# Patient Record
Sex: FEMALE | Race: OTHER | Employment: UNEMPLOYED | URBAN - METROPOLITAN AREA
[De-identification: names, ages, dates, MRNs, and addresses within clinical notes are randomized per-mention and may not be internally consistent; named-entity substitution may affect disease eponyms.]

---

## 2017-01-06 ENCOUNTER — TELEPHONE (OUTPATIENT)
Dept: NEUROLOGY | Facility: CLINIC | Age: 57
End: 2017-01-06

## 2017-01-06 NOTE — TELEPHONE ENCOUNTER
Patient stated that for the past week she has been having a migraine on/off, varying in severity. States she took Sumatriptan 100 mg, 1 tablet yesterday which helped a little bit.   She is also taking Nortriptyline 25 mg 1 capsule nightly and Topamax 50 mg

## 2017-01-08 ENCOUNTER — HOSPITAL ENCOUNTER (OUTPATIENT)
Age: 57
Discharge: HOME | End: 2017-01-08
Attending: EMERGENCY MEDICINE
Payer: MEDICAID

## 2017-01-08 VITALS
TEMPERATURE: 98 F | HEART RATE: 92 BPM | BODY MASS INDEX: 31.01 KG/M2 | WEIGHT: 175 LBS | OXYGEN SATURATION: 100 % | SYSTOLIC BLOOD PRESSURE: 146 MMHG | HEIGHT: 63 IN | DIASTOLIC BLOOD PRESSURE: 40 MMHG | RESPIRATION RATE: 16 BRPM

## 2017-01-08 DIAGNOSIS — J06.9 VIRAL UPPER RESPIRATORY TRACT INFECTION: Primary | ICD-10-CM

## 2017-01-08 PROCEDURE — 99212 OFFICE O/P EST SF 10 MIN: CPT

## 2017-01-08 PROCEDURE — 99213 OFFICE O/P EST LOW 20 MIN: CPT

## 2017-01-08 RX ORDER — FLUTICASONE PROPIONATE 50 MCG
1-2 SPRAY, SUSPENSION (ML) NASAL DAILY
Qty: 16 G | Refills: 0 | Status: SHIPPED | OUTPATIENT
Start: 2017-01-08 | End: 2017-01-11

## 2017-01-08 NOTE — ED PROVIDER NOTES
[facility]    History   Patient presents with:  Cough/URI    Stated Complaint: body aches, headache    HPI    Patient complains of feeling like she has a virus for the last for 5 days.   There is been a mi MG Oral Cap,  Take 1 capsule (25 mg total) by mouth nightly.    fenofibrate micronized 134 MG Oral Cap,  Take 1 capsule (134 mg total) by mouth daily with breakfast.   SUMAtriptan Succinate 100 MG Oral Tab,  TAKE 1 TABLET BY MOUTH PER  DAY   AS NEEDED  FOR Oral   SpO2 01/08/17 1519 100 %   O2 Device --        Current:/40 mmHg  Pulse 92  Temp(Src) 98 °F (36.7 °C) (Oral)  Resp 16  Ht 160 cm (5' 3\")  Wt 79.379 kg  BMI 31.01 kg/m2  SpO2 100%        Physical Exam   Constitutional: She is oriented to person [id number]  [phone]    In 5 days  Follow up with your doctor is important, For follow-up      Medications Prescribed:  Current Discharge Medication List    START taking these medications    Fluticasone Propionate 50 MCG/ACT Nasal Suspension  1-2 sprays by

## 2017-01-11 ENCOUNTER — OFFICE VISIT (OUTPATIENT)
Dept: NEUROLOGY | Facility: CLINIC | Age: 57
End: 2017-01-11

## 2017-01-11 ENCOUNTER — APPOINTMENT (OUTPATIENT)
Dept: LAB | Facility: HOSPITAL | Age: 57
End: 2017-01-11
Attending: Other
Payer: MEDICAID

## 2017-01-11 ENCOUNTER — OFFICE VISIT (OUTPATIENT)
Dept: INTERNAL MEDICINE CLINIC | Facility: CLINIC | Age: 57
End: 2017-01-11

## 2017-01-11 VITALS
RESPIRATION RATE: 20 BRPM | HEART RATE: 85 BPM | DIASTOLIC BLOOD PRESSURE: 76 MMHG | SYSTOLIC BLOOD PRESSURE: 113 MMHG | HEIGHT: 64 IN | BODY MASS INDEX: 31.58 KG/M2 | TEMPERATURE: 98 F | WEIGHT: 185 LBS

## 2017-01-11 VITALS
RESPIRATION RATE: 16 BRPM | HEART RATE: 72 BPM | SYSTOLIC BLOOD PRESSURE: 130 MMHG | DIASTOLIC BLOOD PRESSURE: 76 MMHG | WEIGHT: 175 LBS | BODY MASS INDEX: 31 KG/M2

## 2017-01-11 DIAGNOSIS — G43.709 CHRONIC MIGRAINE WITHOUT AURA WITHOUT STATUS MIGRAINOSUS, NOT INTRACTABLE: ICD-10-CM

## 2017-01-11 DIAGNOSIS — E11.8 TYPE 2 DIABETES MELLITUS WITH COMPLICATION, WITHOUT LONG-TERM CURRENT USE OF INSULIN (HCC): Primary | ICD-10-CM

## 2017-01-11 DIAGNOSIS — G43.709 CHRONIC MIGRAINE WITHOUT AURA WITHOUT STATUS MIGRAINOSUS, NOT INTRACTABLE: Primary | ICD-10-CM

## 2017-01-11 LAB — ERYTHROCYTE [SEDIMENTATION RATE] IN BLOOD: 41 MM/HR (ref 0–30)

## 2017-01-11 PROCEDURE — 99213 OFFICE O/P EST LOW 20 MIN: CPT | Performed by: OTHER

## 2017-01-11 PROCEDURE — 36415 COLL VENOUS BLD VENIPUNCTURE: CPT

## 2017-01-11 PROCEDURE — 85652 RBC SED RATE AUTOMATED: CPT

## 2017-01-11 PROCEDURE — 99214 OFFICE O/P EST MOD 30 MIN: CPT | Performed by: INTERNAL MEDICINE

## 2017-01-11 PROCEDURE — 99212 OFFICE O/P EST SF 10 MIN: CPT | Performed by: INTERNAL MEDICINE

## 2017-01-11 RX ORDER — OMEPRAZOLE 40 MG/1
CAPSULE, DELAYED RELEASE ORAL
Qty: 90 CAPSULE | Refills: 0 | Status: SHIPPED | OUTPATIENT
Start: 2017-01-11 | End: 2017-03-15

## 2017-01-11 RX ORDER — SUMATRIPTAN 100 MG/1
TABLET, FILM COATED ORAL
Qty: 9 TABLET | Refills: 12 | Status: SHIPPED | OUTPATIENT
Start: 2017-01-11 | End: 2017-04-04

## 2017-01-11 RX ORDER — TOPIRAMATE 50 MG/1
50 TABLET, FILM COATED ORAL 2 TIMES DAILY
Qty: 180 TABLET | Refills: 3 | Status: SHIPPED | OUTPATIENT
Start: 2017-01-11 | End: 2017-02-12

## 2017-01-11 RX ORDER — FLUTICASONE PROPIONATE 50 MCG
1-2 SPRAY, SUSPENSION (ML) NASAL DAILY
Qty: 16 G | Refills: 0 | Status: SHIPPED | OUTPATIENT
Start: 2017-01-11 | End: 2017-02-10

## 2017-01-11 RX ORDER — NORTRIPTYLINE HYDROCHLORIDE 25 MG/1
25 CAPSULE ORAL NIGHTLY
Qty: 90 CAPSULE | Refills: 3 | Status: SHIPPED | OUTPATIENT
Start: 2017-01-11 | End: 2017-02-21

## 2017-01-12 NOTE — PROGRESS NOTES
HPI:    Patient ID: Neri Atkins is a 64year old female. Medication Request  Associated symptoms include arthralgias (knees and  occasionaly  hands and    occasionaly  body ache ), congestion and headaches (seeing  neurologist ).  Pertinent negatives Succinate 100 MG Oral Tab TAKE 1 TABLET BY MOUTH PER  DAY   AS NEEDED  FOR  HEADACHE  MAXIMUM   2 PER  24 hours Disp: 9 tablet Rfl: 12   Omeprazole 40 MG Oral Capsule Delayed Release TAKE 1 CAPSULE BY MOUTH 30-60 MIN BEFORE BREAKFAST Disp: 90 capsule Rfl: membrane, external ear and ear canal normal.   Nose: Nose normal. No mucosal edema, rhinorrhea or nose lacerations. Right sinus exhibits no maxillary sinus tenderness and no frontal sinus tenderness.  Left sinus exhibits no maxillary sinus tenderness and no importance of low CHO diet,-1800 keegan ADA- Diabetic diet  Education   Encouraged diet/exercise   Encouraged SBGM   Eye exam and foot exam yearly  Take medications as perscribed  Directions and side effects of medications discussed w/pt  Pt verbalized unders

## 2017-01-27 ENCOUNTER — HOSPITAL ENCOUNTER (OUTPATIENT)
Age: 57
Discharge: HOME | End: 2017-01-27
Attending: EMERGENCY MEDICINE
Payer: MEDICAID

## 2017-01-27 VITALS
WEIGHT: 175 LBS | HEIGHT: 64 IN | TEMPERATURE: 98 F | HEART RATE: 84 BPM | SYSTOLIC BLOOD PRESSURE: 144 MMHG | OXYGEN SATURATION: 100 % | RESPIRATION RATE: 18 BRPM | DIASTOLIC BLOOD PRESSURE: 67 MMHG | BODY MASS INDEX: 29.88 KG/M2

## 2017-01-27 DIAGNOSIS — J11.1 INFLUENZA-LIKE ILLNESS: Primary | ICD-10-CM

## 2017-01-27 PROCEDURE — 99213 OFFICE O/P EST LOW 20 MIN: CPT

## 2017-01-27 PROCEDURE — 99214 OFFICE O/P EST MOD 30 MIN: CPT

## 2017-01-27 RX ORDER — FLUTICASONE PROPIONATE 50 MCG
2 SPRAY, SUSPENSION (ML) NASAL DAILY
Qty: 16 G | Refills: 0 | Status: SHIPPED | OUTPATIENT
Start: 2017-01-27 | End: 2017-01-31

## 2017-01-27 RX ORDER — OSELTAMIVIR PHOSPHATE 75 MG/1
75 CAPSULE ORAL 2 TIMES DAILY
Qty: 10 CAPSULE | Refills: 0 | Status: SHIPPED | OUTPATIENT
Start: 2017-01-27 | End: 2017-02-10 | Stop reason: ALTCHOICE

## 2017-01-27 RX ORDER — BENZONATATE 100 MG/1
100 CAPSULE ORAL 3 TIMES DAILY PRN
Qty: 20 CAPSULE | Refills: 0 | Status: SHIPPED | OUTPATIENT
Start: 2017-01-27 | End: 2017-02-16

## 2017-01-28 NOTE — ED PROVIDER NOTES
Patient Seen in: [facility]    History   Patient presents with:  Cough/URI    Stated Complaint: cough    HPI    60-year-old female patient presents complaining of 2 days of cough congestion associated fever chills body ac Oral Tab,  Take 1 tablet (10 mg total) by mouth nightly.    fenofibrate micronized 134 MG Oral Cap,  Take 1 capsule (134 mg total) by mouth daily with breakfast.   Blood Glucose Monitoring Suppl (TRUETRACK BLOOD GLUCOSE) W/DEVICE Does not apply Kit,  To jaren PERRL, clear oropharynx  Heart: Regular rate and rhythm, no murmur  Lungs: Normal respiratory effort, clear lungs  Abdomen: Soft,  nondistended, non tender  : No CVA tenderness  Skin: No rash, no lesions  Musculoskeletal: Symmetric, no deformity, no inju

## 2017-01-28 NOTE — ED INITIAL ASSESSMENT (HPI)
REPORTS COUGH, BODY ACHES AND VOICE HOARSENESS X 3 DAYS. REPORTS COUGH IS PRODUCTIVE WITH YELLOW SPUTUM. DENIES FEVERS AT HOME. REPORTS RECEIVING FLU IMMUNIZATION THIS YEAR.

## 2017-01-30 ENCOUNTER — TELEPHONE (OUTPATIENT)
Dept: INTERNAL MEDICINE CLINIC | Facility: CLINIC | Age: 57
End: 2017-01-30

## 2017-01-30 NOTE — TELEPHONE ENCOUNTER
Patient requesting an appt. To see Dr. Jung Gains this will be a follow up from    Patient states she can come to see her tomorrow any time tomorrow as long as she is seen tomorrow   Or it can be the last appt.  Of her day tomorrow 1/31/2017  Please call

## 2017-01-30 NOTE — TELEPHONE ENCOUNTER
Pt states seen in [facility] yesterday and taking Hailey-flu and benzonatate.   Pt states she is feeling a little better, she never had a temp and still does not have one, body aches persist, she has clear rhinorrhea and watery eyes, mild headache, productive cough w

## 2017-01-31 ENCOUNTER — OFFICE VISIT (OUTPATIENT)
Dept: INTERNAL MEDICINE CLINIC | Facility: CLINIC | Age: 57
End: 2017-01-31

## 2017-01-31 VITALS
HEART RATE: 82 BPM | OXYGEN SATURATION: 99 % | DIASTOLIC BLOOD PRESSURE: 77 MMHG | BODY MASS INDEX: 31.24 KG/M2 | TEMPERATURE: 98 F | HEIGHT: 64 IN | SYSTOLIC BLOOD PRESSURE: 111 MMHG | WEIGHT: 183 LBS | RESPIRATION RATE: 20 BRPM

## 2017-01-31 DIAGNOSIS — R05.9 COUGH: Primary | ICD-10-CM

## 2017-01-31 PROCEDURE — 99214 OFFICE O/P EST MOD 30 MIN: CPT | Performed by: INTERNAL MEDICINE

## 2017-01-31 PROCEDURE — 99212 OFFICE O/P EST SF 10 MIN: CPT | Performed by: INTERNAL MEDICINE

## 2017-01-31 RX ORDER — AZITHROMYCIN 250 MG/1
TABLET, FILM COATED ORAL
Qty: 6 TABLET | Refills: 0 | Status: SHIPPED | OUTPATIENT
Start: 2017-01-31 | End: 2017-02-10 | Stop reason: ALTCHOICE

## 2017-01-31 NOTE — PROGRESS NOTES
HPI:    Patient ID: Fabian Brooks is a 64year old female. Cough  The current episode started in the past 7 days. The problem has been gradually improving. The cough is non-productive.  Associated symptoms include chills, nasal congestion, postnasal dr times daily. Disp: 10 capsule Rfl: 0   benzonatate 100 MG Oral Cap Take 1 capsule (100 mg total) by mouth 3 (three) times daily as needed for cough.  Disp: 20 capsule Rfl: 0   topiramate 50 MG Oral Tab Take 1 tablet (50 mg total) by mouth 2 (two) times hayder appears well-developed and well-nourished. No distress. HENT:   Head: Normocephalic and atraumatic.    Right Ear: Tympanic membrane, external ear and ear canal normal.   Left Ear: Tympanic membrane, external ear and ear canal normal.   Nose: Nose normal. Oral Tab 6 tablet 0      Sig: Take two tablets by mouth today, then one daily until completed for 5 days. DM-Guaifenesin ER (MUCUS-DM)  MG Oral Tablet 12 Hr 14 tablet 0      Sig: Take 1 tablet by mouth every 12 (twelve) hours.            Imaging

## 2017-01-31 NOTE — TELEPHONE ENCOUNTER
Advised patient of Dr. Phillips More note. Patient verbalized understanding. Appointment made for tomorrow 1/31/17 at 4pm with Dr Gabriella Jiménez in [address] [place] [address]. Phone room will add patient for tomorrow.

## 2017-02-07 ENCOUNTER — TELEPHONE (OUTPATIENT)
Dept: NEUROLOGY | Facility: CLINIC | Age: 57
End: 2017-02-07

## 2017-02-07 NOTE — TELEPHONE ENCOUNTER
Patient stated that she has had a migraine for the past three days. States the pain is on the left side of her head and her left eye feels a bit heavy. States the frequency of her migraines and headaches seem to have increased since last visit.   She is cu

## 2017-02-07 NOTE — TELEPHONE ENCOUNTER
Patient was informed of Dr. August Clinton comment that ESR is still elevated, but improved from the last time, and that she can have office visit tomorrow or Friday. Patient would like to schedule the visit, and her call was transferred to our .

## 2017-02-07 NOTE — TELEPHONE ENCOUNTER
LMTCB. (See Dr. Kunal Asher comment regarding blood work, and offering an office visit, in the [facility])

## 2017-02-10 ENCOUNTER — TELEPHONE (OUTPATIENT)
Dept: OPHTHALMOLOGY | Facility: CLINIC | Age: 57
End: 2017-02-10

## 2017-02-10 ENCOUNTER — OFFICE VISIT (OUTPATIENT)
Dept: NEUROLOGY | Facility: CLINIC | Age: 57
End: 2017-02-10

## 2017-02-10 VITALS
RESPIRATION RATE: 16 BRPM | SYSTOLIC BLOOD PRESSURE: 108 MMHG | HEART RATE: 76 BPM | WEIGHT: 180 LBS | DIASTOLIC BLOOD PRESSURE: 70 MMHG | HEIGHT: 64 IN | BODY MASS INDEX: 30.73 KG/M2

## 2017-02-10 DIAGNOSIS — G43.709 CHRONIC MIGRAINE WITHOUT AURA WITHOUT STATUS MIGRAINOSUS, NOT INTRACTABLE: Primary | ICD-10-CM

## 2017-02-10 PROCEDURE — 99213 OFFICE O/P EST LOW 20 MIN: CPT | Performed by: OTHER

## 2017-02-10 NOTE — TELEPHONE ENCOUNTER
Pt called. She saw her neurologist this morning for migraines and her DrDamián recommended she f/up with RJKATEY for left eye blurry vision sooner then May, your next available. Please advise.

## 2017-02-10 NOTE — TELEPHONE ENCOUNTER
Spoke with patient. She states that she saw her neurologist today and he told her to follow up with Dr. Ramos Dean sooner than May.  Scheduled an EE on 3/7/17 at 2;45pm with Dr. Ramos Dean, Also scheduled patient's  Shanika Jerry for his diabetic EE on 5/17/17

## 2017-02-12 RX ORDER — TOPIRAMATE 50 MG/1
75 TABLET, FILM COATED ORAL 2 TIMES DAILY
Qty: 90 TABLET | Refills: 3 | Status: SHIPPED | OUTPATIENT
Start: 2017-02-12 | End: 2017-05-02

## 2017-02-16 ENCOUNTER — HOSPITAL ENCOUNTER (OUTPATIENT)
Age: 57
Discharge: HOME | End: 2017-02-16
Attending: EMERGENCY MEDICINE
Payer: MEDICAID

## 2017-02-16 VITALS
DIASTOLIC BLOOD PRESSURE: 52 MMHG | BODY MASS INDEX: 30.73 KG/M2 | OXYGEN SATURATION: 100 % | HEIGHT: 64 IN | WEIGHT: 180 LBS | HEART RATE: 81 BPM | SYSTOLIC BLOOD PRESSURE: 114 MMHG | RESPIRATION RATE: 18 BRPM | TEMPERATURE: 97 F

## 2017-02-16 DIAGNOSIS — J02.9 ACUTE VIRAL PHARYNGITIS: Primary | ICD-10-CM

## 2017-02-16 LAB — S PYO AG THROAT QL: NEGATIVE

## 2017-02-16 PROCEDURE — 99213 OFFICE O/P EST LOW 20 MIN: CPT

## 2017-02-16 PROCEDURE — 87430 STREP A AG IA: CPT

## 2017-02-21 ENCOUNTER — OFFICE VISIT (OUTPATIENT)
Dept: INTERNAL MEDICINE CLINIC | Facility: CLINIC | Age: 57
End: 2017-02-21

## 2017-02-21 VITALS
DIASTOLIC BLOOD PRESSURE: 76 MMHG | SYSTOLIC BLOOD PRESSURE: 118 MMHG | RESPIRATION RATE: 20 BRPM | HEIGHT: 64 IN | TEMPERATURE: 99 F | WEIGHT: 182 LBS | BODY MASS INDEX: 31.07 KG/M2 | HEART RATE: 73 BPM

## 2017-02-21 DIAGNOSIS — J30.1 NON-SEASONAL ALLERGIC RHINITIS DUE TO POLLEN: ICD-10-CM

## 2017-02-21 DIAGNOSIS — M25.569 KNEE PAIN, UNSPECIFIED CHRONICITY, UNSPECIFIED LATERALITY: ICD-10-CM

## 2017-02-21 DIAGNOSIS — R05.9 COUGH: ICD-10-CM

## 2017-02-21 DIAGNOSIS — I47.1 SVT (SUPRAVENTRICULAR TACHYCARDIA) (HCC): Primary | ICD-10-CM

## 2017-02-21 DIAGNOSIS — L98.9 SKIN DISORDER: ICD-10-CM

## 2017-02-21 PROCEDURE — 99214 OFFICE O/P EST MOD 30 MIN: CPT | Performed by: INTERNAL MEDICINE

## 2017-02-21 PROCEDURE — 99212 OFFICE O/P EST SF 10 MIN: CPT | Performed by: INTERNAL MEDICINE

## 2017-02-21 RX ORDER — MELOXICAM 15 MG/1
15 TABLET ORAL DAILY
Qty: 90 TABLET | Refills: 0 | Status: SHIPPED | OUTPATIENT
Start: 2017-02-21 | End: 2017-04-24

## 2017-02-21 RX ORDER — NORTRIPTYLINE HYDROCHLORIDE 25 MG/1
25 CAPSULE ORAL NIGHTLY
Qty: 90 CAPSULE | Refills: 3 | Status: SHIPPED | OUTPATIENT
Start: 2017-02-21 | End: 2017-05-22

## 2017-02-21 RX ORDER — ACYCLOVIR 50 MG/G
CREAM TOPICAL
Qty: 1 TUBE | Refills: 0 | Status: SHIPPED | OUTPATIENT
Start: 2017-02-21 | End: 2017-08-31

## 2017-02-21 NOTE — ED PROVIDER NOTES
Patient Seen in: [facility]    History   Patient presents with:  Sore Throat    Stated Complaint: SORE THROAT    HPI    65 yo female with three days of sore throat. No fever. No URI symptoms.      Past Medical History   Lisa Jasmine twice a day. CALCIUM CARBONATE-VITAMIN D 600-400 MG-UNIT Oral Tab,  TAKE ONE TABLET BY MOUTH EVERY DAY   Meloxicam 15 MG Oral Tab,  Take 1 tablet (15 mg total) by mouth daily. ibuprofen (MOTRIN) 600 MG Oral Tab,  as needed.        Family History   Probl Lymphadenopathy:     She has no cervical adenopathy. Neurological: She is alert and oriented to person, place, and time. Skin: Skin is warm and dry. Psychiatric: She has a normal mood and affect.  Her behavior is normal.   Nursing note and vitals re

## 2017-02-22 ENCOUNTER — OFFICE VISIT (OUTPATIENT)
Dept: RHEUMATOLOGY | Facility: CLINIC | Age: 57
End: 2017-02-22

## 2017-02-22 VITALS
HEART RATE: 85 BPM | SYSTOLIC BLOOD PRESSURE: 90 MMHG | WEIGHT: 182 LBS | DIASTOLIC BLOOD PRESSURE: 66 MMHG | HEIGHT: 64 IN | BODY MASS INDEX: 31.07 KG/M2

## 2017-02-22 DIAGNOSIS — Z51.81 THERAPEUTIC DRUG MONITORING: ICD-10-CM

## 2017-02-22 DIAGNOSIS — M13.0 POLYARTHRITIS: Primary | ICD-10-CM

## 2017-02-22 DIAGNOSIS — R70.0 ELEVATED SED RATE: ICD-10-CM

## 2017-02-22 PROCEDURE — 99212 OFFICE O/P EST SF 10 MIN: CPT | Performed by: INTERNAL MEDICINE

## 2017-02-22 PROCEDURE — 99214 OFFICE O/P EST MOD 30 MIN: CPT | Performed by: INTERNAL MEDICINE

## 2017-02-22 RX ORDER — HYDROXYCHLOROQUINE SULFATE 200 MG/1
200 TABLET, FILM COATED ORAL 2 TIMES DAILY
Qty: 60 TABLET | Refills: 1 | Status: SHIPPED | OUTPATIENT
Start: 2017-02-22 | End: 2017-04-24

## 2017-02-22 NOTE — PATIENT INSTRUCTIONS
1. Try tylenol arthritis 650mg every 8 hours -   2. Cont. meloxicam 15mg a dya   3. Trial  hydroxychlroquine 200mg twice a day  - x 2months   4. Return to clinic in 2months  5. Glucosamine chondroitin - for righ tknee   6.  Knee brace for right knee

## 2017-02-22 NOTE — PROGRESS NOTES
Gio Encinas is a 64year old female who presents for Patient presents with:  Joint Pain  Knee Pain: right knee  . HPI:     She's a pleasasnt 54year old who has joint pian in her hands for 1 year. It's worse in the last 5-6 motnhs.    She is noticiing aches.  She got a migraine after it as well. She is on melxoicam 15mg a day. She received synovis injection in her left knee 2 weeks before. With Michele vera. She gets a headaches with the shot.    Her joint pains nweren't too bad on meloxicam 15mg a da Omeprazole 40 MG Oral Capsule Delayed Release TAKE 1 CAPSULE BY MOUTH 30-60 MIN BEFORE BREAKFAST Disp: 90 capsule Rfl: 0   Levothyroxine Sodium 50 MCG Oral Tab TAKE 1 TABLET BY MOUTH BEFORE BREAKFAST.  Disp: 90 tablet Rfl: 1   loratadine 10 MG Oral Tab 1 housewife, 3 daughters,        REVIEW OF SYSTEMS:   Review Of Systems:  Fatigue  Constitutional:No fever, no change in weight or appetitie  Derm: No rashes, no oral ulcers, no alopecia, no photosensitivity, no psoriasis, gets red on her face - not sure if 10/8/2015 4/29/2015 2/9/2015   Glucose      65 - 99 mg/dL 123 (H)  99   Sodium      136 - 144 mmol/L 139  137   Potassium      3.3 - 5.1 mmol/L 4.3  4.3   Chloride      95 - 110 mmol/L 106  101   CARBON DIOXIDE (P)      22 - 32 mmol/L 25  29   BLOOD UREA N URINE      1.002 - 1.035 1.016  1.016   URINE PH      5.0 - 8.0 6.0  6.0   PROTEIN, URINE, QUAL. Negative mg/dL Negative  Negative   GLUCOSE, URINE, QUAL.       Negative mg/dL Negative  Negative   KETONES, URINE      Negative mg/dL Negative  Negative Chronic periosteal thickening mid shafts of the second  through fifth metatarsals may be chronic sequela from previous stress  Injury/fracture.     9/10/2014 - mri brain -   Frontotemporal atrophy greater than expected for age , no white matter signal hugo 37.0 G/DL 31.4 (L)   RED CELL DISTRIBUTION WIDTH      11.0 - 15.0 % 14.8   Platelet Count      794 - 400 K/   MEAN PLATELET VOLUME      7.4 - 10.3 FL 10.6 (H)   Neutrophils %       54   Lymphocytes %       36   Monocytes %       7   Eosinophils % subtle patchy edema involving the lateral     margin of the distal ulna. 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  The ulna positioning is neutral. No obvious TFC     tear on a non-dedicated study.        2.  Mild degenerative subc 650mg every 8 hours -   2. Cont. meloxicam 15mg a dya   3. Trial  hydroxychlroquine 200mg twice a day  - x 2months   4. Return to clinic in 2months  5. Glucosamine chondroitin - for righ tknee   6.  Knee brace for right knee     Adin Rich MD  2/22

## 2017-02-22 NOTE — PROGRESS NOTES
HPI:    Patient ID: Josette Lizarraga is a 64year old female. Skin  This is a new (left  forearm -itchy ) problem. The current episode started in the past 7 days. The problem has been unchanged. Associated symptoms include a rash.  Pertinent negatives inc acetonide 0.1 % External Cream Apply   Thin layer    Twice  Daily  For 1-2 weeks Disp: 60 g Rfl: 0   Acyclovir 5 % External Cream apply cream  On affected  Area  every  3  Hr  For 5-  Days Disp: 1 Tube Rfl: 0   topiramate 50 MG Oral Tab Take 1.5 tablets (7 external ear and ear canal normal.   Left Ear: Tympanic membrane, external ear and ear canal normal.   Nose: Nose normal. Right sinus exhibits no maxillary sinus tenderness and no frontal sinus tenderness.  Left sinus exhibits no maxillary sinus tenderness have postnasal drainage       No orders of the defined types were placed in this encounter. Meds This Visit:  Signed Prescriptions Disp Refills    Meloxicam 15 MG Oral Tab 90 tablet 0      Sig: Take 1 tablet (15 mg total) by mouth daily.  With  Food

## 2017-03-02 ENCOUNTER — TELEPHONE (OUTPATIENT)
Dept: OPHTHALMOLOGY | Facility: CLINIC | Age: 57
End: 2017-03-02

## 2017-03-02 DIAGNOSIS — H53.8 BLURRED VISION, LEFT EYE: Primary | ICD-10-CM

## 2017-03-02 DIAGNOSIS — E11.8 TYPE 2 DIABETES MELLITUS WITH COMPLICATION, WITHOUT LONG-TERM CURRENT USE OF INSULIN (HCC): ICD-10-CM

## 2017-03-02 NOTE — TELEPHONE ENCOUNTER
Patient has an appointment scheduled with Dr. Jolly Salazar on 3/7/17 and they need a referral for the appointment. Please do not hesitate to call me if you have any questions about this. Thank you, Alberto Cabrera at Dr. Duane Cabrera office [phone].    She is being seen fo

## 2017-03-07 ENCOUNTER — OFFICE VISIT (OUTPATIENT)
Dept: OPHTHALMOLOGY | Facility: CLINIC | Age: 57
End: 2017-03-07

## 2017-03-07 DIAGNOSIS — H25.13 AGE-RELATED NUCLEAR CATARACT OF BOTH EYES: ICD-10-CM

## 2017-03-07 DIAGNOSIS — E11.9 DIABETES MELLITUS TYPE 2 WITHOUT RETINOPATHY (HCC): Primary | ICD-10-CM

## 2017-03-07 DIAGNOSIS — H43.391 FLOATERS, RIGHT: ICD-10-CM

## 2017-03-07 PROCEDURE — 99212 OFFICE O/P EST SF 10 MIN: CPT | Performed by: OPHTHALMOLOGY

## 2017-03-07 PROCEDURE — 99243 OFF/OP CNSLTJ NEW/EST LOW 30: CPT | Performed by: OPHTHALMOLOGY

## 2017-03-07 PROCEDURE — 92015 DETERMINE REFRACTIVE STATE: CPT | Performed by: OPHTHALMOLOGY

## 2017-03-07 NOTE — PATIENT INSTRUCTIONS
Diabetes mellitus type 2 without retinopathy ([facility] [address]  Diabetes type II: no background of retinopathy, no signs of neovascularization noted. Discussed ocular and systemic benefits of blood sugar control.   Diagnosis and treatment discussed in detail with driss

## 2017-03-07 NOTE — ASSESSMENT & PLAN NOTE
Discussed with patient that cataract in the right eye is advanced enough at this time to consider surgery; it would be patient's choice. Discussed options such as surgery or change of glasses RX.   Discussed surgical risks, benefits, alternatives and recov

## 2017-03-07 NOTE — PROGRESS NOTES
Amanda Cartwright is a 64year old female.     HPI:     HPI     Diabetic Eye Exam    Additional comments: Pt has been a diabetic for 5 years  5 years on pills/ 0 years on Insulin   Pt checks her BS 1x a day  Pt's last blood sugar was  110  Last HA1C was 6.9 o Medications:    Current Outpatient Prescriptions:  Hydroxychloroquine Sulfate 200 MG Oral Tab Take 1 tablet (200 mg total) by mouth 2 (two) times daily.  Disp: 60 tablet Rfl: 1   Elastic Bandages & Supports (KNEE BRACE) Does not apply Misc Right knee ONE TABLET BY MOUTH EVERY DAY Disp: 30 tablet Rfl: 2   ibuprofen (MOTRIN) 600 MG Oral Tab as needed.  Disp:  Rfl: 0       Allergies:    Codeine                 Nausea only, Dizziness  Morphine                Nausea only, Dizziness  Penicillins             H 020 +1.75       Type:  Flat top bifocal      Manifest Refraction      Sphere Cylinder Axis Dist Add Near   Right [place] +1.00 160 20/40- +2.25 20/30   Left +0.50 +1.00 020 20/20 +2.25 20/20         Final Rx      Sphere Cylinder Axis Add   Right [place] +1.00

## 2017-03-20 NOTE — TELEPHONE ENCOUNTER
Please advise on refill request    Refill Protocol Appointment Criteria  · Appointment scheduled in the past 6 months or in the next 3 months  Recent Visits       Provider Department Primary Dx    3 weeks ago Liat Gill MD [facility], [facility], [address]

## 2017-03-24 RX ORDER — OMEPRAZOLE 40 MG/1
CAPSULE, DELAYED RELEASE ORAL
Qty: 90 CAPSULE | Refills: 0 | Status: SHIPPED | OUTPATIENT
Start: 2017-03-24 | End: 2017-04-04

## 2017-03-27 ENCOUNTER — TELEPHONE (OUTPATIENT)
Dept: INTERNAL MEDICINE CLINIC | Facility: CLINIC | Age: 57
End: 2017-03-27

## 2017-03-27 DIAGNOSIS — G43.009 MIGRAINE WITHOUT AURA AND WITHOUT STATUS MIGRAINOSUS, NOT INTRACTABLE: Primary | ICD-10-CM

## 2017-04-04 ENCOUNTER — OFFICE VISIT (OUTPATIENT)
Dept: INTERNAL MEDICINE CLINIC | Facility: CLINIC | Age: 57
End: 2017-04-04

## 2017-04-04 VITALS
SYSTOLIC BLOOD PRESSURE: 100 MMHG | TEMPERATURE: 98 F | DIASTOLIC BLOOD PRESSURE: 67 MMHG | HEIGHT: 64 IN | BODY MASS INDEX: 31.07 KG/M2 | RESPIRATION RATE: 20 BRPM | WEIGHT: 182 LBS | HEART RATE: 76 BPM

## 2017-04-04 DIAGNOSIS — E11.9 ENCOUNTER FOR DIABETIC FOOT EXAM (HCC): Primary | ICD-10-CM

## 2017-04-04 DIAGNOSIS — M79.671 RIGHT FOOT PAIN: ICD-10-CM

## 2017-04-04 DIAGNOSIS — E78.2 MIXED HYPERLIPIDEMIA: ICD-10-CM

## 2017-04-04 DIAGNOSIS — M54.30 SCIATIC LEG PAIN: ICD-10-CM

## 2017-04-04 PROCEDURE — 99212 OFFICE O/P EST SF 10 MIN: CPT | Performed by: INTERNAL MEDICINE

## 2017-04-04 PROCEDURE — 99214 OFFICE O/P EST MOD 30 MIN: CPT | Performed by: INTERNAL MEDICINE

## 2017-04-04 RX ORDER — SUMATRIPTAN 100 MG/1
TABLET, FILM COATED ORAL
Qty: 9 TABLET | Refills: 3 | Status: SHIPPED | OUTPATIENT
Start: 2017-04-04 | End: 2017-05-02

## 2017-04-04 RX ORDER — SIMVASTATIN 10 MG
10 TABLET ORAL NIGHTLY
Qty: 90 TABLET | Refills: 3 | Status: SHIPPED | OUTPATIENT
Start: 2017-04-04 | End: 2017-08-21 | Stop reason: SINTOL

## 2017-04-04 RX ORDER — OMEPRAZOLE 40 MG/1
CAPSULE, DELAYED RELEASE ORAL
Qty: 90 CAPSULE | Refills: 3 | Status: SHIPPED | OUTPATIENT
Start: 2017-04-04 | End: 2017-06-07

## 2017-04-04 RX ORDER — CYCLOBENZAPRINE HCL 5 MG
5 TABLET ORAL NIGHTLY PRN
Qty: 30 TABLET | Refills: 0 | Status: SHIPPED | OUTPATIENT
Start: 2017-04-04 | End: 2017-05-02

## 2017-04-04 NOTE — PROGRESS NOTES
HPI:    Patient ID: Symone Prince is a 64year old female. Hyperlipidemia  This is a chronic problem. The current episode started more than 1 year ago. The problem is controlled. Factors aggravating her hyperlipidemia include fatty foods.  Pertinent ne Cyclobenzaprine HCl (FLEXERIL) 5 MG Oral Tab Take 1 tablet (5 mg total) by mouth nightly as needed for Muscle spasms. Disp: 30 tablet Rfl: 0   Hydroxychloroquine Sulfate 200 MG Oral Tab Take 1 tablet (200 mg total) by mouth 2 (two) times daily.  Disp: 60 Physical Exam   Constitutional: She is oriented to person, place, and time. She appears well-developed and well-nourished. No distress. HENT:   Head: Normocephalic and atraumatic.    Right Ear: Tympanic membrane, external ear and ear canal normal.   Lef and vegetables   · be active advised  walking /exercise as  tolerated  · Counseling on ideal weight  · continue present management   Labs   cpm        Lower back pain    Weight reduction advised pt  Education   Flexeril  1  Tab po qhs prn     Refer to Pt

## 2017-04-05 PROBLEM — M54.30 SCIATIC LEG PAIN: Status: ACTIVE | Noted: 2017-04-05

## 2017-04-18 ENCOUNTER — OFFICE VISIT (OUTPATIENT)
Dept: ORTHOPEDICS CLINIC | Facility: CLINIC | Age: 57
End: 2017-04-18

## 2017-04-18 ENCOUNTER — HOSPITAL ENCOUNTER (OUTPATIENT)
Dept: GENERAL RADIOLOGY | Facility: HOSPITAL | Age: 57
Discharge: HOME | End: 2017-04-18
Attending: ORTHOPAEDIC SURGERY
Payer: MEDICAID

## 2017-04-18 DIAGNOSIS — M25.561 RIGHT KNEE PAIN, UNSPECIFIED CHRONICITY: ICD-10-CM

## 2017-04-18 DIAGNOSIS — M51.36 DDD (DEGENERATIVE DISC DISEASE), LUMBAR: ICD-10-CM

## 2017-04-18 DIAGNOSIS — M17.11 PRIMARY OSTEOARTHRITIS OF RIGHT KNEE: Primary | ICD-10-CM

## 2017-04-18 PROCEDURE — 73564 X-RAY EXAM KNEE 4 OR MORE: CPT

## 2017-04-18 PROCEDURE — 72100 X-RAY EXAM L-S SPINE 2/3 VWS: CPT

## 2017-04-18 PROCEDURE — 99213 OFFICE O/P EST LOW 20 MIN: CPT | Performed by: ORTHOPAEDIC SURGERY

## 2017-04-18 PROCEDURE — 99212 OFFICE O/P EST SF 10 MIN: CPT | Performed by: ORTHOPAEDIC SURGERY

## 2017-04-18 NOTE — PROGRESS NOTES
4/18/2017  Alicia Landaverdeeed  68/1960  64year old   female  Tyree Whitlock MD    HPI:   Patient presents with:  Knee Pain: Right- pt states synvisc injection LOV helped for 3 months.      This is a pleasant 59-year-old female with a chief complaint of right knee  (primary encounter diagnosis)  Ddd (degenerative disc disease), lumbar  Right knee pain, unspecified chronicity    This is a pleasant 77-year-old female with right knee osteoarthritis and degenerative disc disease of the lumbar spine.   Patient

## 2017-04-24 ENCOUNTER — OFFICE VISIT (OUTPATIENT)
Dept: RHEUMATOLOGY | Facility: CLINIC | Age: 57
End: 2017-04-24

## 2017-04-24 VITALS
TEMPERATURE: 98 F | HEART RATE: 80 BPM | HEIGHT: 64 IN | WEIGHT: 182 LBS | SYSTOLIC BLOOD PRESSURE: 101 MMHG | DIASTOLIC BLOOD PRESSURE: 69 MMHG | BODY MASS INDEX: 31.07 KG/M2

## 2017-04-24 DIAGNOSIS — M06.4 UNDIFFERENTIATED INFLAMMATORY POLYARTHRITIS (HCC): ICD-10-CM

## 2017-04-24 DIAGNOSIS — Z51.81 THERAPEUTIC DRUG MONITORING: ICD-10-CM

## 2017-04-24 DIAGNOSIS — R70.0 ELEVATED SED RATE: Primary | ICD-10-CM

## 2017-04-24 PROCEDURE — 99214 OFFICE O/P EST MOD 30 MIN: CPT | Performed by: INTERNAL MEDICINE

## 2017-04-24 PROCEDURE — 99212 OFFICE O/P EST SF 10 MIN: CPT | Performed by: INTERNAL MEDICINE

## 2017-04-24 RX ORDER — HYDROXYCHLOROQUINE SULFATE 200 MG/1
200 TABLET, FILM COATED ORAL 2 TIMES DAILY
Qty: 60 TABLET | Refills: 6 | Status: SHIPPED | OUTPATIENT
Start: 2017-04-24 | End: 2017-09-22

## 2017-04-24 NOTE — PATIENT INSTRUCTIONS
1. tylenol arthritis 650mg every 8 hours -   2. Cont.   hydroxychlroquine 200mg twice a day  - x 2months   4. Return to clinic in 4-6 months. 5. Glucosamine chondroitin - for righ tknee   6. Knee brace for right knee   7. Physical therapy for knee.    8.

## 2017-04-24 NOTE — PROGRESS NOTES
Roma Garcia is a 64year old female who presents for Patient presents with:  Osteoarthritis: hand pain  . HPI:     She's a pleasasnt 54year old who has joint pian in her hands for 1 year. It's worse in the last 5-6 motnhs.    She is noticiing that he She got a migraine after it as well. She is on melxoicam 15mg a day. She received synovis injection in her left knee 2 weeks before. With Reina vera. She gets a headaches with the shot. Her joint pains nweren't too bad on meloxicam 15mg a day.    She 9 tablet Rfl: 3   Cyclobenzaprine HCl (FLEXERIL) 5 MG Oral Tab Take 1 tablet (5 mg total) by mouth nightly as needed for Muscle spasms.  Disp: 30 tablet Rfl: 0   Hydroxychloroquine Sulfate 200 MG Oral Tab Take 1 tablet (200 mg total) by mouth 2 (two) times Type II or unspecified type diabetes mellitus without mention of complication, not stated as uncontrolled    • Acid reflux    • Esophagitis 09-   • Arrhythmia           Past Surgical History    CHOLECYSTECTOMY      HYSTERECTOMY      APPENDECTOMY (36.6 °C)  Ht 5' 4\" (1.626 m)  Wt 182 lb (82.555 kg)  BMI 31.22 kg/m2  HEENT: Clear oropharynx, no oral ulcers, EOM intact, clear sclear, PERRLA, pleasant, no acute distress, no CAD, no neck tendnerness, good ROM,   No rashes  CVS: RRR, no murmurs  RS: CT MEAN CORPUSCULAR HEMOGLOBIN      27.0 - 32.0 PG 25.6 (L)  27.4   MEAN CORPUSCULAR HEMOGLOBIN CO      32.0 - 37.0 G/DL 31.3 (L)  32.9   RED CELL DISTRIBUTION WIDTH      11.0 - 15.0 % 14.4  14.7   Platelet Count      743 - 400 K/  244   MEAN PLATELET 3. 28   GLYCOHEMOGLOBIN (HgA1c) (L)      4.0 - 6.0 % 7.3 (H)  6.4 (H)   SED RATE (ESR) (L)      0 - 30 MM/HR  33 (H)    RYAN SCREEN WITH REFLEX (S)      Negative  Negative    THYROID FUNCTION PROF (TSH)(S)      0.34 - 5.60 uIU/mL 2.61         11/6/2015 - b/l 9. 1   CALCULATED OSMOLALITY      275 - 295 mOsm/kg 291   AST      15 - 41 U/L 30   ALT (SGPT)      14 - 54 U/L 29   ALK PHOSPHATASE (P)      32 - 100 U/L 64   TOTAL BILIRUBIN      0.3 - 1.2 mg/dL 0.6   TOTAL PROTEIN      5.9 - 8.4 g/dL 7.3   Albumin      3 MICROSCOPIC URINALYSIS COMMENT       Completed   HDL Cholesterol       51   CHOLESTEROL (P)      110 - 200 mg/dL 164   TRIGLYCERIDE (P)      1 - 149 mg/dL 188 (H)   CALCULATED LDL      0 - 99 mg/dL 75   CALCULATED NON HDL      <130 mg/dL 113   GLYCOHEMOG titre <40 so negative -   Had elevated sed rate in the past , corey neg, in the past.   Repeat sed rate -  - suspcious for inflammatory arthirits - negative  mri right hand -   -  labs show inflammation - negative RF and CCP  - stay on hcq 200mg bid    - diane

## 2017-05-02 ENCOUNTER — OFFICE VISIT (OUTPATIENT)
Dept: NEUROLOGY | Facility: CLINIC | Age: 57
End: 2017-05-02

## 2017-05-02 VITALS
BODY MASS INDEX: 30.73 KG/M2 | RESPIRATION RATE: 16 BRPM | HEART RATE: 84 BPM | HEIGHT: 64 IN | WEIGHT: 180 LBS | SYSTOLIC BLOOD PRESSURE: 128 MMHG | DIASTOLIC BLOOD PRESSURE: 80 MMHG

## 2017-05-02 DIAGNOSIS — G43.709 CHRONIC MIGRAINE WITHOUT AURA WITHOUT STATUS MIGRAINOSUS, NOT INTRACTABLE: Primary | ICD-10-CM

## 2017-05-02 PROCEDURE — 99213 OFFICE O/P EST LOW 20 MIN: CPT | Performed by: OTHER

## 2017-05-02 RX ORDER — TOPIRAMATE 50 MG/1
75 TABLET, FILM COATED ORAL 2 TIMES DAILY
Qty: 90 TABLET | Refills: 5 | Status: SHIPPED | OUTPATIENT
Start: 2017-05-02 | End: 2017-07-11

## 2017-05-02 RX ORDER — SUMATRIPTAN 100 MG/1
TABLET, FILM COATED ORAL
Qty: 9 TABLET | Refills: 5 | Status: SHIPPED | OUTPATIENT
Start: 2017-05-02 | End: 2017-09-14

## 2017-05-02 NOTE — PROGRESS NOTES
Lori Conner : 1960     HPI:   Patient presents with:  Headache: LOV: 2/10/17 with Dr. Markus Louis. F/U on headaches. Patient states that her headaches are doing much better since increasing topamax to 75 mg BID.  She states that they are down to abou Thin layer    Twice  Daily  For 1-2 weeks Disp: 60 g Rfl: 0   Acyclovir 5 % External Cream apply cream  On affected  Area  every  3  Hr  For 5-  Days Disp: 1 Tube Rfl: 0   MetFORMIN HCl 500 MG Oral Tab Take 1 tablet (500 mg total) by mouth 2 (two) times da Macular degeneration Neg       Social History:  Social History    Marital Status:              Spouse Name:                       Years of Education:                 Number of children:               Social History Main Topics    Smoking Status: Nev extremities,  No Babinski, no hoffmans, no clonus  Coordination: Finger to nose, heel to shin intact bilaterally. Gait: Narrow based, negative Romberg’s sign. Can stand on heels and toes.     ASSESSMENT AND PLAN:     Horacio Benitez 64year old female p

## 2017-05-03 ENCOUNTER — OFFICE VISIT (OUTPATIENT)
Dept: CARDIOLOGY CLINIC | Facility: CLINIC | Age: 57
End: 2017-05-03

## 2017-05-03 VITALS
WEIGHT: 182 LBS | SYSTOLIC BLOOD PRESSURE: 102 MMHG | DIASTOLIC BLOOD PRESSURE: 68 MMHG | HEART RATE: 82 BPM | BODY MASS INDEX: 31.07 KG/M2 | HEIGHT: 64 IN | RESPIRATION RATE: 20 BRPM

## 2017-05-03 DIAGNOSIS — E11.10 TYPE 2 DIABETES MELLITUS WITH KETOACIDOSIS WITHOUT COMA, WITHOUT LONG-TERM CURRENT USE OF INSULIN (HCC): ICD-10-CM

## 2017-05-03 DIAGNOSIS — I10 ESSENTIAL HYPERTENSION: Primary | ICD-10-CM

## 2017-05-03 DIAGNOSIS — Z13.6 SCREENING FOR CARDIOVASCULAR CONDITION: ICD-10-CM

## 2017-05-03 PROCEDURE — 93005 ELECTROCARDIOGRAM TRACING: CPT | Performed by: INTERNAL MEDICINE

## 2017-05-03 PROCEDURE — 99212 OFFICE O/P EST SF 10 MIN: CPT | Performed by: INTERNAL MEDICINE

## 2017-05-03 PROCEDURE — 93000 ELECTROCARDIOGRAM COMPLETE: CPT | Performed by: INTERNAL MEDICINE

## 2017-05-03 PROCEDURE — 99244 OFF/OP CNSLTJ NEW/EST MOD 40: CPT | Performed by: INTERNAL MEDICINE

## 2017-05-03 NOTE — PROGRESS NOTES
Cardiology Consult Note    5/3/2017    London Stephens is a 64year old female. HPI:   This is a 51-year-old female who presents for initial evaluation.   He is to be followed with Dr. Roslyn Lennox last seen in 2013 point history of SVT ablation which was success BEFORE BREAKFAST.  Disp: 90 tablet Rfl: 1   loratadine 10 MG Oral Tab 1  Tab    oncle  Daily for 1-2  Weeks than prn Disp: 30 tablet Rfl: 1   fenofibrate micronized 134 MG Oral Cap Take 1 capsule (134 mg total) by mouth daily with breakfast. Disp: 90 capsul pulse  GI: good BS's,no masses, HSM or tenderness  EXTREMITIES: no cyanosis, clubbing or edema  NEURO:no focal deficits      Assessment  ASSESSMENT AND PLAN:     (I10) Essential hypertension  (primary encounter diagnosis)  Plan: Not on blood pressure lower

## 2017-05-15 ENCOUNTER — LAB ENCOUNTER (OUTPATIENT)
Dept: LAB | Age: 57
End: 2017-05-15
Attending: INTERNAL MEDICINE
Payer: MEDICAID

## 2017-05-15 DIAGNOSIS — R70.0 ELEVATED SED RATE: ICD-10-CM

## 2017-05-15 DIAGNOSIS — M06.4 UNDIFFERENTIATED INFLAMMATORY POLYARTHRITIS (HCC): ICD-10-CM

## 2017-05-15 DIAGNOSIS — E11.8 TYPE 2 DIABETES MELLITUS WITH COMPLICATION, WITHOUT LONG-TERM CURRENT USE OF INSULIN (HCC): ICD-10-CM

## 2017-05-15 PROCEDURE — 82043 UR ALBUMIN QUANTITATIVE: CPT

## 2017-05-15 PROCEDURE — 85652 RBC SED RATE AUTOMATED: CPT

## 2017-05-15 PROCEDURE — 84443 ASSAY THYROID STIM HORMONE: CPT

## 2017-05-15 PROCEDURE — 81001 URINALYSIS AUTO W/SCOPE: CPT

## 2017-05-15 PROCEDURE — 85025 COMPLETE CBC W/AUTO DIFF WBC: CPT

## 2017-05-15 PROCEDURE — 86140 C-REACTIVE PROTEIN: CPT

## 2017-05-15 PROCEDURE — 83036 HEMOGLOBIN GLYCOSYLATED A1C: CPT

## 2017-05-15 PROCEDURE — 80053 COMPREHEN METABOLIC PANEL: CPT

## 2017-05-15 PROCEDURE — 80061 LIPID PANEL: CPT

## 2017-05-15 PROCEDURE — 36415 COLL VENOUS BLD VENIPUNCTURE: CPT

## 2017-05-15 PROCEDURE — 82570 ASSAY OF URINE CREATININE: CPT

## 2017-05-16 ENCOUNTER — TELEPHONE (OUTPATIENT)
Dept: ORTHOPEDICS CLINIC | Facility: CLINIC | Age: 57
End: 2017-05-16

## 2017-06-05 ENCOUNTER — HOSPITAL ENCOUNTER (OUTPATIENT)
Age: 57
Discharge: HOME | End: 2017-06-05
Attending: FAMILY MEDICINE
Payer: MEDICAID

## 2017-06-05 VITALS
DIASTOLIC BLOOD PRESSURE: 57 MMHG | BODY MASS INDEX: 30.73 KG/M2 | HEIGHT: 64 IN | TEMPERATURE: 98 F | RESPIRATION RATE: 12 BRPM | HEART RATE: 79 BPM | WEIGHT: 180 LBS | SYSTOLIC BLOOD PRESSURE: 111 MMHG | OXYGEN SATURATION: 100 %

## 2017-06-05 DIAGNOSIS — S06.0X0A MILD CONCUSSION, WITHOUT LOC, INITIAL ENCOUNTER: Primary | ICD-10-CM

## 2017-06-05 DIAGNOSIS — M54.9 MILD BACK PAIN: ICD-10-CM

## 2017-06-05 PROCEDURE — 99213 OFFICE O/P EST LOW 20 MIN: CPT

## 2017-06-05 NOTE — ED PROVIDER NOTES
Patient Seen in: 5 [facility]    History   Patient presents with:  Fall (musculoskeletal, neurologic)    Stated Complaint: Fall; HA; Back Pain    HPI    Pt is a 63 yo who presents after a fall 2 days ago.  She was watering h 3  Hr  For 5-  Days   MetFORMIN HCl 500 MG Oral Tab,  Take 1 tablet (500 mg total) by mouth 2 (two) times daily with meals.    Levothyroxine Sodium 50 MCG Oral Tab,  TAKE 1 TABLET BY MOUTH BEFORE BREAKFAST.   loratadine 10 MG Oral Tab,  1  Tab    dileep Callejas well-nourished. HENT:   Head: Normocephalic and atraumatic.    Right Ear: External ear normal.   Left Ear: External ear normal.   Nose: Nose normal.   Mouth/Throat: Oropharynx is clear and moist.   Eyes: Conjunctivae and EOM are normal. Pupils are equal,

## 2017-06-05 NOTE — ED INITIAL ASSESSMENT (HPI)
Patient reports falling onto her right side while working in her garden on Saturday. States she struck her head on the ground and has had a headache since. Patient also c/o right shoulder and lower back pain since the fall.    Patient denies loss of consc

## 2017-06-07 ENCOUNTER — OFFICE VISIT (OUTPATIENT)
Dept: INTERNAL MEDICINE CLINIC | Facility: CLINIC | Age: 57
End: 2017-06-07

## 2017-06-07 VITALS
DIASTOLIC BLOOD PRESSURE: 63 MMHG | WEIGHT: 182 LBS | SYSTOLIC BLOOD PRESSURE: 104 MMHG | TEMPERATURE: 99 F | BODY MASS INDEX: 31.07 KG/M2 | HEART RATE: 75 BPM | HEIGHT: 64 IN | RESPIRATION RATE: 20 BRPM

## 2017-06-07 DIAGNOSIS — L98.9 SKIN DISORDER: ICD-10-CM

## 2017-06-07 DIAGNOSIS — E11.9 TYPE 2 DIABETES MELLITUS WITHOUT COMPLICATION, WITHOUT LONG-TERM CURRENT USE OF INSULIN (HCC): Primary | ICD-10-CM

## 2017-06-07 DIAGNOSIS — E78.2 MIXED HYPERLIPIDEMIA: ICD-10-CM

## 2017-06-07 PROCEDURE — 99214 OFFICE O/P EST MOD 30 MIN: CPT | Performed by: INTERNAL MEDICINE

## 2017-06-07 PROCEDURE — 99212 OFFICE O/P EST SF 10 MIN: CPT | Performed by: INTERNAL MEDICINE

## 2017-06-07 RX ORDER — FENOFIBRATE 134 MG/1
134 CAPSULE ORAL
Qty: 90 CAPSULE | Refills: 1 | Status: SHIPPED | OUTPATIENT
Start: 2017-06-07 | End: 2017-08-03

## 2017-06-07 RX ORDER — OMEPRAZOLE 40 MG/1
CAPSULE, DELAYED RELEASE ORAL
Qty: 90 CAPSULE | Refills: 3 | Status: SHIPPED | OUTPATIENT
Start: 2017-06-07 | End: 2017-09-26

## 2017-06-07 RX ORDER — NORTRIPTYLINE HYDROCHLORIDE 25 MG/1
25 CAPSULE ORAL DAILY
Refills: 3 | COMMUNITY
Start: 2017-06-01 | End: 2017-07-11

## 2017-06-07 RX ORDER — NYSTATIN 100000 U/G
CREAM TOPICAL
Qty: 1 TUBE | Refills: 1 | Status: SHIPPED | OUTPATIENT
Start: 2017-06-07 | End: 2017-08-31

## 2017-06-07 RX ORDER — LORATADINE 10 MG/1
TABLET ORAL
Qty: 30 TABLET | Refills: 1 | Status: SHIPPED | OUTPATIENT
Start: 2017-06-07 | End: 2017-09-26

## 2017-06-07 NOTE — PROGRESS NOTES
HPI:    Patient ID: Argelia Martinez is a 64year old female. Medication Request  Pertinent negatives include no chest pain, rash, visual change or weakness. Diabetes  She presents for her follow-up diabetic visit. She has type 2 diabetes mellitus.  Per prn Disp: 30 tablet Rfl: 1   nystatin 401385 UNIT/GM External Cream Apply   topicaly  On  affecred area  Twice  Daily  . Disp: 1 Tube Rfl: 1   topiramate 50 MG Oral Tab Take 1.5 tablets (75 mg total) by mouth 2 (two) times daily.  Disp: 90 tablet Rfl: 5   S and ear canal normal.   Nose: Nose normal. Right sinus exhibits no maxillary sinus tenderness and no frontal sinus tenderness. Left sinus exhibits no maxillary sinus tenderness and no frontal sinus tenderness.    Mouth/Throat: Uvula is midline and oropharyn diet     Mixed hyperlipidemia  · Advice low fat  diet , lean meat turkey and chicken breast , fish in diet , avoid red meat   · Eat more  fruits and vegetables   · be active advised  walking /exercise as  tolerated  · Counseling on ideal weight  · continue

## 2017-06-09 ENCOUNTER — TELEPHONE (OUTPATIENT)
Dept: PODIATRY CLINIC | Facility: CLINIC | Age: 57
End: 2017-06-09

## 2017-06-09 NOTE — TELEPHONE ENCOUNTER
Pt requesting a f/u appointment sooner than scheduled : 6/26 please call, thank you. (pt aware office is closed until Monday)

## 2017-06-13 NOTE — TELEPHONE ENCOUNTER
Pt called back and instructed on signs of infection to call back.    Reminded to call to see if any cancellations  Tawny verbalizes understanding

## 2017-06-13 NOTE — TELEPHONE ENCOUNTER
New pain last week. Right foot. Pretty intense pain last week. Taking Tylenol. Pain is better now this week . Nail of the left  to touch    Pt saw Dr. Chuck Slade. 4th toe nail  tender.  Thought it was a fungal infection Denies any drainage or re

## 2017-06-21 ENCOUNTER — TELEPHONE (OUTPATIENT)
Dept: NEUROLOGY | Facility: CLINIC | Age: 57
End: 2017-06-21

## 2017-06-21 NOTE — TELEPHONE ENCOUNTER
C/o headache for last 4-5 days. Has taken immitrex once 2 days ago with some relief offered. States headaches are \"slowing her down\". States she was having fewer headaches and decreased topamax to 50 mg BID herself 2 weeks ago.  Advised patient to take

## 2017-06-22 RX ORDER — METHYLPREDNISOLONE 4 MG/1
TABLET ORAL
Qty: 1 PACKAGE | Refills: 0 | Status: SHIPPED | OUTPATIENT
Start: 2017-06-22 | End: 2017-07-11 | Stop reason: ALTCHOICE

## 2017-06-26 ENCOUNTER — OFFICE VISIT (OUTPATIENT)
Dept: PODIATRY CLINIC | Facility: CLINIC | Age: 57
End: 2017-06-26

## 2017-06-26 DIAGNOSIS — E11.9 DIABETES MELLITUS TYPE 2 WITHOUT RETINOPATHY (HCC): Primary | ICD-10-CM

## 2017-06-26 PROCEDURE — 99212 OFFICE O/P EST SF 10 MIN: CPT | Performed by: PODIATRIST

## 2017-06-26 PROCEDURE — 99213 OFFICE O/P EST LOW 20 MIN: CPT | Performed by: PODIATRIST

## 2017-06-26 NOTE — PROGRESS NOTES
HPI:    Patient ID: Sunny Francois is a 64year old female. HPI  This 72-year-old diabetic presents to the office today having not been seen by Dr. Isamar Corbin in approximately 1 year. Patient states that she is here for a diabetic foot evaluation.   Lauren Disp: 90 tablet Rfl: 3   Elastic Bandages & Supports (KNEE BRACE) Does not apply Misc Right knee Disp: 1 each Rfl: 0   triamcinolone acetonide 0.1 % External Cream Apply   Thin layer    Twice  Daily  For 1-2 weeks Disp: 60 g Rfl: 0   Acyclovir 5 % External

## 2017-07-11 ENCOUNTER — APPOINTMENT (OUTPATIENT)
Dept: LAB | Facility: HOSPITAL | Age: 57
End: 2017-07-11
Attending: Other
Payer: COMMERCIAL

## 2017-07-11 ENCOUNTER — OFFICE VISIT (OUTPATIENT)
Dept: NEUROLOGY | Facility: CLINIC | Age: 57
End: 2017-07-11

## 2017-07-11 VITALS
RESPIRATION RATE: 16 BRPM | HEART RATE: 72 BPM | BODY MASS INDEX: 29.88 KG/M2 | DIASTOLIC BLOOD PRESSURE: 60 MMHG | SYSTOLIC BLOOD PRESSURE: 92 MMHG | WEIGHT: 175 LBS | HEIGHT: 64 IN

## 2017-07-11 DIAGNOSIS — R70.0 ELEVATED SED RATE: ICD-10-CM

## 2017-07-11 DIAGNOSIS — G43.709 CHRONIC MIGRAINE WITHOUT AURA WITHOUT STATUS MIGRAINOSUS, NOT INTRACTABLE: Primary | ICD-10-CM

## 2017-07-11 DIAGNOSIS — G43.709 CHRONIC MIGRAINE WITHOUT AURA WITHOUT STATUS MIGRAINOSUS, NOT INTRACTABLE: ICD-10-CM

## 2017-07-11 LAB — ERYTHROCYTE [SEDIMENTATION RATE] IN BLOOD: 94 MM/HR (ref 0–30)

## 2017-07-11 PROCEDURE — 99213 OFFICE O/P EST LOW 20 MIN: CPT | Performed by: OTHER

## 2017-07-11 PROCEDURE — 36415 COLL VENOUS BLD VENIPUNCTURE: CPT

## 2017-07-11 PROCEDURE — 85652 RBC SED RATE AUTOMATED: CPT

## 2017-07-11 RX ORDER — TOPIRAMATE 50 MG/1
75 TABLET, FILM COATED ORAL 2 TIMES DAILY
Qty: 90 TABLET | Refills: 5 | Status: SHIPPED | OUTPATIENT
Start: 2017-07-11 | End: 2017-09-14

## 2017-07-11 RX ORDER — NORTRIPTYLINE HYDROCHLORIDE 25 MG/1
25 CAPSULE ORAL DAILY
Qty: 30 CAPSULE | Refills: 12 | Status: SHIPPED | OUTPATIENT
Start: 2017-07-11 | End: 2017-09-14

## 2017-07-11 NOTE — PROGRESS NOTES
Fredy Arias : 1960     HPI:   Patient presents with:  Headache: LOV: 17. F/U on migraines. Patient states that she had a flare of migraines a couple weeks ago that lasted a week.  She was prescribed medrol dosepak which she feels helped with 10 MG Oral Tab 1  Tab    oncle  Daily for 1-2  Weeks than prn Disp: 30 tablet Rfl: 1   nystatin 294757 UNIT/GM External Cream Apply   topicaly  On  affecred area  Twice  Daily  .  Disp: 1 Tube Rfl: 1   SUMAtriptan Succinate 100 MG Oral Tab TAKE 1 TABLET BY APPENDECTOMY  No date: CHOLECYSTECTOMY  No date: HYSTERECTOMY  09-: UPPER GI ENDOSCOPY PERFORMED   Family History   Problem Relation Age of Onset   • Diabetes Father    • Diabetes Mother    • Heart Disorder Mother    • arthritis Sage Kita Mother    • well.  Neuro:  Higher Integrative Functions:  Alert and cooperative, with normal attention span and concentration. Speech and language normal.  Oriented times three. Cranial Nerves: II-Visual acuity grossly normal, with full visual fields.  Pupil react

## 2017-07-13 ENCOUNTER — TELEPHONE (OUTPATIENT)
Dept: NEUROLOGY | Facility: CLINIC | Age: 57
End: 2017-07-13

## 2017-07-13 ENCOUNTER — TELEPHONE (OUTPATIENT)
Dept: INTERNAL MEDICINE CLINIC | Facility: CLINIC | Age: 57
End: 2017-07-13

## 2017-07-13 DIAGNOSIS — M31.6 TEMPORAL ARTERITIS (HCC): Primary | ICD-10-CM

## 2017-07-13 NOTE — TELEPHONE ENCOUNTER
Pt is aware that Dr Letty Waite is out of the office for 2 weeks and that she will not returning until 7/31.

## 2017-07-13 NOTE — TELEPHONE ENCOUNTER
Pt. requesting to get a sooner appt. as she needs to get Clearance to get a Biopsy done due to having continued headaches. Pt. States that she is seeing Dr Galo Rosenthal - Neurology. No Biopsy date is scheduled as of yet.  Pt does not want to see any othe

## 2017-07-19 NOTE — TELEPHONE ENCOUNTER
Spoke to patient and gave name and number for Dr. Beverley Joy. She will call and schedule after discussing with her brother.

## 2017-07-26 ENCOUNTER — TELEPHONE (OUTPATIENT)
Dept: ORTHOPEDICS CLINIC | Facility: CLINIC | Age: 57
End: 2017-07-26

## 2017-07-26 NOTE — TELEPHONE ENCOUNTER
Patient is complaining of knee pain. Rates pain 8/9 out of 10. Set up appt with JB for 08/04 but would like to know what he would advise until appt. Please call.  Thank you

## 2017-07-26 NOTE — TELEPHONE ENCOUNTER
Dr. Chin Rodriguez please read below. Please advise what pt should do in the mean time while waiting for her appt with you on 08/04/17.

## 2017-07-26 NOTE — TELEPHONE ENCOUNTER
Spoke to pt and she states her right knee is painful. LOV was 04/18/17 for the right knee. Pt did not go to PT. Rates pain 8-9/10 and describes pain as sharp pain. States pain does radiate down leg. Denies any swelling or injuries.  Taking 2 Extra Strength

## 2017-07-31 ENCOUNTER — TELEPHONE (OUTPATIENT)
Dept: NEUROLOGY | Facility: CLINIC | Age: 57
End: 2017-07-31

## 2017-08-02 NOTE — TELEPHONE ENCOUNTER
Spoke with Brother, Dr. Yunior Daly. . Reviewed rational for biopsy with him. He will talk to his Sister.

## 2017-08-03 ENCOUNTER — OFFICE VISIT (OUTPATIENT)
Dept: INTERNAL MEDICINE CLINIC | Facility: CLINIC | Age: 57
End: 2017-08-03

## 2017-08-03 VITALS
RESPIRATION RATE: 20 BRPM | BODY MASS INDEX: 31.58 KG/M2 | HEART RATE: 84 BPM | WEIGHT: 185 LBS | HEIGHT: 64 IN | TEMPERATURE: 98 F | SYSTOLIC BLOOD PRESSURE: 98 MMHG | DIASTOLIC BLOOD PRESSURE: 63 MMHG

## 2017-08-03 DIAGNOSIS — R51.9 HEADACHE, UNSPECIFIED HEADACHE TYPE: ICD-10-CM

## 2017-08-03 DIAGNOSIS — E11.9 TYPE 2 DIABETES MELLITUS WITHOUT COMPLICATION, WITHOUT LONG-TERM CURRENT USE OF INSULIN (HCC): ICD-10-CM

## 2017-08-03 DIAGNOSIS — Z01.810 PREOP CARDIOVASCULAR EXAM: Primary | ICD-10-CM

## 2017-08-03 PROCEDURE — 99212 OFFICE O/P EST SF 10 MIN: CPT | Performed by: INTERNAL MEDICINE

## 2017-08-03 PROCEDURE — 99214 OFFICE O/P EST MOD 30 MIN: CPT | Performed by: INTERNAL MEDICINE

## 2017-08-03 RX ORDER — FENOFIBRATE 134 MG/1
134 CAPSULE ORAL
Qty: 90 CAPSULE | Refills: 1 | Status: SHIPPED | OUTPATIENT
Start: 2017-08-03 | End: 2019-04-17

## 2017-08-03 RX ORDER — OMEPRAZOLE 40 MG/1
40 CAPSULE, DELAYED RELEASE ORAL 2 TIMES DAILY
Qty: 60 CAPSULE | Refills: 3 | Status: SHIPPED | OUTPATIENT
Start: 2017-08-03 | End: 2017-08-31

## 2017-08-03 NOTE — PROGRESS NOTES
HPI:    Patient ID: Franky Hobbs is a 64year old female.   Patient patient presents today for pre op  clearance exam for  R  Temporal  ARTERY BIOPSY -  Due to persistent  Headache and  Elevated  Esr - WITH  DR LAURENT ESTES [facility]   Patient states doing well  othe topiramate 50 MG Oral Tab Take 1.5 tablets (75 mg total) by mouth 2 (two) times daily. Disp: 90 tablet Rfl: 5   Nortriptyline HCl 25 MG Oral Cap Take 1 capsule (25 mg total) by mouth daily.  Disp: 30 capsule Rfl: 12   Omeprazole 40 MG Oral Capsule Delayed Dizziness  Penicillins             Hives, Rash   PHYSICAL EXAM:   Physical Exam   Constitutional: She is oriented to person, place, and time. She appears well-developed and well-nourished. No distress. HENT:   Head: Normocephalic and atraumatic.    Right test   Pending the test results and approval from her  Cardiologist if  normal patient is clear for the surgery.   Headache, unspecified headache type    Type 2 diabetes mellitus without complication, without long-term current use of insulin (hcc)  Discusse

## 2017-08-04 ENCOUNTER — OFFICE VISIT (OUTPATIENT)
Dept: ORTHOPEDICS CLINIC | Facility: CLINIC | Age: 57
End: 2017-08-04

## 2017-08-04 DIAGNOSIS — M51.36 DDD (DEGENERATIVE DISC DISEASE), LUMBAR: Primary | ICD-10-CM

## 2017-08-04 DIAGNOSIS — M17.11 PRIMARY OSTEOARTHRITIS OF RIGHT KNEE: ICD-10-CM

## 2017-08-04 PROCEDURE — 99213 OFFICE O/P EST LOW 20 MIN: CPT | Performed by: ORTHOPAEDIC SURGERY

## 2017-08-04 PROCEDURE — 99212 OFFICE O/P EST SF 10 MIN: CPT | Performed by: ORTHOPAEDIC SURGERY

## 2017-08-04 NOTE — PROGRESS NOTES
8/4/2017  Tomy Johnson Eli  68/1960  64year old   female  Jak Yen MD    HPI:   Patient presents with:  Knee Pain: Right f/u - states she did not went for PT yet, she still has pain in the knee , after the cold packs on the knee pain is less - (primary encounter diagnosis)  Primary osteoarthritis of right knee    This is a pleasant 27-year-old female with degenerative disc disease lumbar spine and right knee osteoarthritis.   The patient will begin a course of physical therapy for the lumbar spin

## 2017-08-07 ENCOUNTER — LAB ENCOUNTER (OUTPATIENT)
Dept: LAB | Age: 57
End: 2017-08-07
Attending: INTERNAL MEDICINE
Payer: COMMERCIAL

## 2017-08-07 DIAGNOSIS — R51.9 HEADACHE, UNSPECIFIED HEADACHE TYPE: ICD-10-CM

## 2017-08-07 DIAGNOSIS — Z01.810 PREOP CARDIOVASCULAR EXAM: ICD-10-CM

## 2017-08-07 DIAGNOSIS — E11.9 TYPE 2 DIABETES MELLITUS WITHOUT COMPLICATION, WITHOUT LONG-TERM CURRENT USE OF INSULIN (HCC): ICD-10-CM

## 2017-08-07 LAB
ALBUMIN SERPL BCP-MCNC: 4.1 G/DL (ref 3.5–4.8)
ALBUMIN/GLOB SERPL: 1.6 {RATIO} (ref 1–2)
ALP SERPL-CCNC: 54 U/L (ref 32–100)
ALT SERPL-CCNC: 24 U/L (ref 14–54)
ANION GAP SERPL CALC-SCNC: 4 MMOL/L (ref 0–18)
AST SERPL-CCNC: 27 U/L (ref 15–41)
BACTERIA UR QL AUTO: NEGATIVE /HPF
BASOPHILS # BLD: 0 K/UL (ref 0–0.2)
BASOPHILS NFR BLD: 1 %
BILIRUB SERPL-MCNC: 0.4 MG/DL (ref 0.3–1.2)
BILIRUB UR QL: NEGATIVE
BUN SERPL-MCNC: 13 MG/DL (ref 8–20)
BUN/CREAT SERPL: 12.7 (ref 10–20)
CALCIUM SERPL-MCNC: 9.2 MG/DL (ref 8.5–10.5)
CHLORIDE SERPL-SCNC: 112 MMOL/L (ref 95–110)
CHOLEST SERPL-MCNC: 141 MG/DL (ref 110–200)
CLARITY UR: CLEAR
CO2 SERPL-SCNC: 23 MMOL/L (ref 22–32)
COLOR UR: YELLOW
CREAT SERPL-MCNC: 1.02 MG/DL (ref 0.5–1.5)
CREAT UR-MCNC: 58.4 MG/DL
EOSINOPHIL # BLD: 0.1 K/UL (ref 0–0.7)
EOSINOPHIL NFR BLD: 2 %
ERYTHROCYTE [DISTWIDTH] IN BLOOD BY AUTOMATED COUNT: 14.5 % (ref 11–15)
GLOBULIN PLAS-MCNC: 2.5 G/DL (ref 2.5–3.7)
GLUCOSE SERPL-MCNC: 119 MG/DL (ref 70–99)
GLUCOSE UR-MCNC: NEGATIVE MG/DL
HCT VFR BLD AUTO: 38.3 % (ref 35–48)
HDLC SERPL-MCNC: 49 MG/DL
HGB BLD-MCNC: 12.2 G/DL (ref 12–16)
HGB UR QL STRIP.AUTO: NEGATIVE
KETONES UR-MCNC: NEGATIVE MG/DL
LDLC SERPL CALC-MCNC: 65 MG/DL (ref 0–99)
LYMPHOCYTES # BLD: 2 K/UL (ref 1–4)
LYMPHOCYTES NFR BLD: 44 %
MCH RBC QN AUTO: 25.9 PG (ref 27–32)
MCHC RBC AUTO-ENTMCNC: 31.9 G/DL (ref 32–37)
MCV RBC AUTO: 81 FL (ref 80–100)
MICROALBUMIN UR-MCNC: 0.2 MG/DL (ref 0–1.8)
MICROALBUMIN/CREAT UR: 3.4 MG/G{CREAT} (ref 0–20)
MONOCYTES # BLD: 0.4 K/UL (ref 0–1)
MONOCYTES NFR BLD: 9 %
NEUTROPHILS # BLD AUTO: 2.1 K/UL (ref 1.8–7.7)
NEUTROPHILS NFR BLD: 45 %
NITRITE UR QL STRIP.AUTO: NEGATIVE
NONHDLC SERPL-MCNC: 92 MG/DL
OSMOLALITY UR CALC.SUM OF ELEC: 289 MOSM/KG (ref 275–295)
PH UR: 6 [PH] (ref 5–8)
PLATELET # BLD AUTO: 293 K/UL (ref 140–400)
PMV BLD AUTO: 10.5 FL (ref 7.4–10.3)
POTASSIUM SERPL-SCNC: 4.3 MMOL/L (ref 3.3–5.1)
PROT SERPL-MCNC: 6.6 G/DL (ref 5.9–8.4)
PROT UR-MCNC: NEGATIVE MG/DL
RBC # BLD AUTO: 4.72 M/UL (ref 3.7–5.4)
RBC #/AREA URNS AUTO: 0 /HPF
SODIUM SERPL-SCNC: 139 MMOL/L (ref 136–144)
SP GR UR STRIP: 1.01 (ref 1–1.03)
TRIGL SERPL-MCNC: 137 MG/DL (ref 1–149)
TSH SERPL-ACNC: 2.56 UIU/ML (ref 0.45–5.33)
UROBILINOGEN UR STRIP-ACNC: <2
VIT C UR-MCNC: NEGATIVE MG/DL
WBC # BLD AUTO: 4.7 K/UL (ref 4–11)
WBC #/AREA URNS AUTO: 1 /HPF

## 2017-08-07 PROCEDURE — 82043 UR ALBUMIN QUANTITATIVE: CPT

## 2017-08-07 PROCEDURE — 82570 ASSAY OF URINE CREATININE: CPT

## 2017-08-07 PROCEDURE — 36415 COLL VENOUS BLD VENIPUNCTURE: CPT

## 2017-08-07 PROCEDURE — 81001 URINALYSIS AUTO W/SCOPE: CPT

## 2017-08-07 PROCEDURE — 84443 ASSAY THYROID STIM HORMONE: CPT

## 2017-08-07 PROCEDURE — 85025 COMPLETE CBC W/AUTO DIFF WBC: CPT

## 2017-08-07 PROCEDURE — 80061 LIPID PANEL: CPT

## 2017-08-07 PROCEDURE — 83036 HEMOGLOBIN GLYCOSYLATED A1C: CPT

## 2017-08-07 PROCEDURE — 80053 COMPREHEN METABOLIC PANEL: CPT

## 2017-08-08 LAB — HBA1C MFR BLD: 6.7 % (ref 4–6)

## 2017-08-11 ENCOUNTER — OFFICE VISIT (OUTPATIENT)
Dept: SURGERY | Facility: CLINIC | Age: 57
End: 2017-08-11

## 2017-08-11 VITALS — HEIGHT: 64 IN | BODY MASS INDEX: 29.88 KG/M2 | WEIGHT: 175 LBS

## 2017-08-11 DIAGNOSIS — R51.9 HEADACHE DISORDER: Primary | ICD-10-CM

## 2017-08-11 PROCEDURE — 99204 OFFICE O/P NEW MOD 45 MIN: CPT | Performed by: SURGERY

## 2017-08-11 PROCEDURE — 99212 OFFICE O/P EST SF 10 MIN: CPT | Performed by: SURGERY

## 2017-08-11 NOTE — PROGRESS NOTES
Visit Note    Active Problems   Headache disorder, r/o temporal arteritis  (primary encounter diagnosis)  Chief Complaint: Patient presents with:  Temporal Arteritis/pmr: Patient referred by Dr. Jabari Swann for temporal arteritis and discuss biopsies.  Patient evaluation May 11, 2017. She is a 49-year-old right-handed female under my care for the treatment of chronic headaches. She has intermittent migraine headaches, and daily dull generalized headaches.   She had been doing well on Topamax, but in May had an Omeprazole 40 MG Oral Capsule Delayed Release Take 1 capsule (40 mg total) by mouth 2 (two) times daily.  Take 30-60 minutes before breakfast and at bedtime on empty stomach Disp: 60 capsule Rfl: 3   topiramate 50 MG Oral Tab Take 1.5 tablets (75 mg total 600-400 MG-UNIT Oral Tab TAKE ONE TABLET BY MOUTH EVERY DAY Disp: 30 tablet Rfl: 2      Allergies: Tawny is allergic to codeine; morphine; and penicillins.     Past Medical History  Past Medical History:   Diagnosis Date   • Acid reflux    • Age-related nu and vomiting. Genitourinary: Negative. Negative for difficulty urinating. Musculoskeletal: Negative. Skin: Negative. Neurological: Positive for headaches. Psychiatric/Behavioral: Negative.         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 Patient needs a temporal artery Biopsy and should be able to proceed as soon as cleared by IM and cardiology.   Note by Dr Paul Moyer .: \"  ASSESSMENT/PLAN:   Preop cardiovascular exam  (primary encounter diagnosis)  Alodnra Alston

## 2017-08-14 NOTE — PATIENT INSTRUCTIONS
Assessment   Headache disorder, R/O Temporal arteritis  (primary encounter diagnosis)    Plan                     All the pertinent records were reviewed. Patient has fairly chronic problem of intermittent headaches and was seen by her PCP and Dr Juve Leavitt.  Ul

## 2017-08-15 ENCOUNTER — HOSPITAL ENCOUNTER (OUTPATIENT)
Age: 57
Discharge: HOME | End: 2017-08-15
Attending: EMERGENCY MEDICINE
Payer: COMMERCIAL

## 2017-08-15 VITALS
OXYGEN SATURATION: 100 % | RESPIRATION RATE: 18 BRPM | HEART RATE: 78 BPM | DIASTOLIC BLOOD PRESSURE: 63 MMHG | BODY MASS INDEX: 29.88 KG/M2 | WEIGHT: 175 LBS | HEIGHT: 64 IN | SYSTOLIC BLOOD PRESSURE: 108 MMHG | TEMPERATURE: 98 F

## 2017-08-15 DIAGNOSIS — M79.10 MYALGIA: Primary | ICD-10-CM

## 2017-08-15 LAB
URINE BILIRUBIN: NEGATIVE
URINE BLOOD: NEGATIVE
URINE CLARITY: CLEAR
URINE COLOR: YELLOW
URINE GLUCOSE: NEGATIVE MG/DL
URINE KETONES: NEGATIVE MG/DL
URINE NITRITE: NEGATIVE
URINE PH: 7
URINE PROTEIN: NEGATIVE MG /DL
URINE SPECIFIC GRAVITY: 1.01
URINE UROBILINOGEN: 0.2 MG/DL

## 2017-08-15 PROCEDURE — 99212 OFFICE O/P EST SF 10 MIN: CPT

## 2017-08-15 PROCEDURE — 82962 GLUCOSE BLOOD TEST: CPT

## 2017-08-15 PROCEDURE — 81002 URINALYSIS NONAUTO W/O SCOPE: CPT

## 2017-08-16 ENCOUNTER — NURSE TRIAGE (OUTPATIENT)
Dept: OTHER | Age: 57
End: 2017-08-16

## 2017-08-16 LAB — GLUCOSE BLDC GLUCOMTR-MCNC: 131 MG/DL (ref 70–99)

## 2017-08-16 NOTE — ED PROVIDER NOTES
Patient Seen in: [facility]    History   Patient presents with:   Body ache and/or chills    Stated Complaint: BODY ACHES    HPI    The patient is a 27-year-old female with a history of type 2 diabetes, migraines, arthriti Nortriptyline HCl 25 MG Oral Cap,  Take 1 capsule (25 mg total) by mouth daily.    Omeprazole 40 MG Oral Capsule Delayed Release,  TAKE 1 CAPSULE BY MOUTH 30-60 MIN BEFORE BREAKFAST   MetFORMIN HCl 500 MG Oral Tab,  Take 1 tablet (500 mg total) by mouth 2 ( HPI.  Constitutional and vital signs reviewed. All other systems reviewed and negative except as noted above. PSFH elements reviewed from today and agreed except as otherwise stated in HPI.     Physical Exam   ED Triage Vitals [08/15/17 1937]  BP: 1

## 2017-08-16 NOTE — TELEPHONE ENCOUNTER
Per pt Body aches x 5 days. Associated with HA. Pain 5/10. Not relieved by tylenol. Went to  yesterday and they advised her to stop taking Simvastatin. Pt states she stopped and feel much better. Denies fever. Pt requesting f/u appt.  Scheduled this week

## 2017-08-16 NOTE — ED INITIAL ASSESSMENT (HPI)
Body aches and migraine headaches since saturday, denies being lightheadedness, denies fever,chills or sweats

## 2017-08-18 ENCOUNTER — OFFICE VISIT (OUTPATIENT)
Dept: INTERNAL MEDICINE CLINIC | Facility: CLINIC | Age: 57
End: 2017-08-18

## 2017-08-18 ENCOUNTER — LAB ENCOUNTER (OUTPATIENT)
Dept: LAB | Age: 57
End: 2017-08-18
Attending: INTERNAL MEDICINE
Payer: COMMERCIAL

## 2017-08-18 VITALS
DIASTOLIC BLOOD PRESSURE: 76 MMHG | WEIGHT: 185 LBS | HEART RATE: 73 BPM | HEIGHT: 64 IN | TEMPERATURE: 98 F | BODY MASS INDEX: 31.58 KG/M2 | SYSTOLIC BLOOD PRESSURE: 114 MMHG

## 2017-08-18 DIAGNOSIS — M79.10 MYALGIA: ICD-10-CM

## 2017-08-18 DIAGNOSIS — M79.10 MYALGIA: Primary | ICD-10-CM

## 2017-08-18 DIAGNOSIS — R53.83 LETHARGY: ICD-10-CM

## 2017-08-18 LAB
ALBUMIN SERPL BCP-MCNC: 4.3 G/DL (ref 3.5–4.8)
ALBUMIN/GLOB SERPL: 1.7 {RATIO} (ref 1–2)
ALP SERPL-CCNC: 61 U/L (ref 32–100)
ALT SERPL-CCNC: 28 U/L (ref 14–54)
ANION GAP SERPL CALC-SCNC: 6 MMOL/L (ref 0–18)
AST SERPL-CCNC: 28 U/L (ref 15–41)
BASOPHILS # BLD: 0 K/UL (ref 0–0.2)
BASOPHILS NFR BLD: 1 %
BILIRUB SERPL-MCNC: 0.4 MG/DL (ref 0.3–1.2)
BUN SERPL-MCNC: 10 MG/DL (ref 8–20)
BUN/CREAT SERPL: 11.6 (ref 10–20)
CALCIUM SERPL-MCNC: 9.4 MG/DL (ref 8.5–10.5)
CHLORIDE SERPL-SCNC: 109 MMOL/L (ref 95–110)
CK MB SERPL-MCNC: 3.3 NG/ML (ref 0–6.2)
CK SERPL-CCNC: 173 U/L (ref 38–234)
CO2 SERPL-SCNC: 24 MMOL/L (ref 22–32)
CREAT SERPL-MCNC: 0.86 MG/DL (ref 0.5–1.5)
CRP SERPL HS-MCNC: 1.1 MG/L (ref 0–7.5)
EOSINOPHIL # BLD: 0.1 K/UL (ref 0–0.7)
EOSINOPHIL NFR BLD: 2 %
ERYTHROCYTE [DISTWIDTH] IN BLOOD BY AUTOMATED COUNT: 14.6 % (ref 11–15)
ERYTHROCYTE [SEDIMENTATION RATE] IN BLOOD: 89 MM/HR (ref 0–30)
GLOBULIN PLAS-MCNC: 2.6 G/DL (ref 2.5–3.7)
GLUCOSE SERPL-MCNC: 116 MG/DL (ref 70–99)
HCT VFR BLD AUTO: 38.6 % (ref 35–48)
HGB BLD-MCNC: 12.3 G/DL (ref 12–16)
LYMPHOCYTES # BLD: 2.1 K/UL (ref 1–4)
LYMPHOCYTES NFR BLD: 38 %
MCH RBC QN AUTO: 25.6 PG (ref 27–32)
MCHC RBC AUTO-ENTMCNC: 31.8 G/DL (ref 32–37)
MCV RBC AUTO: 80.7 FL (ref 80–100)
MONOCYTES # BLD: 0.5 K/UL (ref 0–1)
MONOCYTES NFR BLD: 10 %
NEUTROPHILS # BLD AUTO: 2.7 K/UL (ref 1.8–7.7)
NEUTROPHILS NFR BLD: 49 %
OSMOLALITY UR CALC.SUM OF ELEC: 288 MOSM/KG (ref 275–295)
PLATELET # BLD AUTO: 283 K/UL (ref 140–400)
PMV BLD AUTO: 9.9 FL (ref 7.4–10.3)
POTASSIUM SERPL-SCNC: 4.3 MMOL/L (ref 3.3–5.1)
PROT SERPL-MCNC: 6.9 G/DL (ref 5.9–8.4)
RBC # BLD AUTO: 4.79 M/UL (ref 3.7–5.4)
SODIUM SERPL-SCNC: 139 MMOL/L (ref 136–144)
WBC # BLD AUTO: 5.4 K/UL (ref 4–11)

## 2017-08-18 PROCEDURE — 82553 CREATINE MB FRACTION: CPT

## 2017-08-18 PROCEDURE — 85025 COMPLETE CBC W/AUTO DIFF WBC: CPT

## 2017-08-18 PROCEDURE — 85652 RBC SED RATE AUTOMATED: CPT

## 2017-08-18 PROCEDURE — 82550 ASSAY OF CK (CPK): CPT

## 2017-08-18 PROCEDURE — 99212 OFFICE O/P EST SF 10 MIN: CPT | Performed by: INTERNAL MEDICINE

## 2017-08-18 PROCEDURE — 99214 OFFICE O/P EST MOD 30 MIN: CPT | Performed by: INTERNAL MEDICINE

## 2017-08-18 PROCEDURE — 86141 C-REACTIVE PROTEIN HS: CPT

## 2017-08-18 PROCEDURE — 36415 COLL VENOUS BLD VENIPUNCTURE: CPT

## 2017-08-18 PROCEDURE — 80053 COMPREHEN METABOLIC PANEL: CPT

## 2017-08-18 RX ORDER — PREDNISONE 10 MG/1
10 TABLET ORAL DAILY
Qty: 30 TABLET | Refills: 0 | Status: SHIPPED | OUTPATIENT
Start: 2017-08-18 | End: 2017-08-31 | Stop reason: ALTCHOICE

## 2017-08-18 NOTE — PATIENT INSTRUCTIONS
Myalgias  Myalgias are another word for muscle aches and soreness. This is a symptom, not a disease. Myalgias can have many causes. A cold, the flu, or an acute infection can cause them. So can any illness with a high fever. They may happen after exertio © 3919-2004 [address], [address]. All rights reserved. This information is not intended as a substitute for professional medical care. Always follow your healthcare professional's instructions.

## 2017-08-18 NOTE — TELEPHONE ENCOUNTER
August 17, 2017   Darryle Bowers, MD   to Sung Costa, PINED[facility] • [facility] Clinical Staff           6:45 PM    Patient Reji Gustafson see me  [id number]   [id number]   For  F/u visit -in mean time can see any available doctor in clinic  Or call her  Neurologist due to [facility]

## 2017-08-18 NOTE — PROGRESS NOTES
Patient ID: Cecilia Mena is a 62year old female. Patient presents with: Body ache and/or chills: x1 week       HISTORY OF PRESENT ILLNESS:   HPI  Patient presents for above.   Here with myalgias, mainly in the shoulder region, for the past several we uncontrolled        Past Surgical History:  2005: APPENDECTOMY  2007: CHOLECYSTECTOMY  2012: HYSTERECTOMY  09-: UPPER GI ENDOSCOPY PERFORMED      Current Outpatient Prescriptions:   •  predniSONE 10 MG Oral Tab, Take 1 tablet (10 mg total) by mouth Disp: 60 g, Rfl: 0  •  Acyclovir 5 % External Cream, apply cream  On affected  Area  every  3  Hr  For 5-  Days, Disp: 1 Tube, Rfl: 0  •  Levothyroxine Sodium 50 MCG Oral Tab, TAKE 1 TABLET BY MOUTH BEFORE BREAKFAST., Disp: 90 tablet, Rfl: 1  •  Blood Gluc to light. Neck: Normal range of motion. Neck supple. No thyromegaly present. Cardiovascular: Normal rate, regular rhythm and normal heart sounds. Pulmonary/Chest: Effort normal and breath sounds normal.   Abdominal: Soft.  Bowel sounds are normal.

## 2017-08-21 ENCOUNTER — OFFICE VISIT (OUTPATIENT)
Dept: INTERNAL MEDICINE CLINIC | Facility: CLINIC | Age: 57
End: 2017-08-21

## 2017-08-21 VITALS
BODY MASS INDEX: 31.41 KG/M2 | HEART RATE: 80 BPM | WEIGHT: 184 LBS | SYSTOLIC BLOOD PRESSURE: 118 MMHG | TEMPERATURE: 98 F | DIASTOLIC BLOOD PRESSURE: 79 MMHG | HEIGHT: 64 IN | RESPIRATION RATE: 20 BRPM

## 2017-08-21 DIAGNOSIS — E78.2 MIXED HYPERLIPIDEMIA: Primary | ICD-10-CM

## 2017-08-21 DIAGNOSIS — R51.9 HEADACHE, UNSPECIFIED HEADACHE TYPE: ICD-10-CM

## 2017-08-21 PROCEDURE — 99212 OFFICE O/P EST SF 10 MIN: CPT | Performed by: INTERNAL MEDICINE

## 2017-08-21 PROCEDURE — 99214 OFFICE O/P EST MOD 30 MIN: CPT | Performed by: INTERNAL MEDICINE

## 2017-08-21 RX ORDER — ROSUVASTATIN CALCIUM 5 MG/1
5 TABLET, COATED ORAL NIGHTLY
Qty: 30 TABLET | Refills: 2 | Status: SHIPPED | OUTPATIENT
Start: 2017-08-21 | End: 2017-08-31

## 2017-08-21 RX ORDER — CALCIUM CARBONATE/VITAMIN D3 600 MG-10
1 TABLET ORAL
Qty: 30 TABLET | Refills: 2 | Status: SHIPPED | OUTPATIENT
Start: 2017-08-21 | End: 2017-08-31

## 2017-08-21 NOTE — PROGRESS NOTES
HPI:    Patient ID: Luis F Vigil is a 62year old female. Medication Request   This is a new problem. The current episode started in the past 7 days. The problem has been gradually improving.  Associated symptoms include arthralgias (knees bill ), fat Skin: Negative for pallor. Neurological: Positive for headaches. Negative for dizziness and vertigo. Psychiatric/Behavioral: Negative for confusion. The patient is not nervous/anxious.                Current Outpatient Prescriptions:  Calcium Carbonate- Acyclovir 5 % External Cream apply cream  On affected  Area  every  3  Hr  For 5-  Days Disp: 1 Tube Rfl: 0   Levothyroxine Sodium 50 MCG Oral Tab TAKE 1 TABLET BY MOUTH BEFORE BREAKFAST.  Disp: 90 tablet Rfl: 1   Blood Glucose Monitoring Suppl Kevin Youssef [address] Abdominal: Soft. She exhibits no mass. There is no hepatosplenomegaly. There is no tenderness. There is no CVA tenderness. Musculoskeletal: She exhibits no edema. Lymphadenopathy:     She has no cervical adenopathy.    Neurological: She is alert and hung Sig: Take 1 tablet (5 mg total) by mouth nightly.            Imaging & Referrals:  None       OR#[id number]

## 2017-08-23 ENCOUNTER — OFFICE VISIT (OUTPATIENT)
Dept: CARDIOLOGY CLINIC | Facility: CLINIC | Age: 57
End: 2017-08-23

## 2017-08-23 VITALS
WEIGHT: 185 LBS | RESPIRATION RATE: 16 BRPM | DIASTOLIC BLOOD PRESSURE: 70 MMHG | BODY MASS INDEX: 32 KG/M2 | HEART RATE: 60 BPM | SYSTOLIC BLOOD PRESSURE: 116 MMHG

## 2017-08-23 DIAGNOSIS — Z13.6 SCREENING FOR CARDIOVASCULAR CONDITION: ICD-10-CM

## 2017-08-23 DIAGNOSIS — Z01.810 PREOP CARDIOVASCULAR EXAM: ICD-10-CM

## 2017-08-23 DIAGNOSIS — I10 ESSENTIAL HYPERTENSION: Primary | ICD-10-CM

## 2017-08-23 PROCEDURE — 93000 ELECTROCARDIOGRAM COMPLETE: CPT | Performed by: INTERNAL MEDICINE

## 2017-08-23 PROCEDURE — 99212 OFFICE O/P EST SF 10 MIN: CPT | Performed by: INTERNAL MEDICINE

## 2017-08-23 PROCEDURE — 99214 OFFICE O/P EST MOD 30 MIN: CPT | Performed by: INTERNAL MEDICINE

## 2017-08-23 PROCEDURE — 93005 ELECTROCARDIOGRAM TRACING: CPT | Performed by: INTERNAL MEDICINE

## 2017-08-23 NOTE — PROGRESS NOTES
Cardiology Follow Up    Lillie Freeman is a 62year old female. Patient presents with:  Surgical/procedure Clearance    HPI:   80-year-old female presents for follow-up visit last seen in May 2017 prior history of SVT ablation asymptomatic since 2004.   [facility] nystatin [id number] UNIT/GM External Cream Apply   topicaly  On  affecred area  Twice  Daily  .  Disp: 1 Tube Rfl: 1   SUMAtriptan Succinate 100 MG Oral Tab TAKE 1 TABLET BY MOUTH PER  DAY   AS NEEDED  FOR  HEADACHE  MAXIMUM   2 PER  24 hours Disp: 9 tablet R review of systems is completed and negative    EXAM:   /70   Pulse 60   Resp 16   Wt 185 lb (83.9 kg)   BMI 31.76 kg/m²   GENERAL: well developed, well nourished,in no apparent distress  SKIN: no rashes,no suspicious lesions  HEENT: atraumatic, normo

## 2017-08-24 ENCOUNTER — TELEPHONE (OUTPATIENT)
Dept: SURGERY | Facility: CLINIC | Age: 57
End: 2017-08-24

## 2017-08-24 NOTE — TELEPHONE ENCOUNTER
Pt. With questions regarding anesthesia, healing time and hair removal.  All questions answered, pt. Verbalized understanding. Call prn.

## 2017-08-24 NOTE — TELEPHONE ENCOUNTER
Pt states she has questions re: 9/7 sx, only wants to discuss questions with RN, pls call thank you.

## 2017-08-25 ENCOUNTER — HOSPITAL ENCOUNTER (EMERGENCY)
Facility: HOSPITAL | Age: 57
Discharge: HOME | End: 2017-08-26
Attending: EMERGENCY MEDICINE
Payer: COMMERCIAL

## 2017-08-25 DIAGNOSIS — G43.809 OTHER MIGRAINE WITHOUT STATUS MIGRAINOSUS, NOT INTRACTABLE: Primary | ICD-10-CM

## 2017-08-25 PROCEDURE — 96374 THER/PROPH/DIAG INJ IV PUSH: CPT

## 2017-08-25 PROCEDURE — 99284 EMERGENCY DEPT VISIT MOD MDM: CPT

## 2017-08-25 PROCEDURE — 96375 TX/PRO/DX INJ NEW DRUG ADDON: CPT

## 2017-08-25 PROCEDURE — 96361 HYDRATE IV INFUSION ADD-ON: CPT

## 2017-08-25 RX ORDER — KETOROLAC TROMETHAMINE 30 MG/ML
30 INJECTION, SOLUTION INTRAMUSCULAR; INTRAVENOUS ONCE
Status: COMPLETED | OUTPATIENT
Start: 2017-08-25 | End: 2017-08-25

## 2017-08-25 RX ORDER — METOCLOPRAMIDE HYDROCHLORIDE 5 MG/ML
10 INJECTION INTRAMUSCULAR; INTRAVENOUS ONCE
Status: COMPLETED | OUTPATIENT
Start: 2017-08-25 | End: 2017-08-26

## 2017-08-25 RX ORDER — DIPHENHYDRAMINE HYDROCHLORIDE 50 MG/ML
50 INJECTION INTRAMUSCULAR; INTRAVENOUS ONCE
Status: COMPLETED | OUTPATIENT
Start: 2017-08-25 | End: 2017-08-26

## 2017-08-26 VITALS
WEIGHT: 180 LBS | OXYGEN SATURATION: 99 % | HEART RATE: 74 BPM | SYSTOLIC BLOOD PRESSURE: 121 MMHG | TEMPERATURE: 98 F | DIASTOLIC BLOOD PRESSURE: 73 MMHG | HEIGHT: 64 IN | BODY MASS INDEX: 30.73 KG/M2 | RESPIRATION RATE: 20 BRPM

## 2017-08-26 NOTE — ED INITIAL ASSESSMENT (HPI)
C/o persistent headache which started yesterday, associated with tingling sensation to head and face but no numbness. Hx of migraines. Speech is clear. Moving all extremities with equal strength, ambulatory with steady gait.

## 2017-08-26 NOTE — ED PROVIDER NOTES
Patient Seen in: [facility] AND [facility] Emergency Department    History   Patient presents with:  Headache (neurologic)    Stated Complaint:     HPI    62year old Female complains of headache   Location:  throbbing pain, dull pain.   To the top of her head TABLET BY MOUTH PER  DAY   AS NEEDED  FOR  HEADACHE  MAXIMUM   2 PER  24 hours   Calcium Carbonate-Vitamin D 600-400 MG-UNIT Oral Tab,  Take 1 tablet by mouth once daily. Rosuvastatin Calcium 5 MG Oral Tab,  Take 1 tablet (5 mg total) by mouth nightly. Alcohol use: No                Review of Systems    Positive for stated complaint:   Other systems are as noted in HPI. Constitutional and vital signs reviewed. All other systems reviewed and negative except as noted above.     [facility] normal.   Nursing note and vitals reviewed.               ED Course   Nursing notes and Triage vitals reviewed  Labs Reviewed - No data to display    Imaging Results Available and Reviewed while in ED: No orders to display    ED Medications Administered: Vulvar boil; Podiatry visit, routine; Diabetes mellitus type 2 without retinopathy ([facility] [address] Essential hypertension with goal blood pressure less than 140/90; Adverse reaction to vaccine; Cough;  Non-seasonal allergic rhinitis due to pollen; Viral upper respir

## 2017-08-28 ENCOUNTER — TELEPHONE (OUTPATIENT)
Dept: NEUROLOGY | Facility: CLINIC | Age: 57
End: 2017-08-28

## 2017-08-28 DIAGNOSIS — G44.221 CHRONIC TENSION-TYPE HEADACHE, INTRACTABLE: Primary | ICD-10-CM

## 2017-08-28 NOTE — TELEPHONE ENCOUNTER
LOV was 7/11/17. ER visit (at [facility]) on 8/25/17, due to having a bad migraine on the weekend; headace is \"not as bad today. \" Patient says she is scheduled for bilateral temporal artery biopsy on Sept. 7th, per Dr. Car Mercado.  Patient wants to know if a C

## 2017-08-28 NOTE — TELEPHONE ENCOUNTER
Spoke to patient and notified her that Dr. Alex Bal did order CT of head. Transferred patient to scheduling department to get it scheduled.

## 2017-08-29 ENCOUNTER — TELEPHONE (OUTPATIENT)
Dept: NEUROLOGY | Facility: CLINIC | Age: 57
End: 2017-08-29

## 2017-08-29 DIAGNOSIS — R51.9 ACUTE INTRACTABLE HEADACHE, UNSPECIFIED HEADACHE TYPE: Primary | ICD-10-CM

## 2017-08-29 NOTE — TELEPHONE ENCOUNTER
[employer] for authorization of approval for CT brain. head wo. Tracking # [id number], will fax clinical notes.  Faxed notes pending approval.

## 2017-08-31 ENCOUNTER — OFFICE VISIT (OUTPATIENT)
Dept: RHEUMATOLOGY | Facility: CLINIC | Age: 57
End: 2017-08-31

## 2017-08-31 VITALS
BODY MASS INDEX: 31.41 KG/M2 | WEIGHT: 184 LBS | SYSTOLIC BLOOD PRESSURE: 122 MMHG | HEART RATE: 84 BPM | HEIGHT: 64 IN | DIASTOLIC BLOOD PRESSURE: 79 MMHG

## 2017-08-31 DIAGNOSIS — M06.4 UNDIFFERENTIATED INFLAMMATORY POLYARTHRITIS (HCC): Primary | ICD-10-CM

## 2017-08-31 DIAGNOSIS — G89.29 CHRONIC NONINTRACTABLE HEADACHE, UNSPECIFIED HEADACHE TYPE: ICD-10-CM

## 2017-08-31 DIAGNOSIS — R51.9 CHRONIC NONINTRACTABLE HEADACHE, UNSPECIFIED HEADACHE TYPE: ICD-10-CM

## 2017-08-31 DIAGNOSIS — R70.0 ELEVATED SED RATE: ICD-10-CM

## 2017-08-31 PROCEDURE — 99213 OFFICE O/P EST LOW 20 MIN: CPT | Performed by: INTERNAL MEDICINE

## 2017-08-31 PROCEDURE — 99212 OFFICE O/P EST SF 10 MIN: CPT | Performed by: INTERNAL MEDICINE

## 2017-08-31 NOTE — PATIENT INSTRUCTIONS
1. tylenol arthritis 650mg every 8 hours -   2. Cont.   hydroxychlroquine 200mg twice a day    3. Will wait for biopsy results on 9/7 -   4 -return to clinic in 2 weeks.

## 2017-08-31 NOTE — TELEPHONE ENCOUNTER
Qian [email or web address] called stating a peer to peer is required. Dr. Annabelle Jewell informed.

## 2017-08-31 NOTE — TELEPHONE ENCOUNTER
[employer] for authorization of approval for Nola torres. New tracking #[id number], will fax clinical notes. Faxed notes pending approval.

## 2017-08-31 NOTE — PROGRESS NOTES
Krissy Bateman is a 62year old female who presents for Patient presents with:  Knee Pain: right knee  . HPI:     She's a pleasasnt 54year old who has joint pian in her hands for 1 year. It's worse in the last 5-6 motnhs.    She is noticiing that her ac got a migraine after it as well. She is on melxoicam 15mg a day. She received synovis injection in her left knee 2 weeks before. With Thanh vera. She gets a headaches with the shot. Her joint pains nweren't too bad on meloxicam 15mg a day.    She was plaqueil . Her hands pain is not her pain issues anymore. Her knee bothers her but that is not new. Wt Readings from Last 2 Encounters:  08/31/17 : 184 lb (83.5 kg)  08/25/17 : 180 lb (81.6 kg)    Body mass index is 31.58 kg/m².         Current Outp For 1-2 weeks Disp: 60 g Rfl: 0   Acyclovir 5 % External Cream apply cream  On affected  Area  every  3  Hr  For 5-  Days Disp: 1 Tube Rfl: 0   Levothyroxine Sodium 50 MCG Oral Tab TAKE 1 TABLET BY MOUTH BEFORE BREAKFAST.  Disp: 90 tablet Rfl: 1   Blood Glu pain, no heart disease, nuc stress test - normal 3/14/2009  RS: No SOB, no Cough, No Pleurtic pain,   GI: No nausea, no vomiiting, no abominal pain, no hx of ulcer, hx of  heartburn, no dyshpagia, no BRBPR or melena,   EGD 1/28/2016 - mild chronic gastirti RATIO      10.0 - 20.0 18.4  16.7   ANION GAP      0 - 18 8  7   CALCIUM      8.5 - 10.5 mg/dL 9.4  9.2   CALCULATED OSMOLALITY      275 - 295 mOsm/kg 291  284   AST      15 - 41 U/L 35  20   ALT (SGPT)      14 - 54 U/L 37  22   ALK PHOSPHATASE (P)      32 Negative   URINE NITRITE      Negative Negative  Negative   UROBILINOGEN, URINE      <2.0 mg/dL <2.0  <2.0   URINE WBC ESTERASE      Negative Large (A)  Negative   ASCORBIC ACID, URINE      Negative mg/dL Negative  Negative   URINE SQUAMOUS EPITHELIAL ZULEYMA Negative Negative   ANTI-SJOGREN'S B (S)      Negative Negative   C-REACTIVE PROTEIN (CRP)(S)      0.0 - 0.9 mg/dL 1.2 (H)   SED RATE (ESR) (L)      0 - 30 MM/HR 79 (H)   HEPATITIS C VIRUS ANTIBODY      NonReactive Non-Reactive   RYAN SCREEN      Negative M MONOCYTES #      0.0 - 1.0 K/UL 0.5   EOSINOPHILS #      0.0 - 0.7 K/UL 0.1   BASOPHILS #      0.0 - 0.2 K/UL 0.1   URINE-COLOR      Yellow Yellow   URINE CLARITY      Clear Hazy (A)   SPECIFIC GRAVITY, URINE      1.002 - 1.035 1.012   URINE PH      5.0 arthropathy. 7/22/2106 - lumbar xray   1. Normal alignment. Mild multilevel degenerative spondylosis. Vertebral     bodies are intact. No fracture or destructive process. 2. Disc spaces are intact. 6/29/2016 - si joint xray   1. Normal SI joints.   2 % 10  9   Eosinophils %      % 2  2   Basophils %      % 1  1   Neutrophils Absolute      1.8 - 7.7 K/UL 2.7  2.1   Lymphocytes Absolute      1.0 - 4.0 K/UL 2.1  2.0   Monocytes Absolute      0.0 - 1.0 K/UL 0.5  0.4   Eosinophils Absolute      0.0 - 0.7 POC GLUCOSE      70 - 99  131 (H)    SED RATE      0 - 30 mm/Hr 89 (H)     HSCRP      0.0 - 7.5 mg/L 1.1     CK      38 - 234 U/L 173     CKMB      0.0 - 6.2 ng/mL 3.3       Component      Latest Ref Rng 1/11/2017 7/23/2016 3/31/2016 4/29/2015   SED RATE

## 2017-09-01 ENCOUNTER — NURSE TRIAGE (OUTPATIENT)
Dept: OTHER | Age: 57
End: 2017-09-01

## 2017-09-01 NOTE — TELEPHONE ENCOUNTER
Action Requested: Summary for Provider     []  Critical Lab, Recommendations Needed  [] Need Additional Advice  []   FYI    []   Need Orders  [] Need Medications Sent to Pharmacy  []  Other     SUMMARY: pt states has ongoing \"body aches\" and was seen by

## 2017-09-01 NOTE — TELEPHONE ENCOUNTER
Reason for Disposition  • Nursing judgment    Protocols used: NO PROTOCOL AVAILABLE - SICK ADULT-A-[place]

## 2017-09-02 ENCOUNTER — OFFICE VISIT (OUTPATIENT)
Dept: INTERNAL MEDICINE CLINIC | Facility: CLINIC | Age: 57
End: 2017-09-02

## 2017-09-02 VITALS
HEIGHT: 64 IN | SYSTOLIC BLOOD PRESSURE: 100 MMHG | BODY MASS INDEX: 31.58 KG/M2 | DIASTOLIC BLOOD PRESSURE: 68 MMHG | TEMPERATURE: 98 F | HEART RATE: 76 BPM | WEIGHT: 185 LBS

## 2017-09-02 DIAGNOSIS — M79.10 MYALGIA: Primary | ICD-10-CM

## 2017-09-02 PROCEDURE — 99212 OFFICE O/P EST SF 10 MIN: CPT | Performed by: INTERNAL MEDICINE

## 2017-09-02 PROCEDURE — 99214 OFFICE O/P EST MOD 30 MIN: CPT | Performed by: INTERNAL MEDICINE

## 2017-09-02 NOTE — PROGRESS NOTES
Patient ID: Jonel Mary is a 62year old female. Patient presents with:  Pain: body ache since 8/12       HISTORY OF PRESENT ILLNESS:   HPI  Patient presents for above. Here for myalgias, diffuse.   Saw patient approximately 2 weeks ago for similar a tablets (75 mg total) by mouth 2 (two) times daily. , Disp: 90 tablet, Rfl: 5  •  Nortriptyline HCl 25 MG Oral Cap, Take 1 capsule (25 mg total) by mouth daily. , Disp: 30 capsule, Rfl: 12  •  Omeprazole 40 MG Oral Capsule Delayed Release, TAKE 1 CAPSULE BY 1 cup daily and rare coffee    Exercise No     Social History Narrative    The patient does not use an assistive device. .      The patient does live in a home with stairs.                PHYSICAL EXAM:      09/02/17  0849   BP: 100/68   Pulse: 76   Temp: Flaco Rutledge

## 2017-09-02 NOTE — PATIENT INSTRUCTIONS
Myalgias  Myalgias are another word for muscle aches and soreness. This is a symptom, not a disease. Myalgias can have many causes. A cold, the flu, or an acute infection can cause them. So can any illness with a high fever. They may happen after exertio © 1939-6446 [address], [address] [place]. All rights reserved. This information is not intended as a substitute for professional medical care. Always follow your healthcare professional's instructions.

## 2017-09-05 ENCOUNTER — TELEPHONE (OUTPATIENT)
Dept: SURGERY | Facility: CLINIC | Age: 57
End: 2017-09-05

## 2017-09-05 NOTE — TELEPHONE ENCOUNTER
Received fax from [facility] [address] advising of approval for MRI brain wo. Authorization # [id number] effective 08/31/17 to 09/30/17. Will call Pt. to inform. L/m advising Pt. of MRI approval. Can proceed with scheduling appt.

## 2017-09-05 NOTE — TELEPHONE ENCOUNTER
Patient had questions regarding upcoming surgery on 09/07/2017. All questions answered, patient was reassured.

## 2017-09-07 ENCOUNTER — HOSPITAL ENCOUNTER (OUTPATIENT)
Facility: HOSPITAL | Age: 57
Setting detail: HOSPITAL OUTPATIENT SURGERY
Discharge: HOME | End: 2017-09-07
Attending: SURGERY | Admitting: SURGERY
Payer: COMMERCIAL

## 2017-09-07 ENCOUNTER — SURGERY (OUTPATIENT)
Age: 57
End: 2017-09-07

## 2017-09-07 ENCOUNTER — ANESTHESIA (OUTPATIENT)
Dept: SURGERY | Facility: HOSPITAL | Age: 57
End: 2017-09-07
Payer: COMMERCIAL

## 2017-09-07 ENCOUNTER — ANESTHESIA EVENT (OUTPATIENT)
Dept: SURGERY | Facility: HOSPITAL | Age: 57
End: 2017-09-07
Payer: COMMERCIAL

## 2017-09-07 VITALS
SYSTOLIC BLOOD PRESSURE: 113 MMHG | DIASTOLIC BLOOD PRESSURE: 72 MMHG | HEIGHT: 64 IN | TEMPERATURE: 98 F | HEART RATE: 71 BPM | BODY MASS INDEX: 31.24 KG/M2 | RESPIRATION RATE: 14 BRPM | OXYGEN SATURATION: 100 % | WEIGHT: 183 LBS

## 2017-09-07 DIAGNOSIS — M31.6 TEMPORAL ARTERITIS (HCC): Primary | ICD-10-CM

## 2017-09-07 LAB — GLUCOSE BLDC GLUCOMTR-MCNC: 93 MG/DL (ref 70–99)

## 2017-09-07 PROCEDURE — 03BS0ZX EXCISION OF RIGHT TEMPORAL ARTERY, OPEN APPROACH, DIAGNOSTIC: ICD-10-PCS | Performed by: SURGERY

## 2017-09-07 PROCEDURE — 82962 GLUCOSE BLOOD TEST: CPT

## 2017-09-07 PROCEDURE — 03BT0ZX EXCISION OF LEFT TEMPORAL ARTERY, OPEN APPROACH, DIAGNOSTIC: ICD-10-PCS | Performed by: SURGERY

## 2017-09-07 PROCEDURE — 88305 TISSUE EXAM BY PATHOLOGIST: CPT | Performed by: SURGERY

## 2017-09-07 RX ORDER — METOCLOPRAMIDE 10 MG/1
10 TABLET ORAL ONCE
Status: COMPLETED | OUTPATIENT
Start: 2017-09-07 | End: 2017-09-07

## 2017-09-07 RX ORDER — LIDOCAINE HYDROCHLORIDE 10 MG/ML
INJECTION, SOLUTION EPIDURAL; INFILTRATION; INTRACAUDAL; PERINEURAL AS NEEDED
Status: DISCONTINUED | OUTPATIENT
Start: 2017-09-07 | End: 2017-09-07 | Stop reason: HOSPADM

## 2017-09-07 RX ORDER — NALOXONE HYDROCHLORIDE 0.4 MG/ML
80 INJECTION, SOLUTION INTRAMUSCULAR; INTRAVENOUS; SUBCUTANEOUS AS NEEDED
Status: DISCONTINUED | OUTPATIENT
Start: 2017-09-07 | End: 2017-09-07

## 2017-09-07 RX ORDER — CLINDAMYCIN PHOSPHATE 900 MG/50ML
900 INJECTION INTRAVENOUS ONCE
Status: DISCONTINUED | OUTPATIENT
Start: 2017-09-07 | End: 2017-09-07 | Stop reason: HOSPADM

## 2017-09-07 RX ORDER — MORPHINE SULFATE 4 MG/ML
4 INJECTION, SOLUTION INTRAMUSCULAR; INTRAVENOUS EVERY 10 MIN PRN
Status: DISCONTINUED | OUTPATIENT
Start: 2017-09-07 | End: 2017-09-07

## 2017-09-07 RX ORDER — MORPHINE SULFATE 10 MG/ML
6 INJECTION, SOLUTION INTRAMUSCULAR; INTRAVENOUS EVERY 10 MIN PRN
Status: DISCONTINUED | OUTPATIENT
Start: 2017-09-07 | End: 2017-09-07

## 2017-09-07 RX ORDER — HYDROMORPHONE HYDROCHLORIDE 1 MG/ML
0.2 INJECTION, SOLUTION INTRAMUSCULAR; INTRAVENOUS; SUBCUTANEOUS EVERY 5 MIN PRN
Status: DISCONTINUED | OUTPATIENT
Start: 2017-09-07 | End: 2017-09-07

## 2017-09-07 RX ORDER — HYDROMORPHONE HYDROCHLORIDE 1 MG/ML
0.4 INJECTION, SOLUTION INTRAMUSCULAR; INTRAVENOUS; SUBCUTANEOUS EVERY 5 MIN PRN
Status: DISCONTINUED | OUTPATIENT
Start: 2017-09-07 | End: 2017-09-07

## 2017-09-07 RX ORDER — ONDANSETRON 2 MG/ML
4 INJECTION INTRAMUSCULAR; INTRAVENOUS ONCE AS NEEDED
Status: DISCONTINUED | OUTPATIENT
Start: 2017-09-07 | End: 2017-09-07

## 2017-09-07 RX ORDER — HYDROCODONE BITARTRATE AND ACETAMINOPHEN 5; 325 MG/1; MG/1
2 TABLET ORAL AS NEEDED
Status: DISCONTINUED | OUTPATIENT
Start: 2017-09-07 | End: 2017-09-07

## 2017-09-07 RX ORDER — HALOPERIDOL 5 MG/ML
0.25 INJECTION INTRAMUSCULAR ONCE AS NEEDED
Status: DISCONTINUED | OUTPATIENT
Start: 2017-09-07 | End: 2017-09-07

## 2017-09-07 RX ORDER — HYDROCODONE BITARTRATE AND ACETAMINOPHEN 5; 325 MG/1; MG/1
1 TABLET ORAL EVERY 4 HOURS PRN
Status: DISCONTINUED | OUTPATIENT
Start: 2017-09-07 | End: 2017-09-07

## 2017-09-07 RX ORDER — HYDROCODONE BITARTRATE AND ACETAMINOPHEN 5; 325 MG/1; MG/1
2 TABLET ORAL EVERY 4 HOURS PRN
Status: DISCONTINUED | OUTPATIENT
Start: 2017-09-07 | End: 2017-09-07

## 2017-09-07 RX ORDER — FAMOTIDINE 20 MG/1
20 TABLET ORAL ONCE
Status: COMPLETED | OUTPATIENT
Start: 2017-09-07 | End: 2017-09-07

## 2017-09-07 RX ORDER — ACETAMINOPHEN 325 MG/1
650 TABLET ORAL ONCE
Status: COMPLETED | OUTPATIENT
Start: 2017-09-07 | End: 2017-09-07

## 2017-09-07 RX ORDER — HYDROCODONE BITARTRATE AND ACETAMINOPHEN 5; 325 MG/1; MG/1
1 TABLET ORAL AS NEEDED
Status: DISCONTINUED | OUTPATIENT
Start: 2017-09-07 | End: 2017-09-07

## 2017-09-07 RX ORDER — SODIUM CHLORIDE, SODIUM LACTATE, POTASSIUM CHLORIDE, CALCIUM CHLORIDE 600; 310; 30; 20 MG/100ML; MG/100ML; MG/100ML; MG/100ML
INJECTION, SOLUTION INTRAVENOUS CONTINUOUS
Status: DISCONTINUED | OUTPATIENT
Start: 2017-09-07 | End: 2017-09-07

## 2017-09-07 RX ORDER — MORPHINE SULFATE 2 MG/ML
2 INJECTION, SOLUTION INTRAMUSCULAR; INTRAVENOUS EVERY 10 MIN PRN
Status: DISCONTINUED | OUTPATIENT
Start: 2017-09-07 | End: 2017-09-07

## 2017-09-07 RX ORDER — MIDAZOLAM HYDROCHLORIDE 1 MG/ML
INJECTION INTRAMUSCULAR; INTRAVENOUS AS NEEDED
Status: DISCONTINUED | OUTPATIENT
Start: 2017-09-07 | End: 2017-09-07 | Stop reason: SURG

## 2017-09-07 RX ORDER — ACETAMINOPHEN 325 MG/1
650 TABLET ORAL EVERY 4 HOURS PRN
Status: DISCONTINUED | OUTPATIENT
Start: 2017-09-07 | End: 2017-09-07

## 2017-09-07 RX ORDER — HYDROMORPHONE HYDROCHLORIDE 1 MG/ML
0.6 INJECTION, SOLUTION INTRAMUSCULAR; INTRAVENOUS; SUBCUTANEOUS EVERY 5 MIN PRN
Status: DISCONTINUED | OUTPATIENT
Start: 2017-09-07 | End: 2017-09-07

## 2017-09-07 RX ORDER — LIDOCAINE HYDROCHLORIDE 10 MG/ML
INJECTION, SOLUTION EPIDURAL; INFILTRATION; INTRACAUDAL; PERINEURAL AS NEEDED
Status: DISCONTINUED | OUTPATIENT
Start: 2017-09-07 | End: 2017-09-07 | Stop reason: SURG

## 2017-09-07 RX ADMIN — MIDAZOLAM HYDROCHLORIDE 1 MG: 1 INJECTION INTRAMUSCULAR; INTRAVENOUS at 11:10:00

## 2017-09-07 RX ADMIN — MIDAZOLAM HYDROCHLORIDE 2 MG: 1 INJECTION INTRAMUSCULAR; INTRAVENOUS at 11:03:00

## 2017-09-07 RX ADMIN — LIDOCAINE HYDROCHLORIDE 50 MG: 10 INJECTION, SOLUTION EPIDURAL; INFILTRATION; INTRACAUDAL; PERINEURAL at 11:04:00

## 2017-09-07 RX ADMIN — MIDAZOLAM HYDROCHLORIDE 1 MG: 1 INJECTION INTRAMUSCULAR; INTRAVENOUS at 12:15:00

## 2017-09-07 RX ADMIN — SODIUM CHLORIDE, SODIUM LACTATE, POTASSIUM CHLORIDE, CALCIUM CHLORIDE: 600; 310; 30; 20 INJECTION, SOLUTION INTRAVENOUS at 11:03:00

## 2017-09-07 NOTE — INTERVAL H&P NOTE
Pre-op Diagnosis: temporal arteritis    The above referenced H&P was reviewed by Yris Bowen MD on 9/7/2017, the patient was examined and no significant changes have occurred in the patient's condition since the H&P was performed.   I discussed with ameya

## 2017-09-07 NOTE — H&P
· Ml Jon MD   SURGERY, GENERAL  · Headache disorder, R/O Temporal arteritis   Dx  · Temporal Arteritis/pmr   Reason for Visit    Reason for Visit   Temporal Arteritis/pmr Patient referred by Dr. Cory Molina for temporal arteritis and discuss biop about 6 months (around 11/2/2017). Claudine Nuñez. Perez Tatum MD\"   And on 07/11/17 .:   HPI:   Patient presents with:  Headache: LOV: 5/2/17. F/U on migraines. Patient states that she had a flare of migraines a couple weeks ago that lasted a week.  She was p arteritis in view of the elevated sed rate. Although clinically she is doing well today, we decided to repeat the sed rate. Decision on temporal artery biopsy will be made once the results are available.   She will call me later this week to review the bl weeks Disp: 60 g Rfl: 0   Acyclovir 5 % External Cream apply cream  On affected  Area  every  3  Hr  For 5-  Days Disp: 1 Tube Rfl: 0   Levothyroxine Sodium 50 MCG Oral Tab TAKE 1 TABLET BY MOUTH BEFORE BREAKFAST.  Disp: 90 tablet Rfl: 1   Blood Glucose Mon on file                 Other Topics Concern                Caffeine Concern No              Comment: tea 1 cup daily and rare coffee     Exercise No          Social History Narrative     The patient does not use an assistive device. .       The patient medley reflexes. She displays normal reflexes. No cranial nerve deficit. Coordination normal.   Skin: Skin is warm and dry. No rash noted. She is not diaphoretic. No erythema. Psychiatric: She has a normal mood and affect.  Her behavior is normal. Judgment and t of the defined types were placed in this encounter.         Imaging & Referrals  None     Lorelei Marcelo MD

## 2017-09-07 NOTE — OPERATIVE REPORT
[facility]    PATIENT'S NAME: Miguel Owens   ATTENDING PHYSICIAN: Breanna Queen MD   OPERATING PHYSICIAN: Breanna Queen MD   PATIENT ACCOUNT#:   [id number]    LOCATION:  [facility] [address] PACU [address] [place] [address]  MEDICAL RECORD #:   [id number] preauricular area and deepened through the skin and the subcutaneous tissues. Divulsion was utilized to find the artery. The artery was ligated proximally and distally with 4-0 chromic catgut and 2 cm of it excised.   The specimens were sent to Pathology

## 2017-09-07 NOTE — ANESTHESIA POSTPROCEDURE EVALUATION
Patient: Cecilia Mena    Procedure Summary     Date:  09/07/17 Room / Location:  [facility] MAIN OR 05 / [address] [place] [address] MAIN OR    Anesthesia Start:  3038 Anesthesia Stop:  2072    Procedure:  TEMPORAL ARTERY BIOPSY (Bilateral ) Diagnosis:  (temporal arteritis)    Linda Pilar

## 2017-09-07 NOTE — BRIEF OP NOTE
Pre-Operative Diagnosis: temporal arteritis     Post-Operative Diagnosis: temporal arteritis     Procedure Performed:   Procedure(s):  bilateral temporal artery biopsy    Surgeon(s) and Role:     * Kathy Milton MD - Primary    Assistant(s):   Non

## 2017-09-07 NOTE — ANESTHESIA PREPROCEDURE EVALUATION
Anesthesia PreOp Note    HPI:     Ingrid Keith is a 62year old female who presents for preoperative consultation requested by: Jensen Varela MD    Date of Surgery: 9/7/2017    Procedure(s):  TEMPORAL ARTERY BIOPSY  Indication: temporal arteritis depression         Date Noted: 02/11/2015      Lateral epicondylitis of left elbow         Date Noted: 02/11/2015      Migraine         Date Noted: 12/31/2014      HTN (hypertension)         Date Noted: 09/30/2014      DM2 (diabetes mellitus, type 2) (RUSTca 75.) mouth 2 (two) times daily with meals.  Disp: 180 tablet Rfl: 3 9/6/2017 at Unknown time   loratadine 10 MG Oral Tab 1  Tab    oncle  Daily for 1-2  Weeks than prn Disp: 30 tablet Rfl: 1 9/6/2017 at Unknown time   SUMAtriptan Succinate 100 MG Oral Tab TAKE 1 09/07/17 1117      No current [facility]-ordered outpatient prescriptions on file.       Codeine                 Nausea only, Dizziness  Morphine                Nausea only, Dizziness  Penicillins             Hives, Rash    Family History   Problem Relation Age o Resp:  16   Temp:  (!) 97.4 °F (36.3 °C)   TempSrc:  Oral   SpO2:  100%   Weight: 83.9 kg (185 lb) 83 kg (183 lb)   Height: 1.626 m (5' 4\")         Anesthesia ROS/Med Hx and Physical Exam     Patient summary reviewed and Nursing notes reviewed    Airway

## 2017-09-08 ENCOUNTER — TELEPHONE (OUTPATIENT)
Dept: SURGERY | Facility: CLINIC | Age: 57
End: 2017-09-08

## 2017-09-08 NOTE — TELEPHONE ENCOUNTER
pt called. Asking for a follow up after biopsy appt with ANABEL. Next opening 10/2/17. pt has [employer]. She asked if she can be seen before 9/30/17.

## 2017-09-08 NOTE — TELEPHONE ENCOUNTER
Contacted patient. Appt scheduled 09/29/2017 at 1100. Patient verbalized understanding and all questions answered.

## 2017-09-11 ENCOUNTER — TELEPHONE (OUTPATIENT)
Dept: RHEUMATOLOGY | Facility: CLINIC | Age: 57
End: 2017-09-11

## 2017-09-13 ENCOUNTER — OFFICE VISIT (OUTPATIENT)
Dept: SURGERY | Facility: CLINIC | Age: 57
End: 2017-09-13

## 2017-09-13 DIAGNOSIS — Z09 POSTOPERATIVE FOLLOW-UP: Primary | ICD-10-CM

## 2017-09-13 PROCEDURE — 99024 POSTOP FOLLOW-UP VISIT: CPT | Performed by: SURGERY

## 2017-09-13 PROCEDURE — 99212 OFFICE O/P EST SF 10 MIN: CPT | Performed by: SURGERY

## 2017-09-14 ENCOUNTER — OFFICE VISIT (OUTPATIENT)
Dept: NEUROLOGY | Facility: CLINIC | Age: 57
End: 2017-09-14

## 2017-09-14 ENCOUNTER — TELEPHONE (OUTPATIENT)
Dept: RHEUMATOLOGY | Facility: CLINIC | Age: 57
End: 2017-09-14

## 2017-09-14 VITALS
HEART RATE: 72 BPM | HEIGHT: 64 IN | WEIGHT: 180 LBS | SYSTOLIC BLOOD PRESSURE: 110 MMHG | RESPIRATION RATE: 16 BRPM | DIASTOLIC BLOOD PRESSURE: 70 MMHG | BODY MASS INDEX: 30.73 KG/M2

## 2017-09-14 DIAGNOSIS — M19.90 ARTHRITIS: ICD-10-CM

## 2017-09-14 DIAGNOSIS — G43.709 CHRONIC MIGRAINE WITHOUT AURA WITHOUT STATUS MIGRAINOSUS, NOT INTRACTABLE: Primary | ICD-10-CM

## 2017-09-14 PROCEDURE — 99213 OFFICE O/P EST LOW 20 MIN: CPT | Performed by: OTHER

## 2017-09-14 RX ORDER — SUMATRIPTAN 100 MG/1
TABLET, FILM COATED ORAL
Qty: 9 TABLET | Refills: 5 | Status: SHIPPED | OUTPATIENT
Start: 2017-09-14 | End: 2018-09-05

## 2017-09-14 RX ORDER — TOPIRAMATE 50 MG/1
75 TABLET, FILM COATED ORAL 2 TIMES DAILY
Qty: 90 TABLET | Refills: 5 | Status: SHIPPED | OUTPATIENT
Start: 2017-09-14 | End: 2017-12-13

## 2017-09-14 RX ORDER — NORTRIPTYLINE HYDROCHLORIDE 25 MG/1
25 CAPSULE ORAL DAILY
Qty: 30 CAPSULE | Refills: 12 | Status: SHIPPED | OUTPATIENT
Start: 2017-09-14 | End: 2017-09-26

## 2017-09-14 NOTE — PROGRESS NOTES
Thuy Davey Eli : 1960     HPI:   Patient presents with: Follow - Up: Patient presents today for a follow up on biopsy. Pt had temporal biopsy done on 17. Sade Pittman was seen in follow-up in my office 2017.   She is a 58-year mg total) by mouth 2 (two) times daily. Disp: 60 tablet Rfl: 6   Elastic Bandages & Supports (KNEE BRACE) Does not apply Misc Right knee Disp: 1 each Rfl: 0   Levothyroxine Sodium 50 MCG Oral Tab TAKE 1 TABLET BY MOUTH BEFORE BREAKFAST.  Disp: 90 tablet Rfl Smokeless tobacco: Never Used                        Alcohol use:  No              Drug use: No            Other Topics            Concern  Caffeine Concern        No    Comment:tea 1 cup daily and rare coffee  Exercise bilaterally. Gait: Narrow based, negative Romberg’s sign. Can stand on heels and toes. ASSESSMENT AND PLAN:     Horacio Benitez 62year old female presents to clinic with chronic vascular headaches.   Because of the elevated sedimentation rate, tempo

## 2017-09-14 NOTE — TELEPHONE ENCOUNTER
Pt calling regarding having appt today with Dr. Salena Parker.  Pt state that she was advise to follow up with Dr. Salena Parker in two weeks from her last appt. There is  no  appt scheduled for today. Next available appt is oct 9.   Pt is requesting to see Dr. Salena Parker asap because he

## 2017-09-15 NOTE — PROGRESS NOTES
Follow Up Visit Note       Active Problems   Postoperative follow-up, s/p bilateral temporal artery biopsy  (primary encounter diagnosis)  Chief Complaint: Patient presents with:  Post-Op: First post op.  S/P Bilateral temporal artery biopsies on 09/07/2017 (TRUETEST TEST) In Vitro Strip Check blood sugars twice a day. Disp: 100 each Rfl: 0   Nortriptyline HCl 25 MG Oral Cap Take 1 capsule (25 mg total) by mouth daily.  Disp: 30 capsule Rfl: 12   topiramate 50 MG Oral Tab Take 1.5 tablets (75 mg total) by mout Referrals  None    Follow Up No Follow-up on file.     Lorelei Marcelo MD

## 2017-09-20 ENCOUNTER — TELEPHONE (OUTPATIENT)
Dept: ORTHOPEDICS CLINIC | Facility: CLINIC | Age: 57
End: 2017-09-20

## 2017-09-20 RX ORDER — LEVOTHYROXINE SODIUM 0.05 MG/1
TABLET ORAL
Qty: 90 TABLET | Refills: 0 | Status: SHIPPED | OUTPATIENT
Start: 2017-09-20 | End: 2017-09-26

## 2017-09-20 NOTE — TELEPHONE ENCOUNTER
Called pt and informed them we are no longer accepting [employer] insurance starting 10/1/2017. They can contact [facility] member services at [phone] to help assist them in finding an in network doctor. They verbalized understanding.

## 2017-09-20 NOTE — TELEPHONE ENCOUNTER
Hypothyroid Medications  Protocol Criteria:  Appointment scheduled in the past 12 months or the next 3 months  TSH resulted in the past 12 months that is normal  Recent Outpatient Visits            6 days ago Chronic migraine without aura without status mi

## 2017-09-21 ENCOUNTER — OFFICE VISIT (OUTPATIENT)
Dept: RHEUMATOLOGY | Facility: CLINIC | Age: 57
End: 2017-09-21

## 2017-09-21 ENCOUNTER — APPOINTMENT (OUTPATIENT)
Dept: LAB | Age: 57
End: 2017-09-21
Attending: INTERNAL MEDICINE
Payer: COMMERCIAL

## 2017-09-21 VITALS
DIASTOLIC BLOOD PRESSURE: 68 MMHG | TEMPERATURE: 98 F | WEIGHT: 185 LBS | BODY MASS INDEX: 31.58 KG/M2 | HEIGHT: 64 IN | SYSTOLIC BLOOD PRESSURE: 104 MMHG | HEART RATE: 76 BPM

## 2017-09-21 DIAGNOSIS — R51.9 NONINTRACTABLE HEADACHE, UNSPECIFIED CHRONICITY PATTERN, UNSPECIFIED HEADACHE TYPE: ICD-10-CM

## 2017-09-21 DIAGNOSIS — R76.8 POSITIVE ANA (ANTINUCLEAR ANTIBODY): ICD-10-CM

## 2017-09-21 DIAGNOSIS — R70.0 ELEVATED SED RATE: ICD-10-CM

## 2017-09-21 DIAGNOSIS — R70.0 ELEVATED SED RATE: Primary | ICD-10-CM

## 2017-09-21 LAB
CRP SERPL-MCNC: 0.7 MG/DL (ref 0–0.9)
ERYTHROCYTE [SEDIMENTATION RATE] IN BLOOD: 81 MM/HR (ref 0–30)

## 2017-09-21 PROCEDURE — 86235 NUCLEAR ANTIGEN ANTIBODY: CPT

## 2017-09-21 PROCEDURE — 86140 C-REACTIVE PROTEIN: CPT

## 2017-09-21 PROCEDURE — 86803 HEPATITIS C AB TEST: CPT

## 2017-09-21 PROCEDURE — 83516 IMMUNOASSAY NONANTIBODY: CPT

## 2017-09-21 PROCEDURE — 99212 OFFICE O/P EST SF 10 MIN: CPT | Performed by: INTERNAL MEDICINE

## 2017-09-21 PROCEDURE — 83876 ASSAY MYELOPEROXIDASE: CPT

## 2017-09-21 PROCEDURE — 85652 RBC SED RATE AUTOMATED: CPT

## 2017-09-21 PROCEDURE — 86038 ANTINUCLEAR ANTIBODIES: CPT

## 2017-09-21 PROCEDURE — 36415 COLL VENOUS BLD VENIPUNCTURE: CPT

## 2017-09-21 PROCEDURE — 86225 DNA ANTIBODY NATIVE: CPT

## 2017-09-21 PROCEDURE — 86255 FLUORESCENT ANTIBODY SCREEN: CPT

## 2017-09-21 PROCEDURE — 99214 OFFICE O/P EST MOD 30 MIN: CPT | Performed by: INTERNAL MEDICINE

## 2017-09-21 NOTE — TELEPHONE ENCOUNTER
Roseanne pacheco [place]     Her insurance will change asking for medication to be sent  90 day supply     Current Outpatient Prescriptions:  Hydroxychloroquine Sulfate 200 MG Oral Tab Take 1 tablet (200 mg total) by mouth 2 (two) times daily.  Disp: 60 tablet R

## 2017-09-21 NOTE — PROGRESS NOTES
Fabian Brooks is a 62year old female who presents for Patient presents with:  Joint Pain  . HPI:     She's a pleasasnt 54year old who has joint pian in her hands for 1 year. It's worse in the last 5-6 motnhs.    She is noticiing that her activities wi migraine after it as well. She is on melxoicam 15mg a day. She received synovis injection in her left knee 2 weeks before. With Reina vera. She gets a headaches with the shot. Her joint pains nweren't too bad on meloxicam 15mg a day.    She was [place] Her hands pain is not her pain issues anymore. Her knee bothers her but that is not new.     9/21/2017  She had temporal artery bx on 9/7/2017 it was negative for arteritis. She feels her headaches were better.    She took the prednisoe burst over 8/26 x mg total) by mouth 2 (two) times daily with meals.  Disp: 180 tablet Rfl: 3   loratadine 10 MG Oral Tab 1  Tab    oncle  Daily for 1-2  Weeks than prn Disp: 30 tablet Rfl: 1   Hydroxychloroquine Sulfate 200 MG Oral Tab Take 1 tablet (200 mg total) by mouth housewife, 3 daughters,        REVIEW OF SYSTEMS:   Review Of Systems:  Fatigue  Constitutional:No fever, no change in weight or appetitie  Derm: No rashes, no oral ulcers, no alopecia, no photosensitivity, no psoriasis, gets red on her face - no tendenress  Right anklenot tender         Component      Latest Ref Rng 10/8/2015 4/29/2015 2/9/2015   Glucose      65 - 99 mg/dL 123 (H)  99   Sodium      136 - 144 mmol/L 139  137   Potassium      3.3 - 5.1 mmol/L 4.3  4.3   Chloride      95 - 110 mmol/L Yellow   URINE CLARITY      Clear Clear  Clear   SPECIFIC GRAVITY, URINE      1.002 - 1.035 1.016  1.016   URINE PH      5.0 - 8.0 6.0  6.0   PROTEIN, URINE, QUAL. Negative mg/dL Negative  Negative   GLUCOSE, URINE, QUAL.       Negative mg/dL Negative dislocation. Pes planus configuration. Plantar  calcaneal spurs. Chronic periosteal thickening mid shafts of the second  through fifth metatarsals may be chronic sequela from previous stress  Injury/fracture.     9/10/2014 - mri brain -   Frontotemporal atr 32.0 PG 25.5 (L)   MEAN CORPUSCULAR HEMOGLOBIN CO      32.0 - 37.0 G/DL 31.4 (L)   RED CELL DISTRIBUTION WIDTH      11.0 - 15.0 % 14.8   Platelet Count      979 - 400 K/   MEAN PLATELET VOLUME      7.4 - 10.3 FL 10.6 (H)   Neutrophils %       54   Ly involving the ulnar aspect of the     lunate, with corresponding subtle patchy edema involving the lateral     margin of the distal ulna.  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  The ulna positioning is neutral. No obvious TFC    16.0 g/dL 12.3  12.2   Hematocrit      35.0 - 48.0 % 38.6  38.3   MCV      80.0 - 100.0 fL 80.7  81.0   MCH      27.0 - 32.0 pg 25.6 (L)  25.9 (L)   MCHC      32.0 - 37.0 g/dl 31.8 (L)  31.9 (L)   RDW      11.0 - 15.0 % 14.6  14.5   Platelet Count      914 HDL Cholesterol      mg/dL   49   CHOLESTEROL, TOTAL      110 - 200 mg/dL   141   Triglycerides      1 - 149 mg/dL   137   NON HDL CHOL      <130 mg/dL   92   LDL Cholesterol Calc      0 - 99 mg/dL   65   CREATININE UR RANDOM      mg/dL   58.4   MALB URI ortho and getting steroid injecitons. No benefit from synvisc in righ tknee, give knee brace script -   3. Hx of gastris -   4. Hx of mammogram on 4/2016   Colonoscopy -   5.  Hx of diabetes - gets yearly eye exam - 3/7/2017 -     Summary:  1. tylenol arthr

## 2017-09-22 LAB
DSDNA AB TITR SER: <10 {TITER}
HCV AB SERPL QL IA: NONREACTIVE

## 2017-09-22 RX ORDER — HYDROXYCHLOROQUINE SULFATE 200 MG/1
200 TABLET, FILM COATED ORAL 2 TIMES DAILY
Qty: 180 TABLET | Refills: 1 | Status: SHIPPED | OUTPATIENT
Start: 2017-09-22 | End: 2018-01-04

## 2017-09-22 NOTE — TELEPHONE ENCOUNTER
LOV: 9/21/17  Future Appointments  Date Time Provider Laurie Mcgrawi   9/26/2017 1:00 PM Shanta Drummond MD [facility] [place]   9/26/2017 3:40 PM Donis Hammans., MD [facility]     Summary:  1. tylenol arthritis 650mg every 8 hours -   2

## 2017-09-25 LAB — NUCLEAR IGG TITR SER IF: NEGATIVE {TITER}

## 2017-09-26 ENCOUNTER — OFFICE VISIT (OUTPATIENT)
Dept: INTERNAL MEDICINE CLINIC | Facility: CLINIC | Age: 57
End: 2017-09-26

## 2017-09-26 ENCOUNTER — OFFICE VISIT (OUTPATIENT)
Dept: ORTHOPEDICS CLINIC | Facility: CLINIC | Age: 57
End: 2017-09-26

## 2017-09-26 VITALS — HEART RATE: 84 BPM | SYSTOLIC BLOOD PRESSURE: 100 MMHG | DIASTOLIC BLOOD PRESSURE: 70 MMHG | RESPIRATION RATE: 14 BRPM

## 2017-09-26 VITALS
HEART RATE: 80 BPM | BODY MASS INDEX: 31.76 KG/M2 | DIASTOLIC BLOOD PRESSURE: 75 MMHG | TEMPERATURE: 98 F | SYSTOLIC BLOOD PRESSURE: 124 MMHG | HEIGHT: 64 IN | RESPIRATION RATE: 20 BRPM | WEIGHT: 186 LBS

## 2017-09-26 DIAGNOSIS — E11.9 TYPE 2 DIABETES MELLITUS WITHOUT COMPLICATION, WITHOUT LONG-TERM CURRENT USE OF INSULIN (HCC): Primary | ICD-10-CM

## 2017-09-26 DIAGNOSIS — M17.11 PRIMARY OSTEOARTHRITIS OF RIGHT KNEE: Primary | ICD-10-CM

## 2017-09-26 DIAGNOSIS — R70.0 ESR RAISED: ICD-10-CM

## 2017-09-26 LAB
ENA SM IGG SER QL: NEGATIVE
ENA SM+RNP AB SER QL: NEGATIVE
ENA SS-A AB SER QL IA: NEGATIVE
ENA SS-B AB SER QL IA: NEGATIVE

## 2017-09-26 PROCEDURE — 99212 OFFICE O/P EST SF 10 MIN: CPT | Performed by: INTERNAL MEDICINE

## 2017-09-26 PROCEDURE — 99214 OFFICE O/P EST MOD 30 MIN: CPT | Performed by: INTERNAL MEDICINE

## 2017-09-26 PROCEDURE — 99212 OFFICE O/P EST SF 10 MIN: CPT | Performed by: ORTHOPAEDIC SURGERY

## 2017-09-26 PROCEDURE — 20610 DRAIN/INJ JOINT/BURSA W/O US: CPT | Performed by: ORTHOPAEDIC SURGERY

## 2017-09-26 RX ORDER — OMEPRAZOLE 40 MG/1
CAPSULE, DELAYED RELEASE ORAL
Qty: 90 CAPSULE | Refills: 0 | Status: SHIPPED | OUTPATIENT
Start: 2017-09-26 | End: 2017-12-06

## 2017-09-26 RX ORDER — LEVOTHYROXINE SODIUM 0.05 MG/1
TABLET ORAL
Qty: 90 TABLET | Refills: 0 | Status: SHIPPED | OUTPATIENT
Start: 2017-09-26 | End: 2018-01-30

## 2017-09-26 RX ORDER — NORTRIPTYLINE HYDROCHLORIDE 25 MG/1
25 CAPSULE ORAL DAILY
Qty: 90 CAPSULE | Refills: 0 | Status: SHIPPED | OUTPATIENT
Start: 2017-09-26 | End: 2017-12-19

## 2017-09-26 RX ORDER — LORATADINE 10 MG/1
TABLET ORAL
Qty: 30 TABLET | Refills: 1 | Status: SHIPPED | OUTPATIENT
Start: 2017-09-26 | End: 2018-02-21

## 2017-09-26 NOTE — PROGRESS NOTES
This is a pleasant 24-year-old female with right knee osteoarthritis. Patient comes in today for Synvisc injection into the right knee. On exam, the patient has no swelling of the right knee.   Patient is an intact cruciate and collateral ligament exam.

## 2017-09-26 NOTE — PROCEDURES
The patient was given an informational brochure about Synvisc. After sterile prep, 5 cc of 1% lidocaine was injected into the right knee. This was followed by 6 cc of Synvisc 1. The patient tolerated the procedure well.

## 2017-09-26 NOTE — PROGRESS NOTES
Per verbal order from Dr. Zia Khalil, draw up 5ml of 1% llidocaine for injection to right knee. Wong Ladd, RN    Pt left without getting post injection vitals.  MT, RN

## 2017-09-26 NOTE — PROGRESS NOTES
HPI:    Patient ID: Naun Leija is a 62year old female. Diabetes   She presents for her follow-up (pt  state  doing well  state -  bx of  temporal art   went  well   and woud healed well  ) diabetic visit. Her disease course has been improving.  Hyp Current Outpatient Prescriptions:  Levothyroxine Sodium 50 MCG Oral Tab TAKE 1 TABLET BY MOUTH BEFORE BREAKFAST Disp: 90 tablet Rfl: 0   loratadine 10 MG Oral Tab 1  Tab    oncle  Daily for 1-2  Weeks than prn Disp: 30 tablet Rfl: 1   MetFORMIN Nose: Nose normal. Right sinus exhibits no maxillary sinus tenderness and no frontal sinus tenderness. Left sinus exhibits no maxillary sinus tenderness and no frontal sinus tenderness.    Mouth/Throat: Uvula is midline and oropharynx is clear and moist. No No orders of the defined types were placed in this encounter.       Meds This Visit:  Signed Prescriptions Disp Refills    Levothyroxine Sodium 50 MCG Oral Tab 90 tablet 0      Sig: TAKE 1 TABLET BY MOUTH BEFORE BREAKFAST      loratadine 10 MG Oral Tab 30 t

## 2017-09-27 LAB
MYELOPEROX ANTIBODIES, IGG: 2 AU/ML
RIBOSOMAL P ANTIBODY: 3 AU/ML
SCLERODERMA (SCL-70) (ENA) AB, IGG: 1 AU/ML
SERINE PROTEASE3, IGG: 0 AU/ML

## 2017-09-29 ENCOUNTER — HOSPITAL ENCOUNTER (OUTPATIENT)
Dept: MRI IMAGING | Facility: HOSPITAL | Age: 57
Discharge: HOME | End: 2017-09-29
Attending: Other
Payer: COMMERCIAL

## 2017-09-29 DIAGNOSIS — R51.9 ACUTE INTRACTABLE HEADACHE, UNSPECIFIED HEADACHE TYPE: ICD-10-CM

## 2017-09-29 PROCEDURE — 70551 MRI BRAIN STEM W/O DYE: CPT | Performed by: OTHER

## 2017-10-18 ENCOUNTER — TELEPHONE (OUTPATIENT)
Dept: RHEUMATOLOGY | Facility: CLINIC | Age: 57
End: 2017-10-18

## 2017-10-18 NOTE — TELEPHONE ENCOUNTER
Daughter states based on pt's recent test results needs to start medications. Asking if pt can get started on medication without making f/u appt with Dr Brittany Rush?     Confirmed Mara/YOSELIN    Pt cant schedule appt now- Has [employer] ins until Dec  Starting Dec will have [employer] and can schedule appt at that time

## 2017-10-25 ENCOUNTER — TELEPHONE (OUTPATIENT)
Dept: OPHTHALMOLOGY | Facility: CLINIC | Age: 57
End: 2017-10-25

## 2017-10-25 NOTE — TELEPHONE ENCOUNTER
Pt lost eyeglasses rx, requesting copy mailed to her home, address in [facility] confirmed.

## 2017-12-06 ENCOUNTER — OFFICE VISIT (OUTPATIENT)
Dept: INTERNAL MEDICINE CLINIC | Facility: CLINIC | Age: 57
End: 2017-12-06

## 2017-12-06 VITALS
TEMPERATURE: 98 F | SYSTOLIC BLOOD PRESSURE: 121 MMHG | BODY MASS INDEX: 31.41 KG/M2 | RESPIRATION RATE: 18 BRPM | HEIGHT: 64 IN | WEIGHT: 184 LBS | DIASTOLIC BLOOD PRESSURE: 79 MMHG | HEART RATE: 74 BPM

## 2017-12-06 DIAGNOSIS — G43.909 MIGRAINE WITHOUT STATUS MIGRAINOSUS, NOT INTRACTABLE, UNSPECIFIED MIGRAINE TYPE: Primary | ICD-10-CM

## 2017-12-06 DIAGNOSIS — K21.9 GASTROESOPHAGEAL REFLUX DISEASE WITHOUT ESOPHAGITIS: ICD-10-CM

## 2017-12-06 DIAGNOSIS — E11.9 TYPE 2 DIABETES MELLITUS WITHOUT COMPLICATION, WITHOUT LONG-TERM CURRENT USE OF INSULIN (HCC): ICD-10-CM

## 2017-12-06 PROCEDURE — 99214 OFFICE O/P EST MOD 30 MIN: CPT | Performed by: INTERNAL MEDICINE

## 2017-12-06 PROCEDURE — 99212 OFFICE O/P EST SF 10 MIN: CPT | Performed by: INTERNAL MEDICINE

## 2017-12-06 RX ORDER — OMEPRAZOLE 40 MG/1
CAPSULE, DELAYED RELEASE ORAL
Qty: 90 CAPSULE | Refills: 1 | Status: SHIPPED | OUTPATIENT
Start: 2017-12-06 | End: 2018-02-21

## 2017-12-06 NOTE — PROGRESS NOTES
HPI:    Patient ID: Gold Hansen is a 62year old female. Headache    This is a recurrent (pt state has  headache with more stress at home - with  all wrk needs to be done   ) problem. The current episode started more than 1 year ago.  The problem has Omeprazole 40 MG Oral Capsule Delayed Release TAKE 1 CAPSULE BY MOUTH 30-60 MIN BEFORE BREAKFAST Disp: 90 capsule Rfl: 1   Levothyroxine Sodium 50 MCG Oral Tab TAKE 1 TABLET BY MOUTH BEFORE BREAKFAST Disp: 90 tablet Rfl: 0   loratadine 10 MG Oral Tab 1 Mouth/Throat: Uvula is midline and oropharynx is clear and moist. No oropharyngeal exudate or posterior oropharyngeal erythema. Eyes: Right eye exhibits no discharge. Left eye exhibits no discharge. No scleral icterus. Neck: Neck supple.  No JVD prese Encouraged diet/exercise   Encouraged SBGM   Eye exam and foot exam yearly  Take medications as perscribed  Directions and side effects of medications discussed w/pt  Pt verbalized understanding     Gastroesophageal reflux disease without esophagitis  Pa

## 2017-12-19 ENCOUNTER — OFFICE VISIT (OUTPATIENT)
Dept: NEUROLOGY | Facility: CLINIC | Age: 57
End: 2017-12-19

## 2017-12-19 VITALS
HEART RATE: 68 BPM | SYSTOLIC BLOOD PRESSURE: 116 MMHG | BODY MASS INDEX: 30.73 KG/M2 | WEIGHT: 180 LBS | RESPIRATION RATE: 16 BRPM | HEIGHT: 64 IN | DIASTOLIC BLOOD PRESSURE: 80 MMHG

## 2017-12-19 DIAGNOSIS — G43.019 INTRACTABLE MIGRAINE WITHOUT AURA AND WITHOUT STATUS MIGRAINOSUS: Primary | ICD-10-CM

## 2017-12-19 PROCEDURE — 99213 OFFICE O/P EST LOW 20 MIN: CPT | Performed by: OTHER

## 2017-12-19 RX ORDER — TOPIRAMATE 50 MG/1
75 TABLET, FILM COATED ORAL 2 TIMES DAILY
COMMUNITY
End: 2018-02-28

## 2017-12-19 RX ORDER — NORTRIPTYLINE HYDROCHLORIDE 50 MG/1
50 CAPSULE ORAL DAILY
Qty: 30 CAPSULE | Refills: 5 | Status: SHIPPED | OUTPATIENT
Start: 2017-12-19 | End: 2018-01-18

## 2017-12-19 NOTE — PROGRESS NOTES
Fabian Brooks : 1960     HPI:   Patient presents with:  Migraine: LOV: 17. Patient is here for a f/u on migraines. Pt had biopsy done on 17. Pt states she is having pain on L side where biopsy was done.  Pt is having more frequent migraine not apply Kit To check blood sugars twice a day. Disp: 1 kit Rfl: 0   Glucose Blood (TRUETEST TEST) In Vitro Strip Check blood sugars twice a day. Disp: 100 each Rfl: 0     No current facility-administered medications for this visit.     Past Medical Histor assistive device. .      The patient does live in a home with stairs.               ROS:   GENERAL HEALTH: feels well otherwise  SKIN: denies any unusual skin lesions or rashes  EYES: no visual complaints or deficits  HEENT: denies nasal congestion, sinus pa unremarkable, other than an partially empty sella. She did have recent thyroid studies done, with a normal TSH. I recommended increasing the nortriptyline from 25 to 50 mg at bedtime, to see if that might help reduce headache frequency.   She does get ab

## 2018-01-04 ENCOUNTER — OFFICE VISIT (OUTPATIENT)
Dept: RHEUMATOLOGY | Facility: CLINIC | Age: 58
End: 2018-01-04

## 2018-01-04 ENCOUNTER — APPOINTMENT (OUTPATIENT)
Dept: LAB | Age: 58
End: 2018-01-04
Attending: INTERNAL MEDICINE
Payer: MEDICAID

## 2018-01-04 VITALS
DIASTOLIC BLOOD PRESSURE: 73 MMHG | HEART RATE: 79 BPM | HEIGHT: 64 IN | SYSTOLIC BLOOD PRESSURE: 107 MMHG | BODY MASS INDEX: 31.76 KG/M2 | WEIGHT: 186 LBS

## 2018-01-04 DIAGNOSIS — R70.0 ELEVATED SED RATE: ICD-10-CM

## 2018-01-04 DIAGNOSIS — R76.8 POSITIVE ANA (ANTINUCLEAR ANTIBODY): ICD-10-CM

## 2018-01-04 DIAGNOSIS — Z79.899 LONG-TERM USE OF PLAQUENIL: ICD-10-CM

## 2018-01-04 DIAGNOSIS — M06.4 UNDIFFERENTIATED INFLAMMATORY POLYARTHRITIS (HCC): Primary | ICD-10-CM

## 2018-01-04 LAB
CRP SERPL-MCNC: <0.5 MG/DL (ref 0–0.9)
ERYTHROCYTE [DISTWIDTH] IN BLOOD BY AUTOMATED COUNT: 14.1 % (ref 11–15)
ERYTHROCYTE [SEDIMENTATION RATE] IN BLOOD: 67 MM/HR (ref 0–30)
HCT VFR BLD AUTO: 38.5 % (ref 35–48)
HGB BLD-MCNC: 12.2 G/DL (ref 12–16)
MCH RBC QN AUTO: 25.6 PG (ref 27–32)
MCHC RBC AUTO-ENTMCNC: 31.7 G/DL (ref 32–37)
MCV RBC AUTO: 80.8 FL (ref 80–100)
PLATELET # BLD AUTO: 327 K/UL (ref 140–400)
PMV BLD AUTO: 10.4 FL (ref 7.4–10.3)
RBC # BLD AUTO: 4.77 M/UL (ref 3.7–5.4)
WBC # BLD AUTO: 5.4 K/UL (ref 4–11)

## 2018-01-04 PROCEDURE — 99212 OFFICE O/P EST SF 10 MIN: CPT | Performed by: INTERNAL MEDICINE

## 2018-01-04 PROCEDURE — 36415 COLL VENOUS BLD VENIPUNCTURE: CPT

## 2018-01-04 PROCEDURE — 86140 C-REACTIVE PROTEIN: CPT

## 2018-01-04 PROCEDURE — 85652 RBC SED RATE AUTOMATED: CPT

## 2018-01-04 PROCEDURE — 99214 OFFICE O/P EST MOD 30 MIN: CPT | Performed by: INTERNAL MEDICINE

## 2018-01-04 PROCEDURE — 85027 COMPLETE CBC AUTOMATED: CPT

## 2018-01-04 RX ORDER — HYDROXYCHLOROQUINE SULFATE 200 MG/1
200 TABLET, FILM COATED ORAL 2 TIMES DAILY
Qty: 180 TABLET | Refills: 1 | Status: SHIPPED | OUTPATIENT
Start: 2018-01-04 | End: 2018-05-30

## 2018-01-04 NOTE — PATIENT INSTRUCTIONS
1. tylenol arthritis 650mg every 8 hours -   2. Cont.   hydroxychlroquine 200mg twice a day    3. If sed rate is high - will get ct chest abd pelvis   4. If not high , can consider sulfasalazine 500mg twice a dya   5.  Referral for eye exam - [phone] - What if I miss a dose? If you miss a dose, take it as soon as you can. If it is almost time for your next dose, take only that dose. Do not take double or extra doses. Where should I keep my medicine? Keep out of the reach of children.   Store at room te

## 2018-01-04 NOTE — PROGRESS NOTES
Amanda Cartwright is a 62year old female who presents for Patient presents with:  Hand Pain: bilateral  Wrist Pain: bilateral  .   HPI:     She's a pleasasnt 54year old who has joint pian in her hands for 1 year. It's worse in the last 5-6 motnhs.    She is generalized body aches. She got a migraine after it as well. She is on melxoicam 15mg a day. She received synovis injection in her left knee 2 weeks before. With Grupo vera. She gets a headaches with the shot.    Her joint pains nweren't too bad on me legs.   She is still on plaqueil . Her hands pain is not her pain issues anymore. Her knee bothers her but that is not new.     9/21/2017  She had temporal artery bx on 9/7/2017 it was negative for arteritis. She feels her headaches were better.    She to 30-60 MIN BEFORE BREAKFAST Disp: 90 capsule Rfl: 1   Levothyroxine Sodium 50 MCG Oral Tab TAKE 1 TABLET BY MOUTH BEFORE BREAKFAST Disp: 90 tablet Rfl: 0   loratadine 10 MG Oral Tab 1  Tab    oncle  Daily for 1-2  Weeks than prn Disp: 30 tablet Rfl: 1   Hyd Brother    • Glaucoma Neg    • Macular degeneration Neg    3 sisters, 5 brothers,    Social History:  Smoking status: Never Smoker                                                              Smokeless tobacco: Never Used                      Alcohol use: tender in right 2nd and 3rd mcp with mild swellin g  Tender only in pips - imrpoved but still there.    Can close hands - 5-/5 grasp in both ahnds   Lower back on left glueteal side tender and midnline back - better -   Not tender in  Left hsoudler - over t 44   Monocytes %       8  8   Eosinophils %       1  2   Basophils %       1  1   NEUTROPHILS #      1.8 - 7.7 K/UL 4.5  3.1   LYMPHOCYTES #      1.0 - 4.0 K/UL 2.7  2.9   MONOCYTES #      0.0 - 1.0 K/UL 0.6  0.5   EOSINOPHILS #      0.0 - 0.7 K/UL 0.1  0. and ulnar lunate impingement. 2. No abnormality involving the PIP joints to correspond to the PIP joint     pain. 10/6/2015 - b/l knee xray   1. Degenerative arthritis right greater than left. 2. Bilateral chondromalacia patella.     3/30/2015 - right GFR/NON-      >60 >60   GFR/      >60 >60   WHITE BLOOD COUNT (L)      4.0 - 11.0 K/UL 6.8   RED BLOOD CELL COUNT      3.70 - 5.40 M/UL 5.07   Hemoglobin      12.0 - 16.0 G/DL 12.9   Hematocrit      35.0 - 48.0 % 41.3   ME PROTEIN (CRP)(S)      0.0 - 0.9 mg/dL <0.5   SED RATE (ESR) (L)      0 - 30 MM/HR 63 (H)   SCL-70 AUTOANTIBODIES (R)      0 - 40 AU/mL 0   ANTI-SMITH ANTIBODY (S)      Negative Negative   ANTI-SMITH/RNP ANTIBODY (S)      Negative Negative   ANTI DOUBLE STR - 2.0 1.7  1.6   ANION GAP      0 - 18 mmol/L 6  4   BUN/CREA RATIO      10.0 - 20.0 11.6  12.7   CALCULATED OSMOLALITY      275 - 295 mOsm/kg 288  289   GFR, Non-      >=60 >60  56 (L)   GFR, -American      >=60 >60  >60   WBC Urine      Negative mg/dL  Negative    Specific Gravity, Urine      1.005 - 1.030  1.010    Blood, Urine      Negative  Negative    PH, Urine      5.0 - 8.0  7.0    Protein urine      Negative mg /dL  Negative    Urobilinogen urine      <2.0 mg/dL  0.2 Scleroderma (Scl-70) (DAMIR) Antibody, IgG      0 - 40 AU/mL 1   Anti-Smith Antibody      Negative Negative   Anti-Hunter/RNP Antibody      Negative Negative   Anti Double Strand DNA      <10 <10   RYAN SCREEN      Negative Negative     9/29/2017 - mri brain will get ct chest abd pelvis   4. If not high , can consider sulfasalazine 500mg twice a dya   5.  Referral for eye exam - [phone] - dr. Tsering Pacheco MD  1/4/2018   11:14 AM

## 2018-01-05 ENCOUNTER — TELEPHONE (OUTPATIENT)
Dept: RHEUMATOLOGY | Facility: CLINIC | Age: 58
End: 2018-01-05

## 2018-01-08 ENCOUNTER — TELEPHONE (OUTPATIENT)
Dept: RHEUMATOLOGY | Facility: CLINIC | Age: 58
End: 2018-01-08

## 2018-01-08 DIAGNOSIS — R10.9 ABDOMINAL PAIN, UNSPECIFIED ABDOMINAL LOCATION: Primary | ICD-10-CM

## 2018-01-08 DIAGNOSIS — R70.0 ELEVATED SED RATE: ICD-10-CM

## 2018-01-08 RX ORDER — SULFASALAZINE 500 MG/1
500 TABLET ORAL 2 TIMES DAILY
Qty: 60 TABLET | Refills: 0 | Status: SHIPPED | OUTPATIENT
Start: 2018-01-08 | End: 2018-03-06

## 2018-01-09 NOTE — TELEPHONE ENCOUNTER
D/w pt. - still sed rate is still high - check ct  abd pelvis -   Can you let pt. Know when she's ok to schedule thish     Also will start her on sulfasalzien 500mg twice a day   She will return to clinic in 3-4 weeks.

## 2018-01-12 NOTE — TELEPHONE ENCOUNTER
Pt's insurance plan denied request for CT abd/pelvis. Paperwoek placed on provider's desk for review. Please advise. Pt contacted and advised to cancel CT for now. Pt agreeable with plan.

## 2018-01-30 ENCOUNTER — OFFICE VISIT (OUTPATIENT)
Dept: INTERNAL MEDICINE CLINIC | Facility: CLINIC | Age: 58
End: 2018-01-30

## 2018-01-30 VITALS
BODY MASS INDEX: 31.87 KG/M2 | WEIGHT: 186.69 LBS | RESPIRATION RATE: 20 BRPM | TEMPERATURE: 98 F | HEIGHT: 64 IN | SYSTOLIC BLOOD PRESSURE: 110 MMHG | DIASTOLIC BLOOD PRESSURE: 68 MMHG | HEART RATE: 77 BPM

## 2018-01-30 DIAGNOSIS — G43.909 MIGRAINE WITHOUT STATUS MIGRAINOSUS, NOT INTRACTABLE, UNSPECIFIED MIGRAINE TYPE: ICD-10-CM

## 2018-01-30 DIAGNOSIS — I10 ESSENTIAL HYPERTENSION WITH GOAL BLOOD PRESSURE LESS THAN 140/90: ICD-10-CM

## 2018-01-30 DIAGNOSIS — R70.0 ESR RAISED: ICD-10-CM

## 2018-01-30 DIAGNOSIS — Z12.31 ENCOUNTER FOR SCREENING MAMMOGRAM FOR BREAST CANCER: Primary | ICD-10-CM

## 2018-01-30 PROCEDURE — 99212 OFFICE O/P EST SF 10 MIN: CPT | Performed by: INTERNAL MEDICINE

## 2018-01-30 PROCEDURE — 99214 OFFICE O/P EST MOD 30 MIN: CPT | Performed by: INTERNAL MEDICINE

## 2018-01-30 RX ORDER — LEVOTHYROXINE SODIUM 0.05 MG/1
TABLET ORAL
Qty: 90 TABLET | Refills: 2 | Status: SHIPPED | OUTPATIENT
Start: 2018-01-30 | End: 2018-08-01

## 2018-01-30 NOTE — PROGRESS NOTES
HPI:    Patient ID: Amanda Cartwright is a 62year old female. Headache    This is a chronic problem. The current episode started more than 1 year ago. The problem has been unchanged. Pain location: changing sides  of head  The pain does not radiate.  The twice per day  1-2 weeks Disp: 1 Tube Rfl: 0   sulfaSALAzine 500 MG Oral Tab Take 1 tablet (500 mg total) by mouth 2 (two) times daily.  With meals Disp: 60 tablet Rfl: 0   Hydroxychloroquine Sulfate 200 MG Oral Tab Take 1 tablet (200 mg total) by mouth 2 ( exhibits no discharge. Left eye exhibits no discharge. No scleral icterus. Neck: Neck supple. No JVD present. No thyromegaly present. Cardiovascular: Normal rate and normal heart sounds. No murmur heard.   Pulmonary/Chest: Effort normal and breath so Levothyroxine Sodium 50 MCG Oral Tab 90 tablet 2      Sig: TAKE 1 TABLET BY MOUTH BEFORE BREAKFAST      hydrocortisone 2.5 % External Cream 1 Tube 0      Sig: Apply thin layer on affected area twice per day  1-2 weeks           Imaging & Referrals:  YVES

## 2018-01-31 NOTE — TELEPHONE ENCOUNTER
Pt calling states her CT autho was denied. Pt states she is unsure what step is next or if Dr an resubmit request for pt to have CT. Please advise and call back with update.

## 2018-02-21 ENCOUNTER — TELEPHONE (OUTPATIENT)
Dept: NEUROLOGY | Facility: CLINIC | Age: 58
End: 2018-02-21

## 2018-02-21 ENCOUNTER — OFFICE VISIT (OUTPATIENT)
Dept: INTERNAL MEDICINE CLINIC | Facility: CLINIC | Age: 58
End: 2018-02-21

## 2018-02-21 VITALS
WEIGHT: 188.63 LBS | BODY MASS INDEX: 32.2 KG/M2 | DIASTOLIC BLOOD PRESSURE: 68 MMHG | TEMPERATURE: 98 F | RESPIRATION RATE: 20 BRPM | HEIGHT: 64 IN | HEART RATE: 80 BPM | SYSTOLIC BLOOD PRESSURE: 116 MMHG

## 2018-02-21 DIAGNOSIS — G43.909 MIGRAINE WITHOUT STATUS MIGRAINOSUS, NOT INTRACTABLE, UNSPECIFIED MIGRAINE TYPE: Primary | ICD-10-CM

## 2018-02-21 DIAGNOSIS — K21.9 GASTROESOPHAGEAL REFLUX DISEASE WITHOUT ESOPHAGITIS: ICD-10-CM

## 2018-02-21 DIAGNOSIS — J30.9 ALLERGIC RHINITIS, UNSPECIFIED SEASONALITY, UNSPECIFIED TRIGGER: ICD-10-CM

## 2018-02-21 DIAGNOSIS — I10 ESSENTIAL HYPERTENSION WITH GOAL BLOOD PRESSURE LESS THAN 140/90: ICD-10-CM

## 2018-02-21 PROCEDURE — 99212 OFFICE O/P EST SF 10 MIN: CPT | Performed by: INTERNAL MEDICINE

## 2018-02-21 PROCEDURE — 99214 OFFICE O/P EST MOD 30 MIN: CPT | Performed by: INTERNAL MEDICINE

## 2018-02-21 RX ORDER — LORATADINE 10 MG/1
TABLET ORAL
Qty: 30 TABLET | Refills: 1 | Status: SHIPPED | OUTPATIENT
Start: 2018-02-21 | End: 2018-11-20

## 2018-02-21 RX ORDER — OMEPRAZOLE 40 MG/1
CAPSULE, DELAYED RELEASE ORAL
Qty: 90 CAPSULE | Refills: 1 | Status: SHIPPED | OUTPATIENT
Start: 2018-02-21 | End: 2018-07-11

## 2018-02-21 RX ORDER — METHYLPREDNISOLONE 4 MG/1
TABLET ORAL
Qty: 1 PACKAGE | Refills: 0 | Status: SHIPPED | OUTPATIENT
Start: 2018-02-21 | End: 2018-02-28

## 2018-02-21 RX ORDER — CYCLOBENZAPRINE HCL 5 MG
5 TABLET ORAL
Qty: 20 TABLET | Refills: 0 | Status: SHIPPED | OUTPATIENT
Start: 2018-02-21 | End: 2018-03-06

## 2018-02-21 NOTE — TELEPHONE ENCOUNTER
Spoke to patient and gave her information from Dr Ele Sotelo note. Expressed understanding. Per Epic review seen for headache today by primary Dr Sebastián Mcneal.  Per patient  was told to continue sumatriptan, ice to jaw line for possible TMJ.   NOV 2/28/18 wi

## 2018-02-21 NOTE — TELEPHONE ENCOUNTER
Spoke to patient. She states that she has had a migraine for 3 days now. She states that it has been constant and nothing is relieving the pain. Patient is taking topamax 75 mg BID, nortriptyline 50 mg, and sumatriptan 100 mg prn without relief.  She is won

## 2018-02-21 NOTE — TELEPHONE ENCOUNTER
Please tell her I will prescribe a Medrol Dosepak. Tell her if this does not resolve the headache to go to the emergency room.

## 2018-02-21 NOTE — PROGRESS NOTES
HPI:    Patient ID: Naun Leija is a 62year old female.   Pt state that she has a headache for the past 3-4 days and that sh  Hypertension   This is a chronic (s/p  er    with  bp  check - pt   had  labs  ekg   cxr   pa lat  -  all normal    ,  bp is Respiratory: Negative for cough, shortness of breath and wheezing. Cardiovascular: Negative for chest pain, palpitations and leg swelling. Gastrointestinal: Negative for abdominal pain, constipation, diarrhea, nausea and vomiting.    Genitourinary: N Rfl: 0   Glucose Blood (TRUETEST TEST) In Vitro Strip Check blood sugars twice a day.  Disp: 100 each Rfl: 0   methylPREDNISolone (MEDROL) 4 MG Oral Tablet Therapy Pack As directed Disp: 1 Package Rfl: 0     Allergies:  Codeine                 Nausea only, Hard Vergas Poor  Food   Flexeril   1  Tab  Qd    Warm  Compress  Tid    r  tmj  Area   Soft diet   Refer  To dentist   Flexeril  qhs  As  Needed       Gastroesophageal reflux disease without esophagitis  Patient advised to avoid acidic food, spicy food, coff

## 2018-02-28 ENCOUNTER — OFFICE VISIT (OUTPATIENT)
Dept: NEUROLOGY | Facility: CLINIC | Age: 58
End: 2018-02-28

## 2018-02-28 VITALS
WEIGHT: 189.81 LBS | SYSTOLIC BLOOD PRESSURE: 116 MMHG | HEIGHT: 64 IN | HEART RATE: 92 BPM | RESPIRATION RATE: 18 BRPM | DIASTOLIC BLOOD PRESSURE: 66 MMHG | BODY MASS INDEX: 32.41 KG/M2

## 2018-02-28 DIAGNOSIS — R70.0 ELEVATED SED RATE: ICD-10-CM

## 2018-02-28 DIAGNOSIS — G43.719 INTRACTABLE CHRONIC MIGRAINE WITHOUT AURA AND WITHOUT STATUS MIGRAINOSUS: Primary | ICD-10-CM

## 2018-02-28 PROCEDURE — 99213 OFFICE O/P EST LOW 20 MIN: CPT | Performed by: OTHER

## 2018-02-28 RX ORDER — TOPIRAMATE 100 MG/1
100 TABLET, FILM COATED ORAL 2 TIMES DAILY
Qty: 60 TABLET | Refills: 5 | Status: SHIPPED | OUTPATIENT
Start: 2018-02-28 | End: 2018-11-11

## 2018-02-28 NOTE — PROGRESS NOTES
Lillie Freeman : 1960     HPI:   Patient presents with:  Headache (neurologic): LOV:2017   Patient is here for a f/u on migraines. Patient states that the migraines are still the same with the increase of  nortriptyline.       Jesu gee SUMAtriptan Succinate 100 MG Oral Tab TAKE 1 TABLET BY MOUTH PER  DAY   AS NEEDED  FOR  HEADACHE  MAXIMUM   2 PER  24 hours Disp: 9 tablet Rfl: 5   fenofibrate micronized 134 MG Oral Cap Take 1 capsule (134 mg total) by mouth daily with breakfast. Disp: children:               Social History Main Topics    Smoking status: Never Smoker                                                                Smokeless tobacco: Never Used                        Alcohol use:  No              Drug use: No            Othe heel to shin intact bilaterally. Gait: Narrow based, negative Romberg’s sign. Can stand on heels and toes. ASSESSMENT AND PLAN:     Aurelio Benitez 62year old female presents to clinic with chronic vascular headaches.   I recommended increasing the

## 2018-03-01 ENCOUNTER — OFFICE VISIT (OUTPATIENT)
Dept: RHEUMATOLOGY | Facility: CLINIC | Age: 58
End: 2018-03-01

## 2018-03-01 ENCOUNTER — LAB ENCOUNTER (OUTPATIENT)
Dept: LAB | Age: 58
End: 2018-03-01
Attending: INTERNAL MEDICINE
Payer: MEDICAID

## 2018-03-01 ENCOUNTER — TELEPHONE (OUTPATIENT)
Dept: RHEUMATOLOGY | Facility: CLINIC | Age: 58
End: 2018-03-01

## 2018-03-01 VITALS
SYSTOLIC BLOOD PRESSURE: 125 MMHG | DIASTOLIC BLOOD PRESSURE: 75 MMHG | HEIGHT: 64 IN | HEART RATE: 90 BPM | BODY MASS INDEX: 31.76 KG/M2 | WEIGHT: 186 LBS

## 2018-03-01 DIAGNOSIS — R51.9 NONINTRACTABLE HEADACHE, UNSPECIFIED CHRONICITY PATTERN, UNSPECIFIED HEADACHE TYPE: ICD-10-CM

## 2018-03-01 DIAGNOSIS — R70.0 ELEVATED SED RATE: ICD-10-CM

## 2018-03-01 DIAGNOSIS — M06.4 UNDIFFERENTIATED INFLAMMATORY POLYARTHRITIS (HCC): Primary | ICD-10-CM

## 2018-03-01 DIAGNOSIS — M06.4 UNDIFFERENTIATED INFLAMMATORY POLYARTHRITIS (HCC): ICD-10-CM

## 2018-03-01 DIAGNOSIS — Z51.81 THERAPEUTIC DRUG MONITORING: ICD-10-CM

## 2018-03-01 LAB
ALT SERPL-CCNC: 21 U/L (ref 14–54)
AST SERPL-CCNC: 26 U/L (ref 15–41)
BASOPHILS # BLD: 0 K/UL (ref 0–0.2)
BASOPHILS NFR BLD: 1 %
CREAT SERPL-MCNC: 0.98 MG/DL (ref 0.5–1.5)
CRP SERPL-MCNC: <0.5 MG/DL (ref 0–0.9)
EOSINOPHIL # BLD: 0.1 K/UL (ref 0–0.7)
EOSINOPHIL NFR BLD: 2 %
ERYTHROCYTE [DISTWIDTH] IN BLOOD BY AUTOMATED COUNT: 14.9 % (ref 11–15)
ERYTHROCYTE [SEDIMENTATION RATE] IN BLOOD: 82 MM/HR (ref 0–30)
HCT VFR BLD AUTO: 37.9 % (ref 35–48)
HGB BLD-MCNC: 12.1 G/DL (ref 12–16)
LYMPHOCYTES # BLD: 1.9 K/UL (ref 1–4)
LYMPHOCYTES NFR BLD: 42 %
MCH RBC QN AUTO: 25.8 PG (ref 27–32)
MCHC RBC AUTO-ENTMCNC: 32.1 G/DL (ref 32–37)
MCV RBC AUTO: 80.5 FL (ref 80–100)
MONOCYTES # BLD: 0.4 K/UL (ref 0–1)
MONOCYTES NFR BLD: 9 %
NEUTROPHILS # BLD AUTO: 2.1 K/UL (ref 1.8–7.7)
NEUTROPHILS NFR BLD: 46 %
PLATELET # BLD AUTO: 311 K/UL (ref 140–400)
PMV BLD AUTO: 9.7 FL (ref 7.4–10.3)
RBC # BLD AUTO: 4.71 M/UL (ref 3.7–5.4)
WBC # BLD AUTO: 4.5 K/UL (ref 4–11)

## 2018-03-01 PROCEDURE — 99212 OFFICE O/P EST SF 10 MIN: CPT | Performed by: INTERNAL MEDICINE

## 2018-03-01 PROCEDURE — 82565 ASSAY OF CREATININE: CPT

## 2018-03-01 PROCEDURE — 85025 COMPLETE CBC W/AUTO DIFF WBC: CPT

## 2018-03-01 PROCEDURE — 36415 COLL VENOUS BLD VENIPUNCTURE: CPT

## 2018-03-01 PROCEDURE — 84460 ALANINE AMINO (ALT) (SGPT): CPT

## 2018-03-01 PROCEDURE — 84450 TRANSFERASE (AST) (SGOT): CPT

## 2018-03-01 PROCEDURE — 85652 RBC SED RATE AUTOMATED: CPT

## 2018-03-01 PROCEDURE — 86140 C-REACTIVE PROTEIN: CPT

## 2018-03-01 PROCEDURE — 99214 OFFICE O/P EST MOD 30 MIN: CPT | Performed by: INTERNAL MEDICINE

## 2018-03-01 RX ORDER — PREDNISONE 10 MG/1
TABLET ORAL
Qty: 50 TABLET | Refills: 0 | Status: SHIPPED | OUTPATIENT
Start: 2018-03-01 | End: 2018-06-14

## 2018-03-01 NOTE — PATIENT INSTRUCTIONS
1. tylenol arthritis 650mg every 8 hours -   2. Ok to stop  hydroxychlroquine 200mg twice a day    3. Check labs , if sed rate is high - start prednisone 30mg a day x 1 week,t hen 20mg x 1 week,t hen 10mg a day,   4.  Stay on  sulfasalazine 500mg twice a dy

## 2018-03-01 NOTE — PROGRESS NOTES
Jonel Mary is a 62year old female who presents for Patient presents with:  Hand Pain: right  . HPI:     She's a pleasasnt 54year old who has joint pian in her hands for 1 year. It's worse in the last 5-6 motnhs.    She is noticiing that her activit migraine after it as well. She is on melxoicam 15mg a day. She received synovis injection in her left knee 2 weeks before. With Michele vera. She gets a headaches with the shot. Her joint pains nweren't too bad on meloxicam 15mg a day.    She was [place] Her hands pain is not her pain issues anymore. Her knee bothers her but that is not new.     9/21/2017  She had temporal artery bx on 9/7/2017 it was negative for arteritis. She feels her headaches were better.    She took the prednisoe burst over 8/26 x Encounters:  03/01/18 : 186 lb (84.4 kg)  02/28/18 : 189 lb 12.8 oz (86.1 kg)    Body mass index is 31.93 kg/m². Current Outpatient Prescriptions:  topiramate 100 MG Oral Tab Take 1 tablet (100 mg total) by mouth 2 (two) times daily.  Disp: 60 tablet • Esophagitis 09-   • Floaters 4/9/2015   • High cholesterol    • Migraines    • Muscle weakness     bilateral knees at times   • Osteoarthritis    • Other and unspecified hyperlipidemia    • S/P ablation of accessory bypass tract    • Type II or years - seen neurologist, no hx of seizures,   Lumbar xray - 10/31/2005 - mild djd , post op changes in right upper quadrant  Psych: no hx of anxiety or depression  ENDO: hx of thyroid disease, no hx of DM  Joint/Muscluskeltal: see HPI, has lower back pain 7.7  6.4   Albumin      3.5 - 4.8 g/dL 4.5  3.6   GLOBULIN, TOTAL      2.5 - 3.7 g/dL 3.2  2.8   A/G RATIO      1.0 - 2.0 1.4  1.3   GFR/NON-      >60 >60  >60   GFR/      >60 >60  >60   WHITE BLOOD COUNT (L)      4.0 - 11. 0 None seen Negative  Few (A)   MICROSCOPIC URINALYSIS COMMENT       Completed  Completed   HDL Cholesterol       51  48   CHOLESTEROL (P)      110 - 200 mg/dL 158  154   TRIGLYCERIDE (P)      1 - 149 mg/dL 235 (H)  247 (H)   CALCULATED LDL      0 - 99 mg/dL IU/mL <5.0   RYAN TITER/PATTERN (S)       <40     Component      Latest Ref Rng 7/23/2016   Glucose      70 - 99 mg/dL 120 (H)   Sodium      136 - 144 mmol/L 139   Potassium      3.3 - 5.1 mmol/L 4.3   Chloride      95 - 110 mmol/L 107   CARBON DIOXIDE (P) Negative   KETONES, URINE      Negative mg/dL Negative   BILIRUBIN, URINE, QUAL.       Negative Negative   BLOOD, URINE      Negative UL Negative   URINE NITRITE      Negative Negative   UROBILINOGEN, URINE      <2.0 mg/dL <2.0   URINE WBC ESTERASE      Neg Degenerative arthritis right greater than left.   2. Bilateral chondromalacia patella    Component      Latest Ref Rng & Units 8/18/2017 8/15/2017 8/7/2017   Glucose      70 - 99 mg/dL 116 (H)  119 (H)   Sodium      136 - 144 mmol/L 139  139   Potassium Clear   Clear   SPECIFIC GRAVITY      1.002 - 1.035   1.011   PH, URINE      5.0 - 8.0   6.0   PROTEIN (URINE DIPSTICK)      Negative mg/dL   Negative   GLUCOSE (URINE DIPSTICK)      Negative mg/dL   Negative   KETONES (URINE DIPSTICK)      Negative mg/d · Portion of artery with mild intimal hyperplasia and focal microcalcification. · No evidence of granuloma, giant cells, necrosis or malignancy identified.         B.  Right temporal artery biopsy:  · Portion of artery with mild intimal hyperplasia and presents with:  Hand Pain: right      1. Polayrthritis. elevated sed rate -  In hands - increaseing over 1 year, not resolved , elevated sed rate and crp - corey titre <40 so negative -   Very high sed rate - now , headaches as well - temp artery bx negative

## 2018-03-06 ENCOUNTER — OFFICE VISIT (OUTPATIENT)
Dept: INTERNAL MEDICINE CLINIC | Facility: CLINIC | Age: 58
End: 2018-03-06

## 2018-03-06 VITALS
WEIGHT: 188.19 LBS | SYSTOLIC BLOOD PRESSURE: 108 MMHG | DIASTOLIC BLOOD PRESSURE: 65 MMHG | HEART RATE: 73 BPM | BODY MASS INDEX: 32.13 KG/M2 | HEIGHT: 64 IN | TEMPERATURE: 97 F | RESPIRATION RATE: 20 BRPM

## 2018-03-06 DIAGNOSIS — R53.82 CHRONIC FATIGUE: Primary | ICD-10-CM

## 2018-03-06 PROCEDURE — 99214 OFFICE O/P EST MOD 30 MIN: CPT | Performed by: INTERNAL MEDICINE

## 2018-03-06 PROCEDURE — 99212 OFFICE O/P EST SF 10 MIN: CPT | Performed by: INTERNAL MEDICINE

## 2018-03-06 RX ORDER — CYCLOBENZAPRINE HCL 5 MG
5 TABLET ORAL
Qty: 20 TABLET | Refills: 0 | Status: SHIPPED | OUTPATIENT
Start: 2018-03-06 | End: 2018-05-30

## 2018-03-06 RX ORDER — NORTRIPTYLINE HYDROCHLORIDE 50 MG/1
50 CAPSULE ORAL NIGHTLY
Refills: 4 | COMMUNITY
Start: 2018-03-01 | End: 2018-06-06

## 2018-03-06 NOTE — PROGRESS NOTES
HPI:    Patient ID: Amanda Poser is a 62year old female. Back Pain   This is a new problem. The current episode started yesterday. The pain is present in the lumbar spine. The quality of the pain is described as aching. The pain does not radiate.  Jeffrey Last topiramate 100 MG Oral Tab Take 1 tablet (100 mg total) by mouth 2 (two) times daily.  Disp: 60 tablet Rfl: 5   Omeprazole 40 MG Oral Capsule Delayed Release TAKE 1 CAPSULE BY MOUTH 30-60 MIN BEFORE BREAKFAST Disp: 90 capsule Rfl: 1   loratadine 10 MG Oral Nose: Nose normal. Right sinus exhibits no maxillary sinus tenderness and no frontal sinus tenderness. Left sinus exhibits no maxillary sinus tenderness and no frontal sinus tenderness.    Mouth/Throat: Uvula is midline and oropharynx is clear and moist. No No orders of the defined types were placed in this encounter. Meds This Visit:    Signed Prescriptions Disp Refills    Cyclobenzaprine HCl 5 MG Oral Tab 20 tablet 0      Sig: Take 1 tablet (5 mg total) by mouth daily as needed for Muscle spasms.

## 2018-03-06 NOTE — TELEPHONE ENCOUNTER
LOV: 3/1/18  Future Appointments  Date Time Provider Laurie Mcgrawi   3/14/2018 2:15 PM Rakan Wan MD [address] [facility]   3/26/2018 11:00 AM Jered Goetz MD [facility]     Labs:   Component      Latest Ref Rng & Units 3/1/2018   W

## 2018-03-07 RX ORDER — SULFASALAZINE 500 MG/1
500 TABLET ORAL 2 TIMES DAILY
Qty: 60 TABLET | Refills: 2 | Status: SHIPPED | OUTPATIENT
Start: 2018-03-07 | End: 2018-06-14

## 2018-03-14 ENCOUNTER — OFFICE VISIT (OUTPATIENT)
Dept: OPHTHALMOLOGY | Facility: CLINIC | Age: 58
End: 2018-03-14

## 2018-03-14 DIAGNOSIS — H43.393 VITREOUS FLOATERS OF BOTH EYES: ICD-10-CM

## 2018-03-14 DIAGNOSIS — H25.13 AGE-RELATED NUCLEAR CATARACT OF BOTH EYES: ICD-10-CM

## 2018-03-14 DIAGNOSIS — E11.9 DIABETES MELLITUS TYPE 2 WITHOUT RETINOPATHY (HCC): Primary | ICD-10-CM

## 2018-03-14 PROCEDURE — 99243 OFF/OP CNSLTJ NEW/EST LOW 30: CPT | Performed by: OPHTHALMOLOGY

## 2018-03-14 PROCEDURE — 92015 DETERMINE REFRACTIVE STATE: CPT | Performed by: OPHTHALMOLOGY

## 2018-03-14 PROCEDURE — 99212 OFFICE O/P EST SF 10 MIN: CPT | Performed by: OPHTHALMOLOGY

## 2018-03-14 NOTE — PROGRESS NOTES
Juancarlos Taveras is a 62year old female.     HPI:     HPI     Consult    Additional comments: Per Dr. Sofía Garcia    Additional comments: Pt has been a diabetic for 7 years  7 years on pills/  0 years on Insulin   Pt checks his/ (Bilateral, 09/07/2017) (Temporal artery biopsies, bilateral).     Family History   Problem Relation Age of Onset   • Diabetes Father    • Diabetes Mother    • Heart Disorder Mother    • arthritis Geovanna Gentle Mother    • Diabetes Sister    • Diabetes Brother 2 PER  24 hours Disp: 9 tablet Rfl: 5   fenofibrate micronized 134 MG Oral Cap Take 1 capsule (134 mg total) by mouth daily with breakfast. Disp: 90 capsule Rfl: 1   Blood Glucose Monitoring Suppl (TRUETRACK BLOOD GLUCOSE) W/DEVICE Does not apply Kit To  Temporal crescent    C/D Ratio 0.5 0.5    Macula Normal- no BDR Normal- no BDR    Vessels Normal Normal    Periphery Normal Normal            Lacrimal Exam     Schirmers       Right Left     5 17    Anesthesia:  Yes            Refraction     Wearing Rx placed in this encounter. Meds This Visit:    No prescriptions requested or ordered in this encounter   Follow up instructions:     Consult with Dr. Calderon Broderick for cataract surgery OD, then 1 year Dilated exam with Dr. Tariq Velez .     3/14/2018  Scribed by:

## 2018-03-14 NOTE — PATIENT INSTRUCTIONS
Age-related nuclear cataract of both eyes  Discussed with patient that the cataract in the right eye is advanced enough at this time to consider surgery; it would be patient's choice. Discussed options such as surgery or change of glasses RX.   Discussed s

## 2018-03-14 NOTE — ASSESSMENT & PLAN NOTE
Discussed with patient that the cataract in the right eye is advanced enough at this time to consider surgery; it would be patient's choice. Discussed options such as surgery or change of glasses RX.   Discussed surgical risks, benefits, alternatives and r

## 2018-03-16 ENCOUNTER — TELEPHONE (OUTPATIENT)
Dept: INTERNAL MEDICINE CLINIC | Facility: CLINIC | Age: 58
End: 2018-03-16

## 2018-03-16 DIAGNOSIS — R53.83 OTHER FATIGUE: Primary | ICD-10-CM

## 2018-03-16 NOTE — TELEPHONE ENCOUNTER
Patient does not meet criteria for in lab sleep study. Would you like to order a Home sleep study that insurance will approve? Please advise.

## 2018-03-19 ENCOUNTER — TELEPHONE (OUTPATIENT)
Dept: OPHTHALMOLOGY | Facility: CLINIC | Age: 58
End: 2018-03-19

## 2018-03-19 NOTE — TELEPHONE ENCOUNTER
Pt requesting to speak with a nurse. Pt has question regard pt's appt 3-14-18. mychart visit is different than what lyric had told the pt.   Call pt

## 2018-03-19 NOTE — TELEPHONE ENCOUNTER
Patient wanted to know if she can call Dr. Bob Baldwin office to schedule surgery and I told her that she can. kp

## 2018-03-20 ENCOUNTER — TELEPHONE (OUTPATIENT)
Dept: OPHTHALMOLOGY | Facility: CLINIC | Age: 58
End: 2018-03-20

## 2018-03-20 NOTE — TELEPHONE ENCOUNTER
Pt was seen on 3/14, pt was referred to Dr. Godwin Rodriguez for cataract sx, pts daughter states Dr. Godwin Rodriguez does not have cataract sx dates until July, asking if office can help get sooner date or if pt can get referred elsewhere. Pls advise thank you.

## 2018-03-21 ENCOUNTER — TELEPHONE (OUTPATIENT)
Dept: RHEUMATOLOGY | Facility: CLINIC | Age: 58
End: 2018-03-21

## 2018-03-21 NOTE — TELEPHONE ENCOUNTER
Spoke to Dr. Andrade Sophia office and states that the pt was seen yesterday with an upcoming apt in April. I called an LM with pts daughter.

## 2018-03-21 NOTE — TELEPHONE ENCOUNTER
Patient requesting refill for     sulfaSALAzine 500 MG Oral Tab Take 1 tablet (500 mg total) by mouth 2 (two) times daily. With meals Disp: 60 tablet Rfl: 2       Patient is out of medication.

## 2018-03-22 ENCOUNTER — HOSPITAL ENCOUNTER (OUTPATIENT)
Dept: ULTRASOUND IMAGING | Age: 58
Discharge: HOME | End: 2018-03-22
Attending: INTERNAL MEDICINE
Payer: MEDICAID

## 2018-03-22 DIAGNOSIS — R70.0 ELEVATED SED RATE: ICD-10-CM

## 2018-03-22 DIAGNOSIS — M06.4 UNDIFFERENTIATED INFLAMMATORY POLYARTHRITIS (HCC): ICD-10-CM

## 2018-03-22 PROCEDURE — 76700 US EXAM ABDOM COMPLETE: CPT | Performed by: INTERNAL MEDICINE

## 2018-03-26 ENCOUNTER — OFFICE VISIT (OUTPATIENT)
Dept: RHEUMATOLOGY | Facility: CLINIC | Age: 58
End: 2018-03-26

## 2018-03-26 VITALS
BODY MASS INDEX: 31.92 KG/M2 | DIASTOLIC BLOOD PRESSURE: 68 MMHG | TEMPERATURE: 98 F | WEIGHT: 187 LBS | SYSTOLIC BLOOD PRESSURE: 111 MMHG | HEIGHT: 64 IN | HEART RATE: 85 BPM

## 2018-03-26 DIAGNOSIS — M06.4 UNDIFFERENTIATED INFLAMMATORY POLYARTHRITIS (HCC): Primary | ICD-10-CM

## 2018-03-26 DIAGNOSIS — R70.0 ELEVATED SED RATE: ICD-10-CM

## 2018-03-26 DIAGNOSIS — R51.9 NONINTRACTABLE HEADACHE, UNSPECIFIED CHRONICITY PATTERN, UNSPECIFIED HEADACHE TYPE: ICD-10-CM

## 2018-03-26 PROCEDURE — 99212 OFFICE O/P EST SF 10 MIN: CPT | Performed by: INTERNAL MEDICINE

## 2018-03-26 PROCEDURE — 99214 OFFICE O/P EST MOD 30 MIN: CPT | Performed by: INTERNAL MEDICINE

## 2018-03-26 NOTE — PATIENT INSTRUCTIONS
1. tylenol arthritis 650mg every 8 hours -   2.  start prednisone 30mg a day x 1 week,t hen 20mg x 1 week,t hen 10mg a day,then go off    3. Stay on  sulfasalazine 500mg twice a dya   4. Return to clinic in 1 month.

## 2018-03-26 NOTE — PROGRESS NOTES
Juancarlos Taveras is a 62year old female who presents for Patient presents with:  Joint Pain  Knee Pain: right  . HPI:     She's a pleasasnt 54year old who has joint pian in her hands for 1 year. It's worse in the last 5-6 motnhs.    She is noticiing that She got a migraine after it as well. She is on melxoicam 15mg a day. She received synovis injection in her left knee 2 weeks before. With Dorota vera. She gets a headaches with the shot. Her joint pains nweren't too bad on meloxicam 15mg a day.    Sh fe . Her hands pain is not her pain issues anymore. Her knee bothers her but that is not new.     9/21/2017  She had temporal artery bx on 9/7/2017 it was negative for arteritis. She feels her headaches were better.    She took the prednisoe burst o going to go for sleep studay. She hasn't started predniosne yet. She is going to start it from today. She still getting headaches. They are every now and then. Her joint pain is not that bad. She still has some in her right hands and her knee pain. FOR  HEADACHE  MAXIMUM   2 PER  24 hours Disp: 9 tablet Rfl: 5   fenofibrate micronized 134 MG Oral Cap Take 1 capsule (134 mg total) by mouth daily with breakfast. Disp: 90 capsule Rfl: 1   Blood Glucose Monitoring Suppl (TRUETRACK BLOOD GLUCOSE) W/DEVICE photosensitivity, no psoriasis, gets red on her face - not sure if it's from her meds,   HEENT: No dry eyes, has dry mouth, mild  Raynaud's, no nasal ulcers, no parotid swelling, no neck pain, no jaw pain, no temple pain  Eyes: No visual changes,   CVS: No 2/9/2015   Glucose      65 - 99 mg/dL 123 (H)  99   Sodium      136 - 144 mmol/L 139  137   Potassium      3.3 - 5.1 mmol/L 4.3  4.3   Chloride      95 - 110 mmol/L 106  101   CARBON DIOXIDE (P)      22 - 32 mmol/L 25  29   BLOOD UREA NITROGEN (P)      8 - 1.035 1.016  1.016   URINE PH      5.0 - 8.0 6.0  6.0   PROTEIN, URINE, QUAL. Negative mg/dL Negative  Negative   GLUCOSE, URINE, QUAL.       Negative mg/dL Negative  Negative   KETONES, URINE      Negative mg/dL Negative  Negative   BILIRUBIN, URINE, thickening mid shafts of the second  through fifth metatarsals may be chronic sequela from previous stress  Injury/fracture.     9/10/2014 - mri brain -   Frontotemporal atrophy greater than expected for age , no white matter signal changes, no mass, infarc RED CELL DISTRIBUTION WIDTH      11.0 - 15.0 % 14.8   Platelet Count      074 - 400 K/   MEAN PLATELET VOLUME      7.4 - 10.3 FL 10.6 (H)   Neutrophils %       54   Lymphocytes %       36   Monocytes %       7   Eosinophils %       2   Basophils % involving the lateral     margin of the distal ulna. 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  The ulna positioning is neutral. No obvious TFC     tear on a non-dedicated study.        2.  Mild degenerative subchondral cystic hugo 100.0 fL 80.7  81.0   MCH      27.0 - 32.0 pg 25.6 (L)  25.9 (L)   MCHC      32.0 - 37.0 g/dl 31.8 (L)  31.9 (L)   RDW      11.0 - 15.0 % 14.6  14.5   Platelet Count      597 - 400 K/  293   MEAN PLATELET VOLUME      7.4 - 10.3 fL 9.9  10.5 (H)   Regan Triglycerides      1 - 149 mg/dL   137   NON HDL CHOL      <130 mg/dL   92   LDL Cholesterol Calc      0 - 99 mg/dL   65   CREATININE UR RANDOM      mg/dL   58.4   MALB URINE      0.0 - 1.8 mg/dL   0.2   MALB/CRE CALC      0.0 - 20.0   3.4   HEMOGLOBIN A - 100.0 fL 80.8   MCH      27.0 - 32.0 pg 25.6 (L)   MCHC      32.0 - 37.0 g/dl 31.7 (L)   RDW      11.0 - 15.0 % 14.1   Platelet Count      288 - 400 K/   MEAN PLATELET VOLUME      7.4 - 10.3 fL 10.4 (H)   C-REACTIVE PROTEIN      0.0 - 0.9 mg/dL <0. insurance - pt. Got u/s abd -           2. Chronic righ tknee pain - hx of seeing ortho and getting steroid injecitons. No benefit from synvisc in righ tknee, give knee brace script -   3. Hx of gastris -   4. Hx of mammogram on 4/2016   Colonoscopy -   5.

## 2018-04-17 ENCOUNTER — TELEPHONE (OUTPATIENT)
Dept: INTERNAL MEDICINE CLINIC | Facility: CLINIC | Age: 58
End: 2018-04-17

## 2018-04-17 NOTE — TELEPHONE ENCOUNTER
Pt was contacted and scheduled an appt for 04/19 at 420 in [facility]. Understanding ws voiced.

## 2018-04-17 NOTE — TELEPHONE ENCOUNTER
Pt called in stating that she came in with her  today and she had mentioned that she has a procedure on Monday, 4/23, which she would need clearance for.  Pt states that Dr. Joe Mcintyre stated to call and she would try and squeeze her in during the n

## 2018-04-19 ENCOUNTER — OFFICE VISIT (OUTPATIENT)
Dept: INTERNAL MEDICINE CLINIC | Facility: CLINIC | Age: 58
End: 2018-04-19

## 2018-04-19 VITALS
WEIGHT: 187.81 LBS | RESPIRATION RATE: 20 BRPM | HEIGHT: 64 IN | SYSTOLIC BLOOD PRESSURE: 122 MMHG | TEMPERATURE: 98 F | DIASTOLIC BLOOD PRESSURE: 76 MMHG | HEART RATE: 91 BPM | BODY MASS INDEX: 32.06 KG/M2

## 2018-04-19 DIAGNOSIS — Z01.810 PREOP CARDIOVASCULAR EXAM: Primary | ICD-10-CM

## 2018-04-19 PROCEDURE — 99212 OFFICE O/P EST SF 10 MIN: CPT | Performed by: INTERNAL MEDICINE

## 2018-04-19 PROCEDURE — 99214 OFFICE O/P EST MOD 30 MIN: CPT | Performed by: INTERNAL MEDICINE

## 2018-04-19 RX ORDER — CLINDAMYCIN HYDROCHLORIDE 150 MG/1
150 CAPSULE ORAL 4 TIMES DAILY
COMMUNITY
Start: 2018-04-01 | End: 2018-05-30

## 2018-04-19 RX ORDER — TOPIRAMATE 100 MG/1
100 TABLET, FILM COATED ORAL 2 TIMES DAILY
Refills: 4 | COMMUNITY
Start: 2018-03-31 | End: 2018-11-12

## 2018-04-19 NOTE — PROGRESS NOTES
HPI:    Patient ID: Josette Lizarraga is a 62year old female.   Patient patient presents today for pre op  clearance exam for cataract  Surgery  At  [facility]  On 4/23  Patient states doing well  otherwise, denies chest pain, shortness of breath, dysp with meals.  Disp: 180 tablet Rfl: 2   Levothyroxine Sodium 50 MCG Oral Tab TAKE 1 TABLET BY MOUTH BEFORE BREAKFAST Disp: 90 tablet Rfl: 2   hydrocortisone 2.5 % External Cream Apply thin layer on affected area twice per day  1-2 weeks Disp: 1 Tube Rfl: 0 no discharge. Left eye exhibits no discharge. No scleral icterus. Neck: Neck supple. No JVD present. No thyromegaly present. Cardiovascular: Normal rate and normal heart sounds. No murmur heard.   Pulmonary/Chest: Effort normal and breath sounds norm

## 2018-05-01 ENCOUNTER — OFFICE VISIT (OUTPATIENT)
Dept: INTERNAL MEDICINE CLINIC | Facility: CLINIC | Age: 58
End: 2018-05-01

## 2018-05-01 VITALS
TEMPERATURE: 98 F | DIASTOLIC BLOOD PRESSURE: 62 MMHG | BODY MASS INDEX: 32.07 KG/M2 | HEIGHT: 64 IN | RESPIRATION RATE: 20 BRPM | WEIGHT: 187.88 LBS | HEART RATE: 78 BPM | SYSTOLIC BLOOD PRESSURE: 108 MMHG

## 2018-05-01 DIAGNOSIS — E78.00 PURE HYPERCHOLESTEROLEMIA: ICD-10-CM

## 2018-05-01 DIAGNOSIS — Z98.42 HISTORY OF LEFT CATARACT EXTRACTION: Primary | ICD-10-CM

## 2018-05-01 DIAGNOSIS — E11.9 TYPE 2 DIABETES MELLITUS WITHOUT COMPLICATION, WITHOUT LONG-TERM CURRENT USE OF INSULIN (HCC): ICD-10-CM

## 2018-05-01 PROCEDURE — 99214 OFFICE O/P EST MOD 30 MIN: CPT | Performed by: INTERNAL MEDICINE

## 2018-05-01 PROCEDURE — 99212 OFFICE O/P EST SF 10 MIN: CPT | Performed by: INTERNAL MEDICINE

## 2018-05-01 NOTE — PROGRESS NOTES
HPI:    Patient ID: Gio Encinas is a 62year old female. Cataract Check   This is a chronic (s/p  cataract  surgery  t  state  feels  good    vision is  much better   but  now  has    some irritation in  eye     no pain   no  dischauarge  ) problem. External Cream Apply thin layer on affected area twice per day  1-2 weeks Disp: 1 Tube Rfl: 0   SUMAtriptan Succinate 100 MG Oral Tab TAKE 1 TABLET BY MOUTH PER  DAY   AS NEEDED  FOR  HEADACHE  MAXIMUM   2 PER  24 hours Disp: 9 tablet Rfl: 5   fenofibrate equal, round, and reactive to light. Right eye exhibits no discharge. Left eye exhibits no chemosis, no discharge, no exudate and no hordeolum. No foreign body present in the left eye. Left conjunctiva is not injected. Left conjunctiva has no hemorrhage.  Louisa Zabala

## 2018-05-10 ENCOUNTER — APPOINTMENT (OUTPATIENT)
Dept: LAB | Age: 58
End: 2018-05-10
Attending: INTERNAL MEDICINE
Payer: MEDICAID

## 2018-05-10 ENCOUNTER — OFFICE VISIT (OUTPATIENT)
Dept: RHEUMATOLOGY | Facility: CLINIC | Age: 58
End: 2018-05-10

## 2018-05-10 VITALS
HEIGHT: 64 IN | SYSTOLIC BLOOD PRESSURE: 119 MMHG | WEIGHT: 189 LBS | BODY MASS INDEX: 32.27 KG/M2 | HEART RATE: 86 BPM | DIASTOLIC BLOOD PRESSURE: 76 MMHG

## 2018-05-10 DIAGNOSIS — M06.4 UNDIFFERENTIATED INFLAMMATORY POLYARTHRITIS (HCC): Primary | ICD-10-CM

## 2018-05-10 DIAGNOSIS — R70.0 ELEVATED SED RATE: ICD-10-CM

## 2018-05-10 PROCEDURE — 86140 C-REACTIVE PROTEIN: CPT

## 2018-05-10 PROCEDURE — 99212 OFFICE O/P EST SF 10 MIN: CPT | Performed by: INTERNAL MEDICINE

## 2018-05-10 PROCEDURE — 36415 COLL VENOUS BLD VENIPUNCTURE: CPT

## 2018-05-10 PROCEDURE — 85652 RBC SED RATE AUTOMATED: CPT

## 2018-05-10 PROCEDURE — 99214 OFFICE O/P EST MOD 30 MIN: CPT | Performed by: INTERNAL MEDICINE

## 2018-05-10 RX ORDER — PREDNISONE 1 MG/1
5 TABLET ORAL DAILY
Qty: 30 TABLET | Refills: 1 | Status: SHIPPED | OUTPATIENT
Start: 2018-05-10 | End: 2018-06-06 | Stop reason: ALTCHOICE

## 2018-05-10 NOTE — PATIENT INSTRUCTIONS
1. tylenol arthritis 650mg every 8 hours -   2.  start prednisone 5mg a day -    3. Stay on  sulfasalazine 500mg twice a dya   4. Check labs today   5. Return to clinic in 1 month.

## 2018-05-10 NOTE — PROGRESS NOTES
Krissy Bateman is a 62year old female who presents for Patient presents with:  Joint Pain  Knee Pain: right  . HPI:     She's a pleasasnt 54year old who has joint pian in her hands for 1 year. It's worse in the last 5-6 motnhs.    She is noticiing that She got a migraine after it as well. She is on melxoicam 15mg a day. She received synovis injection in her left knee 2 weeks before. With Meggan vera. She gets a headaches with the shot. Her joint pains nweren't too bad on meloxicam 15mg a day.    Sh fe . Her hands pain is not her pain issues anymore. Her knee bothers her but that is not new.     9/21/2017  She had temporal artery bx on 9/7/2017 it was negative for arteritis. She feels her headaches were better.    She took the prednisoe burst o going to go for sleep studay. She hasn't started predniosne yet. She is going to start it from today. She still getting headaches. They are every now and then. Her joint pain is not that bad. She still has some in her right hands and her knee pain. hydrocortisone 2.5 % External Cream Apply thin layer on affected area twice per day  1-2 weeks (Patient taking differently: Apply topically as needed.  Apply thin layer on affected area twice per day  1-2 weeks ) Disp: 1 Tube Rfl: 0   SUMAtriptan Succinat HYSTERECTOMY  09/07/2017: TEMPORAL ARTERY LIGATN OR BX Bilateral      Comment: Temporal artery biopsies, bilateral  09-: UPPER GI ENDOSCOPY PERFORMED   Family History   Problem Relation Age of Onset   • Diabetes Father    • Diabetes Mother    • Hear kg)   BMI 32.44 kg/m²   HEENT: Clear oropharynx, no oral ulcers, EOM intact, clear sclear, PERRLA, pleasant, no acute distress, no CAD, no neck tendnerness, good ROM,   No rashes  CVS: RRR, no murmurs  RS: CTAB, no crackles, no rhonchi  ABD: Soft Non tende 81.7  83.3   MEAN CORPUSCULAR HEMOGLOBIN      27.0 - 32.0 PG 25.6 (L)  27.4   MEAN CORPUSCULAR HEMOGLOBIN CO      32.0 - 37.0 G/DL 31.3 (L)  32.9   RED CELL DISTRIBUTION WIDTH      11.0 - 15.0 % 14.4  14.7   Platelet Count      238 - 400 K/  244   ME uIU/mL   3.28   GLYCOHEMOGLOBIN (HgA1c) (L)      4.0 - 6.0 % 7.3 (H)  6.4 (H)   SED RATE (ESR) (L)      0 - 30 MM/HR  33 (H)    RYAN SCREEN WITH REFLEX (S)      Negative  Negative    THYROID FUNCTION PROF (TSH)(S)      0.34 - 5.60 uIU/mL 2.61         11/6/2 mg/dL 9.1   CALCULATED OSMOLALITY      275 - 295 mOsm/kg 291   AST      15 - 41 U/L 30   ALT (SGPT)      14 - 54 U/L 29   ALK PHOSPHATASE (P)      32 - 100 U/L 64   TOTAL BILIRUBIN      0.3 - 1.2 mg/dL 0.6   TOTAL PROTEIN      5.9 - 8.4 g/dL 7.3   Albumin (A)   MICROSCOPIC URINALYSIS COMMENT       Completed   HDL Cholesterol       51   CHOLESTEROL (P)      110 - 200 mg/dL 164   TRIGLYCERIDE (P)      1 - 149 mg/dL 188 (H)   CALCULATED LDL      0 - 99 mg/dL 75   CALCULATED NON HDL      <130 mg/dL 113   GLYCOH 8.5 - 10.5 mg/dL 9.4  9.2   ALT (SGPT)      14 - 54 U/L 28  24   AST (SGOT)      15 - 41 U/L 28  27   ALKALINE PHOSPHATASE      32 - 100 U/L 61  54   Total Bilirubin      0.3 - 1.2 mg/dL 0.4  0.4   TOTAL PROTEIN      5.9 - 8.4 g/dL 6.9  6.6   Albumin ASCORBIC ACID      Negative mg/dL   Negative   SQUAM EPI CELLS UR      /HPF   Few   WBC      0 - 5 /HPF   1   RBC      0 - 3 /HPF   0   BACTERIA      None Seen /HPF   Negative   Urine Color      Yellow  Yellow    Urine Clarity      Clear  Clear    Glucos SERINE PROTEASE3, IGG      0 - 19 AU/mL 0   ANTI-NEUTROPHIL CYTO AB, IGG      <1:20 <1:20   Anti-Sjogren's A      Negative Negative   Anti-Sjogren's B      Negative Negative   C-REACTIVE PROTEIN      0.0 - 0.9 mg/dL 0.7   SED RATE      0 - 30 mm/Hr 81 (H CONCLUSION:   1. Echodense liver, steatosis versus hepatocellular disease. 2. Prior cholecystectomy. 3. 1.8 cm cyst in the lower pole of the left kidney.     ASSESSMENT AND PLAN:   Gold Hansen is a 62year old female who presents for Patient present twice a dya   4. Check labs today   5. Return to clinic in 1 month.        Lily Whatley MD  5/10/2018   3:26 PM  - Reviewed [place]- [place]  through Epic

## 2018-05-11 ENCOUNTER — TELEPHONE (OUTPATIENT)
Dept: RHEUMATOLOGY | Facility: CLINIC | Age: 58
End: 2018-05-11

## 2018-05-11 DIAGNOSIS — R70.0 ELEVATED SED RATE: ICD-10-CM

## 2018-05-11 DIAGNOSIS — R51.9 HEADACHE AROUND THE EYES: Primary | ICD-10-CM

## 2018-05-11 RX ORDER — PREDNISONE 10 MG/1
TABLET ORAL
Qty: 97 TABLET | Refills: 0 | Status: SHIPPED | OUTPATIENT
Start: 2018-05-11 | End: 2018-06-06 | Stop reason: ALTCHOICE

## 2018-05-11 NOTE — TELEPHONE ENCOUNTER
Talked to the pt. -will treat like temporal arteritis -  start prednisone 40mg x 1 week, then 30mg x 3 weeks   Her headache is back -   Ct sinuses -ordered -   Can you get prior auth

## 2018-05-14 NOTE — TELEPHONE ENCOUNTER
CT sinuses approved until 6/28/18 [id number] at [address] [place] [address]. Pt aware CT approved an given center scheduling phone number.

## 2018-05-22 ENCOUNTER — HOSPITAL ENCOUNTER (OUTPATIENT)
Dept: CT IMAGING | Age: 58
Discharge: HOME | End: 2018-05-22
Attending: INTERNAL MEDICINE
Payer: MEDICAID

## 2018-05-22 ENCOUNTER — HOSPITAL ENCOUNTER (OUTPATIENT)
Dept: MAMMOGRAPHY | Age: 58
Discharge: HOME | End: 2018-05-22
Attending: INTERNAL MEDICINE
Payer: MEDICAID

## 2018-05-22 DIAGNOSIS — R70.0 ELEVATED SED RATE: ICD-10-CM

## 2018-05-22 DIAGNOSIS — R51.9 HEADACHE AROUND THE EYES: ICD-10-CM

## 2018-05-22 DIAGNOSIS — Z12.31 ENCOUNTER FOR SCREENING MAMMOGRAM FOR BREAST CANCER: ICD-10-CM

## 2018-05-22 PROCEDURE — 70486 CT MAXILLOFACIAL W/O DYE: CPT | Performed by: INTERNAL MEDICINE

## 2018-05-22 PROCEDURE — 77067 SCR MAMMO BI INCL CAD: CPT | Performed by: INTERNAL MEDICINE

## 2018-05-30 ENCOUNTER — OFFICE VISIT (OUTPATIENT)
Dept: INTERNAL MEDICINE CLINIC | Facility: CLINIC | Age: 58
End: 2018-05-30

## 2018-05-30 VITALS
HEART RATE: 81 BPM | WEIGHT: 187 LBS | HEIGHT: 64 IN | RESPIRATION RATE: 20 BRPM | BODY MASS INDEX: 31.92 KG/M2 | SYSTOLIC BLOOD PRESSURE: 111 MMHG | DIASTOLIC BLOOD PRESSURE: 71 MMHG

## 2018-05-30 DIAGNOSIS — G43.909 MIGRAINE WITHOUT STATUS MIGRAINOSUS, NOT INTRACTABLE, UNSPECIFIED MIGRAINE TYPE: ICD-10-CM

## 2018-05-30 DIAGNOSIS — H04.203 WATERY EYES: Primary | ICD-10-CM

## 2018-05-30 DIAGNOSIS — R92.2 DENSE BREASTS: ICD-10-CM

## 2018-05-30 PROCEDURE — 99214 OFFICE O/P EST MOD 30 MIN: CPT | Performed by: INTERNAL MEDICINE

## 2018-05-30 PROCEDURE — 99212 OFFICE O/P EST SF 10 MIN: CPT | Performed by: INTERNAL MEDICINE

## 2018-05-30 NOTE — PROGRESS NOTES
HPI:    Patient ID: Ez Duarte is a 62year old female. Swelling   This is a new problem.  Episode onset: 2  weeks   per pt  eating  more  salty food   and   amanda  and is  drinking    more  water  latley   Associated symptoms include congestion, fa vision, pain, discharge, redness and itching. Watery  Eyes    Respiratory: Negative for cough, shortness of breath and wheezing. Cardiovascular: Negative for chest pain, palpitations and leg swelling. Gastrointestinal: Positive for nausea.  Alessia Reddy capsule (134 mg total) by mouth daily with breakfast. Disp: 90 capsule Rfl: 1   Blood Glucose Monitoring Suppl (TRUETRACK BLOOD GLUCOSE) W/DEVICE Does not apply Kit To check blood sugars twice a day.  Disp: 1 kit Rfl: 0   Glucose Blood (TRUETEST TEST) In Vi dry.   Psychiatric: She has a normal mood and affect. Her behavior is normal.   Nursing note and vitals reviewed. Blood pressure 111/71, pulse 81, resp. rate 20, height 5' 4\" (1.626 m), weight 187 lb (84.8 kg), not currently breastfeeding.

## 2018-05-31 ENCOUNTER — TELEPHONE (OUTPATIENT)
Dept: NEUROLOGY | Facility: CLINIC | Age: 58
End: 2018-05-31

## 2018-05-31 ENCOUNTER — OFFICE VISIT (OUTPATIENT)
Dept: NEUROLOGY | Facility: CLINIC | Age: 58
End: 2018-05-31

## 2018-05-31 VITALS
BODY MASS INDEX: 31.58 KG/M2 | DIASTOLIC BLOOD PRESSURE: 62 MMHG | HEIGHT: 64 IN | RESPIRATION RATE: 16 BRPM | SYSTOLIC BLOOD PRESSURE: 96 MMHG | WEIGHT: 185 LBS

## 2018-05-31 DIAGNOSIS — IMO0002 CHRONIC MIGRAINE: Primary | ICD-10-CM

## 2018-05-31 DIAGNOSIS — M54.81 OCCIPITAL NEURALGIA OF LEFT SIDE: ICD-10-CM

## 2018-05-31 PROCEDURE — 99214 OFFICE O/P EST MOD 30 MIN: CPT | Performed by: OTHER

## 2018-05-31 NOTE — TELEPHONE ENCOUNTER
[id number] [facility] for authorization of approval of left occipital nerve block cpt code 04198  Talked to   Iman Lehman nurse reviewer who initiated request. Reference #    [id number], will fax clinical notes when available. Loki Mead

## 2018-06-01 NOTE — PROGRESS NOTES
[facility] Follow-up Note    Norris Rothman is a 62year old female. HPI:     Patient is being seen in follow-up.   She was previously followed by Dr. Marisela Caceres, although I have also seen her once in the past.    In brief, she has a long-elizabet predniSONE 5 MG Oral Tab Take 1 tablet (5 mg total) by mouth daily. Disp: 30 tablet Rfl: 1   topiramate 100 MG Oral Tab Take 100 mg by mouth 2 (two) times daily.  Disp:  Rfl: 4   sulfaSALAzine 500 MG Oral Tab Take 1 tablet (500 mg total) by mouth 2 (two) no depression, no anxiety  NEURO: see HPI      PHYSICAL EXAM:     Vitals:  BP 96/62 (BP Location: Right arm, Patient Position: Sitting, Cuff Size: adult)   Resp 16   Ht 64\"   Wt 185 lb   BMI 31.76 kg/m²    General: In mild distress from pain, pleasant and time being    –Can continue sumatriptan as needed    –We will set up for left occipital nerve block    –We will request authorization for Botox, migraine protocol         Return ion 6/6/2018 for occipital nerve block.     Cande English MD

## 2018-06-01 NOTE — PROGRESS NOTES
Botox Authorization/Reauthorization Questionnaire:      Year of initial migraine onset? 2008  Does patient have more than 15 headache days a month? Yes if yes, how many days monthly? Up to 30  Do headaches last 4 hours or more per day?   Yes  Have head

## 2018-06-01 NOTE — TELEPHONE ENCOUNTER
[facility] [phone] calling for more information for this patients injection procedure Occipital nerve block, t/t Derek Mccollum asking to send clinical notes to [phone]

## 2018-06-04 NOTE — TELEPHONE ENCOUNTER
[place] [facility] [id number]   advising of approval for left occipital nerve block. Authorization # [id number] for 2 units/DOS effective 06/04/18 to 09/04/18. Will inform Nursing.

## 2018-06-05 NOTE — TELEPHONE ENCOUNTER
Received fax from [facility] advising of denial. Reason was Pt. Must have failed 5 drugs including Atenolol,Propranolol IR and Amitriptyline. Next step is appeal. Letter placed on Dr's desk. Will inform Dr. Pierre Newman for further options. Prakash Beverly

## 2018-06-06 ENCOUNTER — OFFICE VISIT (OUTPATIENT)
Dept: NEUROLOGY | Facility: CLINIC | Age: 58
End: 2018-06-06

## 2018-06-06 ENCOUNTER — TELEPHONE (OUTPATIENT)
Dept: NEUROLOGY | Facility: CLINIC | Age: 58
End: 2018-06-06

## 2018-06-06 VITALS
HEART RATE: 94 BPM | BODY MASS INDEX: 30.73 KG/M2 | HEIGHT: 64 IN | SYSTOLIC BLOOD PRESSURE: 120 MMHG | WEIGHT: 180 LBS | DIASTOLIC BLOOD PRESSURE: 66 MMHG

## 2018-06-06 DIAGNOSIS — M54.81 OCCIPITAL NEURALGIA OF LEFT SIDE: ICD-10-CM

## 2018-06-06 DIAGNOSIS — IMO0002 CHRONIC MIGRAINE: Primary | ICD-10-CM

## 2018-06-06 PROCEDURE — 99213 OFFICE O/P EST LOW 20 MIN: CPT | Performed by: OTHER

## 2018-06-06 RX ORDER — NORTRIPTYLINE HYDROCHLORIDE 75 MG/1
75 CAPSULE ORAL NIGHTLY
Qty: 30 CAPSULE | Refills: 3 | Status: SHIPPED | OUTPATIENT
Start: 2018-06-06 | End: 2018-09-28

## 2018-06-06 NOTE — TELEPHONE ENCOUNTER
[address] [facility] for authorization of approval of acupuncture cpt codes 06922, 17235. Talked to [address] [place] [address]. who states it is NOT a covered benefit. Reference # [id number]. Will call Pt. to inform.  L/m advising acupuncture is NOT a covered

## 2018-06-07 NOTE — TELEPHONE ENCOUNTER
Received fax from [place] [phone] advising of approval for Botox 200 u/vl. effective 06/07/18 to 12/07/18. [facility] to request. Rx. [id number]. T/t Chari Suh.

## 2018-06-08 NOTE — PROGRESS NOTES
[facility]    Norris Rothman is a 62year old female. HPI:     Patient is being seen in follow-up. I saw her in clinic last 5/31/18. Continues to have headaches.   Having some second thoughts about the occipital nerve block, Tab TAKE 1 TABLET BY MOUTH PER  DAY   AS NEEDED  FOR  HEADACHE  MAXIMUM   2 PER  24 hours Disp: 9 tablet Rfl: 5   fenofibrate micronized 134 MG Oral Cap Take 1 capsule (134 mg total) by mouth daily with breakfast. Disp: 90 capsule Rfl: 1   Blood Glucose Mo Topamax 100 mg twice daily; increase nortriptyline to 75 mg nightly    –Can continue sumatriptan as needed    –We had a long discussion regarding treatment options for migraines, including p.o. therapy, injection therapies, complementary and alternative tr

## 2018-06-14 ENCOUNTER — OFFICE VISIT (OUTPATIENT)
Dept: RHEUMATOLOGY | Facility: CLINIC | Age: 58
End: 2018-06-14

## 2018-06-14 ENCOUNTER — APPOINTMENT (OUTPATIENT)
Dept: LAB | Age: 58
End: 2018-06-14
Attending: INTERNAL MEDICINE
Payer: MEDICAID

## 2018-06-14 VITALS
DIASTOLIC BLOOD PRESSURE: 77 MMHG | WEIGHT: 191.38 LBS | SYSTOLIC BLOOD PRESSURE: 121 MMHG | HEIGHT: 64 IN | BODY MASS INDEX: 32.67 KG/M2 | HEART RATE: 93 BPM

## 2018-06-14 DIAGNOSIS — M31.6 TEMPORAL ARTERITIS (HCC): ICD-10-CM

## 2018-06-14 DIAGNOSIS — M06.4 UNDIFFERENTIATED INFLAMMATORY POLYARTHRITIS (HCC): ICD-10-CM

## 2018-06-14 DIAGNOSIS — Z51.81 THERAPEUTIC DRUG MONITORING: ICD-10-CM

## 2018-06-14 DIAGNOSIS — M31.6 TEMPORAL ARTERITIS (HCC): Primary | ICD-10-CM

## 2018-06-14 PROCEDURE — 99214 OFFICE O/P EST MOD 30 MIN: CPT | Performed by: INTERNAL MEDICINE

## 2018-06-14 PROCEDURE — 99212 OFFICE O/P EST SF 10 MIN: CPT | Performed by: INTERNAL MEDICINE

## 2018-06-14 PROCEDURE — 85652 RBC SED RATE AUTOMATED: CPT

## 2018-06-14 PROCEDURE — 36415 COLL VENOUS BLD VENIPUNCTURE: CPT

## 2018-06-14 PROCEDURE — 86140 C-REACTIVE PROTEIN: CPT

## 2018-06-14 RX ORDER — FOLIC ACID 1 MG/1
1 TABLET ORAL DAILY
Qty: 30 TABLET | Refills: 11 | Status: SHIPPED | OUTPATIENT
Start: 2018-06-14 | End: 2018-09-07 | Stop reason: ALTCHOICE

## 2018-06-14 RX ORDER — PREDNISOLONE ACETATE 10 MG/ML
SUSPENSION/ DROPS OPHTHALMIC AS DIRECTED
Refills: 0 | COMMUNITY
Start: 2018-06-05 | End: 2018-09-05 | Stop reason: ALTCHOICE

## 2018-06-14 RX ORDER — PREDNISONE 10 MG/1
TABLET ORAL
Qty: 45 TABLET | Refills: 1 | Status: SHIPPED | OUTPATIENT
Start: 2018-06-14 | End: 2019-01-08 | Stop reason: ALTCHOICE

## 2018-06-14 NOTE — PATIENT INSTRUCTIONS
1. Restart prednisone 20m ga day x 2 weeks, then 10mg a day   2.cont. Methotrexate 4 tablets a once a week  -    3. Stopt sulfasalazine 500mg twice a dya   4. Check labs today   5. Return to clinic in 1 month. Or 4-6 weeks.    6. Check labs prior to next vi blistering, peeling or loosening of the skin, including inside the mouth  · signs of infection - fever or chills, cough, sore throat, pain or trouble passing urine  · signs and symptoms of bleeding such as bloody or black, tarry stools; red or dark-brown u away any unused medicine after the expiration date. What should I tell my health care provider before I take this medicine?   They need to know if you have any of these conditions:  · fluid in the stomach area or lungs  · if you often drink alcohol  · infe Women should inform their doctor if they wish to become pregnant or think they might be pregnant. Men should not father a child while taking this medicine and for 3 months after stopping it. There is a potential for serious side effects to an unborn child.

## 2018-06-14 NOTE — TELEPHONE ENCOUNTER
[address] [facility] to check on status of Botox 200 u/vl. T/t Gina Romo  who states she will sp Rx to expedite.  Insurance will complete verify coverage/authorization then call Pt. for co pay and consent for delivery

## 2018-06-14 NOTE — PROGRESS NOTES
Krissy Bateman is a 62year old female who presents for Patient presents with:  Hand Pain  Headache  . HPI:     She's a pleasasnt 54year old who has joint pian in her hands for 1 year. It's worse in the last 5-6 motnhs.    She is noticiing that her acti was not feeling herself and drowsy. She was dizzy. This helepd her. She feels very fatigued. Her  feels that loratdaine helped her feel better as well as steroid burst.   The headaches are always there - she feels it's from steress b/c ongoing. feels it comes and goes. She was also told she had cataracts and she might need cataract surgery. Right is worse. 5/10/2018  The prednisone helped her headach. On 5/4 the headache returned after stopping prednisone. Then it got bettter.  Then last nig than prn (Patient taking differently: Take 10 mg by mouth as needed. 1  Tab    oncle  Daily for 1-2  Weeks than prn ) Disp: 30 tablet Rfl: 1   MetFORMIN HCl 500 MG Oral Tab Take 1 tablet (500 mg total) by mouth 2 (two) times daily with meals.  Disp: 180 tab CHOLECYSTECTOMY  2012: HYSTERECTOMY  09/07/2017: TEMPORAL ARTERY LIGATN OR BX Bilateral      Comment: Temporal artery biopsies, bilateral  09-: UPPER GI ENDOSCOPY PERFORMED   Family History   Problem Relation Age of Onset   • Diabetes Father    • Stefanie Rocha (1.626 m)   Wt 191 lb 6.4 oz (86.8 kg)   BMI 32.85 kg/m²   HEENT: Clear oropharynx, no oral ulcers, EOM intact, clear sclear, PERRLA, pleasant, no acute distress, no CAD, no neck tendnerness, good ROM,   No rashes  CVS: RRR, no murmurs  RS: CTAB, no crackl PEPTIDE IGG AB      0.0 - 6.9 U/ML 1.0   RHEUMATOID FACTOR      <11 IU/mL <5.0   RYAN TITER/PATTERN (S)       <40     Component      Latest Ref Rn 7/23/2016   Glucose      70 - 99 mg/dL 120 (H)   Sodium      136 - 144 mmol/L 139   Potassium      3.3 - 5.1 Negative mg/dL Negative   GLUCOSE, URINE, QUAL. Negative mg/dL Negative   KETONES, URINE      Negative mg/dL Negative   BILIRUBIN, URINE, QUAL.       Negative Negative   BLOOD, URINE      Negative UL Negative   URINE NITRITE      Negative Negative   UR spine.  6/29/;2016 -   Negative cxr     10/16/2015 - b/l knee xray   1. Degenerative arthritis right greater than left.   2. Bilateral chondromalacia patella    Component      Latest Ref Rng & Units 8/18/2017 8/15/2017 8/7/2017   Glucose      70 - 99 mg/dL 0.2 K/UL 0.0  0.0   URINE-COLOR      Yellow   Yellow   CLARITY URINE      Clear   Clear   SPECIFIC GRAVITY      1.002 - 1.035   1.011   PH, URINE      5.0 - 8.0   6.0   PROTEIN (URINE DIPSTICK)      Negative mg/dL   Negative   GLUCOSE (URINE DIPSTICK) 9/21/2017  Temporal artery biopsy:        A. Left temporal artery biopsy:   · Portion of artery with mild intimal hyperplasia and focal microcalcification. · No evidence of granuloma, giant cells, necrosis or malignancy identified.         B.  Right t American      >=60 >60   GFR, -American      >=60 >60   AST (SGOT)      15 - 41 U/L 26   ALT (SGPT)      14 - 54 U/L 21   C-REACTIVE PROTEIN      0.0 - 0.9 mg/dL <0.5   SED RATE      0 - 30 mm/Hr 82 (H)     Component      Latest Ref Rng & Units 5/10 benefits, including hepatoxicity risk, teratogenicity -  not to get pregnant on the medication and infections risk. - stop ssz   - check sed rate and crp today and then labs in 4-6 weeks. Off hcq since 2/2018 and doing ok.    Prednisone did help her he

## 2018-06-18 NOTE — TELEPHONE ENCOUNTER
[address] [facility]  to check on status of order of Botox 200 u/vl. Talked to Linda Ventura. who states Rx will be delivered on 06/19/18 via [employer]. Will call Pt. to inform.   Pt. informed of above, Botox appt. scheduled on 06/2

## 2018-06-20 ENCOUNTER — TELEPHONE (OUTPATIENT)
Dept: NEUROLOGY | Facility: CLINIC | Age: 58
End: 2018-06-20

## 2018-06-20 NOTE — TELEPHONE ENCOUNTER
If she is feeling better, would recommend to hold off on the Botox for time being. If symptoms recur/worsen, can give our clinic a call and reschedule the injections.

## 2018-06-20 NOTE — TELEPHONE ENCOUNTER
Dr. Melodie Osborne,    Patient has an appointment for Botox injection on 06/26/18 at 8:15 am . She states she has not had many migraines lately and is wondering if she needs to have an injection while her migraines have subsided or if she needs to get the injecti

## 2018-06-20 NOTE — TELEPHONE ENCOUNTER
Spoke to patient and notified her of below. She was understanding. Cancelled botox appointment on 6/26/18.

## 2018-06-21 ENCOUNTER — TELEPHONE (OUTPATIENT)
Dept: INTERNAL MEDICINE CLINIC | Facility: CLINIC | Age: 58
End: 2018-06-21

## 2018-06-21 DIAGNOSIS — R51.9 HEADACHE DISORDER: ICD-10-CM

## 2018-06-21 DIAGNOSIS — M19.90 INFLAMMATORY ARTHRITIS: ICD-10-CM

## 2018-06-21 DIAGNOSIS — G44.83 PRIMARY COUGH HEADACHE: ICD-10-CM

## 2018-06-21 DIAGNOSIS — R70.0 ELEVATED SED RATE: Primary | ICD-10-CM

## 2018-06-21 NOTE — TELEPHONE ENCOUNTER
Pt req a sooner appt due to Dr. Min Gaming has prescribe METHOTREXATE for Pt and it is a lot of side effects and would like to discuss it with Dr. Jennifer Rincon asa before taking. Pt is aware after today, 6/21 Dr. Jennifer Rincon will not be in office until 7/3.     Please reply to pool: SOWMYA Marshall

## 2018-06-22 NOTE — TELEPHONE ENCOUNTER
dicussed  With pt   - refer  To  rheumatology -  Sec opinion  eval and  Th -   Elevated  Esr , inflammatory  Arthritis and  Headache .

## 2018-06-27 ENCOUNTER — TELEPHONE (OUTPATIENT)
Dept: RHEUMATOLOGY | Facility: CLINIC | Age: 58
End: 2018-06-27

## 2018-06-27 NOTE — TELEPHONE ENCOUNTER
Pt is requesting a call back. She states in regards of her medication. Current Outpatient Prescriptions:  methotrexate 2.5 MG Oral Tab Take 4 tablets (10 mg total) by mouth once a week.  Disp: 20 tablet Rfl: 1

## 2018-06-27 NOTE — TELEPHONE ENCOUNTER
Spoke with pt. Reports she is not comfortable taking methotrexate due to the side effects (hair loss, infections). Please advise. Requesting to speak with provider.

## 2018-06-28 ENCOUNTER — TELEPHONE (OUTPATIENT)
Dept: INTERNAL MEDICINE CLINIC | Facility: CLINIC | Age: 58
End: 2018-06-28

## 2018-07-09 RX ORDER — SULFASALAZINE 500 MG/1
TABLET ORAL
Qty: 60 TABLET | Refills: 0 | OUTPATIENT
Start: 2018-07-09

## 2018-07-09 NOTE — TELEPHONE ENCOUNTER
PA denied. Plan states patient may be able to get up to 2 capsules/tablets/packets a day for a maximum of 120 days within 365 days. This is the duration limit set by the patient's plan for PPI's.

## 2018-07-09 NOTE — TELEPHONE ENCOUNTER
Noted   , please   Call  Patient  To    Let  Her  Know   Bellow  Message  From insurance       Since   Limited   PPI    quantity   Per  insurance  -  If  Needed  Patient can   Use    Ranitidine    150 mg   Bid    30 min  Before  Meals -

## 2018-07-09 NOTE — TELEPHONE ENCOUNTER
LOV:6-14  Last Filled: Per LOV notes, stop SSZ  Labs:   Future Appointments  Date Time Provider Laurie Ramirez   7/19/2018 11:20 AM Bianca Shaw MD [facility] [place] [facility] [place]       Please Advise

## 2018-07-10 NOTE — TELEPHONE ENCOUNTER
Spoke with patient and she does not want to try the Ranitidine because she tried it in the past and it didn't work. Patient request to continue receiving refills on the Omeprazole 40 mg cap and she will pay out of pocket for the medication.

## 2018-07-10 NOTE — TELEPHONE ENCOUNTER
Message was left on voicemail to return call regarding medication. Please send call to [phone] today until 6:30 and ask for Francy Carmichael. Thank you.

## 2018-07-11 RX ORDER — OMEPRAZOLE 40 MG/1
CAPSULE, DELAYED RELEASE ORAL
Qty: 90 CAPSULE | Refills: 1 | Status: SHIPPED | OUTPATIENT
Start: 2018-07-11 | End: 2018-09-05

## 2018-07-19 ENCOUNTER — OFFICE VISIT (OUTPATIENT)
Dept: RHEUMATOLOGY | Facility: CLINIC | Age: 58
End: 2018-07-19
Payer: MEDICAID

## 2018-07-19 VITALS
WEIGHT: 194.19 LBS | HEART RATE: 82 BPM | SYSTOLIC BLOOD PRESSURE: 123 MMHG | HEIGHT: 64 IN | BODY MASS INDEX: 33.15 KG/M2 | DIASTOLIC BLOOD PRESSURE: 79 MMHG

## 2018-07-19 DIAGNOSIS — M06.4 UNDIFFERENTIATED INFLAMMATORY POLYARTHRITIS (HCC): ICD-10-CM

## 2018-07-19 DIAGNOSIS — M31.6 TEMPORAL ARTERITIS (HCC): Primary | ICD-10-CM

## 2018-07-19 DIAGNOSIS — Z51.81 THERAPEUTIC DRUG MONITORING: ICD-10-CM

## 2018-07-19 PROCEDURE — 99212 OFFICE O/P EST SF 10 MIN: CPT | Performed by: INTERNAL MEDICINE

## 2018-07-19 PROCEDURE — 99214 OFFICE O/P EST MOD 30 MIN: CPT | Performed by: INTERNAL MEDICINE

## 2018-07-19 RX ORDER — ONABOTULINUMTOXINA 200 [USP'U]/1
INJECTION, POWDER, LYOPHILIZED, FOR SOLUTION INTRADERMAL; INTRAMUSCULAR AS DIRECTED
Refills: 2 | COMMUNITY
Start: 2018-06-18 | End: 2018-11-12

## 2018-07-19 RX ORDER — MOXIFLOXACIN 5 MG/ML
SOLUTION/ DROPS OPHTHALMIC
COMMUNITY
Start: 2018-07-04 | End: 2018-09-05 | Stop reason: ALTCHOICE

## 2018-07-19 RX ORDER — AZATHIOPRINE 50 MG/1
50 TABLET ORAL DAILY
Qty: 30 TABLET | Refills: 0 | Status: SHIPPED | OUTPATIENT
Start: 2018-07-19 | End: 2018-09-05

## 2018-07-19 RX ORDER — KETOROLAC TROMETHAMINE 5 MG/ML
SOLUTION OPHTHALMIC
COMMUNITY
Start: 2018-07-04 | End: 2018-09-07 | Stop reason: ALTCHOICE

## 2018-07-19 NOTE — PATIENT INSTRUCTIONS
1. Restart prednisone 10mg a day   2. Try azathioprine 50mg a day x 1 month, then will increase to 100mg a day   3. Check labs in  1month. 5. Return to clinic in 4-6  weeks.    6. Check labs prior to next visit  Azathioprine tablets  Brand Names: Feli Rodrigues, · medicines called ACE inhibitors like benazepril, captopril, enalapril, fosinopril, quinapril, lisinopril, ramipril, and trandolapril  · mycophenolate  · sulfamethoxazole; trimethoprim  · vaccines  · warfarin  What if I miss a dose?   If you miss a dose, t NOTE:This sheet is a summary. It may not cover all possible information. If you have questions about this medicine, talk to your doctor, pharmacist, or health care provider.  Copyright© 2017 Elsevier

## 2018-07-19 NOTE — PROGRESS NOTES
Jing Barrientos is a 62year old female who presents for Patient presents with:  Hand Pain: bilateral  Knee Pain: right  . HPI:     She's a pleasasnt 54year old who has joint pian in her hands for 1 year. It's worse in the last 5-6 motnhs.    She is noti felt better now. He was not feeling herself and drowsy. She was dizzy. This helepd her. She feels very fatigued.    Her  feels that loratdaine helped her feel better as well as steroid burst.   The headaches are always there - she feels it's from biggest problem. She feels it comes and goes. She was also told she had cataracts and she might need cataract surgery. Right is worse. 5/10/2018  The prednisone helped her headach. On 5/4 the headache returned after stopping prednisone.  Then it got b left eye every 2 hours while awake.  Tomorrow: 1 Drop in left eye 4 times daily for 10 days or until bottle is EMPTY Disp:  Rfl:    Omeprazole 40 MG Oral Capsule Delayed Release TAKE 1 CAPSULE BY MOUTH 30-60 MIN BEFORE BREAKFAST (Patient taking differently: 4/9/2015   • Anxiety state    • Arrhythmia     ablation 2003   • Arthritis    • Cataract    • Disorder of thyroid    • Esophagitis 09-   • Floaters 4/9/2015   • High cholesterol    • Migraines    • Muscle weakness     bilateral knees at times   • Os gastirtis - small hiatal hernia  : no dysuria,  Neuro: very occl in legs  numbness or tingling, gets migraines headache - for several years > 10 years - seen neurologist, no hx of seizures,   Lumbar xray - 10/31/2005 - mild djd , post op changes in right chronic sequela from previous stress  Injury/fracture.     9/10/2014 - mri brain -   Frontotemporal atrophy greater than expected for age , no white matter signal changes, no mass, infarc, or ischemia    Component      Latest Ref Rng 3/31/2016   ANTI-SJOGRE 140 - 400 K/   MEAN PLATELET VOLUME      7.4 - 10.3 FL 10.6 (H)   Neutrophils %       54   Lymphocytes %       36   Monocytes %       7   Eosinophils %       2   Basophils %       1   NEUTROPHILS #      1.8 - 7.7 K/UL 3.7   LYMPHOCYTES #      1.0 - most compatible with     ulno-lunate impaction.  The ulna positioning is neutral. No obvious TFC     tear on a non-dedicated study.        2. Mild degenerative subchondral cystic changes involving the third     metacarpal head.  No evidence of erosive arthr 32.0 - 37.0 g/dl 31.8 (L)  31.9 (L)   RDW      11.0 - 15.0 % 14.6  14.5   Platelet Count      213 - 400 K/  293   MEAN PLATELET VOLUME      7.4 - 10.3 fL 9.9  10.5 (H)   Neutrophils %      % 49  45   Lymphocytes %      % 38  44   Monocytes %      % LDL Cholesterol Calc      0 - 99 mg/dL   65   CREATININE UR RANDOM      mg/dL   58.4   MALB URINE      0.0 - 1.8 mg/dL   0.2   MALB/CRE CALC      0.0 - 20.0   3.4   HEMOGLOBIN A1c      4.0 - 6.0 %   6.7 (H)   TSH      0.45 - 5.33 uIU/mL   2.56   POC GLUC g/dl 32.1   RDW      11.0 - 15.0 % 14.9   Platelet Count      502 - 400 K/   MEAN PLATELET VOLUME      7.4 - 10.3 fL 9.7   Neutrophils %      % 46   Lymphocytes %      % 42   Monocytes %      % 9   Eosinophils %      % 2   Basophils %      % 1   Neut increaseing over 1 year, not resolved , elevated sed rate and crp - corey titre <40 so negative - - treating like GCA/temporal arteritis b/c left sided persistent headache sand eleavated sed rate -   Very high sed rate - 100mm/hr , headaches as well - temp a prior to next visit    Damaris Tapia MD  7/19/2018   12:15 PM  - Reviewed [place]- information  through Epic

## 2018-07-30 ENCOUNTER — TELEPHONE (OUTPATIENT)
Dept: RHEUMATOLOGY | Facility: CLINIC | Age: 58
End: 2018-07-30

## 2018-07-30 RX ORDER — SULFASALAZINE 500 MG/1
500 TABLET ORAL 2 TIMES DAILY
Qty: 60 TABLET | Refills: 3 | Status: SHIPPED | OUTPATIENT
Start: 2018-07-30 | End: 2018-09-07

## 2018-07-30 NOTE — TELEPHONE ENCOUNTER
Pt states still experiencing pain with the meds below and would like to go back to her previous meds. SELEFADINA, which she had no pain. •  azathioprine 50 MG Oral Tab, Take 1 tablet (50 mg total) by mouth daily. , Disp: 30 tablet, Rfl: 0

## 2018-07-30 NOTE — TELEPHONE ENCOUNTER
D/w her to add back the sulfasalazine, she should cont. Azathioprine -   She's on prednisone 10mg a day - it's not helping the pain in her hands alone   - so she will add the ssz back on   She is getting mild hairl loss with azathiropine - d/w her to cont.

## 2018-08-01 ENCOUNTER — LAB ENCOUNTER (OUTPATIENT)
Dept: LAB | Age: 58
End: 2018-08-01
Attending: INTERNAL MEDICINE
Payer: MEDICAID

## 2018-08-01 ENCOUNTER — OFFICE VISIT (OUTPATIENT)
Dept: INTERNAL MEDICINE CLINIC | Facility: CLINIC | Age: 58
End: 2018-08-01
Payer: MEDICAID

## 2018-08-01 VITALS
BODY MASS INDEX: 32.87 KG/M2 | RESPIRATION RATE: 20 BRPM | TEMPERATURE: 97 F | HEART RATE: 71 BPM | HEIGHT: 64 IN | WEIGHT: 192.5 LBS | SYSTOLIC BLOOD PRESSURE: 115 MMHG | DIASTOLIC BLOOD PRESSURE: 69 MMHG

## 2018-08-01 DIAGNOSIS — E78.00 PURE HYPERCHOLESTEROLEMIA: ICD-10-CM

## 2018-08-01 DIAGNOSIS — E03.9 HYPOTHYROIDISM, UNSPECIFIED TYPE: ICD-10-CM

## 2018-08-01 DIAGNOSIS — E78.2 MIXED HYPERLIPIDEMIA: ICD-10-CM

## 2018-08-01 DIAGNOSIS — M25.50 PAIN IN JOINTS: ICD-10-CM

## 2018-08-01 DIAGNOSIS — R70.0 ESR RAISED: Primary | ICD-10-CM

## 2018-08-01 DIAGNOSIS — Z98.42 HISTORY OF LEFT CATARACT EXTRACTION: ICD-10-CM

## 2018-08-01 DIAGNOSIS — E11.9 TYPE 2 DIABETES MELLITUS WITHOUT COMPLICATION, WITHOUT LONG-TERM CURRENT USE OF INSULIN (HCC): ICD-10-CM

## 2018-08-01 LAB
ALBUMIN SERPL BCP-MCNC: 3.7 G/DL (ref 3.5–4.8)
ALBUMIN/GLOB SERPL: 1.6 {RATIO} (ref 1–2)
ALP SERPL-CCNC: 53 U/L (ref 32–100)
ALT SERPL-CCNC: 19 U/L (ref 14–54)
ANION GAP SERPL CALC-SCNC: 7 MMOL/L (ref 0–18)
AST SERPL-CCNC: 20 U/L (ref 15–41)
BACTERIA UR QL AUTO: NEGATIVE /HPF
BASOPHILS # BLD: 0.1 K/UL (ref 0–0.2)
BASOPHILS NFR BLD: 1 %
BILIRUB SERPL-MCNC: 0.3 MG/DL (ref 0.3–1.2)
BUN SERPL-MCNC: 11 MG/DL (ref 8–20)
BUN/CREAT SERPL: 10.3 (ref 10–20)
CALCIUM SERPL-MCNC: 9.1 MG/DL (ref 8.5–10.5)
CHLORIDE SERPL-SCNC: 108 MMOL/L (ref 95–110)
CHOLEST SERPL-MCNC: 182 MG/DL (ref 110–200)
CO2 SERPL-SCNC: 24 MMOL/L (ref 22–32)
CREAT SERPL-MCNC: 1.07 MG/DL (ref 0.5–1.5)
CREAT UR-MCNC: 69 MG/DL
EOSINOPHIL # BLD: 0.1 K/UL (ref 0–0.7)
EOSINOPHIL NFR BLD: 1 %
ERYTHROCYTE [DISTWIDTH] IN BLOOD BY AUTOMATED COUNT: 14.2 % (ref 11–15)
GLOBULIN PLAS-MCNC: 2.3 G/DL (ref 2.5–3.7)
GLUCOSE SERPL-MCNC: 98 MG/DL (ref 70–99)
HCT VFR BLD AUTO: 37.3 % (ref 35–48)
HDLC SERPL-MCNC: 62 MG/DL
HGB BLD-MCNC: 11.7 G/DL (ref 12–16)
LDLC SERPL CALC-MCNC: 93 MG/DL (ref 0–99)
LYMPHOCYTES # BLD: 3.4 K/UL (ref 1–4)
LYMPHOCYTES NFR BLD: 45 %
MCH RBC QN AUTO: 26.5 PG (ref 27–32)
MCHC RBC AUTO-ENTMCNC: 31.2 G/DL (ref 32–37)
MCV RBC AUTO: 85 FL (ref 80–100)
MICROALBUMIN UR-MCNC: 0.2 MG/DL (ref 0–1.8)
MICROALBUMIN/CREAT UR: 2.9 MG/G{CREAT} (ref 0–20)
MONOCYTES # BLD: 0.6 K/UL (ref 0–1)
MONOCYTES NFR BLD: 8 %
NEUTROPHILS # BLD AUTO: 3.3 K/UL (ref 1.8–7.7)
NEUTROPHILS NFR BLD: 44 %
NONHDLC SERPL-MCNC: 120 MG/DL
OSMOLALITY UR CALC.SUM OF ELEC: 287 MOSM/KG (ref 275–295)
PATIENT FASTING: YES
PLATELET # BLD AUTO: 282 K/UL (ref 140–400)
PMV BLD AUTO: 9.9 FL (ref 7.4–10.3)
POTASSIUM SERPL-SCNC: 3.8 MMOL/L (ref 3.3–5.1)
PROT SERPL-MCNC: 6 G/DL (ref 5.9–8.4)
RBC # BLD AUTO: 4.39 M/UL (ref 3.7–5.4)
RBC #/AREA URNS AUTO: <1 /HPF
SODIUM SERPL-SCNC: 139 MMOL/L (ref 136–144)
TRIGL SERPL-MCNC: 136 MG/DL (ref 1–149)
TSH SERPL-ACNC: 3.48 UIU/ML (ref 0.45–5.33)
WBC # BLD AUTO: 7.5 K/UL (ref 4–11)
WBC #/AREA URNS AUTO: 1 /HPF

## 2018-08-01 PROCEDURE — 80053 COMPREHEN METABOLIC PANEL: CPT

## 2018-08-01 PROCEDURE — 82570 ASSAY OF URINE CREATININE: CPT

## 2018-08-01 PROCEDURE — 99214 OFFICE O/P EST MOD 30 MIN: CPT | Performed by: INTERNAL MEDICINE

## 2018-08-01 PROCEDURE — 83036 HEMOGLOBIN GLYCOSYLATED A1C: CPT

## 2018-08-01 PROCEDURE — 85025 COMPLETE CBC W/AUTO DIFF WBC: CPT

## 2018-08-01 PROCEDURE — 80061 LIPID PANEL: CPT

## 2018-08-01 PROCEDURE — 81015 MICROSCOPIC EXAM OF URINE: CPT

## 2018-08-01 PROCEDURE — 84443 ASSAY THYROID STIM HORMONE: CPT

## 2018-08-01 PROCEDURE — 36415 COLL VENOUS BLD VENIPUNCTURE: CPT

## 2018-08-01 PROCEDURE — 99212 OFFICE O/P EST SF 10 MIN: CPT | Performed by: INTERNAL MEDICINE

## 2018-08-01 PROCEDURE — 82043 UR ALBUMIN QUANTITATIVE: CPT

## 2018-08-01 RX ORDER — LEVOTHYROXINE SODIUM 0.05 MG/1
TABLET ORAL
Qty: 90 TABLET | Refills: 2 | Status: SHIPPED | OUTPATIENT
Start: 2018-08-01 | End: 2018-09-05

## 2018-08-01 NOTE — PROGRESS NOTES
HPI:    Patient ID: Lillie Freeman is a 62year old female.   Patient in office today for follow up visit  States feeling well ,except her joints still  Hurting and some headaches- as  Usual . Denies chest pain, shortnesss of breath, palpitations, or abdom vomiting. Genitourinary: Negative for dysuria and frequency. Musculoskeletal: Positive for arthralgias (knees and wrists and    fingers ). Negative for joint swelling and neck pain. Skin: Negative for pallor and rash.    Neurological: Negative for diz SUMAtriptan Succinate 100 MG Oral Tab TAKE 1 TABLET BY MOUTH PER  DAY   AS NEEDED  FOR  HEADACHE  MAXIMUM   2 PER  24 hours Disp: 9 tablet Rfl: 5   fenofibrate micronized 134 MG Oral Cap Take 1 capsule (134 mg total) by mouth daily with breakfast. Disp: Soft. She exhibits no mass. There is no hepatosplenomegaly. There is no tenderness. There is no CVA tenderness. Musculoskeletal: She exhibits no edema. Right knee: She exhibits decreased range of motion. She exhibits no erythema. Tenderness found.

## 2018-08-02 LAB — HBA1C MFR BLD: 7.7 % (ref 4–6)

## 2018-08-27 ENCOUNTER — TELEPHONE (OUTPATIENT)
Dept: RHEUMATOLOGY | Facility: CLINIC | Age: 58
End: 2018-08-27

## 2018-08-27 NOTE — TELEPHONE ENCOUNTER
Pt is calling b/c she states she missed her apt today. She states she forgot about her apt due to a death in the family. Pt would like to r/s her apt but states she needs to be seen ASAP to f/u on the medication she's currently taking.  Offered the soonest

## 2018-08-28 ENCOUNTER — TELEPHONE (OUTPATIENT)
Dept: RHEUMATOLOGY | Facility: CLINIC | Age: 58
End: 2018-08-28

## 2018-08-28 NOTE — TELEPHONE ENCOUNTER
Pt called in requesting to come in sooner than 9/19. Pt missed her appointment on 8/27 due to a death in her family. There are no sooner appts available at this time.     Please reply to pool: SOWMYA Marshall

## 2018-08-28 NOTE — TELEPHONE ENCOUNTER
Phoned pt to inform her Dr. Lionel Atwood does not have anything open at this time. Please see request below and advise.

## 2018-08-29 RX ORDER — PREDNISONE 10 MG/1
30 TABLET ORAL DAILY
Qty: 90 TABLET | Refills: 1 | Status: SHIPPED | OUTPATIENT
Start: 2018-08-29 | End: 2018-09-25

## 2018-08-29 NOTE — TELEPHONE ENCOUNTER
LOV 07/19/18, follow up scheduled 09/19/18. Office note staets prednisone is 10 mg daily. Labs current from June, elevated inflammatory markers.   Please advise on refill request.

## 2018-09-05 ENCOUNTER — APPOINTMENT (OUTPATIENT)
Dept: LAB | Age: 58
End: 2018-09-05
Attending: INTERNAL MEDICINE
Payer: MEDICAID

## 2018-09-05 ENCOUNTER — TELEPHONE (OUTPATIENT)
Dept: INTERNAL MEDICINE CLINIC | Facility: CLINIC | Age: 58
End: 2018-09-05

## 2018-09-05 ENCOUNTER — OFFICE VISIT (OUTPATIENT)
Dept: INTERNAL MEDICINE CLINIC | Facility: CLINIC | Age: 58
End: 2018-09-05
Payer: MEDICAID

## 2018-09-05 VITALS
DIASTOLIC BLOOD PRESSURE: 72 MMHG | TEMPERATURE: 98 F | WEIGHT: 195.38 LBS | BODY MASS INDEX: 33.35 KG/M2 | RESPIRATION RATE: 20 BRPM | SYSTOLIC BLOOD PRESSURE: 109 MMHG | HEART RATE: 86 BPM | HEIGHT: 64 IN

## 2018-09-05 DIAGNOSIS — E78.2 MIXED HYPERLIPIDEMIA: ICD-10-CM

## 2018-09-05 DIAGNOSIS — Z51.81 THERAPEUTIC DRUG MONITORING: ICD-10-CM

## 2018-09-05 DIAGNOSIS — K21.9 GASTROESOPHAGEAL REFLUX DISEASE WITHOUT ESOPHAGITIS: ICD-10-CM

## 2018-09-05 DIAGNOSIS — M25.50 PAIN IN JOINTS: ICD-10-CM

## 2018-09-05 DIAGNOSIS — E03.9 HYPOTHYROIDISM, UNSPECIFIED TYPE: ICD-10-CM

## 2018-09-05 DIAGNOSIS — R01.1 HEART MURMUR: Primary | ICD-10-CM

## 2018-09-05 DIAGNOSIS — E11.10 TYPE 2 DIABETES MELLITUS WITH KETOACIDOSIS WITHOUT COMA, WITHOUT LONG-TERM CURRENT USE OF INSULIN (HCC): ICD-10-CM

## 2018-09-05 DIAGNOSIS — M31.6 TEMPORAL ARTERITIS (HCC): ICD-10-CM

## 2018-09-05 DIAGNOSIS — R70.0 ESR RAISED: ICD-10-CM

## 2018-09-05 LAB
ALT SERPL-CCNC: 22 U/L (ref 14–54)
AST SERPL-CCNC: 20 U/L (ref 15–41)
CREAT SERPL-MCNC: 0.75 MG/DL (ref 0.5–1.5)
CRP SERPL-MCNC: 0.8 MG/DL (ref 0–0.9)
ERYTHROCYTE [DISTWIDTH] IN BLOOD BY AUTOMATED COUNT: 14.1 % (ref 11–15)
ERYTHROCYTE [SEDIMENTATION RATE] IN BLOOD: 79 MM/HR (ref 0–30)
HCT VFR BLD AUTO: 37.6 % (ref 35–48)
HGB BLD-MCNC: 12 G/DL (ref 12–16)
MCH RBC QN AUTO: 26.4 PG (ref 27–32)
MCHC RBC AUTO-ENTMCNC: 31.8 G/DL (ref 32–37)
MCV RBC AUTO: 83 FL (ref 80–100)
PLATELET # BLD AUTO: 350 K/UL (ref 140–400)
PMV BLD AUTO: 9.9 FL (ref 7.4–10.3)
RBC # BLD AUTO: 4.54 M/UL (ref 3.7–5.4)
WBC # BLD AUTO: 9.8 K/UL (ref 4–11)

## 2018-09-05 PROCEDURE — 85027 COMPLETE CBC AUTOMATED: CPT

## 2018-09-05 PROCEDURE — 99212 OFFICE O/P EST SF 10 MIN: CPT | Performed by: INTERNAL MEDICINE

## 2018-09-05 PROCEDURE — 99214 OFFICE O/P EST MOD 30 MIN: CPT | Performed by: INTERNAL MEDICINE

## 2018-09-05 PROCEDURE — 36415 COLL VENOUS BLD VENIPUNCTURE: CPT

## 2018-09-05 PROCEDURE — 84460 ALANINE AMINO (ALT) (SGPT): CPT

## 2018-09-05 PROCEDURE — 82565 ASSAY OF CREATININE: CPT

## 2018-09-05 PROCEDURE — 84450 TRANSFERASE (AST) (SGOT): CPT

## 2018-09-05 PROCEDURE — 86140 C-REACTIVE PROTEIN: CPT

## 2018-09-05 PROCEDURE — 85652 RBC SED RATE AUTOMATED: CPT

## 2018-09-05 RX ORDER — LEVOTHYROXINE SODIUM 0.07 MG/1
75 TABLET ORAL
Qty: 30 TABLET | Refills: 1 | Status: SHIPPED | OUTPATIENT
Start: 2018-09-05 | End: 2019-04-17 | Stop reason: DRUGHIGH

## 2018-09-05 RX ORDER — OMEPRAZOLE 40 MG/1
CAPSULE, DELAYED RELEASE ORAL
Qty: 90 CAPSULE | Refills: 0 | Status: SHIPPED | OUTPATIENT
Start: 2018-09-05 | End: 2019-04-17

## 2018-09-05 RX ORDER — SUMATRIPTAN 100 MG/1
TABLET, FILM COATED ORAL
Qty: 9 TABLET | Refills: 5 | Status: SHIPPED | OUTPATIENT
Start: 2018-09-05 | End: 2019-01-26

## 2018-09-05 NOTE — PROGRESS NOTES
HPI:    Patient ID: Raymond De La Rosa is a 62year old female. Patient in office today for follow up visit for checkup on the blood pressure and and follow-up blood test.  Patient states she is still taking prednisone but smaller dosage.   Patient still has Prescriptions:  Polyethyl Glycol-Polyvinyl Alc 1-1 % Ophthalmic Solution Apply 1 drop to eye daily as needed.    Disp:  Rfl:    SUMAtriptan Succinate 100 MG Oral Tab TAKE 1 TABLET BY MOUTH PER  DAY   AS NEEDED  FOR  HEADACHE  MAXIMUM   1 PER  24 hours and 1 tablet (1 mg total) by mouth daily.  Disp: 30 tablet Rfl: 11     Allergies:  Codeine                 NAUSEA ONLY, DIZZINESS  Morphine                NAUSEA ONLY, DIZZINESS  Penicillins             HIVES, RASH   PHYSICAL EXAM:   Physical Exam   Constitutio not currently breastfeeding.            ASSESSMENT/PLAN:   Heart murmur  (primary encounter diagnosis)  2D echo-recheck patient had one last one in 2016-trivial Aortic  regurgitation  EKG    Gastroesophageal reflux disease without esophagitis  Patient advis of prednisone  Sulfasalazine 500 mg twice daily  Labs in process-no results available  Patient to schedule follow-up visit with   Her rheumatologist  Patient not happy about the weight gain and increasing her sugars/diabetes-medication adjustment needed  P

## 2018-09-05 NOTE — TELEPHONE ENCOUNTER
Pt in office with PCP today. She would like to know correct dose of prednisone to take.  Scripts on file show:    SONE 10 MG Oral Tab  1 ordered         Summary: Take 20mg x 1 week, then 10mg ad ay, Normal, Disp-45 tablet, R-1   Start: 6/14/2018  Ord/Sold:

## 2018-09-05 NOTE — TELEPHONE ENCOUNTER
Pt notified of 10mg daily dosing. Follow up rescheduled fro 9/19 to 9/7. Pt denies further questions.

## 2018-09-06 NOTE — TELEPHONE ENCOUNTER
PA for Omeprazole 40 mg cap completed with [employer] via CMM response time 3-5 business days [address].

## 2018-09-07 ENCOUNTER — OFFICE VISIT (OUTPATIENT)
Dept: RHEUMATOLOGY | Facility: CLINIC | Age: 58
End: 2018-09-07
Payer: MEDICAID

## 2018-09-07 VITALS
DIASTOLIC BLOOD PRESSURE: 75 MMHG | HEIGHT: 64 IN | WEIGHT: 142 LBS | RESPIRATION RATE: 16 BRPM | HEART RATE: 85 BPM | SYSTOLIC BLOOD PRESSURE: 113 MMHG | BODY MASS INDEX: 24.24 KG/M2 | TEMPERATURE: 98 F

## 2018-09-07 DIAGNOSIS — M31.6 TEMPORAL ARTERITIS (HCC): Primary | ICD-10-CM

## 2018-09-07 DIAGNOSIS — M06.4 UNDIFFERENTIATED INFLAMMATORY POLYARTHRITIS (HCC): ICD-10-CM

## 2018-09-07 DIAGNOSIS — Z51.81 THERAPEUTIC DRUG MONITORING: ICD-10-CM

## 2018-09-07 PROCEDURE — 99212 OFFICE O/P EST SF 10 MIN: CPT | Performed by: INTERNAL MEDICINE

## 2018-09-07 PROCEDURE — 99214 OFFICE O/P EST MOD 30 MIN: CPT | Performed by: INTERNAL MEDICINE

## 2018-09-07 RX ORDER — AZATHIOPRINE 50 MG/1
50 TABLET ORAL DAILY
Qty: 30 TABLET | Refills: 0 | Status: SHIPPED | OUTPATIENT
Start: 2018-09-07 | End: 2018-11-12

## 2018-09-07 NOTE — TELEPHONE ENCOUNTER
Patient is not better with Ranitidine in past  - did not help her symptoms , Has persistent  Heartburns - Omeprazole  helps and  is  Stable if taking it .

## 2018-09-07 NOTE — TELEPHONE ENCOUNTER
PA denied for this medication. The review was based on the (PPI's ) Quantity limit program. In order to approve this request :    - The patient must be taking this drug according to the FDA label. This includes how often he takes the drug and the intended condition. It also includes the length of treatment. The patient is able to get up to 2 capsules a day for a max of 120 days w/in 365 days. This is the duration limit set by the plan PPI's. If a higher quantity or duration must be used the prescriber must provide the reason.

## 2018-09-07 NOTE — PROGRESS NOTES
Rosangela Dhillon is a 62year old female who presents for Patient presents with:  Headache: Migrane  . HPI:     She's a pleasasnt 54year old who has joint pian in her hands for 1 year. It's worse in the last 5-6 motnhs.    She is noticiing that her Cobb was not feeling herself and drowsy. She was dizzy. This helepd her. She feels very fatigued. Her  feels that loratdaine helped her feel better as well as steroid burst.   The headaches are always there - she feels it's from steress b/c ongoing. feels it comes and goes. She was also told she had cataracts and she might need cataract surgery. Right is worse. 5/10/2018  The prednisone helped her headach. On 5/4 the headache returned after stopping prednisone. Then it got bettter.  Then last nig 24.37 kg/m². Current Outpatient Prescriptions:  Polyethyl Glycol-Polyvinyl Alc 1-1 % Ophthalmic Solution Apply 1 drop to eye daily as needed.    Disp:  Rfl:    SUMAtriptan Succinate 100 MG Oral Tab TAKE 1 TABLET BY MOUTH PER  DAY   AS NEEDED  FOR  HE 4/9/2015   • Anxiety state    • Arrhythmia     ablation 2003   • Arthritis    • Cataract    • Disorder of thyroid    • Esophagitis 09-   • Floaters 4/9/2015   • High cholesterol    • Migraines    • Muscle weakness     bilateral knees at times   • Os gastirtis - small hiatal hernia  : no dysuria,  Neuro: very occl in legs  numbness or tingling, gets migraines headache - for several years > 10 years - seen neurologist, no hx of seizures,   Lumbar xray - 10/31/2005 - mild djd , post op changes in right stress  Injury/fracture.     9/10/2014 - mri brain -   Frontotemporal atrophy greater than expected for age , no white matter signal changes, no mass, infarc, or ischemia    Component      Latest Ref Rng 3/31/2016   ANTI-SJOGREN'S A (S)      Negative Negati PLATELET VOLUME      7.4 - 10.3 FL 10.6 (H)   Neutrophils %       54   Lymphocytes %       36   Monocytes %       7   Eosinophils %       2   Basophils %       1   NEUTROPHILS #      1.8 - 7.7 K/UL 3.7   LYMPHOCYTES #      1.0 - 4.0 K/UL 2.5   MONOCYTES # ulno-lunate impaction.  The ulna positioning is neutral. No obvious TFC     tear on a non-dedicated study.        2. Mild degenerative subchondral cystic changes involving the third     metacarpal head. No evidence of erosive arthropathy.     7/22/2106 - david (L)  31.9 (L)   RDW      11.0 - 15.0 % 14.6  14.5   Platelet Count      494 - 400 K/  293   MEAN PLATELET VOLUME      7.4 - 10.3 fL 9.9  10.5 (H)   Neutrophils %      % 49  45   Lymphocytes %      % 38  44   Monocytes %      % 10  9   Eosinophils % Calc      0 - 99 mg/dL   65   CREATININE UR RANDOM      mg/dL   58.4   MALB URINE      0.0 - 1.8 mg/dL   0.2   MALB/CRE CALC      0.0 - 20.0   3.4   HEMOGLOBIN A1c      4.0 - 6.0 %   6.7 (H)   TSH      0.45 - 5.33 uIU/mL   2.56   POC GLUCOSE      70 - 99 11.0 - 15.0 % 14.9   Platelet Count      424 - 400 K/   MEAN PLATELET VOLUME      7.4 - 10.3 fL 9.7   Neutrophils %      % 46   Lymphocytes %      % 42   Monocytes %      % 9   Eosinophils %      % 2   Basophils %      % 1   Neutrophils Absolute sand eleavated sed rate -   Very high sed rate - 100mm/hr , headaches as well - temp artery bx negative 9/2017 -     D/w her she was on  pred 40mg x 3 days, then 30mg x 3 weeks,  On 5/11/2018 -she stopped this week, then she went to 20mg x 2 weeks,   Then

## 2018-09-07 NOTE — PATIENT INSTRUCTIONS
1. Stay on  prednisone 10mg a day   2. Try azathioprine 50mg a day x 1 month,   3. Check labs in  1month. 5. Return to clinic in 4 weeks.    6. Check labs prior to next visit

## 2018-09-10 ENCOUNTER — OFFICE VISIT (OUTPATIENT)
Dept: NEUROLOGY | Facility: CLINIC | Age: 58
End: 2018-09-10
Payer: MEDICAID

## 2018-09-10 ENCOUNTER — MED REC SCAN ONLY (OUTPATIENT)
Dept: NEUROLOGY | Facility: CLINIC | Age: 58
End: 2018-09-10

## 2018-09-10 VITALS
HEART RATE: 110 BPM | BODY MASS INDEX: 30.73 KG/M2 | HEIGHT: 64 IN | WEIGHT: 180 LBS | DIASTOLIC BLOOD PRESSURE: 66 MMHG | SYSTOLIC BLOOD PRESSURE: 110 MMHG

## 2018-09-10 DIAGNOSIS — IMO0002 CHRONIC MIGRAINE: Primary | ICD-10-CM

## 2018-09-10 PROCEDURE — 64615 CHEMODENERV MUSC MIGRAINE: CPT | Performed by: OTHER

## 2018-09-11 NOTE — TELEPHONE ENCOUNTER
Okay, please create a letter for medical necessity I will attach that will the apeal form. Thank you.

## 2018-09-13 NOTE — TELEPHONE ENCOUNTER
Who  Can help with  That - so  Far  I never needed to creat letter -  Who is in  Charge for that ? PA  Nurse ?   Thanks

## 2018-09-20 NOTE — TELEPHONE ENCOUNTER
Dr Conrado Mohs, please see pended letter dated 9/14/18, change if necessary,  if you approve route this back to triage and PA nurse will send it in

## 2018-09-21 NOTE — TELEPHONE ENCOUNTER
Call from [employer] stating appeal may take up to 15 business days. They need clinical information such as office visit notes faxed to [phone] case id# [id number]. Office visit notes sent to [facility] appeals department via fax.

## 2018-09-24 ENCOUNTER — OFFICE VISIT (OUTPATIENT)
Dept: INTERNAL MEDICINE CLINIC | Facility: CLINIC | Age: 58
End: 2018-09-24
Payer: MEDICAID

## 2018-09-24 ENCOUNTER — TELEPHONE (OUTPATIENT)
Dept: NEUROLOGY | Facility: CLINIC | Age: 58
End: 2018-09-24

## 2018-09-24 VITALS
SYSTOLIC BLOOD PRESSURE: 120 MMHG | WEIGHT: 196.63 LBS | BODY MASS INDEX: 33.57 KG/M2 | HEIGHT: 64 IN | HEART RATE: 87 BPM | DIASTOLIC BLOOD PRESSURE: 79 MMHG | TEMPERATURE: 98 F | RESPIRATION RATE: 20 BRPM

## 2018-09-24 DIAGNOSIS — R51.9 HEADACHE DISORDER: ICD-10-CM

## 2018-09-24 DIAGNOSIS — R22.0 SWELLING OF FACE: ICD-10-CM

## 2018-09-24 DIAGNOSIS — I10 ESSENTIAL HYPERTENSION: Primary | ICD-10-CM

## 2018-09-24 PROCEDURE — 99212 OFFICE O/P EST SF 10 MIN: CPT | Performed by: INTERNAL MEDICINE

## 2018-09-24 PROCEDURE — 99214 OFFICE O/P EST MOD 30 MIN: CPT | Performed by: INTERNAL MEDICINE

## 2018-09-24 NOTE — TELEPHONE ENCOUNTER
Spoke with patient, states she had a constant headache that lasted for the past 5 days, also noticed vision changes and swelling of left eye, has taken sumatriptan 4 days ago, also taking nortriptyline 75 mg, Take 1 capsule by mouth nightly and topiramate

## 2018-09-24 NOTE — PROGRESS NOTES
HPI:    Patient ID: Luis F Vigil is a 62year old female. Patient in office today for a concern of the swelling of the face especially the left side ,patient states that recently she got Botox injections from neurology for her headache.   Patient states tablet Rfl: 0   SUMAtriptan Succinate 100 MG Oral Tab TAKE 1 TABLET BY MOUTH PER  DAY   AS NEEDED  FOR  HEADACHE  MAXIMUM   1 PER  24 hours and  2   Per  Week Disp: 9 tablet Rfl: 5   MetFORMIN HCl 500 MG Oral Tab Take 1 tablet (500 mg total) by mouth 2 (tw well-nourished. No distress. Obese    HENT:   Head: Normocephalic and atraumatic.    Right Ear: Tympanic membrane, external ear and ear canal normal.   Left Ear: Tympanic membrane, external ear and ear canal normal.   Nose: Nose normal. No mucosal edema o side much more swollen        Psychiatric: She has a normal mood and affect. Her behavior is normal.   Nursing note and vitals reviewed. Blood pressure 120/79, pulse 87, temperature 98.1 °F (36.7 °C), temperature source Oral, resp.  rate 20, height 5'

## 2018-09-25 ENCOUNTER — OFFICE VISIT (OUTPATIENT)
Dept: NEUROLOGY | Facility: CLINIC | Age: 58
End: 2018-09-25
Payer: MEDICAID

## 2018-09-25 VITALS
SYSTOLIC BLOOD PRESSURE: 108 MMHG | RESPIRATION RATE: 16 BRPM | DIASTOLIC BLOOD PRESSURE: 78 MMHG | WEIGHT: 196 LBS | HEART RATE: 60 BPM | HEIGHT: 64 IN | BODY MASS INDEX: 33.46 KG/M2

## 2018-09-25 DIAGNOSIS — M54.81 OCCIPITAL NEURALGIA OF LEFT SIDE: ICD-10-CM

## 2018-09-25 DIAGNOSIS — H02.402 PTOSIS OF LEFT EYELID: ICD-10-CM

## 2018-09-25 DIAGNOSIS — IMO0002 CHRONIC MIGRAINE: Primary | ICD-10-CM

## 2018-09-25 PROCEDURE — 99213 OFFICE O/P EST LOW 20 MIN: CPT | Performed by: OTHER

## 2018-09-26 NOTE — PROGRESS NOTES
[facility]    Josette Lizarraga is a 62year old female. HPI:     Patient is being seen in follow-up. I saw her in clinic recently for Botox injections on 9/10/18. Prior to that, I saw her in the clinic in June.   Unfortunately Weeks than prn (Patient taking differently: Take 10 mg by mouth as needed.  1  Tab    oncle  Daily for 1-2  Weeks than prn ) Disp: 30 tablet Rfl: 1   fenofibrate micronized 134 MG Oral Cap Take 1 capsule (134 mg total) by mouth daily with breakfast. Disp: 9 has had previous reports of ocular tearing, cannot absolutely rule out trigeminal autonomic cephalgia. Most recent complaint of left eye ptosis is likely secondary to Botox side effect.       –I had a long discussion with patient regarding ptosis being a

## 2018-09-27 ENCOUNTER — HOSPITAL ENCOUNTER (OUTPATIENT)
Dept: CV DIAGNOSTICS | Facility: HOSPITAL | Age: 58
Discharge: HOME | End: 2018-09-27
Attending: INTERNAL MEDICINE
Payer: MEDICAID

## 2018-09-27 DIAGNOSIS — R01.1 HEART MURMUR: ICD-10-CM

## 2018-09-27 PROCEDURE — 93306 TTE W/DOPPLER COMPLETE: CPT | Performed by: INTERNAL MEDICINE

## 2018-09-27 NOTE — TELEPHONE ENCOUNTER
Hannah [email or web address] is calling to set up delivery for Botox 200 u/vl. Rx will be delivered on 10/04/18 for next injections due around 12/12/18.

## 2018-09-28 ENCOUNTER — TELEPHONE (OUTPATIENT)
Dept: NEUROLOGY | Facility: CLINIC | Age: 58
End: 2018-09-28

## 2018-09-28 RX ORDER — TOPIRAMATE 50 MG/1
50 TABLET, FILM COATED ORAL 2 TIMES DAILY
Qty: 60 TABLET | Refills: 3 | Status: SHIPPED | OUTPATIENT
Start: 2018-09-28 | End: 2018-10-09 | Stop reason: DRUGHIGH

## 2018-09-28 RX ORDER — NORTRIPTYLINE HYDROCHLORIDE 75 MG/1
75 CAPSULE ORAL NIGHTLY
Qty: 30 CAPSULE | Refills: 3 | Status: SHIPPED | OUTPATIENT
Start: 2018-09-28 | End: 2019-04-17 | Stop reason: DRUGHIGH

## 2018-09-28 NOTE — TELEPHONE ENCOUNTER
Spoke to patient and advised  Nortriptyline and topiramate Rx sent to pharmacy. Expressed understanding.

## 2018-09-28 NOTE — TELEPHONE ENCOUNTER
Called pharmacy. Naphazoline-Pheniramine (NAPHCON-A) 0.025-0.3 % Ophthalmic Solution is OTC. Nortriptyline was last refilled on 9/11 and has not refills. topamax was last refilled on 8/8 and has one refill.     Called patient and notified her that eye drops

## 2018-10-04 ENCOUNTER — TELEPHONE (OUTPATIENT)
Dept: NEUROLOGY | Facility: CLINIC | Age: 58
End: 2018-10-04

## 2018-10-09 ENCOUNTER — OFFICE VISIT (OUTPATIENT)
Dept: INTERNAL MEDICINE CLINIC | Facility: CLINIC | Age: 58
End: 2018-10-09
Payer: MEDICAID

## 2018-10-09 VITALS
SYSTOLIC BLOOD PRESSURE: 109 MMHG | BODY MASS INDEX: 33.46 KG/M2 | HEIGHT: 64 IN | RESPIRATION RATE: 18 BRPM | DIASTOLIC BLOOD PRESSURE: 73 MMHG | TEMPERATURE: 98 F | HEART RATE: 83 BPM | WEIGHT: 196 LBS

## 2018-10-09 DIAGNOSIS — E11.8 TYPE 2 DIABETES MELLITUS WITH COMPLICATION, WITHOUT LONG-TERM CURRENT USE OF INSULIN (HCC): Primary | ICD-10-CM

## 2018-10-09 PROCEDURE — 99214 OFFICE O/P EST MOD 30 MIN: CPT | Performed by: INTERNAL MEDICINE

## 2018-10-09 PROCEDURE — 99212 OFFICE O/P EST SF 10 MIN: CPT | Performed by: INTERNAL MEDICINE

## 2018-10-09 NOTE — PROGRESS NOTES
HPI:    Patient ID: Symone Prince is a 62year old female.   Patient presents with:  Diabetes: Pt is f/u with her diabetes and recent test results   Patient states that she was seen by [place] rheumatologist and had  blood testing that came back pretty muc Week Disp: 9 tablet Rfl: 5   MetFORMIN HCl 500 MG Oral Tab Take 1 tablet (500 mg total) by mouth 2 (two) times daily with meals. (Patient taking differently: Take 500 mg by mouth 2 (two) times daily with meals.  Pt is taking 2 at lunch and 1 at dinner. ) Khadra Bowen Right Ear: Tympanic membrane, external ear and ear canal normal.   Left Ear: Tympanic membrane, external ear and ear canal normal.   Nose: Nose normal. Right sinus exhibits no maxillary sinus tenderness and no frontal sinus tenderness.  Left sinus exhibit understanding and compliance  cpm  Stable , Labs  Discussed  Wit pt   metformin 500 mg  2  Tab  Bid  With meals     Elevated     Esr -resolved   Now  Is  6 -at [facility] [place] labs  Pt  stopped   azathioprine  - per   Rheumatologist  form  [place]   Prednisone taper

## 2018-10-10 ENCOUNTER — APPOINTMENT (OUTPATIENT)
Dept: LAB | Age: 58
End: 2018-10-10
Attending: INTERNAL MEDICINE
Payer: MEDICAID

## 2018-10-10 DIAGNOSIS — M31.6 TEMPORAL ARTERITIS (HCC): ICD-10-CM

## 2018-10-10 DIAGNOSIS — M06.4 UNDIFFERENTIATED INFLAMMATORY POLYARTHRITIS (HCC): ICD-10-CM

## 2018-10-10 DIAGNOSIS — Z51.81 THERAPEUTIC DRUG MONITORING: ICD-10-CM

## 2018-10-10 PROCEDURE — 85652 RBC SED RATE AUTOMATED: CPT

## 2018-10-10 PROCEDURE — 36415 COLL VENOUS BLD VENIPUNCTURE: CPT

## 2018-10-10 PROCEDURE — 82565 ASSAY OF CREATININE: CPT

## 2018-10-10 PROCEDURE — 84460 ALANINE AMINO (ALT) (SGPT): CPT

## 2018-10-10 PROCEDURE — 84450 TRANSFERASE (AST) (SGOT): CPT

## 2018-10-10 PROCEDURE — 86140 C-REACTIVE PROTEIN: CPT

## 2018-10-10 PROCEDURE — 85027 COMPLETE CBC AUTOMATED: CPT

## 2018-10-11 ENCOUNTER — OFFICE VISIT (OUTPATIENT)
Dept: RHEUMATOLOGY | Facility: CLINIC | Age: 58
End: 2018-10-11
Payer: MEDICAID

## 2018-10-11 VITALS
DIASTOLIC BLOOD PRESSURE: 75 MMHG | HEART RATE: 89 BPM | BODY MASS INDEX: 33.8 KG/M2 | HEIGHT: 64 IN | SYSTOLIC BLOOD PRESSURE: 121 MMHG | WEIGHT: 198 LBS

## 2018-10-11 DIAGNOSIS — M06.4 UNDIFFERENTIATED INFLAMMATORY POLYARTHRITIS (HCC): ICD-10-CM

## 2018-10-11 DIAGNOSIS — M31.6 TEMPORAL ARTERITIS (HCC): Primary | ICD-10-CM

## 2018-10-11 DIAGNOSIS — Z51.81 THERAPEUTIC DRUG MONITORING: ICD-10-CM

## 2018-10-11 PROCEDURE — 99212 OFFICE O/P EST SF 10 MIN: CPT | Performed by: INTERNAL MEDICINE

## 2018-10-11 PROCEDURE — 99214 OFFICE O/P EST MOD 30 MIN: CPT | Performed by: INTERNAL MEDICINE

## 2018-10-11 NOTE — PATIENT INSTRUCTIONS
1. Stay on  prednisone 7.5mg a day   2. Follow up with dr. Brittanie Jean - she can repeat sed rate at [facility] and see what the difference is.

## 2018-10-11 NOTE — PROGRESS NOTES
Lazarus Bough is a 62year old female who presents for Patient presents with:  Hand Pain  . HPI:     She's a pleasasnt 54year old who has joint pian in her hands for 1 year. It's worse in the last 5-6 motnhs.    She is noticiing that her activities wit He was not feeling herself and drowsy. She was dizzy. This helepd her. She feels very fatigued.    Her  feels that loratdaine helped her feel better as well as steroid burst.   The headaches are always there - [facility] Her headaches are her biggest problem. She feels it comes and goes. She was also told she had cataracts and she might need cataract surgery. Right is worse. 5/10/2018  The prednisone helped her headach.  On 5/4 the headache returned after stopping pre She had gotten a second opinoin with Dr. Kaye Goins  o 9/12 - she had sed rate of 9mm/hr. She asked her to stop the azathioprin on 9/26. She also tapered het prednisoen to 7.5mg a day. She is feeling better. She has 5/10 pain.  .   She is getting the m loratadine 10 MG Oral Tab 1  Tab    oncle  Daily for 1-2  Weeks than prn (Patient taking differently: Take 10 mg by mouth as needed.  1  Tab    oncle  Daily for 1-2  Weeks than prn ) Disp: 30 tablet Rfl: 1   fenofibrate micronized 134 MG Oral Cap Take 1 cap 3 sisters, 5 brothers,    Social History:  Social History    Tobacco Use      Smoking status: Never Smoker      Smokeless tobacco: Never Used    Alcohol use: No      Alcohol/week: 0.0 oz    Drug use: No   housewife, 3 daughters,        REVIEW OF SYSTEMS: Not tender  in pips - imrpoved but still there.    Can close hands - 5-/5 grasp in both ahnds   Lower back on left glueteal side tender and midnline back - better -   Not tender in  Left hsoudler - over tetanus shot area   Right knee  tender , mild crepitus 10.0 - 20.0 20.2 (H)   ANION GAP      0 - 18 6   CALCIUM      8.5 - 10.5 mg/dL 9.1   CALCULATED OSMOLALITY      275 - 295 mOsm/kg 291   AST      15 - 41 U/L 30   ALT (SGPT)      14 - 54 U/L 29   ALK PHOSPHATASE (P)      32 - 100 U/L 64   TOTAL BILIRUBI 0 - 5 /HPF 1   URINE RBC      0 - 3 /HPF 0   URINE BACTERIA      None seen Few (A)   MICROSCOPIC URINALYSIS COMMENT       Completed   HDL Cholesterol       51   CHOLESTEROL (P)      110 - 200 mg/dL 164   TRIGLYCERIDE (P)      1 - 149 mg/dL 188 (H)   CA 8 - 20 mg/dL 10  13   CREATININE      0.50 - 1.50 mg/dL 0.86  1.02   CALCIUM      8.5 - 10.5 mg/dL 9.4  9.2   ALT (SGPT)      14 - 54 U/L 28  24   AST (SGOT)      15 - 41 U/L 28  27   ALKALINE PHOSPHATASE      32 - 100 U/L 61  54   Total Bilirubin Negative   Negative   UROBILINOGEN,SEMI-QN      <2.0   <2.0   LEUKOCYTES      Negative   Trace (A)   ASCORBIC ACID      Negative mg/dL   Negative   SQUAM EPI CELLS UR      /HPF   Few   WBC      0 - 5 /HPF   1   RBC      0 - 3 /HPF   0   BACTERIA      N Component      Latest Ref Rng & Units 9/21/2017   MYELOPEROX ANTIBODIES, IGG      0 - 19 AU/mL 2   SERINE PROTEASE3, IGG      0 - 19 AU/mL 0   ANTI-NEUTROPHIL CYTO AB, IGG      <1:20 <1:20   Anti-Sjogren's A      Negative Negative   Anti-Sjogren's B 3. Left large right aishwarya bullosa deformities with slight rightward nasal septal deviation. 4. Lesser incidental findings as above. 9/29/2017 - mri brain   CONCLUSION:   1. No acute intracranial abnormality.  Specifically, no etiology for the patien Prednisone did help her headache  - trial of predniosnet 30mg x 1 week, then 20mg x 1 week, then stay on 10mg a day -      - 9/7/2107 temporal artery bx - negative - -   She did get eye exam - 6/2017 - repeat eye exam in mid march - 3/2018 - ok - catarcts

## 2018-10-19 ENCOUNTER — OFFICE VISIT (OUTPATIENT)
Dept: ORTHOPEDICS CLINIC | Facility: CLINIC | Age: 58
End: 2018-10-19
Payer: MEDICAID

## 2018-10-19 DIAGNOSIS — M17.11 PRIMARY OSTEOARTHRITIS OF RIGHT KNEE: ICD-10-CM

## 2018-10-19 DIAGNOSIS — M51.36 DDD (DEGENERATIVE DISC DISEASE), LUMBAR: Primary | ICD-10-CM

## 2018-10-19 PROCEDURE — 99213 OFFICE O/P EST LOW 20 MIN: CPT | Performed by: ORTHOPAEDIC SURGERY

## 2018-10-19 PROCEDURE — 99212 OFFICE O/P EST SF 10 MIN: CPT | Performed by: ORTHOPAEDIC SURGERY

## 2018-10-19 NOTE — PROGRESS NOTES
This is a pleasant 80-year-old female with a previous diagnosis of degenerative disc disease of the lumbar spine and right knee osteoarthritis. Patient received a Synvisc injection in September 2017.   Patient reports her right knee pain at return in 2-3 w

## 2018-11-12 ENCOUNTER — OFFICE VISIT (OUTPATIENT)
Dept: NEUROLOGY | Facility: CLINIC | Age: 58
End: 2018-11-12
Payer: MEDICAID

## 2018-11-12 VITALS
HEIGHT: 64 IN | BODY MASS INDEX: 32.27 KG/M2 | WEIGHT: 189 LBS | SYSTOLIC BLOOD PRESSURE: 118 MMHG | DIASTOLIC BLOOD PRESSURE: 82 MMHG | HEART RATE: 80 BPM

## 2018-11-12 DIAGNOSIS — M54.81 OCCIPITAL NEURALGIA OF LEFT SIDE: ICD-10-CM

## 2018-11-12 DIAGNOSIS — IMO0002 CHRONIC MIGRAINE: Primary | ICD-10-CM

## 2018-11-12 PROCEDURE — 99213 OFFICE O/P EST LOW 20 MIN: CPT | Performed by: OTHER

## 2018-11-12 RX ORDER — TOPIRAMATE 100 MG/1
TABLET, FILM COATED ORAL
Qty: 60 TABLET | Refills: 5 | Status: SHIPPED | OUTPATIENT
Start: 2018-11-12 | End: 2019-05-09

## 2018-11-12 NOTE — PROGRESS NOTES
[facility] Follow-up Note    Rosa Elena Pedro is a 62year old female. HPI:     Patient is being seen in follow-up. I saw her in clinic last at the end of September. At that time, she was having a lot of ptosis in her left eye.   This has im 134 MG Oral Cap Take 1 capsule (134 mg total) by mouth daily with breakfast. Disp: 90 capsule Rfl: 1   Blood Glucose Monitoring Suppl (TRUETRACK BLOOD GLUCOSE) W/DEVICE Does not apply Kit To check blood sugars twice a day.  Disp: 1 kit Rfl: 0   Glucose Bloo units    –Continue topiramate 100 mg for time being; as patient is having some cognitive slowing, may consider decreasing dose in the future    –We will continue nortriptyline 75 mg nightly    –Continue sumatriptan 100 mg as needed        Mame Castaneda MD

## 2018-11-14 ENCOUNTER — TELEPHONE (OUTPATIENT)
Dept: OTHER | Age: 58
End: 2018-11-14

## 2018-11-14 NOTE — TELEPHONE ENCOUNTER
Patient and her  Kari Dockery received flu shot today at pharmacy and pharmacist asking if her she and her  could have the pneumonia Prevnar vaccine also?   Patient declined and wanted to check with Dr. Jorge Wilson first. Message also sent in

## 2018-11-14 NOTE — TELEPHONE ENCOUNTER
Please advised patient she did have a Pneumovax shot 2016 in December   when she turns 72 she will need another pneumonia vaccine which is Prevnar 13 patient   Patient does not need a vaccine right now

## 2018-11-18 ENCOUNTER — OFFICE VISIT (OUTPATIENT)
Dept: FAMILY MEDICINE CLINIC | Facility: CLINIC | Age: 58
End: 2018-11-18
Payer: MEDICAID

## 2018-11-18 VITALS
TEMPERATURE: 98 F | SYSTOLIC BLOOD PRESSURE: 118 MMHG | RESPIRATION RATE: 16 BRPM | DIASTOLIC BLOOD PRESSURE: 80 MMHG | HEART RATE: 100 BPM | OXYGEN SATURATION: 100 %

## 2018-11-18 DIAGNOSIS — R22.0 SWELLING OF LEFT SIDE OF FACE: Primary | ICD-10-CM

## 2018-11-18 DIAGNOSIS — R20.2 TINGLING SENSATION IN FACE: ICD-10-CM

## 2018-11-18 PROCEDURE — 99202 OFFICE O/P NEW SF 15 MIN: CPT | Performed by: PHYSICIAN ASSISTANT

## 2018-11-18 RX ORDER — VALACYCLOVIR HYDROCHLORIDE 1 G/1
1 TABLET, FILM COATED ORAL 3 TIMES DAILY
Qty: 21 TABLET | Refills: 0 | Status: SHIPPED | OUTPATIENT
Start: 2018-11-18 | End: 2018-11-25

## 2018-11-18 NOTE — PROGRESS NOTES
CHIEF COMPLAINT:   Patient presents with:  Rash: was more red, pink the other day, swollen/tingling, feels tight, on clindamycin for tooth infection on R side       HPI:   Franky Hobbs is a 62year old female who presents for evaluation of a rash and fa loratadine 10 MG Oral Tab 1  Tab    oncle  Daily for 1-2  Weeks than prn (Patient taking differently: Take 10 mg by mouth as needed.  1  Tab    oncle  Daily for 1-2  Weeks than prn ) Disp: 30 tablet Rfl: 1   fenofibrate micronized 134 MG Oral Cap Take 1 cap Alcohol use: No      Alcohol/week: 0.0 oz    Drug use: No        REVIEW OF SYSTEMS:   GENERAL: feels well otherwise  SKIN: Per HPI. HEENT: Denies rhinorrhea, edema of the lips or swelling of throat. CARDIOVASCULAR: Denies chest pains or palpitations. PLAN: uncertain etiology-- does not seem c/w with drug reaction. No visible rash today. Minimal edema noted.   Due to tingling sensation and mild edema will cover for early shingles-- pt did receive shingles vax in 12/2014 so could have mild case if she medley · The first sign of shingles is usually pain, burning, tingling, or itching on one part of your face or body. You may also feel as if you have the flu, with fever and chills. · A red rash with small blisters appears within a few days.  The rash may appear · Bacterial infection. Shingles blisters may become infected with bacteria. Antibiotic medicine is used to treat the infection. · Eye problems. A person with shingles on the face should see his or her healthcare provider right away.  Shingles can cause ser Salivary Gland Swelling, Uncertain Cause  Salivary glands make saliva in response to food in your mouth. Saliva is mostly water. It also has minerals and proteins that help break down food and keep the mouth and teeth healthy.  There are three pairs of sali Follow up with your healthcare provider or as advised. See your healthcare provider for further exams and testing. If you have been referred to a specialist, make an appointment promptly.   When to seek medical advice  Call your healthcare provider if any o A blocked salivary gland may become infected. This can cause worsened pain, redness over the gland, and fever. Tests that help diagnose a salivary gland stone include CT-scan, X-ray, ultrasound, or injection of dye into the salivary duct.  A stone may be r

## 2018-11-18 NOTE — PATIENT INSTRUCTIONS
Shingles (Herpes Zoster)     Talk to your healthcare provider about the shingles vaccine. Shingles is also called herpes zoster. It is a painful skin rash caused by the herpes zoster virus. This is the same virus that causes chickenpox.  After a perso For most people, shingles heals on its own in a few weeks.  But treatment is recommended to help relieve pain, speed healing, and reduce the risk of complications. Antiviral medicines are prescribed within the first 72 hours of the appearance of the rash. T You can only get shingles if you have had chickenpox in the past. Those who have never had chickenpox can get the virus from you. Although instead of developing shingles, the person may get chickenpox.  Until your blisters form scabs, avoid contact with oth Certain medicines can affect salivary flow. This can lead to swelling of the gland. Be sure to tell your healthcare provider about all of the medicines you take. Tests are being done to determine the cause of the swelling.  These may include blood tests, X Date Last Reviewed: 11/1/2017  © 4517-6767 [employer] [address]. [address] [facility], [address]. All rights reserved. This information is not intended as a substitute for professional medical care.  Always follow your healthcare professional ? Keep good dental hygiene. Brush and floss your teeth daily. See your dentist for regular cleanings. Follow-up care  Follow up with your healthcare provider or as advised.   When to seek medical advice  Call your healthcare provider if any of the followin

## 2018-11-19 ENCOUNTER — APPOINTMENT (OUTPATIENT)
Dept: PHYSICAL THERAPY | Age: 58
End: 2018-11-19
Attending: ORTHOPAEDIC SURGERY
Payer: MEDICAID

## 2018-11-20 ENCOUNTER — OFFICE VISIT (OUTPATIENT)
Dept: INTERNAL MEDICINE CLINIC | Facility: CLINIC | Age: 58
End: 2018-11-20
Payer: MEDICAID

## 2018-11-20 ENCOUNTER — OFFICE VISIT (OUTPATIENT)
Dept: ORTHOPEDICS CLINIC | Facility: CLINIC | Age: 58
End: 2018-11-20
Payer: MEDICAID

## 2018-11-20 VITALS
HEIGHT: 64 IN | SYSTOLIC BLOOD PRESSURE: 129 MMHG | BODY MASS INDEX: 34.08 KG/M2 | TEMPERATURE: 98 F | DIASTOLIC BLOOD PRESSURE: 79 MMHG | RESPIRATION RATE: 20 BRPM | HEART RATE: 85 BPM | WEIGHT: 199.63 LBS

## 2018-11-20 VITALS — RESPIRATION RATE: 20 BRPM | DIASTOLIC BLOOD PRESSURE: 69 MMHG | SYSTOLIC BLOOD PRESSURE: 108 MMHG | HEART RATE: 87 BPM

## 2018-11-20 DIAGNOSIS — R51.9 HEADACHE DISORDER: ICD-10-CM

## 2018-11-20 DIAGNOSIS — E78.2 MIXED HYPERLIPIDEMIA: ICD-10-CM

## 2018-11-20 DIAGNOSIS — Z12.31 SCREENING MAMMOGRAM, ENCOUNTER FOR: ICD-10-CM

## 2018-11-20 DIAGNOSIS — E55.9 VITAMIN D DEFICIENCY: ICD-10-CM

## 2018-11-20 DIAGNOSIS — I10 ESSENTIAL HYPERTENSION: Primary | ICD-10-CM

## 2018-11-20 DIAGNOSIS — R70.0 ESR RAISED: ICD-10-CM

## 2018-11-20 DIAGNOSIS — M17.11 PRIMARY OSTEOARTHRITIS OF RIGHT KNEE: ICD-10-CM

## 2018-11-20 DIAGNOSIS — E11.8 TYPE 2 DIABETES MELLITUS WITH COMPLICATION, WITHOUT LONG-TERM CURRENT USE OF INSULIN (HCC): ICD-10-CM

## 2018-11-20 DIAGNOSIS — Z00.00 PE (PHYSICAL EXAM), ROUTINE: ICD-10-CM

## 2018-11-20 DIAGNOSIS — R22.0 SWELLING OF FACE: ICD-10-CM

## 2018-11-20 DIAGNOSIS — M51.36 DDD (DEGENERATIVE DISC DISEASE), LUMBAR: Primary | ICD-10-CM

## 2018-11-20 DIAGNOSIS — M25.50 PAIN IN JOINTS: ICD-10-CM

## 2018-11-20 PROCEDURE — 99213 OFFICE O/P EST LOW 20 MIN: CPT | Performed by: ORTHOPAEDIC SURGERY

## 2018-11-20 PROCEDURE — 99213 OFFICE O/P EST LOW 20 MIN: CPT | Performed by: INTERNAL MEDICINE

## 2018-11-20 PROCEDURE — 99396 PREV VISIT EST AGE 40-64: CPT | Performed by: INTERNAL MEDICINE

## 2018-11-20 PROCEDURE — 20610 DRAIN/INJ JOINT/BURSA W/O US: CPT | Performed by: ORTHOPAEDIC SURGERY

## 2018-11-20 PROCEDURE — 99212 OFFICE O/P EST SF 10 MIN: CPT | Performed by: ORTHOPAEDIC SURGERY

## 2018-11-20 PROCEDURE — 99212 OFFICE O/P EST SF 10 MIN: CPT | Performed by: INTERNAL MEDICINE

## 2018-11-20 RX ORDER — CLINDAMYCIN HYDROCHLORIDE 150 MG/1
CAPSULE ORAL
Refills: 0 | COMMUNITY
Start: 2018-11-15 | End: 2019-04-17 | Stop reason: ALTCHOICE

## 2018-11-20 RX ORDER — FEXOFENADINE HCL 180 MG/1
180 TABLET ORAL DAILY
Qty: 30 TABLET | Refills: 0 | Status: SHIPPED | OUTPATIENT
Start: 2018-11-20 | End: 2019-09-04

## 2018-11-20 NOTE — PROGRESS NOTES
HPI:    Patient ID: Symone Prince is a 62year old female.   Patient presents with:  Physical: Pt is presenting today for a physical       Patient presents today for physical exam, states doing well otherwise, but feels some frustrated that due to gain we constipation, diarrhea, nausea and vomiting. Genitourinary: Negative for dysuria and frequency. Musculoskeletal: Positive for arthralgias. Wrists hands  And knees    Skin: Negative for pallor. Neurological: Positive for headaches.  Negative for sugars twice a day. Disp: 1 kit Rfl: 0   Glucose Blood (TRUETEST TEST) In Vitro Strip Check blood sugars twice a day.  Disp: 100 each Rfl: 0   Clindamycin HCl 150 MG Oral Cap TK ONE C PO QID FOR 7 DAYS Disp:  Rfl: 0     Allergies:  Penicillins             H Lymphadenopathy:     She has no cervical adenopathy. Neurological: She is alert and oriented to person, place, and time. She has normal strength. No cranial nerve deficit or sensory deficit. She exhibits normal muscle tone.  She displays no seizure acti , lean meat turkey and chicken breast , fish in diet , avoid red meat and fast/fried food  · Eat more  fruits and vegetables   · Be active advised  walking /exercise as  tolerated  · Counseling on ideal weight/BMI  · Continue present management     Vitamin 30 tablet 0     Sig: Take 1 tablet (180 mg total) by mouth daily.  For  -2  Weeks  Than  As  Needed       Imaging & Referrals:  [facility] SCREENING BILAT (PPD=[id number])       [id number]

## 2018-11-20 NOTE — PROGRESS NOTES
This is a pleasant 59-year-old female with a previous diagnosis of degenerative disc disease of the lumbar spine and right knee osteoarthritis. The patient has not begun physical therapy yet for the lumbar spine.   She does come in today for Synvisc inject

## 2018-11-21 ENCOUNTER — APPOINTMENT (OUTPATIENT)
Dept: LAB | Age: 58
End: 2018-11-21
Attending: INTERNAL MEDICINE
Payer: MEDICAID

## 2018-11-21 DIAGNOSIS — E11.8 TYPE 2 DIABETES MELLITUS WITH COMPLICATION, WITHOUT LONG-TERM CURRENT USE OF INSULIN (HCC): ICD-10-CM

## 2018-11-21 DIAGNOSIS — E78.2 MIXED HYPERLIPIDEMIA: ICD-10-CM

## 2018-11-21 DIAGNOSIS — Z00.00 PE (PHYSICAL EXAM), ROUTINE: ICD-10-CM

## 2018-11-21 PROCEDURE — 36415 COLL VENOUS BLD VENIPUNCTURE: CPT

## 2018-11-21 PROCEDURE — 84443 ASSAY THYROID STIM HORMONE: CPT

## 2018-11-21 NOTE — PROGRESS NOTES
Per verbal order from Dr. Jaron Schneider, draw up 5ml of 1% llidocaine for injection to right knee.   Meka Brennan RN

## 2018-11-27 ENCOUNTER — APPOINTMENT (OUTPATIENT)
Dept: PHYSICAL THERAPY | Age: 58
End: 2018-11-27
Attending: ORTHOPAEDIC SURGERY
Payer: MEDICAID

## 2018-11-30 ENCOUNTER — APPOINTMENT (OUTPATIENT)
Dept: PHYSICAL THERAPY | Age: 58
End: 2018-11-30
Attending: ORTHOPAEDIC SURGERY
Payer: MEDICAID

## 2018-12-03 ENCOUNTER — APPOINTMENT (OUTPATIENT)
Dept: PHYSICAL THERAPY | Age: 58
End: 2018-12-03
Attending: ORTHOPAEDIC SURGERY
Payer: MEDICAID

## 2018-12-04 ENCOUNTER — TELEPHONE (OUTPATIENT)
Dept: INTERNAL MEDICINE CLINIC | Facility: CLINIC | Age: 58
End: 2018-12-04

## 2018-12-04 DIAGNOSIS — E03.9 HYPOTHYROIDISM, UNSPECIFIED TYPE: Primary | ICD-10-CM

## 2018-12-04 RX ORDER — LEVOTHYROXINE SODIUM 88 UG/1
TABLET ORAL
Qty: 30 TABLET | Refills: 1 | Status: SHIPPED | OUTPATIENT
Start: 2018-12-04 | End: 2019-03-08

## 2018-12-04 NOTE — TELEPHONE ENCOUNTER
Dosages increased to 88 mcg levothyroxine-sent to the pharmacy patient to complete the blood test TSH in about 6 weeks order is in the system

## 2018-12-04 NOTE — TELEPHONE ENCOUNTER
Please see pt message below; review 11/21/18 TSH result, and advise.      Please reply to pool: SOWMYA Craig

## 2018-12-04 NOTE — TELEPHONE ENCOUNTER
Pt called wanting to know if dr wants to change dose for     Current Outpatient Medications:  Levothyroxine Sodium 75 MCG Oral Tab Take 1 tablet (75 mcg total) by mouth before breakfast. Disp: 30 tablet Rfl: 1

## 2018-12-10 ENCOUNTER — OFFICE VISIT (OUTPATIENT)
Dept: PHYSICAL THERAPY | Age: 58
End: 2018-12-10
Attending: ORTHOPAEDIC SURGERY
Payer: MEDICAID

## 2018-12-10 DIAGNOSIS — M17.11 PRIMARY OSTEOARTHRITIS OF RIGHT KNEE: ICD-10-CM

## 2018-12-10 DIAGNOSIS — M51.36 DDD (DEGENERATIVE DISC DISEASE), LUMBAR: ICD-10-CM

## 2018-12-10 PROCEDURE — 97163 PT EVAL HIGH COMPLEX 45 MIN: CPT | Performed by: PHYSICAL THERAPIST

## 2018-12-10 PROCEDURE — 97110 THERAPEUTIC EXERCISES: CPT | Performed by: PHYSICAL THERAPIST

## 2018-12-10 NOTE — PROGRESS NOTES
LUMBAR SPINE EVALUATION:   Referring Physician: Dr. Ani Rivera  Diagnosis: DDD (degenerative disc disease), lumbar (M51.36)  Primary osteoarthritis of right knee (M17.11)    Evaluation Date: 12/10/2018  Visit # 1  Scheduled Visits [address] [place] [address] discussed attendance policy with pt. Tawny would benefit from skilled Physical Therapy to address the above impairments in order to perform stairs and lift with little to no pain.      Precautions: None     OBJECTIVE:   Observation/Posture: poor B rounded personal factors/comorbidities, 4+ body structures involved/activity limitations, and unstable symptoms including changing pain levels.     Pt has missed 4 appointments prior to the evaluation, PT discussed attendance policy with pt        PLAN OF CARE: care.    X___________________________________________________ Date____________________    Certification From: 14/45/5864  To:3/10/2019

## 2018-12-11 ENCOUNTER — TELEPHONE (OUTPATIENT)
Dept: ORTHOPEDICS CLINIC | Facility: CLINIC | Age: 58
End: 2018-12-11

## 2018-12-11 ENCOUNTER — HOSPITAL ENCOUNTER (EMERGENCY)
Facility: HOSPITAL | Age: 58
Discharge: HOME | End: 2018-12-11
Payer: MEDICAID

## 2018-12-11 ENCOUNTER — APPOINTMENT (OUTPATIENT)
Dept: ULTRASOUND IMAGING | Facility: HOSPITAL | Age: 58
End: 2018-12-11
Payer: MEDICAID

## 2018-12-11 VITALS
SYSTOLIC BLOOD PRESSURE: 140 MMHG | HEART RATE: 82 BPM | RESPIRATION RATE: 18 BRPM | TEMPERATURE: 98 F | OXYGEN SATURATION: 99 % | DIASTOLIC BLOOD PRESSURE: 80 MMHG

## 2018-12-11 DIAGNOSIS — M25.561 CHRONIC PAIN OF RIGHT KNEE: Primary | ICD-10-CM

## 2018-12-11 DIAGNOSIS — G89.29 CHRONIC PAIN OF RIGHT KNEE: Primary | ICD-10-CM

## 2018-12-11 PROCEDURE — 93971 EXTREMITY STUDY: CPT

## 2018-12-11 PROCEDURE — 99284 EMERGENCY DEPT VISIT MOD MDM: CPT

## 2018-12-11 NOTE — TELEPHONE ENCOUNTER
Patient has questions regarding injection given LOV. States still having a lot of knee pain. Would like to know if needs to be seen again. Please advise. Thank you.

## 2018-12-11 NOTE — ED INITIAL ASSESSMENT (HPI)
Patient has a history of arthritis in her knee, and gets frequent cortisone shots. Patient had her most recent shot about a month ago, and since that time, her right knee has gotten worse.  Patient called herm physician and was told to come here for an ultr

## 2018-12-11 NOTE — TELEPHONE ENCOUNTER
Call to [facility]. PAin around the knee. Pain down to the muscle area of leg. Right knee. Pain calf. / Calf pain for  A few days. Not a severe pain but a constant pain. Pt instructed to go to the ER.  Pt instructed on DVT and need for having a doppler test

## 2018-12-12 ENCOUNTER — OFFICE VISIT (OUTPATIENT)
Dept: PHYSICAL THERAPY | Age: 58
End: 2018-12-12
Attending: ORTHOPAEDIC SURGERY
Payer: MEDICAID

## 2018-12-12 PROCEDURE — 97110 THERAPEUTIC EXERCISES: CPT | Performed by: PHYSICAL THERAPIST

## 2018-12-12 NOTE — ED PROVIDER NOTES
Patient Seen in: [facility] Emergency Department    History   No chief complaint on file.     Stated Complaint: chronic knee pain/ today is worse    HPI    62year old female with chronic R knee pain who presents with acute on chronic knee pain sta Review of Systems    Positive for stated complaint: chronic knee pain/ today is worse  Other systems are as noted in HPI. Constitutional and vital signs reviewed. All other systems reviewed and negative except as noted above.     Physical Exam     E Radiology exams  Viewed and reviewed by myself and findings discussed with patient including need for follow up    Medications - No data to display       Pt with acute on chronic knee pain. Ultrasound shows no evidence of DVT.   Patient sees ortho, recentl

## 2018-12-12 NOTE — PROGRESS NOTES
Dx: DDD (degenerative disc disease), lumbar (M51.36)  Primary osteoarthritis of right knee (M17.11)             Visit # 2  Fall Risk: standard     Scheduled Visits 8  Precautions: n/a   Insurance Authorized visits  12/10/18        Next MD visit: none sched

## 2018-12-14 ENCOUNTER — APPOINTMENT (OUTPATIENT)
Dept: PHYSICAL THERAPY | Age: 58
End: 2018-12-14
Attending: ORTHOPAEDIC SURGERY
Payer: MEDICAID

## 2018-12-17 ENCOUNTER — OFFICE VISIT (OUTPATIENT)
Dept: PHYSICAL THERAPY | Age: 58
End: 2018-12-17
Attending: ORTHOPAEDIC SURGERY
Payer: MEDICAID

## 2018-12-17 PROCEDURE — 97110 THERAPEUTIC EXERCISES: CPT | Performed by: PHYSICAL THERAPIST

## 2018-12-17 NOTE — PROGRESS NOTES
Dx: DDD (degenerative disc disease), lumbar (M51.36)  Primary osteoarthritis of right knee (M17.11)             Visit # 3  Fall Risk: standard     Scheduled Visits 8  Precautions: n/a   Insurance Authorized visits  12/10/18        Next MD visit: none sched

## 2018-12-19 ENCOUNTER — OFFICE VISIT (OUTPATIENT)
Dept: PHYSICAL THERAPY | Age: 58
End: 2018-12-19
Attending: ORTHOPAEDIC SURGERY
Payer: MEDICAID

## 2018-12-19 PROCEDURE — 97110 THERAPEUTIC EXERCISES: CPT | Performed by: PHYSICAL THERAPIST

## 2018-12-19 NOTE — PROGRESS NOTES
Dx: DDD (degenerative disc disease), lumbar (M51.36)  Primary osteoarthritis of right knee (M17.11)             Visit # 4  Fall Risk: standard     Scheduled Visits 8  Precautions: n/a   Insurance Authorized visits  12/10/18        Next MD visit: none sched Total Timed Treatment: 43 min  Total Treatment Time: 43 min

## 2018-12-21 ENCOUNTER — APPOINTMENT (OUTPATIENT)
Dept: PHYSICAL THERAPY | Age: 58
End: 2018-12-21
Attending: ORTHOPAEDIC SURGERY
Payer: MEDICAID

## 2019-01-03 ENCOUNTER — OFFICE VISIT (OUTPATIENT)
Dept: PHYSICAL THERAPY | Age: 59
End: 2019-01-03
Attending: ORTHOPAEDIC SURGERY
Payer: MEDICAID

## 2019-01-03 PROCEDURE — 97110 THERAPEUTIC EXERCISES: CPT | Performed by: PHYSICAL THERAPIST

## 2019-01-03 NOTE — PROGRESS NOTES
Dx: DDD (degenerative disc disease), lumbar (M51.36)  Primary osteoarthritis of right knee (M17.11)             Visit # 5  Fall Risk: standard     Scheduled Visits 8  Precautions: n/a   Insurance Authorized visits  12/10/18        Next MD visit: none sched to sit for 2 hours with pain reported less than 2/10 in order to drive  in car  5. Pt to exhibit increase in lumbar AROM to min loss in all planes for ease of mobility/transfers in order for patient to get into/out of car  6.  Pt to report 50% reduction in

## 2019-01-08 ENCOUNTER — OFFICE VISIT (OUTPATIENT)
Dept: PHYSICAL THERAPY | Age: 59
End: 2019-01-08
Attending: ORTHOPAEDIC SURGERY
Payer: MEDICAID

## 2019-01-08 ENCOUNTER — OFFICE VISIT (OUTPATIENT)
Dept: INTERNAL MEDICINE CLINIC | Facility: CLINIC | Age: 59
End: 2019-01-08
Payer: MEDICAID

## 2019-01-08 VITALS
SYSTOLIC BLOOD PRESSURE: 124 MMHG | HEART RATE: 84 BPM | RESPIRATION RATE: 20 BRPM | HEIGHT: 64 IN | TEMPERATURE: 98 F | BODY MASS INDEX: 34.01 KG/M2 | DIASTOLIC BLOOD PRESSURE: 83 MMHG | WEIGHT: 199.19 LBS

## 2019-01-08 DIAGNOSIS — E06.3 HYPOTHYROIDISM DUE TO HASHIMOTO'S THYROIDITIS: ICD-10-CM

## 2019-01-08 DIAGNOSIS — L65.9 THINNING HAIR: ICD-10-CM

## 2019-01-08 DIAGNOSIS — I10 ESSENTIAL HYPERTENSION: Primary | ICD-10-CM

## 2019-01-08 DIAGNOSIS — E03.8 HYPOTHYROIDISM DUE TO HASHIMOTO'S THYROIDITIS: ICD-10-CM

## 2019-01-08 PROCEDURE — 99212 OFFICE O/P EST SF 10 MIN: CPT | Performed by: INTERNAL MEDICINE

## 2019-01-08 PROCEDURE — 97110 THERAPEUTIC EXERCISES: CPT | Performed by: PHYSICAL THERAPIST

## 2019-01-08 PROCEDURE — 99214 OFFICE O/P EST MOD 30 MIN: CPT | Performed by: INTERNAL MEDICINE

## 2019-01-08 RX ORDER — FLASH GLUCOSE SENSOR
KIT MISCELLANEOUS
Qty: 1 EACH | Refills: 0 | Status: SHIPPED | OUTPATIENT
Start: 2019-01-08 | End: 2019-01-21

## 2019-01-08 RX ORDER — PREDNISOLONE ACETATE 10 MG/ML
1 SUSPENSION/ DROPS OPHTHALMIC 2 TIMES DAILY
Refills: 2 | COMMUNITY
Start: 2018-12-06 | End: 2019-04-17 | Stop reason: ALTCHOICE

## 2019-01-08 NOTE — PROGRESS NOTES
Patient Name: Amanda Cartwright, : 1960, MRN: [id number]   Date:  2019  Referring Physician:  Lisa Queen    Diagnosis: DDD (degenerative disc disease), lumbar (M51.36)  Primary osteoarthritis of right knee (M17.11)     Discharge note min loss in all planes for ease of mobility/transfers in order for patient to get into/out of car- met  6. Pt to report 50% reduction in symptoms in the L-spine and R knee while performing stairs.  -not met            FOTO: 34% limited      Plan: Discharge

## 2019-01-08 NOTE — PROGRESS NOTES
HPI:    Patient ID: Roma Garcia is a 62year old female.   Patient presents with:  Thyroid Problem: Pt state that she is f/u with her thyroid and with dosage adjustment  Medication Request: Pt want a request for new diabetic machine     Patient in offic Outpatient Medications:  prednisoLONE acetate 1 % Ophthalmic Suspension Place 1 drop into the right eye 2 (two) times daily.    Disp:  Rfl: 2   Continuous Blood Gluc Sensor ([address] [place] [address]) Does not apply Misc dm2 Disp: 1 each Rfl: 0   Clinda well-nourished. No distress. Obese    HENT:   Head: Normocephalic and atraumatic.    Right Ear: Tympanic membrane, external ear and ear canal normal.   Left Ear: Tympanic membrane, external ear and ear canal normal.   Nose: Nose normal. Right sinus exhibi complications of DM2  Discussed importance of low CHO diet,-1800 keegan ADA- Diabetic diet  Education   Encouraged diet/exercise   Encouraged accu-checks/SBGM three times daily and as needed  Eye exam and foot exam yearly  Take medications as perscribed  Dire

## 2019-01-21 ENCOUNTER — TELEPHONE (OUTPATIENT)
Dept: INTERNAL MEDICINE CLINIC | Facility: CLINIC | Age: 59
End: 2019-01-21

## 2019-01-21 RX ORDER — BIOTIN 1 MG
TABLET ORAL
Qty: 1 KIT | Refills: 0 | Status: SHIPPED | OUTPATIENT
Start: 2019-01-21 | End: 2019-01-21

## 2019-01-21 RX ORDER — BLOOD-GLUCOSE METER
1 EACH MISCELLANEOUS 2 TIMES DAILY
Qty: 1 KIT | Refills: 0 | Status: SHIPPED | OUTPATIENT
Start: 2019-01-21 | End: 2020-07-06

## 2019-01-21 RX ORDER — DIAPER,BRIEF,ADULT, DISPOSABLE
EACH MISCELLANEOUS
Qty: 200 STRIP | Refills: 3 | Status: SHIPPED | OUTPATIENT
Start: 2019-01-21 | End: 2019-01-21

## 2019-01-21 RX ORDER — BLOOD SUGAR DIAGNOSTIC
STRIP MISCELLANEOUS
Qty: 60 STRIP | Refills: 11 | Status: SHIPPED | OUTPATIENT
Start: 2019-01-21 | End: 2020-07-06

## 2019-01-21 NOTE — TELEPHONE ENCOUNTER
[facility] calling asking for the True Track glucose machine, stripes and lancets which her insurance will cover.

## 2019-01-21 NOTE — TELEPHONE ENCOUNTER
Fax request from -  Please send new RX for OneTouch brand-TrueTouch not covered and insurance allows 30 day supply.

## 2019-01-26 RX ORDER — SUMATRIPTAN 100 MG/1
TABLET, FILM COATED ORAL
Qty: 9 TABLET | Refills: 2 | Status: SHIPPED | OUTPATIENT
Start: 2019-01-26 | End: 2019-04-15

## 2019-01-28 ENCOUNTER — TELEPHONE (OUTPATIENT)
Dept: NEUROLOGY | Facility: CLINIC | Age: 59
End: 2019-01-28

## 2019-01-28 NOTE — TELEPHONE ENCOUNTER
Recommend:    1) quick taper off topamax, 50 mg daily x 7 days, then stop    2) after this can either increase her nortriptyline to 100 mg/day, OR try one of the new injectable medications (e.g. Thyra Aus)

## 2019-01-28 NOTE — TELEPHONE ENCOUNTER
Spoke with patient, states she has been taking topiramate 100 mg daily but states she is experiencing hair loss, would like to know if she can have a new Rx for topiramate 50 mg bid, please advise if ok.

## 2019-01-28 NOTE — TELEPHONE ENCOUNTER
Patient called again. States she would prefer not to take Topamax as she feels she is losing hair as a side effect. Patient requesting different medication. Please advise.

## 2019-01-29 RX ORDER — NORTRIPTYLINE HYDROCHLORIDE 50 MG/1
100 CAPSULE ORAL NIGHTLY
Qty: 60 CAPSULE | Refills: 0 | Status: SHIPPED | OUTPATIENT
Start: 2019-01-29 | End: 2019-03-08

## 2019-01-29 NOTE — TELEPHONE ENCOUNTER
Patient states she will taper of the Topamax by taking 50mg daily x 7 days then stopping. She would like to increase the nortriptyline to 100mg/day, e-scribed to pharmacy.     Patient asking if Dr. Jose Molina could squeeze her in for an earlier appt since next

## 2019-02-02 ENCOUNTER — LAB ENCOUNTER (OUTPATIENT)
Dept: LAB | Age: 59
End: 2019-02-02
Attending: INTERNAL MEDICINE
Payer: MEDICAID

## 2019-02-02 DIAGNOSIS — E03.8 HYPOTHYROIDISM DUE TO HASHIMOTO'S THYROIDITIS: ICD-10-CM

## 2019-02-02 DIAGNOSIS — Z00.00 PE (PHYSICAL EXAM), ROUTINE: ICD-10-CM

## 2019-02-02 DIAGNOSIS — L65.9 THINNING HAIR: ICD-10-CM

## 2019-02-02 DIAGNOSIS — E06.3 HYPOTHYROIDISM DUE TO HASHIMOTO'S THYROIDITIS: ICD-10-CM

## 2019-02-02 DIAGNOSIS — E11.8 TYPE 2 DIABETES MELLITUS WITH COMPLICATION, WITHOUT LONG-TERM CURRENT USE OF INSULIN (HCC): ICD-10-CM

## 2019-02-02 DIAGNOSIS — E55.9 VITAMIN D DEFICIENCY: ICD-10-CM

## 2019-02-02 DIAGNOSIS — E03.9 HYPOTHYROIDISM, UNSPECIFIED TYPE: ICD-10-CM

## 2019-02-02 DIAGNOSIS — E78.2 MIXED HYPERLIPIDEMIA: ICD-10-CM

## 2019-02-02 LAB
ALBUMIN SERPL BCP-MCNC: 3.7 G/DL (ref 3.5–4.8)
ALBUMIN/GLOB SERPL: 1.2 {RATIO} (ref 1–2)
ALP SERPL-CCNC: 83 U/L (ref 32–100)
ALT SERPL-CCNC: 34 U/L (ref 14–54)
ANION GAP SERPL CALC-SCNC: 10 MMOL/L (ref 0–18)
AST SERPL-CCNC: 34 U/L (ref 15–41)
BASOPHILS # BLD AUTO: 0.04 X10(3) UL (ref 0–0.2)
BASOPHILS NFR BLD AUTO: 1 %
BILIRUB SERPL-MCNC: 0.5 MG/DL (ref 0.3–1.2)
BILIRUB UR QL: NEGATIVE
BUN SERPL-MCNC: 10 MG/DL (ref 8–20)
BUN/CREAT SERPL: 12.2 (ref 10–20)
CALCIUM SERPL-MCNC: 9.3 MG/DL (ref 8.5–10.5)
CHLORIDE SERPL-SCNC: 107 MMOL/L (ref 95–110)
CHOLEST SERPL-MCNC: 209 MG/DL (ref 110–200)
CLARITY UR: CLEAR
CO2 SERPL-SCNC: 22 MMOL/L (ref 22–32)
COLOR UR: YELLOW
CREAT SERPL-MCNC: 0.82 MG/DL (ref 0.5–1.5)
DEPRECATED RDW RBC AUTO: 41 FL (ref 35.1–46.3)
EOSINOPHIL # BLD AUTO: 0.12 X10(3) UL (ref 0–0.7)
EOSINOPHIL NFR BLD AUTO: 3 %
ERYTHROCYTE [DISTWIDTH] IN BLOOD BY AUTOMATED COUNT: 13.9 % (ref 11–15)
EST. AVERAGE GLUCOSE BLD GHB EST-MCNC: 194 MG/DL (ref 68–126)
FERRITIN SERPL IA-MCNC: 27 NG/ML (ref 11–307)
GLOBULIN PLAS-MCNC: 3 G/DL (ref 2.5–3.7)
GLUCOSE SERPL-MCNC: 207 MG/DL (ref 70–99)
GLUCOSE UR-MCNC: NEGATIVE MG/DL
HBA1C MFR BLD HPLC: 8.4 % (ref ?–5.7)
HCT VFR BLD AUTO: 39 % (ref 35–48)
HDLC SERPL-MCNC: 45 MG/DL
HGB BLD-MCNC: 11.8 G/DL (ref 12–16)
HGB UR QL STRIP.AUTO: NEGATIVE
IMM GRANULOCYTES # BLD AUTO: 0 X10(3) UL (ref 0–1)
IMM GRANULOCYTES NFR BLD: 0 %
KETONES UR-MCNC: NEGATIVE MG/DL
LDLC SERPL CALC-MCNC: 112 MG/DL (ref 0–99)
LYMPHOCYTES # BLD AUTO: 1.56 X10(3) UL (ref 1–4)
LYMPHOCYTES NFR BLD AUTO: 39.6 %
MCH RBC QN AUTO: 24.7 PG (ref 26–34)
MCHC RBC AUTO-ENTMCNC: 30.3 G/DL (ref 31–37)
MCV RBC AUTO: 81.8 FL (ref 80–100)
MONOCYTES # BLD AUTO: 0.4 X10(3) UL (ref 0.1–1)
MONOCYTES NFR BLD AUTO: 10.2 %
NEUTROPHILS # BLD AUTO: 1.82 X10 (3) UL (ref 1.5–7.7)
NEUTROPHILS # BLD AUTO: 1.82 X10(3) UL (ref 1.5–7.7)
NEUTROPHILS NFR BLD AUTO: 46.2 %
NITRITE UR QL STRIP.AUTO: NEGATIVE
NONHDLC SERPL-MCNC: 164 MG/DL
OSMOLALITY UR CALC.SUM OF ELEC: 293 MOSM/KG (ref 275–295)
PATIENT FASTING: YES
PH UR: 6 [PH] (ref 5–8)
PLATELET # BLD AUTO: 357 10(3)UL (ref 150–450)
POTASSIUM SERPL-SCNC: 4.4 MMOL/L (ref 3.3–5.1)
PROT SERPL-MCNC: 6.7 G/DL (ref 5.9–8.4)
PROT UR-MCNC: NEGATIVE MG/DL
RBC # BLD AUTO: 4.77 X10(6)UL (ref 3.8–5.3)
RBC #/AREA URNS AUTO: 1 /HPF
SODIUM SERPL-SCNC: 139 MMOL/L (ref 136–144)
SP GR UR STRIP: 1.01 (ref 1–1.03)
TRIGL SERPL-MCNC: 262 MG/DL (ref 1–149)
TSH SERPL-ACNC: 1.04 UIU/ML (ref 0.45–5.33)
UROBILINOGEN UR STRIP-ACNC: <2
VIT B12 SERPL-MCNC: 347 PG/ML (ref 181–914)
VIT C UR-MCNC: NEGATIVE MG/DL
WBC # BLD AUTO: 3.9 X10(3) UL (ref 4–11)
WBC #/AREA URNS AUTO: 2 /HPF

## 2019-02-02 PROCEDURE — 82728 ASSAY OF FERRITIN: CPT

## 2019-02-02 PROCEDURE — 80061 LIPID PANEL: CPT

## 2019-02-02 PROCEDURE — 83036 HEMOGLOBIN GLYCOSYLATED A1C: CPT

## 2019-02-02 PROCEDURE — 82306 VITAMIN D 25 HYDROXY: CPT

## 2019-02-02 PROCEDURE — 80053 COMPREHEN METABOLIC PANEL: CPT

## 2019-02-02 PROCEDURE — 81001 URINALYSIS AUTO W/SCOPE: CPT

## 2019-02-02 PROCEDURE — 36415 COLL VENOUS BLD VENIPUNCTURE: CPT

## 2019-02-02 PROCEDURE — 82607 VITAMIN B-12: CPT

## 2019-02-02 PROCEDURE — 85025 COMPLETE CBC W/AUTO DIFF WBC: CPT

## 2019-02-02 PROCEDURE — 84443 ASSAY THYROID STIM HORMONE: CPT

## 2019-02-04 LAB — 25(OH)D3 SERPL-MCNC: 18.2 NG/ML (ref 30–100)

## 2019-02-05 ENCOUNTER — OFFICE VISIT (OUTPATIENT)
Dept: NEUROLOGY | Facility: CLINIC | Age: 59
End: 2019-02-05
Payer: MEDICAID

## 2019-02-05 VITALS
SYSTOLIC BLOOD PRESSURE: 122 MMHG | WEIGHT: 180 LBS | DIASTOLIC BLOOD PRESSURE: 74 MMHG | HEART RATE: 67 BPM | HEIGHT: 64 IN | BODY MASS INDEX: 30.73 KG/M2

## 2019-02-05 DIAGNOSIS — IMO0002 CHRONIC MIGRAINE: Primary | ICD-10-CM

## 2019-02-05 DIAGNOSIS — M54.81 OCCIPITAL NEURALGIA OF LEFT SIDE: ICD-10-CM

## 2019-02-05 PROCEDURE — 99213 OFFICE O/P EST LOW 20 MIN: CPT | Performed by: OTHER

## 2019-02-06 ENCOUNTER — TELEPHONE (OUTPATIENT)
Dept: INTERNAL MEDICINE CLINIC | Facility: CLINIC | Age: 59
End: 2019-02-06

## 2019-02-06 DIAGNOSIS — D72.818 OTHER DECREASED WHITE BLOOD CELL (WBC) COUNT: ICD-10-CM

## 2019-02-06 DIAGNOSIS — E55.9 VITAMIN D DEFICIENCY: Primary | ICD-10-CM

## 2019-02-06 RX ORDER — CHOLECALCIFEROL (VITAMIN D3) 125 MCG
2000 CAPSULE ORAL DAILY
Qty: 90 TABLET | Refills: 1 | Status: SHIPPED | OUTPATIENT
Start: 2019-02-06 | End: 2019-03-08

## 2019-02-06 NOTE — PROGRESS NOTES
Neurology Outpate[facility] Follow-up Note    Rosa Elena Pedro is a 62year old female. HPI:     Patient is being seen in follow-up. I saw her in clinic last in November 2018. Since last visit, she is concerned that Topamax is causing hair loss.   She is inq Tab Take 1 tablet (75 mcg total) by mouth before breakfast. Disp: 30 tablet Rfl: 1   fenofibrate micronized 134 MG Oral Cap Take 1 capsule (134 mg total) by mouth daily with breakfast. Disp: 90 capsule Rfl: 1   Cholecalciferol (VITAMIN D3) 2000 units Oral trigeminal autonomic cephalgia.       –We will plan for repeat Botox injections; will reduce injections to /procerus and frontalis muscles (will use 50% of initial dose)    –Taper off topiramate, 50 mg daily for 1 week, then stop    –Increase nort

## 2019-02-07 NOTE — TELEPHONE ENCOUNTER
Notes recorded by Valencia Rob MD on 2/6/2019 at 5:08 PM CST  Please call patient with blood test results.      Kidney and liver function are normal,  Sugars elevated A1c 3 months sugar-is 8. 4-that is some worsening from  1 year ago was 7.0 goal is

## 2019-02-07 NOTE — TELEPHONE ENCOUNTER
Patient contacted (Name and  of pt verified). All results and recommendations reviewed. Patient verbalizes understanding, denies further questions and agrees with plan of care.     Appointment made for 19    Dr. Chuck Slade:   1)  The metformin is p

## 2019-02-08 ENCOUNTER — TELEPHONE (OUTPATIENT)
Dept: NEUROLOGY | Facility: CLINIC | Age: 59
End: 2019-02-08

## 2019-02-08 NOTE — TELEPHONE ENCOUNTER
Faxed prior authorization form to [facility] Clinical review for re authorization of Botox 200 u/vl cpt codes , [address]  pending approval.

## 2019-02-12 ENCOUNTER — TELEPHONE (OUTPATIENT)
Dept: INTERNAL MEDICINE CLINIC | Facility: CLINIC | Age: 59
End: 2019-02-12

## 2019-02-12 NOTE — TELEPHONE ENCOUNTER
Pt states that she needs a letter/note from Dr. Edvin Garcia stating that she is a patient of her (please put how long she has been a patient on the letter. Per pt this is for an official purpose. (no further information was given).  Pt would like her letter

## 2019-02-12 NOTE — TELEPHONE ENCOUNTER
Please advise if letter can be generated. Thank you. Meagan Guerin .Please respond to pool: EM IM LMB LPN/PORTIA

## 2019-02-18 ENCOUNTER — TELEPHONE (OUTPATIENT)
Dept: OTHER | Age: 59
End: 2019-02-18

## 2019-02-18 NOTE — TELEPHONE ENCOUNTER
Pt states she is losing her hair and read online that if Vitamin D is increased to 5000 units per day it can help with hair loss, please advise. Please advise. Pt aware MD out of office this week.

## 2019-02-26 NOTE — TELEPHONE ENCOUNTER
Pt called, message per Dr morris. Verbalized understanding and compliance  And requested alternate pharmacy for metformin [facility] instead of [facility].   rx sent

## 2019-02-26 NOTE — TELEPHONE ENCOUNTER
Vitamin D is good for the skin in the hair-  If vitamin D deficient should be replaced -so it might help with hair growth

## 2019-03-06 NOTE — TELEPHONE ENCOUNTER
Called [employer] to check on status of approval of Botox 200 u/vl. T/t     Vernadine Horse. who states insurance was termed on 01/31/19. Call was conferenced  with Adamaris GRIFFIN [facility] who also verified information. Will call Pt.  To inform

## 2019-03-09 RX ORDER — LEVOTHYROXINE SODIUM 88 UG/1
TABLET ORAL
Qty: 30 TABLET | Refills: 2 | Status: SHIPPED | OUTPATIENT
Start: 2019-03-09 | End: 2019-04-17

## 2019-03-10 NOTE — TELEPHONE ENCOUNTER
Refill passed per [facility], [facility] protocol.   Hypothyroid Medications  Protocol Criteria:  Appointment scheduled in the past 12 months or the next 3 months  TSH resulted in the past 12 months that is normal  Recent Outpatient Visits            1 month ago Chronic migraine    [facility] Zahra Canchola MD    Office Visit    2 months ago     [facility] in [place], [place]    Office Visit    2 months ago Essential hypertension    [facility], [facility], [address], [place] Bridget Mathews MD    Office Visit    2 months ago     [facility] in [place] [id number]    2 months ago     [facility] in [place], [place]    Office Visit          Lab Results   Component Value Date    TSH 1.04 02/02/2019    THYROIDFUNC 2.61 10/08/2015

## 2019-03-11 NOTE — TELEPHONE ENCOUNTER
Nortriptyline 50mg. Take 2 caps nightly. #60. No refill.     Last filled-9/28/2018    LOV-2/5/2019  NOV-none

## 2019-03-12 RX ORDER — NORTRIPTYLINE HYDROCHLORIDE 50 MG/1
CAPSULE ORAL
Qty: 60 CAPSULE | Refills: 0 | Status: SHIPPED | OUTPATIENT
Start: 2019-03-12 | End: 2019-04-15

## 2019-04-15 RX ORDER — NORTRIPTYLINE HYDROCHLORIDE 50 MG/1
CAPSULE ORAL
Qty: 60 CAPSULE | Refills: 2 | Status: SHIPPED | OUTPATIENT
Start: 2019-04-15 | End: 2019-07-02

## 2019-04-15 RX ORDER — SUMATRIPTAN 100 MG/1
TABLET, FILM COATED ORAL
Qty: 9 TABLET | Refills: 2 | Status: SHIPPED | OUTPATIENT
Start: 2019-04-15 | End: 2019-09-05

## 2019-04-15 RX ORDER — SUMATRIPTAN 100 MG/1
TABLET, FILM COATED ORAL
Qty: 9 TABLET | Refills: 2 | Status: SHIPPED | OUTPATIENT
Start: 2019-04-15 | End: 2019-04-15

## 2019-04-15 RX ORDER — NORTRIPTYLINE HYDROCHLORIDE 50 MG/1
CAPSULE ORAL
Qty: 60 CAPSULE | Refills: 2 | Status: SHIPPED | OUTPATIENT
Start: 2019-04-15 | End: 2019-04-15

## 2019-04-15 RX ORDER — NORTRIPTYLINE HYDROCHLORIDE 50 MG/1
CAPSULE ORAL
Qty: 60 CAPSULE | Refills: 0 | OUTPATIENT
Start: 2019-04-15

## 2019-04-15 NOTE — TELEPHONE ENCOUNTER
Patient requesting Nortriptyline and sumatriptan sent to [place] in [facility]. S/w Joe (pharmacist) who will void E-script previously sent to [place]. Re-ordered nortriptyline and sumatriptan and sent to [address] [place] [address] in [facility].

## 2019-04-15 NOTE — TELEPHONE ENCOUNTER
Medication request: Nortriptyline 50mg 2 CAPS QHS    [place] 03/12/19 #60 r-0    Medication request: Sumatriptan 100mg TAKE 1 TABLET BY MOUTH PER  DAY   AS NEEDED  FOR  HEADACHE  MAXIMUM   1 PER  24     [place]: 01/26/19 #9 r-2    LOV:

## 2019-04-16 ENCOUNTER — TELEPHONE (OUTPATIENT)
Dept: OTHER | Age: 59
End: 2019-04-16

## 2019-04-16 NOTE — TELEPHONE ENCOUNTER
Pharmacist Bernice asking if Pt was on Statin/Lipid results, read Dr result note, stated will not have to send Fax recommending statin d/t results

## 2019-04-17 ENCOUNTER — OFFICE VISIT (OUTPATIENT)
Dept: INTERNAL MEDICINE CLINIC | Facility: CLINIC | Age: 59
End: 2019-04-17
Payer: MEDICAID

## 2019-04-17 VITALS
RESPIRATION RATE: 20 BRPM | SYSTOLIC BLOOD PRESSURE: 122 MMHG | HEIGHT: 64 IN | WEIGHT: 200.81 LBS | BODY MASS INDEX: 34.28 KG/M2 | DIASTOLIC BLOOD PRESSURE: 81 MMHG | TEMPERATURE: 98 F | HEART RATE: 91 BPM

## 2019-04-17 DIAGNOSIS — E11.9 TYPE 2 DIABETES MELLITUS WITHOUT COMPLICATION, WITHOUT LONG-TERM CURRENT USE OF INSULIN (HCC): ICD-10-CM

## 2019-04-17 DIAGNOSIS — E11.9 CONTROLLED TYPE 2 DIABETES MELLITUS WITHOUT COMPLICATION, WITHOUT LONG-TERM CURRENT USE OF INSULIN (HCC): ICD-10-CM

## 2019-04-17 DIAGNOSIS — K21.9 GASTROESOPHAGEAL REFLUX DISEASE WITHOUT ESOPHAGITIS: ICD-10-CM

## 2019-04-17 DIAGNOSIS — E11.10 TYPE 2 DIABETES MELLITUS WITH KETOACIDOSIS WITHOUT COMA, WITHOUT LONG-TERM CURRENT USE OF INSULIN (HCC): ICD-10-CM

## 2019-04-17 DIAGNOSIS — E11.8 TYPE 2 DIABETES MELLITUS WITH COMPLICATION, WITHOUT LONG-TERM CURRENT USE OF INSULIN (HCC): ICD-10-CM

## 2019-04-17 DIAGNOSIS — E78.00 PURE HYPERCHOLESTEROLEMIA: ICD-10-CM

## 2019-04-17 DIAGNOSIS — E03.9 HYPOTHYROIDISM, UNSPECIFIED TYPE: ICD-10-CM

## 2019-04-17 DIAGNOSIS — I10 ESSENTIAL HYPERTENSION: Primary | ICD-10-CM

## 2019-04-17 DIAGNOSIS — D64.9 ANEMIA, UNSPECIFIED TYPE: ICD-10-CM

## 2019-04-17 PROCEDURE — 99212 OFFICE O/P EST SF 10 MIN: CPT | Performed by: INTERNAL MEDICINE

## 2019-04-17 PROCEDURE — 99214 OFFICE O/P EST MOD 30 MIN: CPT | Performed by: INTERNAL MEDICINE

## 2019-04-17 RX ORDER — LEVOTHYROXINE SODIUM 88 UG/1
TABLET ORAL
Qty: 90 TABLET | Refills: 2 | Status: SHIPPED | OUTPATIENT
Start: 2019-04-17 | End: 2020-10-04

## 2019-04-17 RX ORDER — OMEPRAZOLE 40 MG/1
CAPSULE, DELAYED RELEASE ORAL
Qty: 90 CAPSULE | Refills: 1 | Status: SHIPPED | OUTPATIENT
Start: 2019-04-17 | End: 2019-04-24

## 2019-04-17 RX ORDER — FENOFIBRATE 134 MG/1
134 CAPSULE ORAL
Qty: 90 CAPSULE | Refills: 1 | Status: SHIPPED | OUTPATIENT
Start: 2019-04-17 | End: 2020-11-18

## 2019-04-17 NOTE — PROGRESS NOTES
HPI:    Patient ID: Fabian Brooks is a 62year old female. Patient presents with:  Diabetes: Pt is f/u with her diabetes  Medication Request: Pend for refill      Patient in office today for follow up visit.  States feeling well otherwise but in some str confusion. The patient is not nervous/anxious.                Current Outpatient Medications:  fenofibrate micronized 134 MG Oral Cap Take 1 capsule (134 mg total) by mouth daily with breakfast. Disp: 90 capsule Rfl: 1   Levothyroxine Sodium 88 MCG Oral Tab Right sinus exhibits no maxillary sinus tenderness and no frontal sinus tenderness. Left sinus exhibits no maxillary sinus tenderness and no frontal sinus tenderness.    Mouth/Throat: Uvula is midline and oropharynx is clear and moist. No oropharyngeal exud tablet; Refill: 2  - CBC WITH DIFFERENTIAL WITH PLATELET; Future  - COMP METABOLIC PANEL (14); Future  - LIPID PANEL; Future  - HEMOGLOBIN A1C; Future      4.  Hypothyroidism, unspecified type  stavle  cpm  - Levothyroxine Sodium 88 MCG Oral Tab; TAKE ONE T avoid spicy food, coffee, tea, caffeinated drinks, alcohol, acidic food/juices  Advised to avoid NSAID's - Aspirin-based medications  Advised to avoid wearing  tight clothes   Advised to elevate the head of the bed   Avoid eating at least 3 hours before be

## 2019-04-18 ENCOUNTER — TELEPHONE (OUTPATIENT)
Dept: INTERNAL MEDICINE CLINIC | Facility: CLINIC | Age: 59
End: 2019-04-18

## 2019-04-18 NOTE — TELEPHONE ENCOUNTER
Prior authorization for:    •  Omeprazole 40 MG Oral Capsule Delayed Release, TAKE 1 CAPSULE BY MOUTH 30-60 MIN BEFORE BREAKFAST, Disp: 90 capsule, Rfl: 1    Covermymeds. com  Key: Chantel [id number]  Patient Last Name: Eric VILLASENOR 1960

## 2019-04-20 NOTE — TELEPHONE ENCOUNTER
PA for Omeprazole 40 mg cap completed with [employer] via CMM response time 3-5 business days [address]

## 2019-04-24 RX ORDER — RANITIDINE 150 MG/1
150 TABLET ORAL 2 TIMES DAILY
Qty: 60 TABLET | Refills: 3 | Status: SHIPPED | OUTPATIENT
Start: 2019-04-24 | End: 2019-05-09

## 2019-04-24 NOTE — TELEPHONE ENCOUNTER
Any PPIs  That    Are covered   ?     If  No  PPIs  are covered  - patient had ranitidine 150 mg twice daily 30 minutes before the meals in the morning and in the evening    Quantity 60 and 3 refills approved

## 2019-05-07 ENCOUNTER — NURSE TRIAGE (OUTPATIENT)
Dept: OTHER | Age: 59
End: 2019-05-07

## 2019-05-07 NOTE — TELEPHONE ENCOUNTER
Action Requested: Summary for Provider     []  Critical Lab, Recommendations Needed  [] Need Additional Advice  []   FYI    []   Need Orders  [] Need Medications Sent to Pharmacy  []  Other     SUMMARY:  Per protocol advised OV within 2 weeks.  Pt agreed an

## 2019-05-09 ENCOUNTER — OFFICE VISIT (OUTPATIENT)
Dept: INTERNAL MEDICINE CLINIC | Facility: CLINIC | Age: 59
End: 2019-05-09
Payer: MEDICAID

## 2019-05-09 VITALS
TEMPERATURE: 98 F | SYSTOLIC BLOOD PRESSURE: 121 MMHG | WEIGHT: 200.63 LBS | HEIGHT: 64 IN | HEART RATE: 92 BPM | BODY MASS INDEX: 34.25 KG/M2 | RESPIRATION RATE: 20 BRPM | DIASTOLIC BLOOD PRESSURE: 77 MMHG

## 2019-05-09 DIAGNOSIS — M25.562 LEFT KNEE PAIN, UNSPECIFIED CHRONICITY: ICD-10-CM

## 2019-05-09 DIAGNOSIS — K21.9 GASTROESOPHAGEAL REFLUX DISEASE WITHOUT ESOPHAGITIS: ICD-10-CM

## 2019-05-09 DIAGNOSIS — E06.3 HYPOTHYROIDISM DUE TO HASHIMOTO'S THYROIDITIS: ICD-10-CM

## 2019-05-09 DIAGNOSIS — E03.8 HYPOTHYROIDISM DUE TO HASHIMOTO'S THYROIDITIS: ICD-10-CM

## 2019-05-09 DIAGNOSIS — R10.11 RUQ PAIN: Primary | ICD-10-CM

## 2019-05-09 PROCEDURE — 99212 OFFICE O/P EST SF 10 MIN: CPT | Performed by: INTERNAL MEDICINE

## 2019-05-09 PROCEDURE — 99214 OFFICE O/P EST MOD 30 MIN: CPT | Performed by: INTERNAL MEDICINE

## 2019-05-09 NOTE — PROGRESS NOTES
HPI:    Patient ID: Fredy Arias is a 62year old female.   Patient presents with:  Abdominal Pain: Pt state that she has been having abdominal pain that started last week that is off and on with no diarrhea    Patient in office today for  Abdominal pain Negative for back pain, gait problem, joint swelling, myalgias and neck pain. Skin: Negative for pallor. Neurological: Negative for dizziness and headaches. Psychiatric/Behavioral: The patient is not nervous/anxious.          Low   energy no maxillary sinus tenderness and no frontal sinus tenderness. Left sinus exhibits no maxillary sinus tenderness and no frontal sinus tenderness.    Mouth/Throat: Uvula is midline and oropharynx is clear and moist. No oropharyngeal exudate or posterior orop disease without esophagitis  Patient advised to avoid spicy food, coffee, tea, caffeinated drinks, alcohol, acidic food/juices  Advised to avoid NSAID's - Aspirin-based medications  Advised to avoid wearing  tight clothes   Advised to elevate the head of t

## 2019-05-21 ENCOUNTER — TELEPHONE (OUTPATIENT)
Dept: NEUROLOGY | Facility: CLINIC | Age: 59
End: 2019-05-21

## 2019-05-21 RX ORDER — METHYLPREDNISOLONE 4 MG/1
TABLET ORAL
Qty: 1 PACKAGE | Refills: 0 | Status: SHIPPED | OUTPATIENT
Start: 2019-05-21 | End: 2019-06-21

## 2019-05-21 NOTE — TELEPHONE ENCOUNTER
Called patient to inform her with 's recommendation. Patient verbalized understanding. Transferred to front to schedule NOV.

## 2019-05-21 NOTE — TELEPHONE ENCOUNTER
Patient calling having a 3 day migraine after taking sumatriptan with no relief. Patient states she has never tried a medrol dosepak fyi    Dosepak pended. Sign if appropriate.

## 2019-05-24 ENCOUNTER — OFFICE VISIT (OUTPATIENT)
Dept: NEUROLOGY | Facility: CLINIC | Age: 59
End: 2019-05-24
Payer: MEDICAID

## 2019-05-24 DIAGNOSIS — M54.81 OCCIPITAL NEURALGIA OF LEFT SIDE: ICD-10-CM

## 2019-05-24 DIAGNOSIS — IMO0002 CHRONIC MIGRAINE: Primary | ICD-10-CM

## 2019-05-24 PROCEDURE — 99211 OFF/OP EST MAY X REQ PHY/QHP: CPT | Performed by: OTHER

## 2019-05-26 RX ORDER — KETOROLAC TROMETHAMINE 10 MG/1
TABLET, FILM COATED ORAL
Qty: 30 TABLET | Refills: 1 | Status: SHIPPED | OUTPATIENT
Start: 2019-05-26 | End: 2019-10-07

## 2019-05-26 NOTE — PROGRESS NOTES
Neurology Outpateint Follow-up Note    Fabian Brooks is a 62year old female. HPI:     Patient comes in for brief follow-up, accompanied by her daughter to visit.   Patient does mention she has tried ketorolac and it did help; inquires whether she ca NAUSEA ONLY, DIZZINESS  Morphine                NAUSEA ONLY, DIZZINESS      ROS:   GENERAL: no weight loss or fevers  HEENT: see HPI  NEURO: see HPI      PHYSICAL EXAM:     Vitals: There were no vitals taken for this visit.    General: In no apparent di

## 2019-05-28 ENCOUNTER — TELEPHONE (OUTPATIENT)
Dept: NEUROLOGY | Facility: CLINIC | Age: 59
End: 2019-05-28

## 2019-05-29 NOTE — TELEPHONE ENCOUNTER
Fax received for denial of Emgality. Patient must have tried and failed 5 drugs covered by plan. Patient must try 2 more drugs.     Atenolol  Metoprolol tartrate or Metoprolol succinate  Nadolol  Propranolol  Amitriptyline  Venlafaxine    Please advi

## 2019-06-21 ENCOUNTER — OFFICE VISIT (OUTPATIENT)
Dept: INTERNAL MEDICINE CLINIC | Facility: CLINIC | Age: 59
End: 2019-06-21
Payer: MEDICAID

## 2019-06-21 VITALS
SYSTOLIC BLOOD PRESSURE: 127 MMHG | TEMPERATURE: 98 F | DIASTOLIC BLOOD PRESSURE: 77 MMHG | RESPIRATION RATE: 20 BRPM | HEART RATE: 83 BPM | WEIGHT: 195 LBS | BODY MASS INDEX: 33 KG/M2

## 2019-06-21 DIAGNOSIS — K11.20 SIALADENITIS: Primary | ICD-10-CM

## 2019-06-21 PROCEDURE — 99214 OFFICE O/P EST MOD 30 MIN: CPT | Performed by: INTERNAL MEDICINE

## 2019-06-21 PROCEDURE — 99212 OFFICE O/P EST SF 10 MIN: CPT | Performed by: INTERNAL MEDICINE

## 2019-06-21 RX ORDER — CLINDAMYCIN HYDROCHLORIDE 300 MG/1
300 CAPSULE ORAL 3 TIMES DAILY
Qty: 21 CAPSULE | Refills: 0 | Status: SHIPPED | OUTPATIENT
Start: 2019-06-21 | End: 2019-07-16 | Stop reason: ALTCHOICE

## 2019-06-21 NOTE — PROGRESS NOTES
HPI:    Patient ID: Raymond De La Rosa is a 62year old female.   Presents for evaluation of the smelling around jaw    HPI  My care here, area is sensitive, no fever chills, no significant pain while eating, she never had similar problem before, she will be s Fexofenadine HCl 180 MG Oral Tab Take 1 tablet (180 mg total) by mouth daily.  For  -2  Weeks  Than  As  Needed Disp: 30 tablet Rfl: 0   Blood Glucose Monitoring Suppl (Revolver) w/Device Does not apply Kit 1 each by Does not apply route 2 (two) times BS#5520

## 2019-07-02 RX ORDER — NORTRIPTYLINE HYDROCHLORIDE 50 MG/1
CAPSULE ORAL
Qty: 60 CAPSULE | Refills: 1 | Status: SHIPPED | OUTPATIENT
Start: 2019-07-02 | End: 2019-09-03

## 2019-07-02 NOTE — TELEPHONE ENCOUNTER
Nortriptyline 50mg. Take 2 capsules nightly    Last filled-4/15/2019    LOV-5/24/2019  NOV-9/23/2019

## 2019-07-08 ENCOUNTER — HOSPITAL ENCOUNTER (OUTPATIENT)
Dept: MAMMOGRAPHY | Age: 59
Discharge: HOME | End: 2019-07-08
Attending: INTERNAL MEDICINE
Payer: MEDICAID

## 2019-07-08 DIAGNOSIS — Z12.31 SCREENING MAMMOGRAM, ENCOUNTER FOR: ICD-10-CM

## 2019-07-08 PROCEDURE — 77063 BREAST TOMOSYNTHESIS BI: CPT | Performed by: INTERNAL MEDICINE

## 2019-07-08 PROCEDURE — 77067 SCR MAMMO BI INCL CAD: CPT | Performed by: INTERNAL MEDICINE

## 2019-07-16 ENCOUNTER — HOSPITAL ENCOUNTER (OUTPATIENT)
Age: 59
Discharge: HOME | End: 2019-07-16
Attending: EMERGENCY MEDICINE
Payer: MEDICAID

## 2019-07-16 VITALS
DIASTOLIC BLOOD PRESSURE: 62 MMHG | SYSTOLIC BLOOD PRESSURE: 124 MMHG | RESPIRATION RATE: 18 BRPM | HEART RATE: 88 BPM | TEMPERATURE: 98 F | OXYGEN SATURATION: 97 % | BODY MASS INDEX: 31 KG/M2 | WEIGHT: 180 LBS

## 2019-07-16 DIAGNOSIS — J06.9 VIRAL UPPER RESPIRATORY INFECTION: Primary | ICD-10-CM

## 2019-07-16 PROCEDURE — 99212 OFFICE O/P EST SF 10 MIN: CPT

## 2019-07-16 RX ORDER — FLUTICASONE PROPIONATE 50 MCG
1-2 SPRAY, SUSPENSION (ML) NASAL DAILY
Qty: 16 G | Refills: 0 | Status: SHIPPED | OUTPATIENT
Start: 2019-07-16 | End: 2019-08-15

## 2019-07-17 NOTE — ED PROVIDER NOTES
Patient Seen in: [facility]    History   Patient presents with:  Cough/URI    Stated Complaint: congestion     HPI  Patient complains of 3 days of runny nose, watery eyes, occasional sneezing and rare cough.   Patient felt Systems    Positive for stated complaint: congestion   Other systems are as noted in HPI. Constitutional and vital signs reviewed. All other systems reviewed and negative except as noted above.     Physical Exam     ED Triage Vitals [07/16/19 1854] she has not used it.           Disposition and Plan     Clinical Impression:  Viral upper respiratory infection  (primary encounter diagnosis)    Disposition:  Discharge  7/16/2019  7:06 pm    Follow-up:  Judson Ha MD  [address] [place] [address]

## 2019-07-23 NOTE — PROGRESS NOTES
Spoke with patient (identified name and ), results reviewed and agrees with plan.   Written by Kathleen Benedict MD on 2019  7:27 AM   Dear [place],     Your breast mammogram is normal /stable, recommend routine f/u mammogram in 1 year, continue mo

## 2019-07-30 ENCOUNTER — LAB ENCOUNTER (OUTPATIENT)
Dept: LAB | Age: 59
End: 2019-07-30
Attending: INTERNAL MEDICINE
Payer: MEDICAID

## 2019-07-30 DIAGNOSIS — D72.818 OTHER DECREASED WHITE BLOOD CELL (WBC) COUNT: ICD-10-CM

## 2019-07-30 DIAGNOSIS — E55.9 VITAMIN D DEFICIENCY: ICD-10-CM

## 2019-07-30 DIAGNOSIS — D64.9 ANEMIA, UNSPECIFIED TYPE: ICD-10-CM

## 2019-07-30 DIAGNOSIS — E03.9 HYPOTHYROIDISM, UNSPECIFIED TYPE: ICD-10-CM

## 2019-07-30 DIAGNOSIS — E11.9 TYPE 2 DIABETES MELLITUS WITHOUT COMPLICATION, WITHOUT LONG-TERM CURRENT USE OF INSULIN (HCC): ICD-10-CM

## 2019-07-30 LAB
ALBUMIN SERPL-MCNC: 3.8 G/DL (ref 3.4–5)
ALBUMIN/GLOB SERPL: 1.1 {RATIO} (ref 1–2)
ALP LIVER SERPL-CCNC: 78 U/L (ref 46–118)
ALT SERPL-CCNC: 39 U/L (ref 13–56)
ANION GAP SERPL CALC-SCNC: 4 MMOL/L (ref 0–18)
AST SERPL-CCNC: 26 U/L (ref 15–37)
BASOPHILS # BLD AUTO: 0.04 X10(3) UL (ref 0–0.2)
BASOPHILS NFR BLD AUTO: 0.6 %
BILIRUB SERPL-MCNC: 0.3 MG/DL (ref 0.1–2)
BUN BLD-MCNC: 12 MG/DL (ref 7–18)
BUN/CREAT SERPL: 12.6 (ref 10–20)
CALCIUM BLD-MCNC: 8.7 MG/DL (ref 8.5–10.1)
CHLORIDE SERPL-SCNC: 106 MMOL/L (ref 98–112)
CHOLEST SMN-MCNC: 184 MG/DL (ref ?–200)
CO2 SERPL-SCNC: 29 MMOL/L (ref 21–32)
CREAT BLD-MCNC: 0.95 MG/DL (ref 0.55–1.02)
DEPRECATED HBV CORE AB SER IA-ACNC: 40 NG/ML (ref 18–340)
DEPRECATED RDW RBC AUTO: 44.4 FL (ref 35.1–46.3)
EOSINOPHIL # BLD AUTO: 0.08 X10(3) UL (ref 0–0.7)
EOSINOPHIL NFR BLD AUTO: 1.2 %
ERYTHROCYTE [DISTWIDTH] IN BLOOD BY AUTOMATED COUNT: 15 % (ref 11–15)
EST. AVERAGE GLUCOSE BLD GHB EST-MCNC: 169 MG/DL (ref 68–126)
GLOBULIN PLAS-MCNC: 3.4 G/DL (ref 2.8–4.4)
GLUCOSE BLD-MCNC: 124 MG/DL (ref 70–99)
HBA1C MFR BLD HPLC: 7.5 % (ref ?–5.7)
HCT VFR BLD AUTO: 39.2 % (ref 35–48)
HDLC SERPL-MCNC: 55 MG/DL (ref 40–59)
HGB BLD-MCNC: 12.2 G/DL (ref 12–16)
IMM GRANULOCYTES # BLD AUTO: 0.03 X10(3) UL (ref 0–1)
IMM GRANULOCYTES NFR BLD: 0.5 %
LDLC SERPL CALC-MCNC: 107 MG/DL (ref ?–100)
LYMPHOCYTES # BLD AUTO: 2.25 X10(3) UL (ref 1–4)
LYMPHOCYTES NFR BLD AUTO: 34.9 %
M PROTEIN MFR SERPL ELPH: 7.2 G/DL (ref 6.4–8.2)
MCH RBC QN AUTO: 25.5 PG (ref 26–34)
MCHC RBC AUTO-ENTMCNC: 31.1 G/DL (ref 31–37)
MCV RBC AUTO: 81.8 FL (ref 80–100)
MONOCYTES # BLD AUTO: 0.56 X10(3) UL (ref 0.1–1)
MONOCYTES NFR BLD AUTO: 8.7 %
NEUTROPHILS # BLD AUTO: 3.48 X10 (3) UL (ref 1.5–7.7)
NEUTROPHILS # BLD AUTO: 3.48 X10(3) UL (ref 1.5–7.7)
NEUTROPHILS NFR BLD AUTO: 54.1 %
NONHDLC SERPL-MCNC: 129 MG/DL (ref ?–130)
OSMOLALITY SERPL CALC.SUM OF ELEC: 289 MOSM/KG (ref 275–295)
PATIENT FASTING: YES
PATIENT FASTING: YES
PLATELET # BLD AUTO: 378 10(3)UL (ref 150–450)
POTASSIUM SERPL-SCNC: 4.4 MMOL/L (ref 3.5–5.1)
RBC # BLD AUTO: 4.79 X10(6)UL (ref 3.8–5.3)
SODIUM SERPL-SCNC: 139 MMOL/L (ref 136–145)
TRIGL SERPL-MCNC: 112 MG/DL (ref 30–149)
TSI SER-ACNC: 1.86 MIU/ML (ref 0.36–3.74)
VIT B12 SERPL-MCNC: 396 PG/ML (ref 193–986)
VLDLC SERPL CALC-MCNC: 22 MG/DL (ref 0–30)
WBC # BLD AUTO: 6.4 X10(3) UL (ref 4–11)

## 2019-07-30 PROCEDURE — 82728 ASSAY OF FERRITIN: CPT

## 2019-07-30 PROCEDURE — 80053 COMPREHEN METABOLIC PANEL: CPT

## 2019-07-30 PROCEDURE — 82607 VITAMIN B-12: CPT

## 2019-07-30 PROCEDURE — 85025 COMPLETE CBC W/AUTO DIFF WBC: CPT

## 2019-07-30 PROCEDURE — 36415 COLL VENOUS BLD VENIPUNCTURE: CPT

## 2019-07-30 PROCEDURE — 84443 ASSAY THYROID STIM HORMONE: CPT

## 2019-07-30 PROCEDURE — 80061 LIPID PANEL: CPT

## 2019-07-30 PROCEDURE — 82306 VITAMIN D 25 HYDROXY: CPT

## 2019-07-30 PROCEDURE — 83036 HEMOGLOBIN GLYCOSYLATED A1C: CPT

## 2019-07-31 LAB — 25(OH)D3 SERPL-MCNC: 27.8 NG/ML (ref 30–100)

## 2019-09-03 RX ORDER — NORTRIPTYLINE HYDROCHLORIDE 50 MG/1
CAPSULE ORAL
Qty: 60 CAPSULE | Refills: 0 | Status: SHIPPED | OUTPATIENT
Start: 2019-09-03 | End: 2019-09-05

## 2019-09-03 NOTE — TELEPHONE ENCOUNTER
Refill request for nortriptyline 50 mg, take 2 caps daily, #60, no refills    LOV: 5/24/19  NOV: 9/23/19

## 2019-09-04 RX ORDER — LORATADINE 10 MG/1
TABLET ORAL
Qty: 90 TABLET | Refills: 1 | Status: SHIPPED | OUTPATIENT
Start: 2019-09-04 | End: 2020-01-06 | Stop reason: ALTCHOICE

## 2019-09-04 NOTE — TELEPHONE ENCOUNTER
Pt needs refill now as it is allergy season, please review and advise. Last filled 02/2018.       Refill Protocol Appointment Criteria  · Appointment scheduled in the past 6 months or in the next 3 months  Recent Outpatient Visits            2 months ago S

## 2019-09-05 ENCOUNTER — OFFICE VISIT (OUTPATIENT)
Dept: INTERNAL MEDICINE CLINIC | Facility: CLINIC | Age: 59
End: 2019-09-05
Payer: MEDICAID

## 2019-09-05 VITALS
DIASTOLIC BLOOD PRESSURE: 68 MMHG | BODY MASS INDEX: 33.7 KG/M2 | HEART RATE: 88 BPM | WEIGHT: 197.38 LBS | TEMPERATURE: 98 F | RESPIRATION RATE: 20 BRPM | HEIGHT: 64 IN | SYSTOLIC BLOOD PRESSURE: 114 MMHG

## 2019-09-05 DIAGNOSIS — I10 ESSENTIAL HYPERTENSION: Primary | ICD-10-CM

## 2019-09-05 DIAGNOSIS — E11.9 TYPE 2 DIABETES MELLITUS WITHOUT COMPLICATION, WITHOUT LONG-TERM CURRENT USE OF INSULIN (HCC): ICD-10-CM

## 2019-09-05 PROCEDURE — 99214 OFFICE O/P EST MOD 30 MIN: CPT | Performed by: INTERNAL MEDICINE

## 2019-09-05 RX ORDER — TOPIRAMATE 25 MG/1
25 TABLET ORAL DAILY
Qty: 90 TABLET | Refills: 0 | Status: SHIPPED | OUTPATIENT
Start: 2019-09-05 | End: 2020-01-06 | Stop reason: ALTCHOICE

## 2019-09-05 RX ORDER — NORTRIPTYLINE HYDROCHLORIDE 50 MG/1
CAPSULE ORAL
Qty: 180 CAPSULE | Refills: 2 | Status: SHIPPED | OUTPATIENT
Start: 2019-09-05 | End: 2020-04-02

## 2019-09-05 NOTE — PROGRESS NOTES
HPI:    Patient ID: Cecilia Mena is a 61year old female.   Patient presents with:  Test Results: Recent labs  Medication Request: Pend for refill  Migraine    Patient in office today for  Dm2  ,labs results   Patient states feeling well otherwise just h Current Outpatient Medications:  Diclofenac Sodium 1 % Transdermal Gel Apply 1 Application topically. Disp:  Rfl:    metFORMIN HCl 500 MG Oral Tab Take 2 tablets (1,000 mg total) by mouth 2 (two) times daily with meals.  Disp: 360 tablet Rfl: 2 sinus tenderness and no frontal sinus tenderness. Mouth/Throat: Uvula is midline. No posterior oropharyngeal erythema. Eyes: Right eye exhibits no discharge. Left eye exhibits no discharge. No scleral icterus. Neck: Neck supple. No JVD present.  No th regullarly nortriptyline 2  Tab   qhs   Pt willing to try again Topamax   25  Mg qd -restart patient interested to help her  with the migraine headaches prevention but also interested in weight reduction ,hope that helps   Side effects and directions of me

## 2019-10-02 DIAGNOSIS — K21.9 GASTROESOPHAGEAL REFLUX DISEASE WITHOUT ESOPHAGITIS: ICD-10-CM

## 2019-10-03 RX ORDER — OMEPRAZOLE 40 MG/1
CAPSULE, DELAYED RELEASE ORAL
Qty: 90 CAPSULE | Refills: 1 | Status: SHIPPED | OUTPATIENT
Start: 2019-10-03 | End: 2020-03-22

## 2019-10-07 ENCOUNTER — TELEPHONE (OUTPATIENT)
Dept: INTERNAL MEDICINE CLINIC | Facility: CLINIC | Age: 59
End: 2019-10-07

## 2019-10-07 NOTE — TELEPHONE ENCOUNTER
Patient needs a refill on:    Nortriptyline HCl 50 MG Oral Cap    Please refill as soon as possible because patient will be out of town starting tomorrow. Thank you.

## 2019-10-07 NOTE — TELEPHONE ENCOUNTER
Refill request for ketorolac tromethamine 10 mg, take 1-2 tabs as needed for headaches, #30, 1 refill -- last refilled on 7/11/19 per pharmacy     LOV: 5/24/19  NOV: 12/20/19    Nortriptyline 50 mg, take refilled on 9/5/19 for 90 day supply with 2 addition

## 2019-10-08 RX ORDER — KETOROLAC TROMETHAMINE 10 MG/1
TABLET, FILM COATED ORAL
Qty: 30 TABLET | Refills: 0 | Status: SHIPPED | OUTPATIENT
Start: 2019-10-08 | End: 2020-04-02

## 2019-12-19 DIAGNOSIS — E11.9 TYPE 2 DIABETES MELLITUS WITHOUT COMPLICATION, WITHOUT LONG-TERM CURRENT USE OF INSULIN (HCC): ICD-10-CM

## 2019-12-19 NOTE — TELEPHONE ENCOUNTER
Patient requesting refill 90 day supply.      metFORMIN HCl 500 MG Oral Tab  fenofibrate micronized 134 MG Oral Cap

## 2019-12-23 NOTE — TELEPHONE ENCOUNTER
Refill passed per [facility], [facility] protocol.   Diabetes Medications  Protocol Criteria:  · Appointment scheduled in the past 6 months or the next 3 months  · A1C < 7.5 in the past 6 months  · Creatinine in the past 12 months  · Creatinine result < 1.5   Rece

## 2020-01-04 ENCOUNTER — HOSPITAL ENCOUNTER (OUTPATIENT)
Age: 60
Discharge: HOME | End: 2020-01-04
Attending: EMERGENCY MEDICINE
Payer: COMMERCIAL

## 2020-01-04 VITALS
SYSTOLIC BLOOD PRESSURE: 123 MMHG | TEMPERATURE: 98 F | OXYGEN SATURATION: 100 % | WEIGHT: 175 LBS | HEART RATE: 82 BPM | RESPIRATION RATE: 16 BRPM | HEIGHT: 64 IN | DIASTOLIC BLOOD PRESSURE: 59 MMHG | BODY MASS INDEX: 29.88 KG/M2

## 2020-01-04 DIAGNOSIS — L50.9 URTICARIA: Primary | ICD-10-CM

## 2020-01-04 PROCEDURE — 99212 OFFICE O/P EST SF 10 MIN: CPT

## 2020-01-04 NOTE — ED INITIAL ASSESSMENT (HPI)
Patient with itchy rash to neck and abdomen starting yesterday. Took benadryl x 2 yesterday. Denies use on any new soaps, lotions, detergents.

## 2020-01-04 NOTE — ED PROVIDER NOTES
Patient Seen in: [facility]      History   Patient presents with:  Rash Skin Problem    Stated Complaint: rash    HPI    42-year-old female presents for evaluation of rash.   Patient reports since yesterday she had a gener (Room air)       Current:/59   Pulse 82   Temp 97.6 °F (36.4 °C) (Oral)   Resp 16   Ht 162.6 cm (5' 4\")   Wt 79.4 kg   SpO2 100%   BMI 30.04 kg/m²         Physical Exam  Vitals signs and nursing note reviewed.    Constitutional:       General: She is

## 2020-01-06 ENCOUNTER — OFFICE VISIT (OUTPATIENT)
Dept: INTERNAL MEDICINE CLINIC | Facility: CLINIC | Age: 60
End: 2020-01-06
Payer: COMMERCIAL

## 2020-01-06 VITALS
TEMPERATURE: 98 F | BODY MASS INDEX: 32.61 KG/M2 | DIASTOLIC BLOOD PRESSURE: 76 MMHG | HEART RATE: 88 BPM | WEIGHT: 191 LBS | SYSTOLIC BLOOD PRESSURE: 130 MMHG | HEIGHT: 64 IN

## 2020-01-06 DIAGNOSIS — L50.9 HIVES: Primary | ICD-10-CM

## 2020-01-06 PROCEDURE — 99213 OFFICE O/P EST LOW 20 MIN: CPT | Performed by: INTERNAL MEDICINE

## 2020-01-06 RX ORDER — LORATADINE 10 MG/1
10 TABLET ORAL DAILY
Qty: 30 TABLET | Refills: 0 | Status: SHIPPED | OUTPATIENT
Start: 2020-01-06 | End: 2021-08-14

## 2020-01-06 RX ORDER — CETIRIZINE HYDROCHLORIDE 10 MG/1
10 TABLET ORAL 2 TIMES DAILY
COMMUNITY
End: 2020-01-06 | Stop reason: ALTCHOICE

## 2020-01-06 NOTE — PROGRESS NOTES
Paul Raza is a 61year old female. Patient presents with:  Rash: Itchy rash on body, was seen at [facility] on Saturday. Started taking zyrtec with no relief.        HPI:   Pt comes for as an urgent visit   C/c rash  C/c rash x 2 days -went to UC 2 days ago Acid reflux    • Age-related nuclear cataract of both eyes 4/9/2015   • Anxiety state    • Arrhythmia     ablation 2003   • Arthritis    • Cataract    • Disorder of thyroid    • Esophagitis 09-   • Floaters 4/9/2015   • High cholesterol    • Migrain visit: Hives    Other orders  -     loratadine 10 MG Oral Tab; Take 1 tablet (10 mg total) by mouth daily.         Pepcid 20 mg twice a day, Benadryl 25 mg 2-3 times a day, Claritin 10 mg twice a day for the next 7- 10 days  If not better can try steroid

## 2020-01-28 ENCOUNTER — HOSPITAL ENCOUNTER (OUTPATIENT)
Dept: GENERAL RADIOLOGY | Facility: HOSPITAL | Age: 60
Discharge: HOME | End: 2020-01-28
Attending: ORTHOPAEDIC SURGERY
Payer: COMMERCIAL

## 2020-01-28 ENCOUNTER — OFFICE VISIT (OUTPATIENT)
Dept: ORTHOPEDICS CLINIC | Facility: CLINIC | Age: 60
End: 2020-01-28
Payer: COMMERCIAL

## 2020-01-28 VITALS
HEART RATE: 89 BPM | BODY MASS INDEX: 32.61 KG/M2 | DIASTOLIC BLOOD PRESSURE: 69 MMHG | HEIGHT: 64 IN | RESPIRATION RATE: 20 BRPM | SYSTOLIC BLOOD PRESSURE: 122 MMHG | WEIGHT: 191 LBS

## 2020-01-28 DIAGNOSIS — M25.561 RIGHT KNEE PAIN, UNSPECIFIED CHRONICITY: ICD-10-CM

## 2020-01-28 DIAGNOSIS — M17.11 PRIMARY OSTEOARTHRITIS OF RIGHT KNEE: Primary | ICD-10-CM

## 2020-01-28 PROCEDURE — 73564 X-RAY EXAM KNEE 4 OR MORE: CPT | Performed by: ORTHOPAEDIC SURGERY

## 2020-01-28 PROCEDURE — 20610 DRAIN/INJ JOINT/BURSA W/O US: CPT | Performed by: ORTHOPAEDIC SURGERY

## 2020-01-28 RX ORDER — TRIAMCINOLONE ACETONIDE 40 MG/ML
40 INJECTION, SUSPENSION INTRA-ARTICULAR; INTRAMUSCULAR ONCE
Status: COMPLETED | OUTPATIENT
Start: 2020-01-28 | End: 2020-01-28

## 2020-01-28 RX ADMIN — TRIAMCINOLONE ACETONIDE 40 MG: 40 INJECTION, SUSPENSION INTRA-ARTICULAR; INTRAMUSCULAR at 15:34:00

## 2020-01-28 NOTE — PROGRESS NOTES
Per verbal order from Dr. Chelo Luevano, draw up 4ml of 1% lidocaine and 1ml of Kenalog 40 for cortisone injection to right knee. Barbara Jo    Patient provided education handout for cortisone injection.    Patient left office without obtaining post injection ej

## 2020-01-28 NOTE — H&P
NURSING INTAKE COMMENTS: Patient presents with:  Knee Pain: Right - was seen by JB in 11/2018 and she had a Synvisc injection at that time - states she saw another orthopedic Dr at [facility] and received cortisone inj x 2  - last one was at end of 9/2019 - i Dispense Refill   • loratadine 10 MG Oral Tab Take 1 tablet (10 mg total) by mouth daily. 30 tablet 0   • metFORMIN HCl 500 MG Oral Tab Take 2 tablets (1,000 mg total) by mouth 2 (two) times daily with meals.  180 tablet 1   • Ketorolac Tromethamine 10 MG O Systems:  GENERAL: feels generally well, no significant weight loss or weight gain  SKIN: no ulcerated or worrisome skin lesions  EYES: denies blurred vision or double vision  HEENT: denies new nasal congestion or sinus pain  PULM: denies shortness of miguelangel Results   Component Value Date    WBC 6.4 07/30/2019    HGB 12.2 07/30/2019    .0 07/30/2019      Lab Results   Component Value Date     (H) 07/30/2019    BUN 12 07/30/2019    CREATSERUM 0.95 07/30/2019    GFR 58 (L) 05/01/2016    GFRNAA 66 0

## 2020-01-28 NOTE — PROCEDURES
The patient identified the right knee as the correct procedure site. This was verified with the consent and with 2 patient identifiers. The superolateral patellar injection site was prepped with chlorhexidine.   A 22-gauge needle was introduced into the graham

## 2020-02-04 ENCOUNTER — OFFICE VISIT (OUTPATIENT)
Dept: INTERNAL MEDICINE CLINIC | Facility: CLINIC | Age: 60
End: 2020-02-04
Payer: COMMERCIAL

## 2020-02-04 VITALS
SYSTOLIC BLOOD PRESSURE: 133 MMHG | RESPIRATION RATE: 20 BRPM | HEIGHT: 64 IN | WEIGHT: 191.5 LBS | DIASTOLIC BLOOD PRESSURE: 80 MMHG | TEMPERATURE: 98 F | BODY MASS INDEX: 32.69 KG/M2 | HEART RATE: 81 BPM | OXYGEN SATURATION: 100 %

## 2020-02-04 DIAGNOSIS — R79.0 DECREASED FERRITIN: ICD-10-CM

## 2020-02-04 DIAGNOSIS — G43.709 CHRONIC MIGRAINE WITHOUT AURA WITHOUT STATUS MIGRAINOSUS, NOT INTRACTABLE: ICD-10-CM

## 2020-02-04 DIAGNOSIS — E53.8 B12 DEFICIENCY: ICD-10-CM

## 2020-02-04 DIAGNOSIS — D64.9 ANEMIA, UNSPECIFIED TYPE: Primary | ICD-10-CM

## 2020-02-04 DIAGNOSIS — E11.9 TYPE 2 DIABETES MELLITUS WITHOUT COMPLICATION, WITHOUT LONG-TERM CURRENT USE OF INSULIN (HCC): ICD-10-CM

## 2020-02-04 DIAGNOSIS — E55.9 VITAMIN D DEFICIENCY: ICD-10-CM

## 2020-02-04 PROCEDURE — 99214 OFFICE O/P EST MOD 30 MIN: CPT | Performed by: INTERNAL MEDICINE

## 2020-02-04 RX ORDER — CLOTRIMAZOLE AND BETAMETHASONE DIPROPIONATE 10; .64 MG/G; MG/G
CREAM TOPICAL
Qty: 1 TUBE | Refills: 0 | Status: SHIPPED | OUTPATIENT
Start: 2020-02-04 | End: 2020-07-06 | Stop reason: ALTCHOICE

## 2020-02-04 RX ORDER — FLASH GLUCOSE SENSOR
KIT MISCELLANEOUS
Qty: 1 EACH | Refills: 0 | Status: SHIPPED | OUTPATIENT
Start: 2020-02-04 | End: 2020-07-06

## 2020-02-04 RX ORDER — TOPIRAMATE 25 MG/1
25 TABLET ORAL EVERY EVENING
Qty: 30 TABLET | Refills: 0 | Status: SHIPPED | OUTPATIENT
Start: 2020-02-04 | End: 2020-07-06

## 2020-02-04 NOTE — PROGRESS NOTES
HPI:    Patient ID: Raymond De La Rosa is a 61year old female.   Patient presents with:  Knee Pain: Pt state that she is having constant right knee pain and that she need to discuss recent labs  Medication Request: Pend for refill  pt presents today for right Neurological: Positive for headaches (often migraine headache). Current Outpatient Medications   Medication Sig Dispense Refill   • metFORMIN HCl 500 MG Oral Tab Take 2 tablets (1,000 mg total) by mouth 2 (two) times daily with meals.  360 tabl She appears well-developed and well-nourished. No distress. OBESE    HENT:   Head: Normocephalic and atraumatic.    Right Ear: Tympanic membrane, external ear and ear canal normal.   Left Ear: Hearing, tympanic membrane, external ear and ear canal normal. 97.5 °F (36.4 °C), temperature source Oral, resp. rate 20, height 5' 4\" (1.626 m), weight 191 lb 8 oz (86.9 kg), SpO2 100 %, not currently breastfeeding.            ASSESSMENT/PLAN:   Type 2 diabetes mellitus without complication, without long-term current meals.   • clotrimazole-betamethasone 1-0.05 % External Cream 1 Tube 0     Sig: Apply twice per day thin layer on affected area for 1-2 weeks   • Continuous Blood Gluc Sensor (roomlinx CINDY SENSOR SYSTEM) Does not apply Misc 1 each 0     Sig: accu   Tid

## 2020-03-10 ENCOUNTER — TELEPHONE (OUTPATIENT)
Dept: ORTHOPEDICS CLINIC | Facility: CLINIC | Age: 60
End: 2020-03-10

## 2020-03-10 NOTE — TELEPHONE ENCOUNTER
Pt states in a lot of pain again wants to know when she will be able to get another injection again please advise

## 2020-03-10 NOTE — TELEPHONE ENCOUNTER
Pt notified per Dr. Vaughn Florez message. She states she would like to get Pre-auth for synvisc. I informed her I would put a request in but the process may take 2 wks. Will call her once determined.    Pt wants to know if Dr. Vaughn Florez has any recommendations for

## 2020-03-10 NOTE — TELEPHONE ENCOUNTER
S/w pt and she states on 1/28 OV she rc'd cortisone injection right knee and it helped take away 70% of the pain. She states pain returned last wk. Pt rates pain 8-9/10 with mild swelling. She denies any redness or warmth.  She took aleve on 3/9 with no rel

## 2020-03-10 NOTE — TELEPHONE ENCOUNTER
I do not recommend another cortisone shot for at least 2 to 3 months. If she would like to try a Synvisc shot, we can request preauthorization from her insurance.   If she would like to discuss other options such as surgery, she can follow-up in the clinic

## 2020-03-11 NOTE — TELEPHONE ENCOUNTER
If patient has severe symptoms she can try taking ketorolac tablets along with omeprazole, both of which were on her medical reconciliation. For more mild symptoms she can try over-the-counter Aleve as instructed on the bottle along with Tylenol.   I would

## 2020-03-22 DIAGNOSIS — K21.9 GASTROESOPHAGEAL REFLUX DISEASE WITHOUT ESOPHAGITIS: ICD-10-CM

## 2020-03-22 RX ORDER — OMEPRAZOLE 40 MG/1
CAPSULE, DELAYED RELEASE ORAL
Qty: 90 CAPSULE | Refills: 0 | Status: SHIPPED | OUTPATIENT
Start: 2020-03-22 | End: 2020-03-27

## 2020-03-24 ENCOUNTER — TELEPHONE (OUTPATIENT)
Dept: NEUROLOGY | Facility: CLINIC | Age: 60
End: 2020-03-24

## 2020-03-24 NOTE — TELEPHONE ENCOUNTER
Rn spoke with patient.     Virtual/Telephone Check-In    Raymond Pimple verbally consents to  a Virtual/Telephone Check-In service on 04/01/2020  Patient understands and accepts financial responsibility for any deductible, co-insurance and/or co-pays assoc

## 2020-03-27 DIAGNOSIS — K21.9 GASTROESOPHAGEAL REFLUX DISEASE WITHOUT ESOPHAGITIS: ICD-10-CM

## 2020-03-27 RX ORDER — OMEPRAZOLE 40 MG/1
CAPSULE, DELAYED RELEASE ORAL
Qty: 90 CAPSULE | Refills: 0 | Status: SHIPPED | OUTPATIENT
Start: 2020-03-27 | End: 2020-03-31

## 2020-03-31 DIAGNOSIS — K21.9 GASTROESOPHAGEAL REFLUX DISEASE WITHOUT ESOPHAGITIS: ICD-10-CM

## 2020-03-31 RX ORDER — OMEPRAZOLE 40 MG/1
CAPSULE, DELAYED RELEASE ORAL
Qty: 90 CAPSULE | Refills: 0 | Status: SHIPPED | OUTPATIENT
Start: 2020-03-31 | End: 2020-11-18

## 2020-04-01 ENCOUNTER — VIRTUAL PHONE E/M (OUTPATIENT)
Dept: NEUROLOGY | Facility: CLINIC | Age: 60
End: 2020-04-01

## 2020-04-01 ENCOUNTER — VIRTUAL PHONE E/M (OUTPATIENT)
Dept: NEUROLOGY | Facility: CLINIC | Age: 60
End: 2020-04-01
Payer: MEDICAID

## 2020-04-01 DIAGNOSIS — IMO0002 CHRONIC MIGRAINE: Primary | ICD-10-CM

## 2020-04-01 DIAGNOSIS — M54.81 OCCIPITAL NEURALGIA OF LEFT SIDE: ICD-10-CM

## 2020-04-01 DIAGNOSIS — E11.9 TYPE 2 DIABETES MELLITUS WITHOUT COMPLICATION, WITHOUT LONG-TERM CURRENT USE OF INSULIN (HCC): Primary | ICD-10-CM

## 2020-04-01 PROCEDURE — 99213 OFFICE O/P EST LOW 20 MIN: CPT | Performed by: OTHER

## 2020-04-01 RX ORDER — GLUCOSAM/CHON-MSM1/C/MANG/BOSW 500-416.6
TABLET ORAL
Qty: 100 EACH | Refills: 3 | Status: SHIPPED | OUTPATIENT
Start: 2020-04-01 | End: 2020-07-06

## 2020-04-01 RX ORDER — DIAPER,BRIEF,ADULT, DISPOSABLE
EACH MISCELLANEOUS
Qty: 100 STRIP | Refills: 3 | Status: SHIPPED | OUTPATIENT
Start: 2020-04-01 | End: 2020-07-06

## 2020-04-01 NOTE — TELEPHONE ENCOUNTER
Refill request not on medication list    True plus Lancets 33G Test 2 times a day  TrueTrack Test in vitro strip test 2 times a day

## 2020-04-01 NOTE — PROGRESS NOTES
Spoke to patient. She states that she will be available at 3:15 pm today to speak with Dr. Kade Ibarra. Can be reached at [phone].

## 2020-04-01 NOTE — TELEPHONE ENCOUNTER
This is being sent to you without review by the Triage staff due to the high call volumes created by the COVID-19 virus, per the email sent by Dr. Vernon Vicente on 3/19/20. Thank you for your support.     Centralized Nurse Triage Team

## 2020-04-01 NOTE — PROGRESS NOTES
Call out to patient x 2, LMTCB; if we can please reach out to patient later today and inquire about a more convenient time to talk (can offer 3:15 today, or any available time Friday)

## 2020-04-02 ENCOUNTER — TELEPHONE (OUTPATIENT)
Dept: NEUROLOGY | Facility: CLINIC | Age: 60
End: 2020-04-02

## 2020-04-02 RX ORDER — NORTRIPTYLINE HYDROCHLORIDE 50 MG/1
CAPSULE ORAL
Qty: 180 CAPSULE | Refills: 3 | Status: SHIPPED | OUTPATIENT
Start: 2020-04-02 | End: 2021-03-17

## 2020-04-02 RX ORDER — KETOROLAC TROMETHAMINE 10 MG/1
TABLET, FILM COATED ORAL
Qty: 30 TABLET | Refills: 3 | Status: SHIPPED | OUTPATIENT
Start: 2020-04-02 | End: 2021-05-28

## 2020-04-02 NOTE — PROGRESS NOTES
Neurology Telephone / Virtual Follow-up Note    Allie Walker is a 61year old female. HPI:     Patient is being seen in follow-up. I saw her in the office last in May 2019. Since last visit, Emgality was not approved.   Her headaches are a little micronized 134 MG Oral Cap Take 1 capsule (134 mg total) by mouth daily with breakfast. 90 capsule 1   • Levothyroxine Sodium 88 MCG Oral Tab TAKE ONE TABLET BY MOUTH ONE TIME DAILY 30-60 MINUTES BEFORE BREAKFAST 90 tablet 2   • ONETOUCH DELICA LANCETS FIN may be untoward side effects that we are not yet aware of; she verbalized understanding      Return to clinic in person in 3 months      Booker Abebe MD      Note: Patient verbally consented to a Virtual/Telephone Check-In service.   She understands and ac

## 2020-04-22 ENCOUNTER — TELEPHONE (OUTPATIENT)
Dept: NEUROLOGY | Facility: CLINIC | Age: 60
End: 2020-04-22

## 2020-04-22 NOTE — TELEPHONE ENCOUNTER
Spoke to patient. She states that she started to experience headache on left side of head on Monday, along with heaviness on left side of face. It has continued on today. She took ketorolac last night which helped a little but continues to have a headache.

## 2020-04-23 RX ORDER — BUTALBITAL, ACETAMINOPHEN AND CAFFEINE 50; 325; 40 MG/1; MG/1; MG/1
TABLET ORAL
Qty: 20 TABLET | Refills: 0 | OUTPATIENT
Start: 2020-04-23 | End: 2021-01-19

## 2020-04-23 NOTE — TELEPHONE ENCOUNTER
Can we please check on the status of the Ubrelvy?     If unable to get this filled, can five limited supply of     Fioricet: 1 to 2 tablets at onset of migraine, may repeat in 2 hours, no more than 4 tablets in 24 hours and did not exceed use more than twic

## 2020-04-23 NOTE — TELEPHONE ENCOUNTER
Spoke to patient and notified her of below. She is willing to try fioricet. She did not get ubrogepant from pharmacy as it is not covered by insurance. Called pharmacy and verbal given to fioricet as documented below.  PA is needed for ubrogepant -- ini

## 2020-04-24 ENCOUNTER — TELEPHONE (OUTPATIENT)
Dept: NEUROLOGY | Facility: CLINIC | Age: 60
End: 2020-04-24

## 2020-04-24 ENCOUNTER — MED REC SCAN ONLY (OUTPATIENT)
Dept: NEUROLOGY | Facility: CLINIC | Age: 60
End: 2020-04-24

## 2020-04-24 NOTE — TELEPHONE ENCOUNTER
Ubrelvy 50 mg was denied by insurance. Patient received fioricet -40 mg yesterday to take as needed only.

## 2020-05-05 ENCOUNTER — VIRTUAL PHONE E/M (OUTPATIENT)
Dept: INTERNAL MEDICINE CLINIC | Facility: CLINIC | Age: 60
End: 2020-05-05
Payer: MEDICAID

## 2020-05-05 DIAGNOSIS — E11.9 TYPE 2 DIABETES MELLITUS WITHOUT COMPLICATION, WITHOUT LONG-TERM CURRENT USE OF INSULIN (HCC): ICD-10-CM

## 2020-05-05 DIAGNOSIS — M25.561 RECURRENT PAIN OF RIGHT KNEE: Primary | ICD-10-CM

## 2020-05-05 PROCEDURE — 99214 OFFICE O/P EST MOD 30 MIN: CPT | Performed by: INTERNAL MEDICINE

## 2020-05-05 NOTE — PROGRESS NOTES
Telemedicine Note    Virtual/Telephone Check-In    Leonor Alvarez verbally consents to a Virtual/Telephone Check-In service on 05/05/20.  Patient understands and accepts financial responsibility for any deductible, co-insurance and/or co-pays associated wi • Occupation or large gatherings: no       Past Medical History:   Diagnosis Date   • Acid reflux    • Age-related nuclear cataract of both eyes 4/9/2015   • Anxiety state    • Arrhythmia     ablation 2003   • Arthritis    • Cataract    • Disorder of thy Adverse reaction to vaccine     Non-seasonal allergic rhinitis due to pollen     SVT (supraventricular tachycardia) (HCC)     Mixed hyperlipidemia     Right foot pain     Sciatic leg pain     ESR raised     Gastroesophageal reflux disease without esophagi Levothyroxine Sodium 88 MCG Oral Tab TAKE ONE TABLET BY MOUTH ONE TIME DAILY 30-60 MINUTES BEFORE BREAKFAST 90 tablet 2   • ONETOUCH DELICA LANCETS FINE Does not apply Misc Test 2 times a day 60 each 11   • Blood Glucose Monitoring Suppl (ONETOUCH VERIO) w diabetes  Educated patient regarding her risks and if her knee bothers her that much that is affecting her on daily basis and is really bad patient and should have it done, otherwise if that does not bother her that much recommend to wait until it is absol of phone triage and evaluation due to current COVID-19 outbreak based upon CDC recommendations, as well as limitations of phone/video triage including but not limited to the inability to perform appropriate physical exam nor face-to-face examination, which

## 2020-05-14 ENCOUNTER — NURSE TRIAGE (OUTPATIENT)
Dept: INTERNAL MEDICINE CLINIC | Facility: CLINIC | Age: 60
End: 2020-05-14

## 2020-05-14 DIAGNOSIS — B34.9 VIRAL ILLNESS: Primary | ICD-10-CM

## 2020-05-14 PROCEDURE — 99213 OFFICE O/P EST LOW 20 MIN: CPT | Performed by: INTERNAL MEDICINE

## 2020-05-14 NOTE — TELEPHONE ENCOUNTER
Action Requested: Summary for Provider     []  Critical Lab, Recommendations Needed  [] Need Additional Advice  []   FYI    []   Need Orders  [] Need Medications Sent to Pharmacy  []  Other     SUMMARY: Dr. Duran Less:   Patient called back.     Patient wo

## 2020-05-15 NOTE — TELEPHONE ENCOUNTER
Telemedicine Note    Virtual/Telephone Check-In    Amanda Cartwright verbally consents to a Virtual/Telephone Check-In service on 05/14/20.  Patient understands and accepts financial responsibility for any deductible, co-insurance and/or co-pays associated wi Migraines    • Muscle weakness     bilateral knees at times   • Osteoarthritis    • Other and unspecified hyperlipidemia    • S/P ablation of accessory bypass tract    • Type II or unspecified type diabetes mellitus without mention of complication, not sta due to Hashimoto's thyroiditis    Current Outpatient Medications   Medication Sig Dispense Refill   • Butalbital-APAP-Caffeine -40 MG Oral Tab 1 to 2 tablets at onset of migraine, may repeat in 2 hours, no more than 4 tablets in 24 hours and did not • Glucose Blood (ONETOUCH VERIO) In Vitro Strip Checks blood sugar 2 times a day 60 strip 11       Penicillins             HIVES, RASH  Codeine                 NAUSEA ONLY, DIZZINESS  Morphine                NAUSEA ONLY, DIZZINESS        ROS   GENERAL: f symptomatic treatment as outlined on CDC Patient Guidelines.      Explained to patient rationale of phone triage and evaluation due to current COVID-19 outbreak based upon CDC recommendations, as well as limitations of phone/video triage including but not l

## 2020-05-18 ENCOUNTER — LAB ENCOUNTER (OUTPATIENT)
Dept: LAB | Facility: HOSPITAL | Age: 60
End: 2020-05-18
Attending: INTERNAL MEDICINE
Payer: MEDICAID

## 2020-05-18 DIAGNOSIS — B34.9 VIRAL ILLNESS: ICD-10-CM

## 2020-06-12 ENCOUNTER — TELEPHONE (OUTPATIENT)
Dept: NEUROLOGY | Facility: CLINIC | Age: 60
End: 2020-06-12

## 2020-06-12 DIAGNOSIS — IMO0002 CHRONIC MIGRAINE: Primary | ICD-10-CM

## 2020-06-12 NOTE — TELEPHONE ENCOUNTER
Spoke to patient. She states that she spoke with Dr. Jyotsna Bagley about doing botox and would like to know if she can move forward with it. Explained that we would need to get approval first. She was understanding.

## 2020-06-15 NOTE — TELEPHONE ENCOUNTER
Spoke to patient and advised Dr Ravindra Juárez did write Botox order. Will call patient back to schedule after insurance approval is obtained.

## 2020-06-15 NOTE — TELEPHONE ENCOUNTER
Botox 200 units x 1 vial is in the fridge. Will call Pt. to schedule appt.  Not available, will call her in the am.

## 2020-06-25 NOTE — TELEPHONE ENCOUNTER
Called Pt. to inform we have 1 vial of Botox 200 units in fridge. Calling to schedule appt. for Botox injections. States she will think about whether to continue taking Ubrelvy or Botox injections.  Informed she cannot be on both meds  because insurance ari Dupixent Pregnancy And Lactation Text: This medication likely crosses the placenta but the risk for the fetus is uncertain. This medication is excreted in breast milk.

## 2020-07-06 ENCOUNTER — OFFICE VISIT (OUTPATIENT)
Dept: NEUROLOGY | Facility: CLINIC | Age: 60
End: 2020-07-06
Payer: MEDICAID

## 2020-07-06 VITALS — RESPIRATION RATE: 16 BRPM | SYSTOLIC BLOOD PRESSURE: 124 MMHG | DIASTOLIC BLOOD PRESSURE: 76 MMHG | HEART RATE: 78 BPM

## 2020-07-06 DIAGNOSIS — G43.709 CHRONIC MIGRAINE WITHOUT AURA WITHOUT STATUS MIGRAINOSUS, NOT INTRACTABLE: Primary | ICD-10-CM

## 2020-07-06 DIAGNOSIS — IMO0002 CHRONIC MIGRAINE: ICD-10-CM

## 2020-07-06 PROCEDURE — 64615 CHEMODENERV MUSC MIGRAINE: CPT | Performed by: OTHER

## 2020-07-06 NOTE — PROCEDURES
Botox Injection Procedure Note    Subjective:  Patient here for repeat Botox injections. She still has a lot of pain in left temporal / occipital region; will repeat same injection strategy.     Consent:    Risks, benefits, and alternatives of botulinum to

## 2020-07-10 ENCOUNTER — MED REC SCAN ONLY (OUTPATIENT)
Dept: NEUROLOGY | Facility: CLINIC | Age: 60
End: 2020-07-10

## 2020-08-19 ENCOUNTER — HOSPITAL ENCOUNTER (OUTPATIENT)
Age: 60
Discharge: HOME | End: 2020-08-19
Attending: EMERGENCY MEDICINE
Payer: MEDICAID

## 2020-08-19 VITALS
OXYGEN SATURATION: 97 % | TEMPERATURE: 97 F | HEART RATE: 97 BPM | SYSTOLIC BLOOD PRESSURE: 142 MMHG | RESPIRATION RATE: 17 BRPM | DIASTOLIC BLOOD PRESSURE: 69 MMHG

## 2020-08-19 DIAGNOSIS — S80.212A ABRASION OF LEFT KNEE, INITIAL ENCOUNTER: Primary | ICD-10-CM

## 2020-08-19 DIAGNOSIS — S09.8XXA BLUNT HEAD TRAUMA, INITIAL ENCOUNTER: ICD-10-CM

## 2020-08-19 PROCEDURE — 99213 OFFICE O/P EST LOW 20 MIN: CPT

## 2020-08-19 NOTE — ED INITIAL ASSESSMENT (HPI)
PATIENT STATES THIS AFTERNOON SHE WAS AVOIDING A DOG IN HER PARKING LOT WHEN SHE TRIPPED STRIKING HER LEFT KNEE AND HEAD ON THE PAVEMENT.  + ABRASION NOTED TO LEFT KNEE. STATES SHE DID NOT LOSE CONSCIOUSNESS. DENIES NAUSEA/VOMITING.

## 2020-08-19 NOTE — ED PROVIDER NOTES
Patient Seen in: [address]      History   Patient presents with:  Head Neck Injury    Stated Complaint: TL - fell on knees and hit forehead with a bump. No change in LOC.  April K    HPI    Patient is a 61-year-old female forehead with a bump. No change in LOC. April K  Other systems are as noted in HPI. Constitutional and vital signs reviewed. All other systems reviewed and negative except as noted above.     Physical Exam     ED Triage Vitals [08/19/20 1727]

## 2020-08-24 ENCOUNTER — LAB ENCOUNTER (OUTPATIENT)
Dept: LAB | Age: 60
End: 2020-08-24
Attending: INTERNAL MEDICINE
Payer: MEDICAID

## 2020-08-24 ENCOUNTER — HOSPITAL ENCOUNTER (OUTPATIENT)
Age: 60
Discharge: HOME | End: 2020-08-24
Attending: EMERGENCY MEDICINE
Payer: MEDICAID

## 2020-08-24 ENCOUNTER — APPOINTMENT (OUTPATIENT)
Dept: GENERAL RADIOLOGY | Age: 60
End: 2020-08-24
Attending: EMERGENCY MEDICINE
Payer: MEDICAID

## 2020-08-24 VITALS
DIASTOLIC BLOOD PRESSURE: 77 MMHG | OXYGEN SATURATION: 97 % | HEIGHT: 64 IN | SYSTOLIC BLOOD PRESSURE: 135 MMHG | TEMPERATURE: 97 F | RESPIRATION RATE: 20 BRPM | BODY MASS INDEX: 29.88 KG/M2 | WEIGHT: 175 LBS | HEART RATE: 94 BPM

## 2020-08-24 DIAGNOSIS — R79.0 DECREASED FERRITIN: ICD-10-CM

## 2020-08-24 DIAGNOSIS — E11.9 TYPE 2 DIABETES MELLITUS WITHOUT COMPLICATION, WITHOUT LONG-TERM CURRENT USE OF INSULIN (HCC): ICD-10-CM

## 2020-08-24 DIAGNOSIS — E55.9 VITAMIN D DEFICIENCY: ICD-10-CM

## 2020-08-24 DIAGNOSIS — S92.501A CLOSED FRACTURE OF PHALANX OF RIGHT FIFTH TOE, INITIAL ENCOUNTER: Primary | ICD-10-CM

## 2020-08-24 DIAGNOSIS — E53.8 B12 DEFICIENCY: ICD-10-CM

## 2020-08-24 LAB
ALBUMIN SERPL-MCNC: 3.7 G/DL (ref 3.4–5)
ALBUMIN/GLOB SERPL: 1.1 {RATIO} (ref 1–2)
ALP LIVER SERPL-CCNC: 97 U/L (ref 46–118)
ALT SERPL-CCNC: 50 U/L (ref 13–56)
ANION GAP SERPL CALC-SCNC: 6 MMOL/L (ref 0–18)
AST SERPL-CCNC: 39 U/L (ref 15–37)
BACTERIA UR QL AUTO: NEGATIVE /HPF
BASOPHILS # BLD AUTO: 0.04 X10(3) UL (ref 0–0.2)
BASOPHILS NFR BLD AUTO: 0.6 %
BILIRUB SERPL-MCNC: 0.5 MG/DL (ref 0.1–2)
BILIRUB UR QL: NEGATIVE
BUN BLD-MCNC: 10 MG/DL (ref 7–18)
BUN/CREAT SERPL: 10 (ref 10–20)
CALCIUM BLD-MCNC: 9.2 MG/DL (ref 8.5–10.1)
CHLORIDE SERPL-SCNC: 103 MMOL/L (ref 98–112)
CHOLEST SMN-MCNC: 201 MG/DL (ref ?–200)
CLARITY UR: CLEAR
CO2 SERPL-SCNC: 28 MMOL/L (ref 21–32)
COLOR UR: YELLOW
CREAT BLD-MCNC: 1 MG/DL (ref 0.55–1.02)
CREAT UR-SCNC: 93.8 MG/DL
DEPRECATED HBV CORE AB SER IA-ACNC: 44.5 NG/ML (ref 18–340)
DEPRECATED RDW RBC AUTO: 41.1 FL (ref 35.1–46.3)
EOSINOPHIL # BLD AUTO: 0.13 X10(3) UL (ref 0–0.7)
EOSINOPHIL NFR BLD AUTO: 1.8 %
ERYTHROCYTE [DISTWIDTH] IN BLOOD BY AUTOMATED COUNT: 14.2 % (ref 11–15)
EST. AVERAGE GLUCOSE BLD GHB EST-MCNC: 177 MG/DL (ref 68–126)
GLOBULIN PLAS-MCNC: 3.5 G/DL (ref 2.8–4.4)
GLUCOSE BLD-MCNC: 147 MG/DL (ref 70–99)
GLUCOSE UR-MCNC: NEGATIVE MG/DL
HBA1C MFR BLD HPLC: 7.8 % (ref ?–5.7)
HCT VFR BLD AUTO: 39.6 % (ref 35–48)
HDLC SERPL-MCNC: 45 MG/DL (ref 40–59)
HGB BLD-MCNC: 12.5 G/DL (ref 12–16)
HGB UR QL STRIP.AUTO: NEGATIVE
IMM GRANULOCYTES # BLD AUTO: 0.04 X10(3) UL (ref 0–1)
IMM GRANULOCYTES NFR BLD: 0.6 %
KETONES UR-MCNC: NEGATIVE MG/DL
LDLC SERPL CALC-MCNC: 105 MG/DL (ref ?–100)
LYMPHOCYTES # BLD AUTO: 2.31 X10(3) UL (ref 1–4)
LYMPHOCYTES NFR BLD AUTO: 32.7 %
M PROTEIN MFR SERPL ELPH: 7.2 G/DL (ref 6.4–8.2)
MCH RBC QN AUTO: 25.6 PG (ref 26–34)
MCHC RBC AUTO-ENTMCNC: 31.6 G/DL (ref 31–37)
MCV RBC AUTO: 81.1 FL (ref 80–100)
MICROALBUMIN UR-MCNC: 1.41 MG/DL
MICROALBUMIN/CREAT 24H UR-RTO: 15 UG/MG (ref ?–30)
MONOCYTES # BLD AUTO: 0.65 X10(3) UL (ref 0.1–1)
MONOCYTES NFR BLD AUTO: 9.2 %
NEUTROPHILS # BLD AUTO: 3.89 X10 (3) UL (ref 1.5–7.7)
NEUTROPHILS # BLD AUTO: 3.89 X10(3) UL (ref 1.5–7.7)
NEUTROPHILS NFR BLD AUTO: 55.1 %
NITRITE UR QL STRIP.AUTO: NEGATIVE
NONHDLC SERPL-MCNC: 156 MG/DL (ref ?–130)
OSMOLALITY SERPL CALC.SUM OF ELEC: 286 MOSM/KG (ref 275–295)
PATIENT FASTING Y/N/NP: YES
PATIENT FASTING Y/N/NP: YES
PH UR: 6 [PH] (ref 5–8)
PLATELET # BLD AUTO: 366 10(3)UL (ref 150–450)
POTASSIUM SERPL-SCNC: 4.5 MMOL/L (ref 3.5–5.1)
PROT UR-MCNC: NEGATIVE MG/DL
RBC # BLD AUTO: 4.88 X10(6)UL (ref 3.8–5.3)
RBC #/AREA URNS AUTO: <1 /HPF
SODIUM SERPL-SCNC: 137 MMOL/L (ref 136–145)
SP GR UR STRIP: 1.01 (ref 1–1.03)
TRIGL SERPL-MCNC: 257 MG/DL (ref 30–149)
TSI SER-ACNC: 2.95 MIU/ML (ref 0.36–3.74)
UROBILINOGEN UR STRIP-ACNC: <2
VIT B12 SERPL-MCNC: 321 PG/ML (ref 193–986)
VLDLC SERPL CALC-MCNC: 51 MG/DL (ref 0–30)
WBC # BLD AUTO: 7.1 X10(3) UL (ref 4–11)
WBC #/AREA URNS AUTO: 1 /HPF

## 2020-08-24 PROCEDURE — 36415 COLL VENOUS BLD VENIPUNCTURE: CPT

## 2020-08-24 PROCEDURE — 84443 ASSAY THYROID STIM HORMONE: CPT

## 2020-08-24 PROCEDURE — 85025 COMPLETE CBC W/AUTO DIFF WBC: CPT

## 2020-08-24 PROCEDURE — 99213 OFFICE O/P EST LOW 20 MIN: CPT

## 2020-08-24 PROCEDURE — 82306 VITAMIN D 25 HYDROXY: CPT

## 2020-08-24 PROCEDURE — 82043 UR ALBUMIN QUANTITATIVE: CPT

## 2020-08-24 PROCEDURE — 81001 URINALYSIS AUTO W/SCOPE: CPT

## 2020-08-24 PROCEDURE — 82607 VITAMIN B-12: CPT

## 2020-08-24 PROCEDURE — 83036 HEMOGLOBIN GLYCOSYLATED A1C: CPT

## 2020-08-24 PROCEDURE — 99212 OFFICE O/P EST SF 10 MIN: CPT

## 2020-08-24 PROCEDURE — 80061 LIPID PANEL: CPT

## 2020-08-24 PROCEDURE — 28510 TREATMENT OF TOE FRACTURE: CPT

## 2020-08-24 PROCEDURE — 73660 X-RAY EXAM OF TOE(S): CPT | Performed by: EMERGENCY MEDICINE

## 2020-08-24 PROCEDURE — 82728 ASSAY OF FERRITIN: CPT

## 2020-08-24 PROCEDURE — 80053 COMPREHEN METABOLIC PANEL: CPT

## 2020-08-24 PROCEDURE — 82570 ASSAY OF URINE CREATININE: CPT

## 2020-08-25 NOTE — ED PROVIDER NOTES
Patient Seen in: [address]      History   Patient presents with: Foot Pain    Stated Complaint: fall - twisted right foot    HPI    60 yo female fell on 8/19 and was evaluated at that time.  Since then she has noted that Current:/77   Pulse 94   Temp 97.1 °F (36.2 °C) (Temporal)   Resp 20   Ht 162.6 cm (5' 4\")   Wt 79.4 kg   SpO2 97%   BMI 30.04 kg/m²         Physical Exam  Vitals signs and nursing note reviewed.    Constitutional:       Appearance: Normal appe

## 2020-08-26 LAB — 25(OH)D3 SERPL-MCNC: 23.1 NG/ML (ref 30–100)

## 2020-09-08 ENCOUNTER — OFFICE VISIT (OUTPATIENT)
Dept: INTERNAL MEDICINE CLINIC | Facility: CLINIC | Age: 60
End: 2020-09-08
Payer: MEDICAID

## 2020-09-08 VITALS
SYSTOLIC BLOOD PRESSURE: 132 MMHG | WEIGHT: 188.5 LBS | HEIGHT: 64 IN | HEART RATE: 76 BPM | DIASTOLIC BLOOD PRESSURE: 84 MMHG | BODY MASS INDEX: 32.18 KG/M2

## 2020-09-08 DIAGNOSIS — Z12.31 SCREENING MAMMOGRAM, ENCOUNTER FOR: Primary | ICD-10-CM

## 2020-09-08 DIAGNOSIS — E11.9 TYPE 2 DIABETES MELLITUS WITHOUT COMPLICATION, WITHOUT LONG-TERM CURRENT USE OF INSULIN (HCC): ICD-10-CM

## 2020-09-08 DIAGNOSIS — G43.709 CHRONIC MIGRAINE WITHOUT AURA WITHOUT STATUS MIGRAINOSUS, NOT INTRACTABLE: ICD-10-CM

## 2020-09-08 DIAGNOSIS — D50.9 IRON DEFICIENCY ANEMIA, UNSPECIFIED IRON DEFICIENCY ANEMIA TYPE: ICD-10-CM

## 2020-09-08 PROCEDURE — 3075F SYST BP GE 130 - 139MM HG: CPT | Performed by: INTERNAL MEDICINE

## 2020-09-08 PROCEDURE — 3079F DIAST BP 80-89 MM HG: CPT | Performed by: INTERNAL MEDICINE

## 2020-09-08 PROCEDURE — 99214 OFFICE O/P EST MOD 30 MIN: CPT | Performed by: INTERNAL MEDICINE

## 2020-09-08 PROCEDURE — 3008F BODY MASS INDEX DOCD: CPT | Performed by: INTERNAL MEDICINE

## 2020-09-08 RX ORDER — FERROUS SULFATE 325(65) MG
325 TABLET ORAL EVERY OTHER DAY
Qty: 90 TABLET | Refills: 0 | Status: SHIPPED | OUTPATIENT
Start: 2020-09-08

## 2020-09-08 RX ORDER — IBUPROFEN 800 MG/1
TABLET ORAL DAILY PRN
COMMUNITY
Start: 2020-08-31 | End: 2021-01-19 | Stop reason: ALTCHOICE

## 2020-09-08 NOTE — PROGRESS NOTES
HPI:    Patient ID: London Stephens is a 61year old female.   Patient presents with:  f/u labs patient states her sugars are some elevated but trying to improve the diet  Denies chest pain, shortnesss of breath, palpitations, or abdominal pain, denies UTI taking differently: Take 40 mg by mouth daily as needed.  ) 90 capsule 0   • metFORMIN HCl 500 MG Oral Tab Take 2 tablets (1,000 mg total) by mouth 2 (two) times daily with meals.  360 tablet 1   • loratadine 10 MG Oral Tab Take 1 tablet (10 mg total) by mo Normal rate, regular rhythm and normal heart sounds. No murmur heard. Pulmonary/Chest: Effort normal and breath sounds normal. No respiratory distress. She has no wheezes. She has no rales. Abdominal: Soft. She exhibits no distension and no mass.  Ther Recheck the levels 6 months              Orders Placed This Encounter      Hemoglobin A1C [E]      Microalb/Creat Ratio, Random Urine      Comp Metabolic Panel (14)      CBC W Differential W Platelet [E]      Ferritin [E]      Iron And Tibc      Meds Thi

## 2020-09-09 ENCOUNTER — TELEPHONE (OUTPATIENT)
Dept: INTERNAL MEDICINE CLINIC | Facility: CLINIC | Age: 60
End: 2020-09-09

## 2020-09-09 NOTE — TELEPHONE ENCOUNTER
Prior authorization request received from [facility] for FARXIGA 5MG but not on pt medication list    Key; [id number]  [email or web address]/LOGIN

## 2020-09-09 NOTE — TELEPHONE ENCOUNTER
Prior authorization for Naresh Mena has been initiated through [employer] using keycode: [id number] It takes about 1-5 business days for a decision to come back.

## 2020-09-10 NOTE — TELEPHONE ENCOUNTER
Prior authorization has been denied. It cannot be approved at this time. You must have tried and had a poor response to two preferred drugs that are used to treat your condition. They must be appropriate for your condition.  They must have been tried for

## 2020-09-10 NOTE — TELEPHONE ENCOUNTER
Invokana 100 mg daily was sent to the pharmacy as requested. I called the [facility] and they will call if it doesn't go through insurance.

## 2020-09-30 DIAGNOSIS — E03.9 HYPOTHYROIDISM, UNSPECIFIED TYPE: ICD-10-CM

## 2020-10-04 RX ORDER — LEVOTHYROXINE SODIUM 88 UG/1
TABLET ORAL
Qty: 90 TABLET | Refills: 1 | Status: SHIPPED | OUTPATIENT
Start: 2020-10-04 | End: 2021-06-18

## 2020-10-19 ENCOUNTER — TELEPHONE (OUTPATIENT)
Dept: INTERNAL MEDICINE CLINIC | Facility: CLINIC | Age: 60
End: 2020-10-19

## 2020-10-20 NOTE — TELEPHONE ENCOUNTER
Contacted [facility], spoke with Crayton Callander. Crayton Callander states they were able to get the medication to go thru. Medication picked up on 10/16/2020.

## 2020-11-17 DIAGNOSIS — K21.9 GASTROESOPHAGEAL REFLUX DISEASE WITHOUT ESOPHAGITIS: ICD-10-CM

## 2020-11-17 DIAGNOSIS — E78.00 PURE HYPERCHOLESTEROLEMIA: ICD-10-CM

## 2020-11-18 ENCOUNTER — TELEPHONE (OUTPATIENT)
Dept: INTERNAL MEDICINE CLINIC | Facility: CLINIC | Age: 60
End: 2020-11-18

## 2020-11-18 RX ORDER — FENOFIBRATE 134 MG/1
134 CAPSULE ORAL
Qty: 90 CAPSULE | Refills: 3 | Status: SHIPPED | OUTPATIENT
Start: 2020-11-18 | End: 2021-11-29

## 2020-11-18 RX ORDER — OMEPRAZOLE 40 MG/1
CAPSULE, DELAYED RELEASE ORAL
Qty: 90 CAPSULE | Refills: 0 | Status: SHIPPED | OUTPATIENT
Start: 2020-11-18 | End: 2021-01-19

## 2020-11-18 NOTE — TELEPHONE ENCOUNTER
Fax received requesting PA through cover my meds. Use Key [id number].      Current Outpatient Medications   Medication Sig Dispense Refill   • Omeprazole 40 MG Oral Capsule Delayed Release TAKE 1 CAPSULE BY MOUTH 30-60 MINUTES BEFORE BREAKFAST 90 capsule 0

## 2020-11-18 NOTE — TELEPHONE ENCOUNTER
Prior authorization for omeprazole has been initiated through [employer] using keycode: [id number] It takes about 1-5 business days for a decision to come back.

## 2020-12-25 RX ORDER — CANAGLIFLOZIN 100 MG/1
TABLET, FILM COATED ORAL
Qty: 90 TABLET | Refills: 0 | Status: SHIPPED | OUTPATIENT
Start: 2020-12-25 | End: 2021-01-19

## 2020-12-27 NOTE — TELEPHONE ENCOUNTER
MyChart sent. CSS=please call and assist-please advise to do non fasting blood tests-orders in the computer. See Dr Rony Cat note below. No future appointments.

## 2020-12-30 ENCOUNTER — LAB ENCOUNTER (OUTPATIENT)
Dept: LAB | Age: 60
End: 2020-12-30
Attending: INTERNAL MEDICINE
Payer: MEDICAID

## 2020-12-30 DIAGNOSIS — D50.9 IRON DEFICIENCY ANEMIA, UNSPECIFIED IRON DEFICIENCY ANEMIA TYPE: ICD-10-CM

## 2020-12-30 DIAGNOSIS — E11.9 TYPE 2 DIABETES MELLITUS WITHOUT COMPLICATION, WITHOUT LONG-TERM CURRENT USE OF INSULIN (HCC): ICD-10-CM

## 2020-12-30 LAB
ALBUMIN SERPL-MCNC: 3.6 G/DL (ref 3.4–5)
ALBUMIN/GLOB SERPL: 1 {RATIO} (ref 1–2)
ALP LIVER SERPL-CCNC: 82 U/L
ALT SERPL-CCNC: 42 U/L
ANION GAP SERPL CALC-SCNC: 2 MMOL/L (ref 0–18)
AST SERPL-CCNC: 27 U/L (ref 15–37)
BASOPHILS # BLD AUTO: 0.06 X10(3) UL (ref 0–0.2)
BASOPHILS NFR BLD AUTO: 0.8 %
BILIRUB SERPL-MCNC: 0.3 MG/DL (ref 0.1–2)
BUN BLD-MCNC: 15 MG/DL (ref 7–18)
BUN/CREAT SERPL: 14.9 (ref 10–20)
CALCIUM BLD-MCNC: 9.5 MG/DL (ref 8.5–10.1)
CHLORIDE SERPL-SCNC: 106 MMOL/L (ref 98–112)
CO2 SERPL-SCNC: 30 MMOL/L (ref 21–32)
CREAT BLD-MCNC: 1.01 MG/DL
CREAT UR-SCNC: 219 MG/DL
DEPRECATED HBV CORE AB SER IA-ACNC: 35.8 NG/ML
DEPRECATED RDW RBC AUTO: 41.7 FL (ref 35.1–46.3)
EOSINOPHIL # BLD AUTO: 0.19 X10(3) UL (ref 0–0.7)
EOSINOPHIL NFR BLD AUTO: 2.5 %
ERYTHROCYTE [DISTWIDTH] IN BLOOD BY AUTOMATED COUNT: 14.3 % (ref 11–15)
EST. AVERAGE GLUCOSE BLD GHB EST-MCNC: 171 MG/DL (ref 68–126)
GLOBULIN PLAS-MCNC: 3.6 G/DL (ref 2.8–4.4)
GLUCOSE BLD-MCNC: 185 MG/DL (ref 70–99)
HBA1C MFR BLD HPLC: 7.6 % (ref ?–5.7)
HCT VFR BLD AUTO: 39.7 %
HGB BLD-MCNC: 12.4 G/DL
IMM GRANULOCYTES # BLD AUTO: 0.03 X10(3) UL (ref 0–1)
IMM GRANULOCYTES NFR BLD: 0.4 %
IRON SATURATION: 11 %
IRON SERPL-MCNC: 53 UG/DL
LYMPHOCYTES # BLD AUTO: 2.75 X10(3) UL (ref 1–4)
LYMPHOCYTES NFR BLD AUTO: 36.4 %
M PROTEIN MFR SERPL ELPH: 7.2 G/DL (ref 6.4–8.2)
MCH RBC QN AUTO: 25.4 PG (ref 26–34)
MCHC RBC AUTO-ENTMCNC: 31.2 G/DL (ref 31–37)
MCV RBC AUTO: 81.2 FL
MICROALBUMIN UR-MCNC: 3.14 MG/DL
MICROALBUMIN/CREAT 24H UR-RTO: 14.3 UG/MG (ref ?–30)
MONOCYTES # BLD AUTO: 0.66 X10(3) UL (ref 0.1–1)
MONOCYTES NFR BLD AUTO: 8.7 %
NEUTROPHILS # BLD AUTO: 3.86 X10 (3) UL (ref 1.5–7.7)
NEUTROPHILS # BLD AUTO: 3.86 X10(3) UL (ref 1.5–7.7)
NEUTROPHILS NFR BLD AUTO: 51.2 %
OSMOLALITY SERPL CALC.SUM OF ELEC: 292 MOSM/KG (ref 275–295)
PATIENT FASTING Y/N/NP: NO
PLATELET # BLD AUTO: 388 10(3)UL (ref 150–450)
POTASSIUM SERPL-SCNC: 4.9 MMOL/L (ref 3.5–5.1)
RBC # BLD AUTO: 4.89 X10(6)UL
SODIUM SERPL-SCNC: 138 MMOL/L (ref 136–145)
TOTAL IRON BINDING CAPACITY: 462 UG/DL (ref 240–450)
TRANSFERRIN SERPL-MCNC: 310 MG/DL (ref 200–360)
WBC # BLD AUTO: 7.6 X10(3) UL (ref 4–11)

## 2020-12-30 PROCEDURE — 83540 ASSAY OF IRON: CPT

## 2020-12-30 PROCEDURE — 82043 UR ALBUMIN QUANTITATIVE: CPT

## 2020-12-30 PROCEDURE — 80053 COMPREHEN METABOLIC PANEL: CPT

## 2020-12-30 PROCEDURE — 82728 ASSAY OF FERRITIN: CPT

## 2020-12-30 PROCEDURE — 85025 COMPLETE CBC W/AUTO DIFF WBC: CPT

## 2020-12-30 PROCEDURE — 82570 ASSAY OF URINE CREATININE: CPT

## 2020-12-30 PROCEDURE — 84466 ASSAY OF TRANSFERRIN: CPT

## 2020-12-30 PROCEDURE — 83036 HEMOGLOBIN GLYCOSYLATED A1C: CPT

## 2020-12-30 PROCEDURE — 36415 COLL VENOUS BLD VENIPUNCTURE: CPT

## 2021-01-14 ENCOUNTER — VIRTUAL PHONE E/M (OUTPATIENT)
Dept: INTERNAL MEDICINE CLINIC | Facility: CLINIC | Age: 61
End: 2021-01-14

## 2021-01-14 DIAGNOSIS — E11.9 TYPE 2 DIABETES MELLITUS WITHOUT COMPLICATION, WITHOUT LONG-TERM CURRENT USE OF INSULIN (HCC): ICD-10-CM

## 2021-01-14 DIAGNOSIS — E03.9 HYPOTHYROIDISM, UNSPECIFIED TYPE: Primary | ICD-10-CM

## 2021-01-14 PROCEDURE — 99213 OFFICE O/P EST LOW 20 MIN: CPT | Performed by: INTERNAL MEDICINE

## 2021-01-14 NOTE — PROGRESS NOTES
Telemedicine Note    Virtual Phone Check-In    Fabian Brooks verbally consents to a Virtual Phone Check-In service on 01/14/21.  Patient understands and accepts financial responsibility for any deductible, co-insurance and/or co-pays associated with this Hysterectomy  2012   • Temporal artery ligatn or bx Bilateral 09/07/2017    Temporal artery biopsies, bilateral   • Upper gi endoscopy performed  09-     Family History   Problem Relation Age of Onset   • Diabetes Father    • Diabetes Mother    • He Delayed Release TAKE 1 CAPSULE BY MOUTH 30-60 MINUTES BEFORE BREAKFAST 90 capsule 0   • Levothyroxine Sodium 88 MCG Oral Tab TAKE ONE TABLET BY MOUTH ONE TIME DAILY 30-60 MINUTES BEFORE BREAKFAST 90 tablet 1   • ibuprofen 800 MG Oral Tab daily as needed. dizziness  PSYCHE: negative for depression or anxiety symptoms    ALL/ASTHMA: negative for allergy symptoms   loosing  Hair -  thinning      Physical Exam  Patient alert and oriented x3  Patient does not appear in any respiratory distress during the conver evaluation is not a substitute for face-to-face examination or emergency care. Patient advised to go to ER or call 911 for worsening symptoms or acute distress.    Bandar Neumann MD

## 2021-01-16 ENCOUNTER — LAB ENCOUNTER (OUTPATIENT)
Dept: LAB | Age: 61
End: 2021-01-16
Attending: COUNSELOR
Payer: MEDICAID

## 2021-01-16 DIAGNOSIS — E03.9 HYPOTHYROIDISM, UNSPECIFIED TYPE: ICD-10-CM

## 2021-01-16 LAB — TSI SER-ACNC: 1.04 MIU/ML (ref 0.36–3.74)

## 2021-01-16 PROCEDURE — 84443 ASSAY THYROID STIM HORMONE: CPT

## 2021-01-16 PROCEDURE — 36415 COLL VENOUS BLD VENIPUNCTURE: CPT

## 2021-01-19 ENCOUNTER — OFFICE VISIT (OUTPATIENT)
Dept: INTERNAL MEDICINE CLINIC | Facility: CLINIC | Age: 61
End: 2021-01-19
Payer: MEDICAID

## 2021-01-19 VITALS
HEART RATE: 88 BPM | WEIGHT: 180 LBS | DIASTOLIC BLOOD PRESSURE: 67 MMHG | HEIGHT: 64 IN | BODY MASS INDEX: 30.73 KG/M2 | SYSTOLIC BLOOD PRESSURE: 129 MMHG

## 2021-01-19 DIAGNOSIS — G89.29 CHRONIC PAIN OF RIGHT KNEE: ICD-10-CM

## 2021-01-19 DIAGNOSIS — E11.9 TYPE 2 DIABETES MELLITUS WITHOUT COMPLICATION, WITHOUT LONG-TERM CURRENT USE OF INSULIN (HCC): Primary | ICD-10-CM

## 2021-01-19 DIAGNOSIS — M25.561 CHRONIC PAIN OF RIGHT KNEE: ICD-10-CM

## 2021-01-19 DIAGNOSIS — K21.9 GASTROESOPHAGEAL REFLUX DISEASE WITHOUT ESOPHAGITIS: ICD-10-CM

## 2021-01-19 DIAGNOSIS — E66.9 OBESITY (BMI 30-39.9): ICD-10-CM

## 2021-01-19 PROCEDURE — 3078F DIAST BP <80 MM HG: CPT | Performed by: INTERNAL MEDICINE

## 2021-01-19 PROCEDURE — 99214 OFFICE O/P EST MOD 30 MIN: CPT | Performed by: INTERNAL MEDICINE

## 2021-01-19 PROCEDURE — 3008F BODY MASS INDEX DOCD: CPT | Performed by: INTERNAL MEDICINE

## 2021-01-19 PROCEDURE — 3074F SYST BP LT 130 MM HG: CPT | Performed by: INTERNAL MEDICINE

## 2021-01-19 RX ORDER — OMEPRAZOLE 40 MG/1
CAPSULE, DELAYED RELEASE ORAL
Qty: 90 CAPSULE | Refills: 1 | Status: SHIPPED | OUTPATIENT
Start: 2021-01-19 | End: 2021-03-03

## 2021-01-19 NOTE — PROGRESS NOTES
HPI:    Patient ID: Luis F Vigil is a 61year old female.     Patient presents with:  Diabetes    Patient presents today for follow-up on her diabetes patient states could not tolerate Invokana due to burning with urination as well as some vaginal itchin weight, a limited range of motion and stiffness. The symptoms are aggravated by activity and standing. She has tried movement (had simvinsc  and steroid injections    seeing   Orthoedic  at [facility] 6) for the symptoms. The treatment provided mild relief.  H Apply 1 Application topically. • SITagliptin Phosphate 100 MG Oral Tab Take 1 tablet (100 mg total) by mouth daily.  (Patient not taking: Reported on 1/19/2021 ) 30 tablet 0     Allergies:  Penicillins             HIVES, RASH  Codeine                 NA ASSESSMENT/PLAN:   Type 2 diabetes mellitus without complication, without long-term current use of insulin (hcc)  Keep sugars glycemic control to prevent complications of DM2  Keep 1800 keegan ADA- Diabetic diet   diet/exercise   accu-checks   Eye exam and minutes before lunch    always on empty stomach Side effects and directions of medication discussed with patient. Patient verbalized understanding and compliance. No orders of the defined types were placed in this encounter.       Meds This Visit:

## 2021-01-25 NOTE — TELEPHONE ENCOUNTER
Patient called and advised that she is Completely OUT of Diclofenac Gel. She is no longer seeing that , and wanted to know if Dr. Reina Villalta would please refill it for her.      ALSO, Patient advised the Pharmacy NEVER received the prescription for the S

## 2021-02-04 DIAGNOSIS — E11.9 TYPE 2 DIABETES MELLITUS WITHOUT COMPLICATION, WITHOUT LONG-TERM CURRENT USE OF INSULIN (HCC): ICD-10-CM

## 2021-03-01 ENCOUNTER — TELEPHONE (OUTPATIENT)
Dept: INTERNAL MEDICINE CLINIC | Facility: CLINIC | Age: 61
End: 2021-03-01

## 2021-03-01 DIAGNOSIS — E11.9 TYPE 2 DIABETES MELLITUS WITHOUT COMPLICATION, WITHOUT LONG-TERM CURRENT USE OF INSULIN (HCC): Primary | ICD-10-CM

## 2021-03-01 DIAGNOSIS — K21.9 GASTROESOPHAGEAL REFLUX DISEASE WITHOUT ESOPHAGITIS: ICD-10-CM

## 2021-03-01 DIAGNOSIS — E55.9 VITAMIN D DEFICIENCY: ICD-10-CM

## 2021-03-01 DIAGNOSIS — R79.0 DECREASED FERRITIN: ICD-10-CM

## 2021-03-01 NOTE — TELEPHONE ENCOUNTER
Dr. Aimee Aguilera, patient is schedule for a follow up on 03/04/2021. Patient is requesting blood test order for appointment. Please advise.

## 2021-03-02 NOTE — TELEPHONE ENCOUNTER
FAX FROM [facility]:  •  Omeprazole 40 MG Oral Capsule Delayed Release, TAKE 1 CAPSULE BY MOUTH 30-60 MINUTES BEFORE LUNCH, Disp: 90 capsule, Rfl: 1   message from pharmacy: Patient says directions are take two times a day please send new prescription

## 2021-03-03 RX ORDER — OMEPRAZOLE 40 MG/1
40 CAPSULE, DELAYED RELEASE ORAL 2 TIMES DAILY
Qty: 180 CAPSULE | Refills: 0 | Status: SHIPPED | OUTPATIENT
Start: 2021-03-03 | End: 2021-05-28

## 2021-03-04 ENCOUNTER — TELEPHONE (OUTPATIENT)
Dept: INTERNAL MEDICINE CLINIC | Facility: CLINIC | Age: 61
End: 2021-03-04

## 2021-03-04 NOTE — TELEPHONE ENCOUNTER
Prior authorization request received through [employer]    Prior authorization for Omeprazole completed w/ [employer] on cover my meds Key: [id number], turn around time 3-5 days.

## 2021-03-05 NOTE — TELEPHONE ENCOUNTER
Please  Inform  patient on below insurance  response       Pt  Takes  1 tab  Daily - please  , does she  still need medication daily ?       Alternative is pepsid  20 mg  Bid

## 2021-03-11 ENCOUNTER — LAB ENCOUNTER (OUTPATIENT)
Dept: LAB | Age: 61
End: 2021-03-11
Attending: INTERNAL MEDICINE
Payer: MEDICAID

## 2021-03-11 DIAGNOSIS — E11.9 TYPE 2 DIABETES MELLITUS WITHOUT COMPLICATION, WITHOUT LONG-TERM CURRENT USE OF INSULIN (HCC): ICD-10-CM

## 2021-03-11 DIAGNOSIS — R79.0 DECREASED FERRITIN: ICD-10-CM

## 2021-03-11 DIAGNOSIS — E55.9 VITAMIN D DEFICIENCY: ICD-10-CM

## 2021-03-11 LAB
ALBUMIN SERPL-MCNC: 3.6 G/DL (ref 3.4–5)
ALBUMIN/GLOB SERPL: 1.1 {RATIO} (ref 1–2)
ALP LIVER SERPL-CCNC: 79 U/L
ALT SERPL-CCNC: 40 U/L
ANION GAP SERPL CALC-SCNC: 1 MMOL/L (ref 0–18)
AST SERPL-CCNC: 30 U/L (ref 15–37)
BILIRUB SERPL-MCNC: 0.3 MG/DL (ref 0.1–2)
BUN BLD-MCNC: 12 MG/DL (ref 7–18)
BUN/CREAT SERPL: 12.1 (ref 10–20)
CALCIUM BLD-MCNC: 8.8 MG/DL (ref 8.5–10.1)
CHLORIDE SERPL-SCNC: 105 MMOL/L (ref 98–112)
CHOLEST SMN-MCNC: 181 MG/DL (ref ?–200)
CO2 SERPL-SCNC: 31 MMOL/L (ref 21–32)
CREAT BLD-MCNC: 0.99 MG/DL
CREAT UR-SCNC: 102 MG/DL
DEPRECATED HBV CORE AB SER IA-ACNC: 46.4 NG/ML
EST. AVERAGE GLUCOSE BLD GHB EST-MCNC: 174 MG/DL (ref 68–126)
GLOBULIN PLAS-MCNC: 3.3 G/DL (ref 2.8–4.4)
GLUCOSE BLD-MCNC: 128 MG/DL (ref 70–99)
HBA1C MFR BLD HPLC: 7.7 % (ref ?–5.7)
HDLC SERPL-MCNC: 45 MG/DL (ref 40–59)
LDLC SERPL CALC-MCNC: 101 MG/DL (ref ?–100)
M PROTEIN MFR SERPL ELPH: 6.9 G/DL (ref 6.4–8.2)
MICROALBUMIN UR-MCNC: 1.04 MG/DL
MICROALBUMIN/CREAT 24H UR-RTO: 10.2 UG/MG (ref ?–30)
NONHDLC SERPL-MCNC: 136 MG/DL (ref ?–130)
OSMOLALITY SERPL CALC.SUM OF ELEC: 285 MOSM/KG (ref 275–295)
PATIENT FASTING Y/N/NP: YES
PATIENT FASTING Y/N/NP: YES
POTASSIUM SERPL-SCNC: 4.3 MMOL/L (ref 3.5–5.1)
SODIUM SERPL-SCNC: 137 MMOL/L (ref 136–145)
TRIGL SERPL-MCNC: 175 MG/DL (ref 30–149)
TSI SER-ACNC: 2.7 MIU/ML (ref 0.36–3.74)
VLDLC SERPL CALC-MCNC: 35 MG/DL (ref 0–30)

## 2021-03-11 PROCEDURE — 80053 COMPREHEN METABOLIC PANEL: CPT

## 2021-03-11 PROCEDURE — 82570 ASSAY OF URINE CREATININE: CPT

## 2021-03-11 PROCEDURE — 80061 LIPID PANEL: CPT

## 2021-03-11 PROCEDURE — 82728 ASSAY OF FERRITIN: CPT

## 2021-03-11 PROCEDURE — 3061F NEG MICROALBUMINURIA REV: CPT | Performed by: INTERNAL MEDICINE

## 2021-03-11 PROCEDURE — 83036 HEMOGLOBIN GLYCOSYLATED A1C: CPT

## 2021-03-11 PROCEDURE — 82043 UR ALBUMIN QUANTITATIVE: CPT

## 2021-03-11 PROCEDURE — 84443 ASSAY THYROID STIM HORMONE: CPT

## 2021-03-11 PROCEDURE — 36415 COLL VENOUS BLD VENIPUNCTURE: CPT

## 2021-03-11 PROCEDURE — 3051F HG A1C>EQUAL 7.0%<8.0%: CPT | Performed by: INTERNAL MEDICINE

## 2021-03-11 PROCEDURE — 82306 VITAMIN D 25 HYDROXY: CPT

## 2021-03-12 LAB — 25(OH)D3 SERPL-MCNC: 27.5 NG/ML (ref 30–100)

## 2021-03-17 ENCOUNTER — OFFICE VISIT (OUTPATIENT)
Dept: INTERNAL MEDICINE CLINIC | Facility: CLINIC | Age: 61
End: 2021-03-17
Payer: MEDICAID

## 2021-03-17 VITALS
HEIGHT: 64 IN | WEIGHT: 180 LBS | BODY MASS INDEX: 30.73 KG/M2 | SYSTOLIC BLOOD PRESSURE: 122 MMHG | DIASTOLIC BLOOD PRESSURE: 79 MMHG | HEART RATE: 87 BPM

## 2021-03-17 DIAGNOSIS — E78.2 MIXED HYPERLIPIDEMIA: ICD-10-CM

## 2021-03-17 DIAGNOSIS — E11.9 TYPE 2 DIABETES MELLITUS WITHOUT COMPLICATION, WITHOUT LONG-TERM CURRENT USE OF INSULIN (HCC): Primary | ICD-10-CM

## 2021-03-17 DIAGNOSIS — E66.9 OBESITY (BMI 30-39.9): ICD-10-CM

## 2021-03-17 PROCEDURE — 3078F DIAST BP <80 MM HG: CPT | Performed by: INTERNAL MEDICINE

## 2021-03-17 PROCEDURE — 3074F SYST BP LT 130 MM HG: CPT | Performed by: INTERNAL MEDICINE

## 2021-03-17 PROCEDURE — 3008F BODY MASS INDEX DOCD: CPT | Performed by: INTERNAL MEDICINE

## 2021-03-17 PROCEDURE — 99214 OFFICE O/P EST MOD 30 MIN: CPT | Performed by: INTERNAL MEDICINE

## 2021-03-17 RX ORDER — SEMAGLUTIDE 1.34 MG/ML
0.25 INJECTION, SOLUTION SUBCUTANEOUS
Qty: 1 PEN | Refills: 0 | Status: SHIPPED | OUTPATIENT
Start: 2021-03-17 | End: 2021-03-19

## 2021-03-17 RX ORDER — PEN NEEDLE, DIABETIC 30 GX3/16"
1 NEEDLE, DISPOSABLE MISCELLANEOUS
Qty: 30 EACH | Refills: 0 | Status: SHIPPED | OUTPATIENT
Start: 2021-03-17

## 2021-03-17 RX ORDER — NORTRIPTYLINE HYDROCHLORIDE 50 MG/1
CAPSULE ORAL
Qty: 180 CAPSULE | Refills: 0 | Status: SHIPPED | OUTPATIENT
Start: 2021-03-17 | End: 2021-04-18

## 2021-03-18 ENCOUNTER — TELEPHONE (OUTPATIENT)
Dept: INTERNAL MEDICINE CLINIC | Facility: CLINIC | Age: 61
End: 2021-03-18

## 2021-03-18 NOTE — PROGRESS NOTES
HPI:    Patient ID: Luis F Vigil is a 61year old female.     Patient presents with:  Diabetes: Last labs completed on 3/11/21    Patient presents today for follow-up on her diabetes-follow-up on her blood test last A1c is seven-point  7 that is slowly d NEEDLES) 32G X 4 MM Does not apply Misc 1 each by Does not apply route every 7 days.  30 each 0   • Nortriptyline HCl 50 MG Oral Cap TAKE 2 CAPSULES BY MOUTH ONE TIME DAILY AT NIGHT 180 capsule 0   • metFORMIN HCl 500 MG Oral Tab Take 2 tablets (1,000 mg to Right conjunctiva is not injected. Left eye: Left conjunctiva is not injected. Pupils: Pupils are equal, round, and reactive to light. Neck:      Thyroid: No thyromegaly. Vascular: No JVD.    Cardiovascular:      Rate and Rhythm: Normal rat patient is tolerating medicine well  Due to elevated sugars hemoglobin A1c is 7.7 try to add Ozempic  0.25 mg injections once weekly every 7 days subacute under the skin  Patient agrees and verbalized understanding compliance side effects and directions di each 0     Si each by Does not apply route every 7 days.    • Nortriptyline HCl 50 MG Oral Cap 180 capsule 0     Sig: TAKE 2 CAPSULES BY MOUTH ONE TIME DAILY AT NIGHT   • metFORMIN HCl 500 MG Oral Tab 180 tablet 3     Sig: Take 2 tablets (1,000 mg total

## 2021-03-18 NOTE — TELEPHONE ENCOUNTER
Fax received. Prior authorization needed:    •  Semaglutide,0.25 or 0.5MG/DOS, (OZEMPIC, 0.25 OR 0.5 MG/DOSE,) 2 MG/1.5ML Subcutaneous Solution Pen-injector, Inject 0.25 mg into the skin every 7 days for 28 days. , Disp: 1 pen, Rfl: 0    KEY: [id number]

## 2021-03-18 NOTE — TELEPHONE ENCOUNTER
Prior authorization for Diclofenac gel completed w/ [employer] on cover my meds Key: [id number], turn around time 1-5 days. Diclofenac Sodium 1 % External Gel, Apply 2 g topically 4 (four) times daily. , Disp: 1 each, Rfl: 0     KEY: [id number]

## 2021-03-18 NOTE — TELEPHONE ENCOUNTER
Prior authorization for Ozempic completed w/ [employer] on cover my meds Key: [id number], turn around time 1-5 days.     See other encounter for other PA request.

## 2021-03-19 ENCOUNTER — TELEPHONE (OUTPATIENT)
Dept: INTERNAL MEDICINE CLINIC | Facility: CLINIC | Age: 61
End: 2021-03-19

## 2021-03-19 RX ORDER — LIRAGLUTIDE 6 MG/ML
INJECTION SUBCUTANEOUS
Qty: 3 PEN | Refills: 0 | Status: SHIPPED | OUTPATIENT
Start: 2021-03-19 | End: 2021-05-18

## 2021-03-19 RX ORDER — PEN NEEDLE, DIABETIC 30 GX3/16"
1 NEEDLE, DISPOSABLE MISCELLANEOUS DAILY
Qty: 90 EACH | Refills: 0 | Status: SHIPPED | OUTPATIENT
Start: 2021-03-19

## 2021-03-19 NOTE — TELEPHONE ENCOUNTER
Prior authorization for Victoza completed w/ [employer] on cover my meds Key: [id number], turn around time 1-5 days.

## 2021-03-19 NOTE — TELEPHONE ENCOUNTER
Prior authorization has been denied for Ozempic. Patients plan states medication is not covered due to not meeting criteria.     Alterntives:  -Byetta  -Victoza

## 2021-03-19 NOTE — TELEPHONE ENCOUNTER
Voltaren gel can buy it also over-the-counter as I discussed with patient at visit time if not covered by insurance

## 2021-03-19 NOTE — TELEPHONE ENCOUNTER
We will try Victoza injections daily start with 0.6 once daily for now hope we can switch it sometime soon in the future to Ozempic -that is not covered by insurance now    Patient agreed with Victoza injections at visit time-  Victoza sent to the pharmacy

## 2021-03-19 NOTE — TELEPHONE ENCOUNTER
Prior authorization request from [employer]    •  Liraglutide (VICTOZA) 18 MG/3ML Subcutaneous Solution Pen-injector, Inject 0.6 mg into the skin daily for 7 days, THEN 1.2 mg daily for 7 days, THEN 1.8 mg daily. , Disp: 3 pen, Rfl: 0    [email or web address]

## 2021-03-19 NOTE — TELEPHONE ENCOUNTER
Prior authorization was denied for Diclofenac gel.      Patient must have tried and had a poor response to five preferred drugs.   -Celecoxib capsules   -Diclofenac sodium tablets   -Diflunisal tablets   -Etodolac capsules, immediate-release tablets, extend

## 2021-03-22 ENCOUNTER — APPOINTMENT (OUTPATIENT)
Dept: GENERAL RADIOLOGY | Facility: HOSPITAL | Age: 61
End: 2021-03-22
Attending: EMERGENCY MEDICINE
Payer: MEDICAID

## 2021-03-22 ENCOUNTER — HOSPITAL ENCOUNTER (EMERGENCY)
Facility: HOSPITAL | Age: 61
Discharge: HOME | End: 2021-03-23
Attending: EMERGENCY MEDICINE
Payer: MEDICAID

## 2021-03-22 DIAGNOSIS — R07.9 CHEST PAIN OF UNCERTAIN ETIOLOGY: Primary | ICD-10-CM

## 2021-03-22 DIAGNOSIS — K21.9 GASTROESOPHAGEAL REFLUX DISEASE, UNSPECIFIED WHETHER ESOPHAGITIS PRESENT: ICD-10-CM

## 2021-03-22 PROCEDURE — 71045 X-RAY EXAM CHEST 1 VIEW: CPT | Performed by: EMERGENCY MEDICINE

## 2021-03-22 PROCEDURE — 99284 EMERGENCY DEPT VISIT MOD MDM: CPT

## 2021-03-22 PROCEDURE — 80048 BASIC METABOLIC PNL TOTAL CA: CPT | Performed by: EMERGENCY MEDICINE

## 2021-03-22 PROCEDURE — 85025 COMPLETE CBC W/AUTO DIFF WBC: CPT | Performed by: EMERGENCY MEDICINE

## 2021-03-22 PROCEDURE — 84484 ASSAY OF TROPONIN QUANT: CPT | Performed by: EMERGENCY MEDICINE

## 2021-03-22 PROCEDURE — 36415 COLL VENOUS BLD VENIPUNCTURE: CPT

## 2021-03-22 PROCEDURE — 80076 HEPATIC FUNCTION PANEL: CPT | Performed by: EMERGENCY MEDICINE

## 2021-03-22 PROCEDURE — 93010 ELECTROCARDIOGRAM REPORT: CPT | Performed by: EMERGENCY MEDICINE

## 2021-03-22 PROCEDURE — 83690 ASSAY OF LIPASE: CPT | Performed by: EMERGENCY MEDICINE

## 2021-03-22 PROCEDURE — 93005 ELECTROCARDIOGRAM TRACING: CPT

## 2021-03-22 RX ORDER — ASPIRIN 81 MG/1
324 TABLET, CHEWABLE ORAL ONCE
Status: COMPLETED | OUTPATIENT
Start: 2021-03-22 | End: 2021-03-23

## 2021-03-22 RX ORDER — MAGNESIUM HYDROXIDE/ALUMINUM HYDROXICE/SIMETHICONE 120; 1200; 1200 MG/30ML; MG/30ML; MG/30ML
30 SUSPENSION ORAL ONCE
Status: COMPLETED | OUTPATIENT
Start: 2021-03-22 | End: 2021-03-23

## 2021-03-23 VITALS
DIASTOLIC BLOOD PRESSURE: 89 MMHG | RESPIRATION RATE: 20 BRPM | HEIGHT: 64 IN | BODY MASS INDEX: 30.73 KG/M2 | TEMPERATURE: 98 F | OXYGEN SATURATION: 100 % | WEIGHT: 180 LBS | SYSTOLIC BLOOD PRESSURE: 132 MMHG | HEART RATE: 89 BPM

## 2021-03-23 LAB
ALBUMIN SERPL-MCNC: 3.6 G/DL (ref 3.4–5)
ALP LIVER SERPL-CCNC: 87 U/L
ALT SERPL-CCNC: 44 U/L
ANION GAP SERPL CALC-SCNC: 7 MMOL/L (ref 0–18)
AST SERPL-CCNC: 41 U/L (ref 15–37)
BASOPHILS # BLD AUTO: 0.05 X10(3) UL (ref 0–0.2)
BASOPHILS NFR BLD AUTO: 0.5 %
BILIRUB DIRECT SERPL-MCNC: <0.1 MG/DL (ref 0–0.2)
BILIRUB SERPL-MCNC: 0.3 MG/DL (ref 0.1–2)
BUN BLD-MCNC: 16 MG/DL (ref 7–18)
BUN/CREAT SERPL: 15.8 (ref 10–20)
CALCIUM BLD-MCNC: 9.1 MG/DL (ref 8.5–10.1)
CHLORIDE SERPL-SCNC: 105 MMOL/L (ref 98–112)
CO2 SERPL-SCNC: 24 MMOL/L (ref 21–32)
CREAT BLD-MCNC: 1.01 MG/DL
D DIMER PPP FEU-MCNC: 0.48 UG/ML FEU (ref ?–0.6)
DEPRECATED RDW RBC AUTO: 43.6 FL (ref 35.1–46.3)
EOSINOPHIL # BLD AUTO: 0.13 X10(3) UL (ref 0–0.7)
EOSINOPHIL NFR BLD AUTO: 1.4 %
ERYTHROCYTE [DISTWIDTH] IN BLOOD BY AUTOMATED COUNT: 15.1 % (ref 11–15)
GLUCOSE BLD-MCNC: 144 MG/DL (ref 70–99)
GLUCOSE BLDC GLUCOMTR-MCNC: 130 MG/DL (ref 70–99)
HCT VFR BLD AUTO: 37.2 %
HGB BLD-MCNC: 11.7 G/DL
IMM GRANULOCYTES # BLD AUTO: 0.04 X10(3) UL (ref 0–1)
IMM GRANULOCYTES NFR BLD: 0.4 %
LIPASE SERPL-CCNC: 115 U/L (ref 73–393)
LYMPHOCYTES # BLD AUTO: 3.78 X10(3) UL (ref 1–4)
LYMPHOCYTES NFR BLD AUTO: 40.9 %
M PROTEIN MFR SERPL ELPH: 7.5 G/DL (ref 6.4–8.2)
MCH RBC QN AUTO: 25.1 PG (ref 26–34)
MCHC RBC AUTO-ENTMCNC: 31.5 G/DL (ref 31–37)
MCV RBC AUTO: 79.7 FL
MONOCYTES # BLD AUTO: 0.96 X10(3) UL (ref 0.1–1)
MONOCYTES NFR BLD AUTO: 10.4 %
NEUTROPHILS # BLD AUTO: 4.29 X10 (3) UL (ref 1.5–7.7)
NEUTROPHILS # BLD AUTO: 4.29 X10(3) UL (ref 1.5–7.7)
NEUTROPHILS NFR BLD AUTO: 46.4 %
OSMOLALITY SERPL CALC.SUM OF ELEC: 286 MOSM/KG (ref 275–295)
PLATELET # BLD AUTO: 352 10(3)UL (ref 150–450)
POTASSIUM SERPL-SCNC: 4.9 MMOL/L (ref 3.5–5.1)
RBC # BLD AUTO: 4.67 X10(6)UL
SODIUM SERPL-SCNC: 136 MMOL/L (ref 136–145)
TROPONIN I SERPL-MCNC: <0.045 NG/ML (ref ?–0.04)
TROPONIN I SERPL-MCNC: <0.045 NG/ML (ref ?–0.04)
WBC # BLD AUTO: 9.3 X10(3) UL (ref 4–11)

## 2021-03-23 PROCEDURE — 85379 FIBRIN DEGRADATION QUANT: CPT | Performed by: EMERGENCY MEDICINE

## 2021-03-23 PROCEDURE — 82962 GLUCOSE BLOOD TEST: CPT

## 2021-03-23 PROCEDURE — 84484 ASSAY OF TROPONIN QUANT: CPT | Performed by: EMERGENCY MEDICINE

## 2021-03-23 RX ORDER — FAMOTIDINE 20 MG/1
20 TABLET ORAL 2 TIMES DAILY PRN
Qty: 30 TABLET | Refills: 0 | Status: SHIPPED | OUTPATIENT
Start: 2021-03-23 | End: 2021-04-22

## 2021-03-23 NOTE — ED INITIAL ASSESSMENT (HPI)
Patient is here mid sternal chest pain after she had a dinner with \"a hot sauce\". Patient denied shortness of breath. Patient states having a heart ablasion 10 years ago. Patient also states having history of heartburn.

## 2021-03-24 NOTE — ED PROVIDER NOTES
Patient Seen in: [facility] Emergency Department    History   Patient presents with:  Chest Pain Angina    Stated Complaint: chest pain     HPI    61year old female presenting with chest pain. Pain began this a.m.  . The patient describes the pain NEEDLES) 32G X 4 MM Does not apply Misc,  1 each by Does not apply route daily. Insulin Pen Needle (PEN NEEDLES) 32G X 4 MM Does not apply Misc,  1 each by Does not apply route every 7 days.    Nortriptyline HCl 50 MG Oral Cap,  TAKE 2 CAPSULES BY MOUTH O signs reviewed. All other systems reviewed and negative except as noted above. PSFH elements reviewed from today and agreed except as otherwise stated in HPI.     Physical Exam     ED Triage Vitals   BP 03/22/21 2318 151/74   Pulse 03/22/21 2322 91 components within normal limits   HEPATIC FUNCTION PANEL (7) - Abnormal; Notable for the following components:    AST 41 (*)     All other components within normal limits   POCT GLUCOSE - Abnormal; Notable for the following components:    POC Glucose  130 1pt, nl 0pt)  1  AGE- (>65 2pt, 45-64 1 pt, Under 45 0 pt)    1  Risk Factors- ( DM,HTN,Chol, fhx CAD, BMI>30, hx CAD)  ( >3 or hx CAD 2pt, 1-2 risks 1pt, None 0pt)  1  Troponin- ( 3 times nl 2pt, 2 times nl 1pt, nl 0pt   0         TOTAL  4    SCORE 0-3: 2 MD  [address] [place] [address]  [phone]            Medications Prescribed:  Discharge Medication List as of 3/23/2021  1:10 AM    START taking these medications    famoTIDine (PEPCID) 20 MG Oral Tab  Take 1 tablet (20 mg total) by mouth 2 (two) t

## 2021-04-18 RX ORDER — NORTRIPTYLINE HYDROCHLORIDE 50 MG/1
CAPSULE ORAL
Qty: 180 CAPSULE | Refills: 0 | Status: SHIPPED | OUTPATIENT
Start: 2021-04-18 | End: 2021-05-28

## 2021-04-26 ENCOUNTER — TELEPHONE (OUTPATIENT)
Dept: INTERNAL MEDICINE CLINIC | Facility: CLINIC | Age: 61
End: 2021-04-26

## 2021-04-26 DIAGNOSIS — M25.569 KNEE PAIN, UNSPECIFIED CHRONICITY, UNSPECIFIED LATERALITY: Primary | ICD-10-CM

## 2021-04-26 NOTE — TELEPHONE ENCOUNTER
Pt would like a referral to see Dr. Vicky Yanez surgeon. Pt states that she has an appt on 5-3-21 for consultation of the knee. Pt states that Dr. Arabella Davis knows about this. Please, call pt with any questions.

## 2021-04-26 NOTE — TELEPHONE ENCOUNTER
Not sure if patient really needs referral we will send that to manage care to see if patient needs referral    I approve , if covered by her insurance

## 2021-04-26 NOTE — TELEPHONE ENCOUNTER
Dr. Leesa Ledesma, referral has been pended.      05/03/2021 Appointment Orthopaedics Sindhu Orozco MD    [address]   [phone] Ostiffany Liu   [phone] (Fax)

## 2021-04-27 NOTE — TELEPHONE ENCOUNTER
Left message for patient, please inform of referral.     Referral does not require insurance authorization, patient is requesting referral per Specialist request.

## 2021-04-29 NOTE — TELEPHONE ENCOUNTER
Spoke to patient and she wanted to know if her referral was done. I informed her that it was already ordered.  Referral was faxed to [phone]

## 2021-05-12 ENCOUNTER — MED REC SCAN ONLY (OUTPATIENT)
Dept: INTERNAL MEDICINE CLINIC | Facility: CLINIC | Age: 61
End: 2021-05-12

## 2021-05-18 RX ORDER — LIRAGLUTIDE 6 MG/ML
INJECTION SUBCUTANEOUS
Qty: 9 ML | Refills: 0 | Status: SHIPPED | OUTPATIENT
Start: 2021-05-18 | End: 2021-08-30

## 2021-05-25 ENCOUNTER — HOSPITAL ENCOUNTER (OUTPATIENT)
Age: 61
Discharge: HOME | End: 2021-05-25
Payer: MEDICAID

## 2021-05-25 VITALS
OXYGEN SATURATION: 100 % | DIASTOLIC BLOOD PRESSURE: 56 MMHG | SYSTOLIC BLOOD PRESSURE: 116 MMHG | RESPIRATION RATE: 18 BRPM | TEMPERATURE: 98 F | HEART RATE: 82 BPM

## 2021-05-25 DIAGNOSIS — K08.89 PAIN, DENTAL: Primary | ICD-10-CM

## 2021-05-25 DIAGNOSIS — K02.9 DENTAL CARIES: ICD-10-CM

## 2021-05-25 PROCEDURE — 99213 OFFICE O/P EST LOW 20 MIN: CPT

## 2021-05-25 RX ORDER — CLINDAMYCIN HYDROCHLORIDE 300 MG/1
300 CAPSULE ORAL 3 TIMES DAILY
Qty: 30 CAPSULE | Refills: 0 | Status: SHIPPED | OUTPATIENT
Start: 2021-05-25 | End: 2021-06-04

## 2021-05-25 RX ORDER — TRAMADOL HYDROCHLORIDE 50 MG/1
25 TABLET ORAL 2 TIMES DAILY PRN
Qty: 6 TABLET | Refills: 0 | Status: SHIPPED | OUTPATIENT
Start: 2021-05-25 | End: 2021-09-29

## 2021-05-26 NOTE — ED INITIAL ASSESSMENT (HPI)
2-3 days of pain to right jaw she believes to be related to right upper molar pain. No fever. Unable to see her dentist today.

## 2021-05-26 NOTE — ED PROVIDER NOTES
Patient Seen in: [facility]      History   Patient presents with:  Dental Problem    Stated Complaint: Dental problem    HPI/Subjective:   HPI    This is a 26-year-old female presenting with dental pain.   Patient states, she has a tooth on th above.    Physical Exam     ED Triage Vitals [05/25/21 1913]   /56   Pulse 82   Resp 18   Temp 97.8 °F (36.6 °C)   Temp src Temporal   SpO2 100 %   O2 Device None (Room air)       Current:/56   Pulse 82   Temp 97.8 °F (36.6 °C) (Temporal)   Res in discharge for work. Patient acknowledged understanding discharge instructions.                              Disposition and Plan     Clinical Impression:  Pain, dental  (primary encounter diagnosis)  Dental caries     Disposition:  Discharge  5/25/2021

## 2021-05-28 ENCOUNTER — TELEPHONE (OUTPATIENT)
Dept: NEUROLOGY | Facility: CLINIC | Age: 61
End: 2021-05-28

## 2021-05-28 ENCOUNTER — OFFICE VISIT (OUTPATIENT)
Dept: NEUROLOGY | Facility: CLINIC | Age: 61
End: 2021-05-28
Payer: MEDICAID

## 2021-05-28 VITALS
DIASTOLIC BLOOD PRESSURE: 62 MMHG | WEIGHT: 180 LBS | BODY MASS INDEX: 30.73 KG/M2 | SYSTOLIC BLOOD PRESSURE: 108 MMHG | HEART RATE: 80 BPM | HEIGHT: 64 IN

## 2021-05-28 DIAGNOSIS — G43.709 CHRONIC MIGRAINE WITHOUT AURA WITHOUT STATUS MIGRAINOSUS, NOT INTRACTABLE: Primary | ICD-10-CM

## 2021-05-28 DIAGNOSIS — K21.9 GASTROESOPHAGEAL REFLUX DISEASE WITHOUT ESOPHAGITIS: ICD-10-CM

## 2021-05-28 PROCEDURE — 3074F SYST BP LT 130 MM HG: CPT | Performed by: OTHER

## 2021-05-28 PROCEDURE — 3078F DIAST BP <80 MM HG: CPT | Performed by: OTHER

## 2021-05-28 PROCEDURE — 99214 OFFICE O/P EST MOD 30 MIN: CPT | Performed by: OTHER

## 2021-05-28 PROCEDURE — 3008F BODY MASS INDEX DOCD: CPT | Performed by: OTHER

## 2021-05-28 RX ORDER — OMEPRAZOLE 40 MG/1
40 CAPSULE, DELAYED RELEASE ORAL 2 TIMES DAILY
Qty: 180 CAPSULE | Refills: 0 | Status: SHIPPED | OUTPATIENT
Start: 2021-05-28 | End: 2021-08-26

## 2021-05-28 RX ORDER — RIMEGEPANT SULFATE 75 MG/75MG
75 TABLET, ORALLY DISINTEGRATING ORAL DAILY PRN
Qty: 16 TABLET | Refills: 5 | Status: SHIPPED | OUTPATIENT
Start: 2021-05-28

## 2021-05-28 RX ORDER — KETOROLAC TROMETHAMINE 10 MG/1
TABLET, FILM COATED ORAL
Qty: 30 TABLET | Refills: 3 | Status: SHIPPED | OUTPATIENT
Start: 2021-05-28 | End: 2021-09-17

## 2021-05-28 RX ORDER — NORTRIPTYLINE HYDROCHLORIDE 50 MG/1
100 CAPSULE ORAL NIGHTLY
Qty: 180 CAPSULE | Refills: 3 | Status: SHIPPED | OUTPATIENT
Start: 2021-05-28

## 2021-05-28 NOTE — TELEPHONE ENCOUNTER
[employer] for authorization of approval for Botox 200 u/vl cpt codes [id number], [id number]. Dx: L54.529 Authorization Number: [id number] effective 05/28/21 to 09/30/21 Buy & Bill 200 units. Offered earlier appt.  Per Pt's request scheduled for Botox injections

## 2021-05-28 NOTE — PROGRESS NOTES
Neurology Follow up Visit     Referred By: Dr. Denny ref. provider found    Chief Complaint: Patient presents with:  Headache: Former [facility] patient- Patient present today with cc of headaches. Patient states she gets headaches more often now.  Patient stat Cataract    • Disorder of thyroid    • Esophagitis 09-   • Floaters 4/9/2015   • High cholesterol    • Migraines    • Muscle weakness     bilateral knees at times   • Osteoarthritis    • Other and unspecified hyperlipidemia    • S/P ablation of acce Subcutaneous Solution Pen-injector, Inject 0.6 mg into the skin daily for 7 days, THEN inject 1.2 mg into the skin daily for 7 days, THEN inject 1.8 mg into the skin daily. , Disp: 9 mL, Rfl: 0  •  TRIAMCINOLONE ACETONIDE 0.1 % External Cream, Apply topical groomed. Head- Normocephalic, atraumatic  Eyes- No redness or swelling  ENT- Hearing intake, normal glutition  Neck- No masses or adenopathy    Neurological:     Mental Status- Alert and oriented x3.   Normal attention span and concentration  Thought proce Nurtec was prescribed. We discussed its potential side effects. Might consider injectable drugs as well. - SPECIALTY (OTHER) - EXTERNAL           Education and counseling provided to patient.  Instructed patient to call my office or seek medical attent

## 2021-06-07 ENCOUNTER — HOSPITAL ENCOUNTER (OUTPATIENT)
Dept: MAMMOGRAPHY | Age: 61
Discharge: HOME | End: 2021-06-07
Attending: INTERNAL MEDICINE
Payer: MEDICAID

## 2021-06-07 DIAGNOSIS — Z12.31 SCREENING MAMMOGRAM, ENCOUNTER FOR: ICD-10-CM

## 2021-06-07 PROCEDURE — 77067 SCR MAMMO BI INCL CAD: CPT | Performed by: INTERNAL MEDICINE

## 2021-06-07 PROCEDURE — 77063 BREAST TOMOSYNTHESIS BI: CPT | Performed by: INTERNAL MEDICINE

## 2021-06-08 ENCOUNTER — TELEPHONE (OUTPATIENT)
Dept: NEUROLOGY | Facility: CLINIC | Age: 61
End: 2021-06-08

## 2021-06-10 ENCOUNTER — MED REC SCAN ONLY (OUTPATIENT)
Dept: NEUROLOGY | Facility: CLINIC | Age: 61
End: 2021-06-10

## 2021-06-15 ENCOUNTER — OFFICE VISIT (OUTPATIENT)
Dept: NEUROLOGY | Facility: CLINIC | Age: 61
End: 2021-06-15
Payer: MEDICAID

## 2021-06-15 VITALS
DIASTOLIC BLOOD PRESSURE: 78 MMHG | SYSTOLIC BLOOD PRESSURE: 130 MMHG | HEART RATE: 88 BPM | HEIGHT: 64 IN | BODY MASS INDEX: 30.73 KG/M2 | WEIGHT: 180 LBS

## 2021-06-15 DIAGNOSIS — IMO0002 CHRONIC MIGRAINE: Primary | ICD-10-CM

## 2021-06-15 PROCEDURE — 3008F BODY MASS INDEX DOCD: CPT | Performed by: OTHER

## 2021-06-15 PROCEDURE — 3075F SYST BP GE 130 - 139MM HG: CPT | Performed by: OTHER

## 2021-06-15 PROCEDURE — 3078F DIAST BP <80 MM HG: CPT | Performed by: OTHER

## 2021-06-15 PROCEDURE — 64615 CHEMODENERV MUSC MIGRAINE: CPT | Performed by: OTHER

## 2021-06-18 DIAGNOSIS — E03.9 HYPOTHYROIDISM, UNSPECIFIED TYPE: ICD-10-CM

## 2021-06-18 RX ORDER — LEVOTHYROXINE SODIUM 88 UG/1
TABLET ORAL
Qty: 90 TABLET | Refills: 0 | Status: SHIPPED | OUTPATIENT
Start: 2021-06-18 | End: 2021-12-23

## 2021-06-29 ENCOUNTER — OFFICE VISIT (OUTPATIENT)
Dept: FAMILY MEDICINE CLINIC | Facility: CLINIC | Age: 61
End: 2021-06-29
Payer: MEDICAID

## 2021-06-29 ENCOUNTER — NURSE TRIAGE (OUTPATIENT)
Dept: INTERNAL MEDICINE CLINIC | Facility: CLINIC | Age: 61
End: 2021-06-29

## 2021-06-29 VITALS
HEART RATE: 88 BPM | WEIGHT: 178 LBS | DIASTOLIC BLOOD PRESSURE: 78 MMHG | BODY MASS INDEX: 30.39 KG/M2 | HEIGHT: 64 IN | SYSTOLIC BLOOD PRESSURE: 126 MMHG

## 2021-06-29 DIAGNOSIS — R59.0 ENLARGED LYMPH NODE IN NECK: Primary | ICD-10-CM

## 2021-06-29 PROCEDURE — 3008F BODY MASS INDEX DOCD: CPT | Performed by: PHYSICIAN ASSISTANT

## 2021-06-29 PROCEDURE — 3078F DIAST BP <80 MM HG: CPT | Performed by: PHYSICIAN ASSISTANT

## 2021-06-29 PROCEDURE — 99213 OFFICE O/P EST LOW 20 MIN: CPT | Performed by: PHYSICIAN ASSISTANT

## 2021-06-29 PROCEDURE — 3074F SYST BP LT 130 MM HG: CPT | Performed by: PHYSICIAN ASSISTANT

## 2021-06-29 RX ORDER — CHLORHEXIDINE GLUCONATE 0.12 MG/ML
RINSE ORAL
COMMUNITY
Start: 2021-06-24 | End: 2021-09-29

## 2021-06-29 RX ORDER — CLINDAMYCIN HYDROCHLORIDE 300 MG/1
300 CAPSULE ORAL 4 TIMES DAILY
COMMUNITY
Start: 2021-06-24 | End: 2021-09-29

## 2021-06-29 RX ORDER — IBUPROFEN 800 MG/1
800 TABLET ORAL 3 TIMES DAILY PRN
COMMUNITY
Start: 2021-06-23

## 2021-06-29 NOTE — TELEPHONE ENCOUNTER
Action Requested: Summary for Provider     []  Critical Lab, Recommendations Needed  [] Need Additional Advice  []   FYI    []   Need Orders  [] Need Medications Sent to Pharmacy  []  Other     SUMMARY: Patient c/o right side of mouth on the inside of jaw

## 2021-06-29 NOTE — PATIENT INSTRUCTIONS
Lymphadenopathy  Lymphadenopathy is swelling of the lymph nodes. Lymph nodes are small, bean-shaped glands around the body. What are lymph nodes? Lymph nodes are part of your immune system.  These glands are found in your neck, over your clavicle, armpi symptoms from an infection causing the swollen glands. These symptoms may include fever, sore throat, body aches, or cough. Diagnosing lymphadenopathy  Your healthcare provider will ask about your health history and symptoms.  He or she will give you a phy educational content on 6/1/2019  © 6218-9400 [employer]. All rights reserved. This information is not intended as a substitute for professional medical care. Always follow your healthcare professional's instructions.

## 2021-06-29 NOTE — PROGRESS NOTES
HPI:     HPI   61year-old female is here in the office complaining of right lymph node enlargement for the past 3 days. Patient states that she saw Dentist 3 days ago and has teeth cavities, she is currently taking Clindamycin before root canal done.    Pa 90 tablet 0   • loratadine 10 MG Oral Tab Take 1 tablet (10 mg total) by mouth daily. (Patient taking differently: Take 10 mg by mouth daily as needed.  ) 30 tablet 0   • ibuprofen 800 MG Oral Tab Take 800 mg by mouth 3 (three) times daily as needed.  (Stephanie thyroid    • Esophagitis 09-   • Floaters 4/9/2015   • High cholesterol    • Migraines    • Muscle weakness     bilateral knees at times   • Osteoarthritis    • Other and unspecified hyperlipidemia    • S/P ablation of accessory bypass tract    • Ty Diet: Not Asked        Back Care: Not Asked        Exercise: No        Bike Helmet: Not Asked        Seat Belt: Not Asked        Self-Exams: Not Asked    Social History Narrative      The patient does not use an assistive device. .        The patient does l Exam:   Physical Exam  Vitals reviewed. Constitutional:       General: She is not in acute distress. Appearance: She is well-developed. HENT:      Head: Normocephalic and atraumatic.       Right Ear: Tympanic membrane, ear canal and external ear nor

## 2021-07-09 ENCOUNTER — OFFICE VISIT (OUTPATIENT)
Dept: INTERNAL MEDICINE CLINIC | Facility: CLINIC | Age: 61
End: 2021-07-09
Payer: MEDICAID

## 2021-07-09 VITALS
HEIGHT: 64 IN | WEIGHT: 174 LBS | SYSTOLIC BLOOD PRESSURE: 111 MMHG | BODY MASS INDEX: 29.71 KG/M2 | DIASTOLIC BLOOD PRESSURE: 67 MMHG | HEART RATE: 61 BPM

## 2021-07-09 DIAGNOSIS — G89.29 CHRONIC PAIN OF RIGHT KNEE: ICD-10-CM

## 2021-07-09 DIAGNOSIS — F41.9 ANXIETY: ICD-10-CM

## 2021-07-09 DIAGNOSIS — E11.9 TYPE 2 DIABETES MELLITUS WITHOUT COMPLICATION, WITHOUT LONG-TERM CURRENT USE OF INSULIN (HCC): Primary | ICD-10-CM

## 2021-07-09 DIAGNOSIS — M25.561 CHRONIC PAIN OF RIGHT KNEE: ICD-10-CM

## 2021-07-09 DIAGNOSIS — E03.9 HYPOTHYROIDISM, UNSPECIFIED TYPE: ICD-10-CM

## 2021-07-09 PROCEDURE — 99214 OFFICE O/P EST MOD 30 MIN: CPT | Performed by: INTERNAL MEDICINE

## 2021-07-09 PROCEDURE — 3074F SYST BP LT 130 MM HG: CPT | Performed by: INTERNAL MEDICINE

## 2021-07-09 PROCEDURE — 3008F BODY MASS INDEX DOCD: CPT | Performed by: INTERNAL MEDICINE

## 2021-07-09 PROCEDURE — 3078F DIAST BP <80 MM HG: CPT | Performed by: INTERNAL MEDICINE

## 2021-07-09 NOTE — PROGRESS NOTES
HPI:    Patient ID: Lillie Freeman is a 61year old female.     Patient presents with:  Medication Follow-Up: Victoza    Patient presents today for follow-up on her own diabetes mellitus two patient is on Victoza for about 2 months and states she is feelin breath and wheezing. Cardiovascular: Negative for palpitations and leg swelling. Gastrointestinal: Negative for constipation and diarrhea. Genitourinary: Negative for dysuria and frequency.    Musculoskeletal: Positive for arthralgias (r knee pain ) Sulfate 325 (65 Fe) MG Oral Tab Take 1 tablet (325 mg total) by mouth every other day. 90 tablet 0   • loratadine 10 MG Oral Tab Take 1 tablet (10 mg total) by mouth daily.  (Patient taking differently: Take 10 mg by mouth daily as needed.  ) 30 tablet 0 Breath sounds: Normal breath sounds. No wheezing or rales. Abdominal:      General: There is no distension. Palpations: Abdomen is soft. There is no mass. Tenderness: There is no abdominal tenderness. There is no guarding or rebound.    Muscul Complete  F/u visit in 3 months          2 . Right knee pain  Due to moderate to severe arthritis  Might need R knee  Replacement - Dr Gely Saldaña patient to use Voltaren gel 3 -4 times daily as needed Tylenol 500 mg  2- 3 times daily as needed  P

## 2021-07-21 ENCOUNTER — NURSE TRIAGE (OUTPATIENT)
Dept: INTERNAL MEDICINE CLINIC | Facility: CLINIC | Age: 61
End: 2021-07-21

## 2021-07-21 ENCOUNTER — TELEPHONE (OUTPATIENT)
Dept: INTERNAL MEDICINE CLINIC | Facility: CLINIC | Age: 61
End: 2021-07-21

## 2021-07-21 NOTE — TELEPHONE ENCOUNTER
Action Requested: Summary for Provider     []  Critical Lab, Recommendations Needed  [] Need Additional Advice  [x]   FYI    []   Need Orders  [] Need Medications Sent to Pharmacy  []  Other     SUMMARY: c/o of cough for 2 days, little yellowish phlegm, de

## 2021-07-22 NOTE — TELEPHONE ENCOUNTER
Message # [phone]         2021 11:39p   [MARICELAD]  To:  From:  YANY Ramos MD:  Phone#:  ----------------------------------------------------------------------  [facility] [phone] RE MOTHER Sobeida Sosa ,  60 RE COUGH AND CHEST PAIN Paged a

## 2021-07-24 NOTE — TELEPHONE ENCOUNTER
On call , was called with pt navin roth since today seen in IC  and  Taking cough medicine delsym- not hepling much     Recommend mucinex dm bid otc If  Worsening sy should let me know

## 2021-08-06 ENCOUNTER — TELEPHONE (OUTPATIENT)
Dept: NEUROLOGY | Facility: CLINIC | Age: 61
End: 2021-08-06

## 2021-08-06 DIAGNOSIS — IMO0002 CHRONIC MIGRAINE: Primary | ICD-10-CM

## 2021-08-06 NOTE — TELEPHONE ENCOUNTER
Spoke to pt who verbalizes having headaches for 4 consecutive days. Pt took Nurtec 2 days ago without relief & has not tried again. Pt just took 1 tablet of Ketorolac Tromethamine and states she has had some relief.  Advised pt per instructions on prescript

## 2021-08-06 NOTE — TELEPHONE ENCOUNTER
Pt called to schedule appt. pt has been experiencing headaches for 4 days consecutively. please advise.

## 2021-08-09 RX ORDER — METHYLPREDNISOLONE 4 MG/1
TABLET ORAL
Qty: 1 EACH | Refills: 0 | Status: SHIPPED | OUTPATIENT
Start: 2021-08-09 | End: 2021-09-29

## 2021-08-09 RX ORDER — DIVALPROEX SODIUM 500 MG/1
500 TABLET, EXTENDED RELEASE ORAL DAILY
Qty: 5 TABLET | Refills: 0 | Status: SHIPPED | OUTPATIENT
Start: 2021-08-09 | End: 2021-08-14

## 2021-08-09 RX ORDER — SUMATRIPTAN 100 MG/1
TABLET, FILM COATED ORAL
Qty: 9 TABLET | Refills: 0 | Status: SHIPPED | OUTPATIENT
Start: 2021-08-09 | End: 2021-09-29

## 2021-08-09 RX ORDER — CALCIUM CARBONATE 300MG(750)
400 TABLET,CHEWABLE ORAL 2 TIMES DAILY
Qty: 10 TABLET | Refills: 0 | Status: SHIPPED | OUTPATIENT
Start: 2021-08-09 | End: 2021-09-29

## 2021-08-09 RX ORDER — METOCLOPRAMIDE 5 MG/1
5 TABLET ORAL 2 TIMES DAILY
Qty: 10 TABLET | Refills: 0 | Status: SHIPPED | OUTPATIENT
Start: 2021-08-09 | End: 2021-08-14

## 2021-08-09 NOTE — TELEPHONE ENCOUNTER
Spoke to patient and notified her of migraine cocktail. Reviewed directions of medication. She was understanding and thankful for the call.

## 2021-08-11 ENCOUNTER — TELEMEDICINE (OUTPATIENT)
Dept: NEUROLOGY | Facility: CLINIC | Age: 61
End: 2021-08-11
Payer: MEDICAID

## 2021-08-11 DIAGNOSIS — IMO0002 CHRONIC MIGRAINE: Primary | ICD-10-CM

## 2021-08-11 DIAGNOSIS — M54.81 OCCIPITAL NEURALGIA OF LEFT SIDE: ICD-10-CM

## 2021-08-11 PROCEDURE — 99214 OFFICE O/P EST MOD 30 MIN: CPT | Performed by: OTHER

## 2021-08-11 RX ORDER — SUMATRIPTAN 5 MG/1
1 SPRAY NASAL EVERY 2 HOUR PRN
Qty: 9 EACH | Refills: 5 | Status: SHIPPED | OUTPATIENT
Start: 2021-08-11 | End: 2021-09-29

## 2021-08-11 NOTE — PROGRESS NOTES
I conducted a telehealth visit with Cecilia Mena today, 08/11/21, which was completed using two-way, real-time interactive audio and video communication.  This has been done in [place] to provide continuity of care in the best interest of the prov efficacy. Then patient also was tried on nortriptyline. Topamax was stopped. She did get some benefit originally from nortriptyline, raise the dose up to 100 mg at night. Patient was very interested in trying different new medications.   She had discuss Surgical History:   Procedure Laterality Date   • APPENDECTOMY  2005   • CATARACT Right 04/2018   • CHOLECYSTECTOMY  2007   • HYSTERECTOMY  2012   • TEMPORAL ARTERY LIGATN OR BX Bilateral 09/07/2017    Temporal artery biopsies, bilateral   • UPPER GI ENDOS Capsule Delayed Release, Take 1 capsule (40 mg total) by mouth 2 (two) times a day.  TAKE 1 CAPSULE BY MOUTH 30-60 MINUTES BEFORE LUNCH, Disp: 180 capsule, Rfl: 0  •  Rimegepant Sulfate (NURTEC) 75 MG Oral Tablet Dispersible, Take 75 mg by mouth daily as ne day., Disp: 90 tablet, Rfl: 0  •  loratadine 10 MG Oral Tab, Take 1 tablet (10 mg total) by mouth daily.  (Patient taking differently: Take 10 mg by mouth daily as needed.  ), Disp: 30 tablet, Rfl: 0      Penicillins             HIVES, RASH  Codeine migraines. It seems that Botox is wearing out within 2 months. We'll schedule her another few weeks for repeat, but in the meantime we'll do a cocktail, and it was explained how to use it. She'll let me know in a week time how she is done with it.

## 2021-08-14 RX ORDER — LORATADINE 10 MG/1
10 TABLET ORAL DAILY PRN
Qty: 30 TABLET | Refills: 0 | Status: SHIPPED | OUTPATIENT
Start: 2021-08-14 | End: 2022-01-02

## 2021-08-25 ENCOUNTER — TELEPHONE (OUTPATIENT)
Dept: NEUROLOGY | Facility: CLINIC | Age: 61
End: 2021-08-25

## 2021-08-25 DIAGNOSIS — G43.709 CHRONIC MIGRAINE WITHOUT AURA WITHOUT STATUS MIGRAINOSUS, NOT INTRACTABLE: Primary | ICD-10-CM

## 2021-08-25 NOTE — TELEPHONE ENCOUNTER
Authorization Number: [id number] effective 05/28/21 to 09/30/21 Buy & Bill 200 units. Due 09/15/21.  Scheduled for next series of Botox injections on 09/14/21 at 3:30 pm.

## 2021-08-25 NOTE — TELEPHONE ENCOUNTER
Spoke to patient, states she has had a migraine for last 2 days unrelieved by sumatriptan. States she will start migraine cocktail prescribed 8/9/21. LOV was televisit 8/11/21. Patient asking to have Botox again, last injection 6/15/21.  Order pend to

## 2021-08-30 ENCOUNTER — TELEPHONE (OUTPATIENT)
Dept: INTERNAL MEDICINE CLINIC | Facility: CLINIC | Age: 61
End: 2021-08-30

## 2021-08-30 RX ORDER — LIRAGLUTIDE 6 MG/ML
INJECTION SUBCUTANEOUS
Qty: 9 ML | Refills: 0 | Status: SHIPPED | OUTPATIENT
Start: 2021-08-30 | End: 2021-10-05

## 2021-08-30 NOTE — TELEPHONE ENCOUNTER
Prior authorization for   :   •  VICTOZA 18 MG/3ML Subcutaneous Solution Pen-injector, Inject 0.6 mg into the skin daily for 7 days, THEN inject 1.2 mg into the skin daily for 7 days, THEN inject 1.8 mg into the skin daily. , Disp: 9 mL, Rfl: 0    Go.coverm

## 2021-08-30 NOTE — TELEPHONE ENCOUNTER
Prior authorization for Gaby Thomas has been initiated through "[employer] using keycode: Charo Treadwell It takes about 1-5 business days for a decision to come back.

## 2021-09-02 ENCOUNTER — LAB ENCOUNTER (OUTPATIENT)
Dept: LAB | Age: 61
End: 2021-09-02
Attending: INTERNAL MEDICINE
Payer: MEDICAID

## 2021-09-02 DIAGNOSIS — E03.9 HYPOTHYROIDISM, UNSPECIFIED TYPE: ICD-10-CM

## 2021-09-02 DIAGNOSIS — E11.9 TYPE 2 DIABETES MELLITUS WITHOUT COMPLICATION, WITHOUT LONG-TERM CURRENT USE OF INSULIN (HCC): ICD-10-CM

## 2021-09-02 LAB
ALBUMIN SERPL-MCNC: 3.9 G/DL (ref 3.4–5)
ALBUMIN/GLOB SERPL: 1 {RATIO} (ref 1–2)
ALP LIVER SERPL-CCNC: 91 U/L
ALT SERPL-CCNC: 33 U/L
ANION GAP SERPL CALC-SCNC: 6 MMOL/L (ref 0–18)
AST SERPL-CCNC: 27 U/L (ref 15–37)
BILIRUB SERPL-MCNC: 0.4 MG/DL (ref 0.1–2)
BUN BLD-MCNC: 13 MG/DL (ref 7–18)
BUN/CREAT SERPL: 14.4 (ref 10–20)
CALCIUM BLD-MCNC: 9.5 MG/DL (ref 8.5–10.1)
CHLORIDE SERPL-SCNC: 104 MMOL/L (ref 98–112)
CHOLEST SMN-MCNC: 181 MG/DL (ref ?–200)
CO2 SERPL-SCNC: 27 MMOL/L (ref 21–32)
CREAT BLD-MCNC: 0.9 MG/DL
CREAT UR-SCNC: 56.2 MG/DL
EST. AVERAGE GLUCOSE BLD GHB EST-MCNC: 151 MG/DL (ref 68–126)
GLOBULIN PLAS-MCNC: 3.9 G/DL (ref 2.8–4.4)
GLUCOSE BLD-MCNC: 107 MG/DL (ref 70–99)
HBA1C MFR BLD HPLC: 6.9 % (ref ?–5.7)
HDLC SERPL-MCNC: 48 MG/DL (ref 40–59)
LDLC SERPL CALC-MCNC: 100 MG/DL (ref ?–100)
M PROTEIN MFR SERPL ELPH: 7.8 G/DL (ref 6.4–8.2)
MICROALBUMIN UR-MCNC: 0.54 MG/DL
MICROALBUMIN/CREAT 24H UR-RTO: 9.6 UG/MG (ref ?–30)
NONHDLC SERPL-MCNC: 133 MG/DL (ref ?–130)
OSMOLALITY SERPL CALC.SUM OF ELEC: 285 MOSM/KG (ref 275–295)
PATIENT FASTING Y/N/NP: YES
PATIENT FASTING Y/N/NP: YES
POTASSIUM SERPL-SCNC: 4.5 MMOL/L (ref 3.5–5.1)
SODIUM SERPL-SCNC: 137 MMOL/L (ref 136–145)
TRIGL SERPL-MCNC: 195 MG/DL (ref 30–149)
TSI SER-ACNC: 2.64 MIU/ML (ref 0.36–3.74)
VLDLC SERPL CALC-MCNC: 33 MG/DL (ref 0–30)

## 2021-09-02 PROCEDURE — 80061 LIPID PANEL: CPT

## 2021-09-02 PROCEDURE — 83036 HEMOGLOBIN GLYCOSYLATED A1C: CPT

## 2021-09-02 PROCEDURE — 84443 ASSAY THYROID STIM HORMONE: CPT

## 2021-09-02 PROCEDURE — 82570 ASSAY OF URINE CREATININE: CPT

## 2021-09-02 PROCEDURE — 3044F HG A1C LEVEL LT 7.0%: CPT | Performed by: INTERNAL MEDICINE

## 2021-09-02 PROCEDURE — 80053 COMPREHEN METABOLIC PANEL: CPT

## 2021-09-02 PROCEDURE — 82043 UR ALBUMIN QUANTITATIVE: CPT

## 2021-09-02 PROCEDURE — 36415 COLL VENOUS BLD VENIPUNCTURE: CPT

## 2021-09-02 PROCEDURE — 3061F NEG MICROALBUMINURIA REV: CPT | Performed by: INTERNAL MEDICINE

## 2021-09-07 ENCOUNTER — TELEPHONE (OUTPATIENT)
Dept: NEUROLOGY | Facility: CLINIC | Age: 61
End: 2021-09-07

## 2021-09-17 ENCOUNTER — OFFICE VISIT (OUTPATIENT)
Dept: NEUROLOGY | Facility: CLINIC | Age: 61
End: 2021-09-17
Payer: MEDICAID

## 2021-09-17 DIAGNOSIS — G43.709 CHRONIC MIGRAINE WITHOUT AURA WITHOUT STATUS MIGRAINOSUS, NOT INTRACTABLE: ICD-10-CM

## 2021-09-17 PROCEDURE — 64615 CHEMODENERV MUSC MIGRAINE: CPT | Performed by: OTHER

## 2021-09-17 RX ORDER — NAPROXEN 500 MG/1
500 TABLET ORAL 2 TIMES DAILY WITH MEALS
Qty: 30 TABLET | Refills: 1 | Status: SHIPPED | OUTPATIENT
Start: 2021-09-17

## 2021-09-29 ENCOUNTER — OFFICE VISIT (OUTPATIENT)
Dept: INTERNAL MEDICINE CLINIC | Facility: CLINIC | Age: 61
End: 2021-09-29
Payer: MEDICAID

## 2021-09-29 VITALS
DIASTOLIC BLOOD PRESSURE: 81 MMHG | HEIGHT: 64 IN | SYSTOLIC BLOOD PRESSURE: 131 MMHG | HEART RATE: 81 BPM | BODY MASS INDEX: 29.53 KG/M2 | WEIGHT: 173 LBS | OXYGEN SATURATION: 100 %

## 2021-09-29 DIAGNOSIS — F41.9 ANXIETY: ICD-10-CM

## 2021-09-29 DIAGNOSIS — Z12.31 SCREENING MAMMOGRAM, ENCOUNTER FOR: ICD-10-CM

## 2021-09-29 DIAGNOSIS — E78.00 PURE HYPERCHOLESTEROLEMIA: ICD-10-CM

## 2021-09-29 DIAGNOSIS — E11.9 TYPE 2 DIABETES MELLITUS WITHOUT COMPLICATION, WITHOUT LONG-TERM CURRENT USE OF INSULIN (HCC): ICD-10-CM

## 2021-09-29 DIAGNOSIS — Z00.00 PE (PHYSICAL EXAM), ROUTINE: Primary | ICD-10-CM

## 2021-09-29 PROCEDURE — 3075F SYST BP GE 130 - 139MM HG: CPT | Performed by: INTERNAL MEDICINE

## 2021-09-29 PROCEDURE — 3008F BODY MASS INDEX DOCD: CPT | Performed by: INTERNAL MEDICINE

## 2021-09-29 PROCEDURE — 99396 PREV VISIT EST AGE 40-64: CPT | Performed by: INTERNAL MEDICINE

## 2021-09-29 PROCEDURE — 3079F DIAST BP 80-89 MM HG: CPT | Performed by: INTERNAL MEDICINE

## 2021-09-29 RX ORDER — OMEPRAZOLE 40 MG/1
40 CAPSULE, DELAYED RELEASE ORAL DAILY
Qty: 90 CAPSULE | Refills: 1 | Status: SHIPPED | OUTPATIENT
Start: 2021-09-29 | End: 2021-10-29

## 2021-09-29 RX ORDER — ROSUVASTATIN CALCIUM 5 MG/1
5 TABLET, COATED ORAL NIGHTLY
Qty: 90 TABLET | Refills: 0 | Status: SHIPPED | OUTPATIENT
Start: 2021-09-29

## 2021-09-29 NOTE — PROGRESS NOTES
HPI:    Patient ID: Amanda Cartwright is a 64year old female.     Patient presents with:  Physical    Patient presents today for follow-up on her own diabetes mellitus two patient is on Victoza for about 2 months and states she is feeling well her sugars are Cardiovascular: Negative for palpitations and leg swelling. Gastrointestinal: Negative for constipation and diarrhea. Genitourinary: Negative for dysuria and frequency. Musculoskeletal: Positive for arthralgias (r knee pain ) and stiffness. (two) times daily as needed. 1-2   Week (Patient not taking: Reported on 7/9/2021 ) 60 g 0   • Diclofenac Sodium 1 % External Gel Apply 2 g topically 4 (four) times daily.  (Patient not taking: Reported on 9/29/2021) 1 each 0     Allergies:  Penicillins normal.   Psychiatric:         Behavior: Behavior normal.  Patient state some  stress at home due to sickness of her daughters with depression ,also she is taking care of the granddaughter  as well as the whole household .       Blood pressure 131/81, pulse bedtime   Counseling on ideal weight/BMI  Take PPIs qd in the morning 30-60 minutes before breakfast always on empty stomach Side effects and directions of medication discussed with patient. Patient verbalized understanding and compliance.        2 .Right k

## 2021-10-05 ENCOUNTER — TELEPHONE (OUTPATIENT)
Dept: INTERNAL MEDICINE CLINIC | Facility: CLINIC | Age: 61
End: 2021-10-05

## 2021-10-05 RX ORDER — LIRAGLUTIDE 6 MG/ML
INJECTION SUBCUTANEOUS
Qty: 9 ML | Refills: 0 | Status: SHIPPED | OUTPATIENT
Start: 2021-10-05 | End: 2021-11-02

## 2021-10-05 NOTE — TELEPHONE ENCOUNTER
Anya/ Pharmacy Technician of [address] [place] [address] is calling to confirm directions for patient's medication VICTOZA 18 MG/3ML Subcutaneous Solution Pen-injector.

## 2021-10-05 NOTE — TELEPHONE ENCOUNTER
[address] [facility] pharmacist of Dr. Vincent Izquierdo note. Pharmacist verbalized understanding.

## 2021-10-05 NOTE — TELEPHONE ENCOUNTER
Called [place], spoke with Junie Bishop in regards to clarification of Victoza order. They are asking if this is a continuation order for 1.8mg daily . .. Sukhdeep Ruano or      Or is she starting over again at 0.6mg?   (patient started on this in August 2021)    Please advise

## 2021-10-06 ENCOUNTER — TELEPHONE (OUTPATIENT)
Dept: INTERNAL MEDICINE CLINIC | Facility: CLINIC | Age: 61
End: 2021-10-06

## 2021-10-06 NOTE — TELEPHONE ENCOUNTER
Nakita Burrell from [employer] called asking for a diagnosis code for victoza.  I provided her with E11.9

## 2021-10-06 NOTE — TELEPHONE ENCOUNTER
•  VICTOZA 18 MG/3ML Subcutaneous Solution Pen-injector, Inject 0.6 mg into the skin daily for 7 days, THEN inject 1.2 mg into the skin daily for 7 days, THEN inject 1.8 mg into the skin daily. , Disp: 9 mL, Rfl: 0    Go.[email or web address]/login    Key: [id number]

## 2021-10-07 NOTE — TELEPHONE ENCOUNTER
Prior authorization approved   Case ID: [id number]      Payer:  [address] [place] [address] Road    [phone]    The case has been Approved from   to   Electronic appeal:  Not supported

## 2021-10-20 ENCOUNTER — MED REC SCAN ONLY (OUTPATIENT)
Dept: INTERNAL MEDICINE CLINIC | Facility: CLINIC | Age: 61
End: 2021-10-20

## 2021-10-22 ENCOUNTER — TELEPHONE (OUTPATIENT)
Dept: NEUROLOGY | Facility: CLINIC | Age: 61
End: 2021-10-22

## 2021-10-22 DIAGNOSIS — G43.901 STATUS MIGRAINOSUS: Primary | ICD-10-CM

## 2021-10-22 RX ORDER — METHYLPREDNISOLONE 4 MG/1
TABLET ORAL
Qty: 21 TABLET | Refills: 0 | Status: SHIPPED | OUTPATIENT
Start: 2021-10-22

## 2021-10-22 RX ORDER — ONDANSETRON HYDROCHLORIDE 8 MG/1
8 TABLET, FILM COATED ORAL EVERY 8 HOURS PRN
Qty: 9 TABLET | Refills: 0 | Status: SHIPPED | OUTPATIENT
Start: 2021-10-22 | End: 2021-10-25

## 2021-10-22 RX ORDER — DIPHENHYDRAMINE HCL 25 MG
25 CAPSULE ORAL NIGHTLY PRN
Qty: 5 CAPSULE | Refills: 0 | Status: SHIPPED | OUTPATIENT
Start: 2021-10-22 | End: 2021-10-27

## 2021-10-22 NOTE — TELEPHONE ENCOUNTER
Called re: status migrainosus x 4-5 days. Reports that she has left-sided headaches. Her headache symptoms are not different from her typical headaches. She has no new focal neurological deficits. She has no vision loss.   She has no new neurological sy bedtime. Day 5: Take 1 tablet before breakfast and at bedtime. Day 6 take 1 tablet before breakfast.  Dispense: 21 tablet; Refill: 0  - ondansetron (ZOFRAN) 8 MG tablet;  Take 1 tablet (8 mg total) by mouth every 8 (eight) hours as needed for Nausea (headac

## 2021-11-02 NOTE — TELEPHONE ENCOUNTER
•  VICTOZA 18 MG/3ML Subcutaneous Solution Pen-injector, Inject 0.6 mg into the skin daily for 7 days, THEN inject 1.2 mg into the skin daily for 7 days, THEN inject 1.8 mg into the skin daily. , Disp: 9 mL, Rfl: 0

## 2021-11-02 NOTE — TELEPHONE ENCOUNTER
Refill passed per [facility] protocol. Routed to Dr. Trever Mandel to review new sig of \"inject 1.8 mg into the skin daily. \"   Requested Prescriptions   Pending Prescriptions Disp Refills    Liraglutide (VICTOZA) 18 MG/3ML Subcutaneous Solution Pen-in

## 2021-11-05 ENCOUNTER — TELEMEDICINE (OUTPATIENT)
Dept: NEUROLOGY | Facility: CLINIC | Age: 61
End: 2021-11-05
Payer: MEDICAID

## 2021-11-05 DIAGNOSIS — G43.709 CHRONIC MIGRAINE WITHOUT AURA WITHOUT STATUS MIGRAINOSUS, NOT INTRACTABLE: ICD-10-CM

## 2021-11-05 DIAGNOSIS — G43.901 STATUS MIGRAINOSUS: Primary | ICD-10-CM

## 2021-11-05 PROCEDURE — 99214 OFFICE O/P EST MOD 30 MIN: CPT | Performed by: OTHER

## 2021-11-05 RX ORDER — LIRAGLUTIDE 6 MG/ML
INJECTION SUBCUTANEOUS
Qty: 9 ML | Refills: 1 | Status: SHIPPED | OUTPATIENT
Start: 2021-11-05 | End: 2021-12-23

## 2021-11-05 RX ORDER — GALCANEZUMAB 120 MG/ML
120 INJECTION, SOLUTION SUBCUTANEOUS
Qty: 1.12 ML | Refills: 5 | Status: SHIPPED | OUTPATIENT
Start: 2021-11-05 | End: 2022-11-05

## 2021-11-05 NOTE — PROGRESS NOTES
I conducted a telehealth visit with Sunny Francois today, 11/05/21, which was completed using two-way, real-time interactive audio and video communication.  This has been done in [place] to provide continuity of care in the best interest of the prov efficacy. Then patient also was tried on nortriptyline. Topamax was stopped. She did get some benefit originally from nortriptyline, raise the dose up to 100 mg at night. Patient was very interested in trying different new medications.   She had discuss Migraines    • Muscle weakness     bilateral knees at times   • Osteoarthritis    • Other and unspecified hyperlipidemia    • S/P ablation of accessory bypass tract    • Type II or unspecified type diabetes mellitus without mention of complication, not sta Oral Tab, Take 1 tablet (500 mg total) by mouth 2 (two) times daily with meals. (Patient not taking: Reported on 9/29/2021), Disp: 30 tablet, Rfl: 1  •  loratadine 10 MG Oral Tab, Take 1 tablet (10 mg total) by mouth daily as needed. , Disp: 30 tablet, Rfl: ONLY, DIZZINESS    ROS:   As in HPI, the rest of the 14 system review was done and was negative      Physical Exam:  There were no vitals filed for this visit. General: No apparent distress, well nourished, well groomed.   Head- Normocephalic, atraumatic medications. Education and counseling provided to patient. Instructed patient to call my office or seek medical attention immediately if symptoms worsen. Patient verbalized understanding of information given. All questions were answered.  All keesha

## 2021-11-15 ENCOUNTER — TELEPHONE (OUTPATIENT)
Dept: NEUROLOGY | Facility: CLINIC | Age: 61
End: 2021-11-15

## 2021-11-15 NOTE — TELEPHONE ENCOUNTER
[employer] for authorization of approval for Botox 200 u/vl cpt codes [id number], [id number]. Dx: Y91.608. Authorization Number: [id number] effective 11/15/21-04/09/22.  Scheduled for next series of Botox injections on 12/14/21 at 9: 39 am.

## 2021-11-17 ENCOUNTER — TELEPHONE (OUTPATIENT)
Dept: PHYSICAL MEDICINE AND REHAB | Facility: CLINIC | Age: 61
End: 2021-11-17

## 2021-11-17 DIAGNOSIS — G43.709 CHRONIC MIGRAINE W/O AURA W/O STATUS MIGRAINOSUS, NOT INTRACTABLE: Primary | ICD-10-CM

## 2021-11-17 NOTE — TELEPHONE ENCOUNTER
Spoke to patient, states has had pressure headache for 2 days. Took one dose of naprelan yesterday, will repeat today. Asked if she should also take Excedrin. Advised not to take Excedrin and NSAID at same time.    Patient has been  previously scheduled for

## 2021-11-17 NOTE — TELEPHONE ENCOUNTER
Patient called to say she has a really bad headache; going on Day 2. The medicine she has is not working. She believes the  is trying to get a different medication approved; inquiring about the status.

## 2021-11-18 RX ORDER — GABAPENTIN 100 MG/1
CAPSULE ORAL
Qty: 270 CAPSULE | Refills: 5 | Status: SHIPPED | OUTPATIENT
Start: 2021-11-18

## 2021-11-18 NOTE — TELEPHONE ENCOUNTER
Spoke to pt. Pt states she did not know she was supposed to keep a headache diary/keep track. Pt has had 6-7 migraines in past month. She states they last an average of 2 days.  Pt currently taking Nortriptyline 100 mg nightly & would be willing to try anot

## 2021-11-18 NOTE — TELEPHONE ENCOUNTER
Did she keep track of her headaches for the last month like a asked? How many headaches did she have so far in the whole month time. I can order a CT of the head but I do not think that there would be any abnormalities.     Here is a list of common medi

## 2021-11-18 NOTE — TELEPHONE ENCOUNTER
Called pt & notified MD called in prescription & discussed name, dosage, directions. Pt advised to keep headache diary & track possible triggers.  Pt [facility] & appreciative of call

## 2021-11-29 DIAGNOSIS — E78.00 PURE HYPERCHOLESTEROLEMIA: ICD-10-CM

## 2021-11-29 RX ORDER — FENOFIBRATE 134 MG/1
134 CAPSULE ORAL
Qty: 90 CAPSULE | Refills: 1 | Status: SHIPPED | OUTPATIENT
Start: 2021-11-29 | End: 2022-08-26

## 2021-11-30 NOTE — TELEPHONE ENCOUNTER
MICU Progress Note    Marc Cannon MRN: [id number]  1940  Date of Admission:1/9/2017  Primary care provider: Singh Gomes      Assessment and Plan     76 Refill passed per [facility] protocol.     Requested Prescriptions   Pending Prescriptions Disp Refills    FENOFIBRATE MICRONIZED 134 MG Oral Cap [Pharmacy Med Name: Fenofibrate Micronized 134 Mg Cap Nort] 90 capsule 0     Sig: Take 1 capsule (134 mg total) by mouth daily with breakfast.        Cholesterol Medication Protocol Passed - 11/29/2021  8:40 PM        Passed - ALT in past 12 months        Passed - LDL in past 12 months        Passed - Last ALT < 80       Lab Results   Component Value Date    ALT 33 09/02/2021             Passed - Last LDL < 130     Lab Results   Component Value Date     (H) 09/02/2021               Passed - Appointment in past 12 or next 3 months              Recent Outpatient Visits              3 weeks ago Status migrainosus    [facility] Deann Seen, MD    Telemedicine    2 months ago PE (physical exam), routine    [id number] [facility] [place] Dustin Austria, MD    Office Visit    2 months ago Chronic migraine without aura without status migrainosus, not intractable    [facility]    Office Visit    3 months ago Chronic migraine    [facility] Deann Hoosk MD    Telemedicine    4 months ago Type 2 diabetes mellitus without complication, without long-term current use of insulin [facility]    [facility], [address] [place] [address] [place] Dustin Austria, MD    Office Visit FOSTER JUÁREZ MD

## 2021-12-03 ENCOUNTER — TELEPHONE (OUTPATIENT)
Dept: INTERNAL MEDICINE CLINIC | Facility: CLINIC | Age: 61
End: 2021-12-03

## 2021-12-03 NOTE — TELEPHONE ENCOUNTER
Patient is requesting a morning appointment for her clearance visit. She is tentatively scheduled on 12/14 at 2pm but needs something in the morning. Please call back.

## 2021-12-03 NOTE — TELEPHONE ENCOUNTER
There are no morning appointments available. The first morning available is a res 24 on 12-9-21 at 11:00 can that slot be used for the patient?

## 2021-12-06 ENCOUNTER — LAB ENCOUNTER (OUTPATIENT)
Dept: LAB | Age: 61
End: 2021-12-06
Attending: INTERNAL MEDICINE
Payer: MEDICAID

## 2021-12-06 DIAGNOSIS — E78.00 PURE HYPERCHOLESTEROLEMIA: ICD-10-CM

## 2021-12-06 DIAGNOSIS — E11.9 TYPE 2 DIABETES MELLITUS WITHOUT COMPLICATION, WITHOUT LONG-TERM CURRENT USE OF INSULIN (HCC): ICD-10-CM

## 2021-12-06 PROCEDURE — 80061 LIPID PANEL: CPT

## 2021-12-06 PROCEDURE — 83036 HEMOGLOBIN GLYCOSYLATED A1C: CPT

## 2021-12-06 PROCEDURE — 82043 UR ALBUMIN QUANTITATIVE: CPT

## 2021-12-06 PROCEDURE — 80053 COMPREHEN METABOLIC PANEL: CPT

## 2021-12-06 PROCEDURE — 82570 ASSAY OF URINE CREATININE: CPT

## 2021-12-06 PROCEDURE — 36415 COLL VENOUS BLD VENIPUNCTURE: CPT

## 2021-12-10 ENCOUNTER — TELEMEDICINE (OUTPATIENT)
Dept: INTERNAL MEDICINE CLINIC | Facility: CLINIC | Age: 61
End: 2021-12-10
Payer: MEDICAID

## 2021-12-10 DIAGNOSIS — E11.9 DIABETES MELLITUS TYPE 2 WITHOUT RETINOPATHY (HCC): Primary | ICD-10-CM

## 2021-12-10 DIAGNOSIS — E06.3 HYPOTHYROIDISM DUE TO HASHIMOTO'S THYROIDITIS: ICD-10-CM

## 2021-12-10 DIAGNOSIS — E78.2 MIXED HYPERLIPIDEMIA: ICD-10-CM

## 2021-12-10 DIAGNOSIS — M17.10 OSTEOARTHRITIS OF KNEE, UNSPECIFIED LATERALITY, UNSPECIFIED OSTEOARTHRITIS TYPE: ICD-10-CM

## 2021-12-10 DIAGNOSIS — E03.8 HYPOTHYROIDISM DUE TO HASHIMOTO'S THYROIDITIS: ICD-10-CM

## 2021-12-10 PROCEDURE — 99213 OFFICE O/P EST LOW 20 MIN: CPT | Performed by: INTERNAL MEDICINE

## 2021-12-10 NOTE — PROGRESS NOTES
Telemedicine Note    Virtual Phone Check-In    Jing Barrientos verbally consents to a Virtual Phone Check-In service on 12/10/21.  Patient understands and accepts financial responsibility for any deductible, co-insurance and/or co-pays associated with this Age of Onset   • Diabetes Father    • Diabetes Mother    • Heart Disorder Mother    • Breast Cancer Mother 72   • Other (arthritis) Mother    • Diabetes Sister    • Diabetes Brother    • Heart Disorder Brother    • Glaucoma Neg    • Macular degeneration Ne methylPREDNISolone 4 MG Oral Tablet Therapy Pack day 1: 2 tablets before breakfast, 1 tablet after lunch and dinner, and 2 tablets at bedtime Day 2: 1 tablet before breakfast, 1 tablet after lunch and dinner, 2 tablets at bedtime.  Day 3: 1 tablet before br daily. 180 tablet 3   • Diclofenac Sodium 1 % External Gel Apply 2 g topically 4 (four) times daily. (Patient not taking: Reported on 9/29/2021) 1 each 0   • Ferrous Sulfate 325 (65 Fe) MG Oral Tab Take 1 tablet (325 mg total) by mouth every other day.  [address] total knee replacement  Patient is seeing the doctor orthopedic from [facility]  Patient seen cardiologist and advised medicine stress test is ordered patient has appointment next week  Surgery is Postponed at this time to January/22        Follow Up:

## 2021-12-20 DIAGNOSIS — E03.9 HYPOTHYROIDISM, UNSPECIFIED TYPE: ICD-10-CM

## 2021-12-20 NOTE — TELEPHONE ENCOUNTER
Patient requesting a refill request for the following medications, send please to [address] [place] [address]. Liraglutide (VICTOZA) 18 MG/3ML Subcutaneous Solution Pen-injector 9 mL 1 11/5/2021    Sig:   inject 1.8 mg into the skin daily.      Route:   (none

## 2021-12-23 RX ORDER — LIRAGLUTIDE 6 MG/ML
INJECTION SUBCUTANEOUS
Qty: 9 ML | Refills: 1 | Status: SHIPPED | OUTPATIENT
Start: 2021-12-23 | End: 2022-02-03 | Stop reason: ALTCHOICE

## 2021-12-23 RX ORDER — LEVOTHYROXINE SODIUM 88 UG/1
88 TABLET ORAL
Qty: 90 TABLET | Refills: 0 | Status: SHIPPED | OUTPATIENT
Start: 2021-12-23

## 2021-12-27 ENCOUNTER — TELEPHONE (OUTPATIENT)
Dept: INTERNAL MEDICINE CLINIC | Facility: CLINIC | Age: 61
End: 2021-12-27

## 2021-12-27 NOTE — TELEPHONE ENCOUNTER
Prior authorization request for:    •  Liraglutide (VICTOZA) 18 MG/3ML Subcutaneous Solution Pen-injector, inject 1.8 mg into the skin daily. , Disp: 9 mL, Rfl: 1    [email or web address]    JIG:[id number]

## 2022-01-02 RX ORDER — LORATADINE 10 MG/1
TABLET ORAL
Qty: 30 TABLET | Refills: 0 | Status: SHIPPED | OUTPATIENT
Start: 2022-01-02

## 2022-01-18 NOTE — TELEPHONE ENCOUNTER
Massage therapy order placed. Pt provided with phone number to schedule. Notified pt PA appeared to be denied. Unable to locate documentation in Epic.  Will initiate prior authorization process again & await determination    PA submitted through [address] [place] [address] chrissie Conner

## 2022-01-18 NOTE — TELEPHONE ENCOUNTER
Patient called and wants to see if we have put thru paperwork to Insurance to approve  the once a month injection for her headaches that Dr. Mike Diaz mentioned to her at her last visit.   She would also like to know if she can get an Order for Massage Ther

## 2022-01-24 ENCOUNTER — PATIENT MESSAGE (OUTPATIENT)
Dept: NEUROLOGY | Facility: CLINIC | Age: 62
End: 2022-01-24

## 2022-01-24 DIAGNOSIS — G43.709 CHRONIC MIGRAINE WITHOUT AURA WITHOUT STATUS MIGRAINOSUS, NOT INTRACTABLE: Primary | ICD-10-CM

## 2022-01-24 NOTE — TELEPHONE ENCOUNTER
From: Rubin Whitt  To: Maude Blanco MD  Sent: 1/24/2022 1:12 PM CST  Subject: Lucero Harrell,    I would like a referral for migraines specifically cervicogenic therapy.  My dr also has migraines and receives this, and has found it to

## 2022-01-26 ENCOUNTER — PATIENT MESSAGE (OUTPATIENT)
Dept: NEUROLOGY | Facility: CLINIC | Age: 62
End: 2022-01-26

## 2022-01-26 NOTE — TELEPHONE ENCOUNTER
From: Alexys Grove  To: Lm Leon MD  Sent: 1/24/2022 1:12 PM CST  Subject: Calleen Odessa Braxton,    I would like a referral for migraines specifically cervicogenic therapy.  My dr also has migraines and receives this, and has found it to

## 2022-02-01 ENCOUNTER — HOSPITAL ENCOUNTER (EMERGENCY)
Facility: HOSPITAL | Age: 62
Discharge: HOME | End: 2022-02-01
Attending: EMERGENCY MEDICINE
Payer: MEDICAID

## 2022-02-01 VITALS
BODY MASS INDEX: 29.02 KG/M2 | WEIGHT: 170 LBS | HEIGHT: 64 IN | HEART RATE: 85 BPM | DIASTOLIC BLOOD PRESSURE: 71 MMHG | TEMPERATURE: 98 F | SYSTOLIC BLOOD PRESSURE: 107 MMHG | RESPIRATION RATE: 20 BRPM | OXYGEN SATURATION: 100 %

## 2022-02-01 DIAGNOSIS — R51.9 NONINTRACTABLE HEADACHE, UNSPECIFIED CHRONICITY PATTERN, UNSPECIFIED HEADACHE TYPE: Primary | ICD-10-CM

## 2022-02-01 DIAGNOSIS — R82.81 PYURIA: ICD-10-CM

## 2022-02-01 DIAGNOSIS — R53.81 MALAISE: ICD-10-CM

## 2022-02-01 LAB
ANION GAP SERPL CALC-SCNC: 4 MMOL/L (ref 0–18)
BASOPHILS # BLD AUTO: 0.04 X10(3) UL (ref 0–0.2)
BASOPHILS NFR BLD AUTO: 0.5 %
BILIRUB UR QL: NEGATIVE
BUN BLD-MCNC: 17 MG/DL (ref 7–18)
BUN/CREAT SERPL: 14.8 (ref 10–20)
CALCIUM BLD-MCNC: 8.8 MG/DL (ref 8.5–10.1)
CHLORIDE SERPL-SCNC: 105 MMOL/L (ref 98–112)
CLARITY UR: CLEAR
CO2 SERPL-SCNC: 30 MMOL/L (ref 21–32)
COLOR UR: YELLOW
CREAT BLD-MCNC: 1.15 MG/DL
DEPRECATED RDW RBC AUTO: 43.5 FL (ref 35.1–46.3)
EOSINOPHIL # BLD AUTO: 0.06 X10(3) UL (ref 0–0.7)
EOSINOPHIL NFR BLD AUTO: 0.7 %
ERYTHROCYTE [DISTWIDTH] IN BLOOD BY AUTOMATED COUNT: 14.5 % (ref 11–15)
GLUCOSE BLD-MCNC: 137 MG/DL (ref 70–99)
GLUCOSE UR-MCNC: NEGATIVE MG/DL
HCT VFR BLD AUTO: 39.7 %
HGB BLD-MCNC: 12.5 G/DL
HGB UR QL STRIP.AUTO: NEGATIVE
IMM GRANULOCYTES # BLD AUTO: 0.03 X10(3) UL (ref 0–1)
IMM GRANULOCYTES NFR BLD: 0.3 %
KETONES UR-MCNC: NEGATIVE MG/DL
LYMPHOCYTES # BLD AUTO: 3.66 X10(3) UL (ref 1–4)
LYMPHOCYTES NFR BLD AUTO: 42.4 %
MCH RBC QN AUTO: 26 PG (ref 26–34)
MCHC RBC AUTO-ENTMCNC: 31.5 G/DL (ref 31–37)
MCV RBC AUTO: 82.7 FL
MONOCYTES # BLD AUTO: 0.78 X10(3) UL (ref 0.1–1)
MONOCYTES NFR BLD AUTO: 9 %
NEUTROPHILS # BLD AUTO: 4.07 X10 (3) UL (ref 1.5–7.7)
NEUTROPHILS # BLD AUTO: 4.07 X10(3) UL (ref 1.5–7.7)
NEUTROPHILS NFR BLD AUTO: 47.1 %
NITRITE UR QL STRIP.AUTO: NEGATIVE
OSMOLALITY SERPL CALC.SUM OF ELEC: 292 MOSM/KG (ref 275–295)
PH UR: 7 [PH] (ref 5–8)
PLATELET # BLD AUTO: 422 10(3)UL (ref 150–450)
POTASSIUM SERPL-SCNC: 4.5 MMOL/L (ref 3.5–5.1)
PROT UR-MCNC: NEGATIVE MG/DL
RBC # BLD AUTO: 4.8 X10(6)UL
SODIUM SERPL-SCNC: 139 MMOL/L (ref 136–145)
TSI SER-ACNC: 1.15 MIU/ML (ref 0.36–3.74)
UROBILINOGEN UR STRIP-ACNC: <2
WBC # BLD AUTO: 8.6 X10(3) UL (ref 4–11)

## 2022-02-01 PROCEDURE — 84443 ASSAY THYROID STIM HORMONE: CPT | Performed by: EMERGENCY MEDICINE

## 2022-02-01 PROCEDURE — 96375 TX/PRO/DX INJ NEW DRUG ADDON: CPT

## 2022-02-01 PROCEDURE — 96374 THER/PROPH/DIAG INJ IV PUSH: CPT

## 2022-02-01 PROCEDURE — 99284 EMERGENCY DEPT VISIT MOD MDM: CPT

## 2022-02-01 PROCEDURE — 96361 HYDRATE IV INFUSION ADD-ON: CPT

## 2022-02-01 PROCEDURE — 80048 BASIC METABOLIC PNL TOTAL CA: CPT | Performed by: EMERGENCY MEDICINE

## 2022-02-01 PROCEDURE — 85025 COMPLETE CBC W/AUTO DIFF WBC: CPT | Performed by: EMERGENCY MEDICINE

## 2022-02-01 PROCEDURE — 87086 URINE CULTURE/COLONY COUNT: CPT | Performed by: EMERGENCY MEDICINE

## 2022-02-01 PROCEDURE — 81001 URINALYSIS AUTO W/SCOPE: CPT | Performed by: EMERGENCY MEDICINE

## 2022-02-01 RX ORDER — DIPHENHYDRAMINE HYDROCHLORIDE 50 MG/ML
12.5 INJECTION INTRAMUSCULAR; INTRAVENOUS ONCE
Status: COMPLETED | OUTPATIENT
Start: 2022-02-01 | End: 2022-02-01

## 2022-02-01 RX ORDER — METOCLOPRAMIDE 10 MG/1
10 TABLET ORAL EVERY 6 HOURS PRN
Qty: 20 TABLET | Refills: 0 | Status: SHIPPED | OUTPATIENT
Start: 2022-02-01 | End: 2022-02-06

## 2022-02-01 RX ORDER — METOCLOPRAMIDE HYDROCHLORIDE 5 MG/ML
10 INJECTION INTRAMUSCULAR; INTRAVENOUS ONCE
Status: COMPLETED | OUTPATIENT
Start: 2022-02-01 | End: 2022-02-01

## 2022-02-02 NOTE — ED INITIAL ASSESSMENT (HPI)
Pt with history of migraines. Reports migraine has not improved since Sunday. Home migraine meds with mild relief. Reports nausea, denies dizziness or blurry vision.

## 2022-02-02 NOTE — ED QUICK NOTES
Pt states she feels a little better. ivf continuing to infuse. Additional warm blankets provided as requested.

## 2022-02-02 NOTE — ED QUICK NOTES
Pt provided and explained d/c instructions, at-home care, follow-up, and rx. Pt in nad at this time. Iv access d/c. Vss. Goldberg. Ambulatory. A&ox3. Belongings with pt. All questions and concerns addressed.

## 2022-02-03 ENCOUNTER — TELEMEDICINE (OUTPATIENT)
Dept: INTERNAL MEDICINE CLINIC | Facility: CLINIC | Age: 62
End: 2022-02-03
Payer: MEDICAID

## 2022-02-03 DIAGNOSIS — G43.709 CHRONIC MIGRAINE WITHOUT AURA WITHOUT STATUS MIGRAINOSUS, NOT INTRACTABLE: ICD-10-CM

## 2022-02-03 DIAGNOSIS — K21.9 GASTROESOPHAGEAL REFLUX DISEASE WITHOUT ESOPHAGITIS: ICD-10-CM

## 2022-02-03 DIAGNOSIS — R51.9 INCREASED FREQUENCY OF HEADACHES: Primary | ICD-10-CM

## 2022-02-03 DIAGNOSIS — E11.9 TYPE 2 DIABETES MELLITUS WITHOUT COMPLICATION, WITHOUT LONG-TERM CURRENT USE OF INSULIN (HCC): ICD-10-CM

## 2022-02-03 LAB — SARS-COV-2 RNA RESP QL NAA+PROBE: NOT DETECTED

## 2022-02-03 PROCEDURE — 99214 OFFICE O/P EST MOD 30 MIN: CPT | Performed by: INTERNAL MEDICINE

## 2022-02-03 RX ORDER — FAMOTIDINE 20 MG/1
20 TABLET, FILM COATED ORAL 2 TIMES DAILY
Qty: 180 TABLET | Refills: 0 | Status: SHIPPED | OUTPATIENT
Start: 2022-02-03

## 2022-02-03 RX ORDER — DULAGLUTIDE 1.5 MG/.5ML
1.5 INJECTION, SOLUTION SUBCUTANEOUS
Qty: 4 EACH | Refills: 0 | Status: SHIPPED | OUTPATIENT
Start: 2022-02-03

## 2022-02-03 RX ORDER — CELECOXIB 200 MG/1
200 CAPSULE ORAL
Qty: 30 CAPSULE | Refills: 0 | Status: SHIPPED | OUTPATIENT
Start: 2022-02-03

## 2022-02-04 ENCOUNTER — TELEPHONE (OUTPATIENT)
Dept: INTERNAL MEDICINE CLINIC | Facility: CLINIC | Age: 62
End: 2022-02-04

## 2022-02-04 NOTE — TELEPHONE ENCOUNTER
Dr. Genet Tatum,    A request for an MRI of the brain was forwarded to the referral department. Please place order if needed. Thank you.     Referral Department

## 2022-02-06 PROBLEM — G43.709 CHRONIC MIGRAINE WITHOUT AURA WITHOUT STATUS MIGRAINOSUS, NOT INTRACTABLE: Status: ACTIVE | Noted: 2022-02-06

## 2022-02-07 ENCOUNTER — TELEPHONE (OUTPATIENT)
Dept: CASE MANAGEMENT | Age: 62
End: 2022-02-07

## 2022-02-07 NOTE — TELEPHONE ENCOUNTER
You can do a peer to peer by calling   Ph. [phone] option 4. Case # [id number]  You have seven days to complete peer to peer.     Thank you,  [facility]   Referral specialist

## 2022-02-07 NOTE — TELEPHONE ENCOUNTER
Hello Dr. Paschal Hatchet,    Your request for patient to have MRI of the brain. This request has been denied by health plan. Please advise plan of care.     Thank you,  [facility]   Referral specialist

## 2022-02-07 NOTE — TELEPHONE ENCOUNTER
Called and had peer to peer approval        MRI brain is approved      Approval number is see below     A [id number]       on 22       Patient can call and schedule appointment for MRI of the brain without contrast

## 2022-02-08 NOTE — TELEPHONE ENCOUNTER
Spoke to patient and informed her that her MRI brain was approved and she can schedule an appointment

## 2022-02-28 ENCOUNTER — TELEPHONE (OUTPATIENT)
Dept: NEUROLOGY | Facility: CLINIC | Age: 62
End: 2022-02-28

## 2022-02-28 NOTE — TELEPHONE ENCOUNTER
Patient calling to check on status of Emgality PA. PA appears to be closed. PA re-submitted to [employer]. Will await determination. Patient reports she has been having headaches about every other day & sometimes they last all day. She notices headaches occur upon awakening in morning. Patient states Dr Valentina Blank ordered naproxen 500mg for her, but it is not working as well as Naprelan CR. Patient requesting an order for Naprelan.  Order pend & routed to MD for approval

## 2022-03-01 RX ORDER — NAPROXEN SODIUM 500 MG/1
500 TABLET, FILM COATED, EXTENDED RELEASE ORAL
Qty: 30 TABLET | Refills: 1 | Status: SHIPPED | OUTPATIENT
Start: 2022-03-01

## 2022-03-01 NOTE — TELEPHONE ENCOUNTER
Please inform the patient about importance of taking abortive medications less than 3 times a week, otherwise she runs a risk of developing rebound headaches.

## 2022-03-01 NOTE — TELEPHONE ENCOUNTER
Called and notified patient prescription e-scribed. Educated on rebound headaches as advised by Dr Karlene Faust.  Patient VU & appreciative of call

## 2022-03-01 NOTE — TELEPHONE ENCOUNTER
Emgality was denied as patient needs to try and fail Aimovig or Ajovy. Letter placed in scanning.      Pended aimovig 140 mg/mL, inject 1 mL every 30 days, #1, no refills

## 2022-03-02 ENCOUNTER — HOSPITAL ENCOUNTER (OUTPATIENT)
Dept: MRI IMAGING | Age: 62
Discharge: HOME | End: 2022-03-02
Attending: INTERNAL MEDICINE
Payer: MEDICAID

## 2022-03-02 DIAGNOSIS — R51.9 PERSISTENT HEADACHES: ICD-10-CM

## 2022-03-02 PROCEDURE — 70551 MRI BRAIN STEM W/O DYE: CPT | Performed by: INTERNAL MEDICINE

## 2022-03-02 RX ORDER — ERENUMAB-AOOE 140 MG/ML
140 INJECTION, SOLUTION SUBCUTANEOUS
Qty: 1 ML | Refills: 0 | Status: SHIPPED | OUTPATIENT
Start: 2022-03-02 | End: 2023-03-02

## 2022-03-02 NOTE — TELEPHONE ENCOUNTER
PA approval letter received for Aimovig 140 sol auto inj up to 1 syringe per 28 days  effective 1/1/22-3/2/23    Case number [id number]    Approval letter placed in scanning bin

## 2022-03-02 NOTE — TELEPHONE ENCOUNTER
I signed the prescription for Chalmer Carls since it was required to be tried first before [facility].

## 2022-03-02 NOTE — TELEPHONE ENCOUNTER
Spoke to patient and informed her of Aimovig prescription. She was understanding and thankful for the call. PA for aimovig completed today. Will await determination.

## 2022-03-14 RX ORDER — ROSUVASTATIN CALCIUM 5 MG/1
5 TABLET, COATED ORAL NIGHTLY
Qty: 90 TABLET | Refills: 1 | Status: SHIPPED | OUTPATIENT
Start: 2022-03-14 | End: 2022-09-23

## 2022-03-14 NOTE — TELEPHONE ENCOUNTER
Refill passed per [place] [employer] [facility] [facility] protocol. Requested Prescriptions   Pending Prescriptions Disp Refills    ROSUVASTATIN 5 MG Oral Tab [Pharmacy Med Name: Rosuvastatin Calcium 5 Mg Tab Nort] 90 tablet 0     Sig: Take 1 tablet (5 mg total) by mouth nightly.         Cholesterol Medication Protocol Passed - 3/12/2022  5:36 PM        Passed - ALT in past 12 months        Passed - LDL in past 12 months        Passed - Last ALT < 80       Lab Results   Component Value Date    ALT 31 12/06/2021             Passed - Last LDL < 130     Lab Results   Component Value Date    LDL 47 12/06/2021               Passed - Appointment in past 12 or next 3 months             Recent Outpatient Visits              1 month ago Increased frequency of headaches    Baudilio Burnett Seltjarnarnes, MD    Telemedicine    3 months ago Diabetes mellitus type 2 without retinopathy ([facility] [address]    [facility], Main [address], [place] Judye Daniels, MD    Telemedicine    4 months ago Status migrainosus    [facility] Michelle King MD    Telemedicine    5 months ago PE (physical exam), routine    [place], [place] Judye Daniels, MD    Office Visit    5 months ago Chronic migraine without aura without status migrainosus, not intractable    [facility] Michelle King MD    Office Visit           Future Appointments         Provider Department Appt Notes    In 1 month Lizz Ortiz MD [place] [employer] [place] [facility], [address], [place] patient scheduled for knee replacement [facility] 05/06 with Dr. Chaim Giles.  policy informed

## 2022-04-06 RX ORDER — ERENUMAB-AOOE 140 MG/ML
INJECTION, SOLUTION SUBCUTANEOUS
Qty: 1 ML | Refills: 5 | Status: SHIPPED | OUTPATIENT
Start: 2022-04-06

## 2022-04-06 NOTE — TELEPHONE ENCOUNTER
Medication: AIMOVIG 140 MG/ML Subcutaneous Solution Auto-injector     LOV: 11/5/21  NOV: none    Last refill/[place]: 3/2/22

## 2022-04-15 RX ORDER — LANCETS 30 GAUGE
1 EACH MISCELLANEOUS 2 TIMES DAILY
Qty: 200 EACH | Refills: 3 | Status: SHIPPED | OUTPATIENT
Start: 2022-04-15

## 2022-04-15 RX ORDER — BLOOD SUGAR DIAGNOSTIC
STRIP MISCELLANEOUS
Qty: 200 EACH | Refills: 3 | Status: SHIPPED | OUTPATIENT
Start: 2022-04-15

## 2022-04-15 RX ORDER — DULAGLUTIDE 1.5 MG/.5ML
1.5 INJECTION, SOLUTION SUBCUTANEOUS
Qty: 4 ML | Refills: 0 | Status: SHIPPED | OUTPATIENT
Start: 2022-04-15

## 2022-04-15 RX ORDER — BLOOD-GLUCOSE METER
EACH MISCELLANEOUS
Qty: 1 KIT | Refills: 0 | Status: SHIPPED | OUTPATIENT
Start: 2022-04-15

## 2022-04-15 NOTE — TELEPHONE ENCOUNTER
Patient broke her glucose monitor and needs a replacement along with refills for test strips and lancets. please advise.

## 2022-04-16 NOTE — TELEPHONE ENCOUNTER
Refill passed per [place] [employer] [facility] protocol. Requested Prescriptions   Pending Prescriptions Disp Refills    Blood Glucose Monitoring Suppl (ONETOUCH ULTRA 2) w/Device Does not apply Kit 1 kit 0     Sig: Check blood sugar 2 times daily        Diabetic Supplies Protocol Passed - 4/15/2022  5:00 PM        Passed - Appointment in past 12 or next 3 months           Glucose Blood (ONETOUCH ULTRA) In Vitro Strip 200 each 3     Sig: Check blood sugar 2 times daily        Diabetic Supplies Protocol Passed - 4/15/2022  5:00 PM        Passed - Appointment in past 12 or next 3 months           OneTouch Delica Lancets 56I Does not apply Misc 200 each 3     Si each 2 (two) times a day.  Check blood sugar 2 times daily        Diabetic Supplies Protocol Passed - 4/15/2022  5:00 PM        Passed - Appointment in past 12 or next 3 months               Recent Outpatient Visits              2 months ago Increased frequency of headaches    Baudilio Kohler Seltjarnarnes, MD    Telemedicine    4 months ago Diabetes mellitus type 2 without retinopathy [facility])    [place] [employer] [facility], Main [address], [place] Coralyn Marry, MD    Telemedicine    5 months ago Status migrainosus    ARIC Vasquez MD    Telemedicine    6 months ago PE (physical exam), routine    [facility], [place] Coralyn Marry, MD    Office Visit    7 months ago Chronic migraine without aura without status migrainosus, not intractable    ARIC Vasquez MD    Office Visit             Future Appointments         Provider Department Appt Notes    In 5 days Dennis Gonzalez MD [place] [employer] [facility], [address], [place] patient scheduled for knee replacement [facility]  with Dr. Kamilah Godfrey.  policy informed

## 2022-04-20 ENCOUNTER — TELEPHONE (OUTPATIENT)
Dept: INTERNAL MEDICINE CLINIC | Facility: CLINIC | Age: 62
End: 2022-04-20

## 2022-04-20 ENCOUNTER — OFFICE VISIT (OUTPATIENT)
Dept: INTERNAL MEDICINE CLINIC | Facility: CLINIC | Age: 62
End: 2022-04-20
Payer: MEDICAID

## 2022-04-20 VITALS
BODY MASS INDEX: 29.71 KG/M2 | HEART RATE: 81 BPM | HEIGHT: 64 IN | SYSTOLIC BLOOD PRESSURE: 118 MMHG | WEIGHT: 174 LBS | DIASTOLIC BLOOD PRESSURE: 67 MMHG

## 2022-04-20 DIAGNOSIS — K21.9 GASTROESOPHAGEAL REFLUX DISEASE WITHOUT ESOPHAGITIS: ICD-10-CM

## 2022-04-20 DIAGNOSIS — M17.11 PRIMARY OSTEOARTHRITIS OF RIGHT KNEE: ICD-10-CM

## 2022-04-20 DIAGNOSIS — E11.9 TYPE 2 DIABETES MELLITUS WITHOUT COMPLICATION, WITHOUT LONG-TERM CURRENT USE OF INSULIN (HCC): ICD-10-CM

## 2022-04-20 DIAGNOSIS — Z01.810 PREOP CARDIOVASCULAR EXAM: Primary | ICD-10-CM

## 2022-04-20 PROCEDURE — 3008F BODY MASS INDEX DOCD: CPT | Performed by: INTERNAL MEDICINE

## 2022-04-20 PROCEDURE — 3074F SYST BP LT 130 MM HG: CPT | Performed by: INTERNAL MEDICINE

## 2022-04-20 PROCEDURE — 3078F DIAST BP <80 MM HG: CPT | Performed by: INTERNAL MEDICINE

## 2022-04-20 PROCEDURE — 99214 OFFICE O/P EST MOD 30 MIN: CPT | Performed by: INTERNAL MEDICINE

## 2022-04-20 RX ORDER — PANTOPRAZOLE SODIUM 40 MG/1
40 TABLET, DELAYED RELEASE ORAL
Qty: 90 TABLET | Refills: 1 | Status: SHIPPED | OUTPATIENT
Start: 2022-04-20

## 2022-04-20 NOTE — TELEPHONE ENCOUNTER
LMTCB.  When pt calls back pls inform her that Dr. Ede Licona will not be in the office in the afternoon. We can see patient today (4/20/22) at 11:40am, or if she is unable to make it at the time we can change visit into a video visit.

## 2022-04-21 ENCOUNTER — TELEPHONE (OUTPATIENT)
Dept: INTERNAL MEDICINE CLINIC | Facility: CLINIC | Age: 62
End: 2022-04-21

## 2022-04-21 NOTE — TELEPHONE ENCOUNTER
Patient states she's having surgery on May 6th and that Dr. Conrado Mohs had wanted to see her 2 weeks afterwards. Informed first opening was not until June. Patient asking if she needs to be seen in May.

## 2022-04-21 NOTE — TELEPHONE ENCOUNTER
Patient can see me on the May 18 TCM or 25th TCM visit  please schedule    If patient goes to rehab facility , she should see me then  Within -2 weeks after rehab

## 2022-04-21 NOTE — TELEPHONE ENCOUNTER
Patient states office visit yesterday with Dr. Oma Burton, but forgot to mention she has had a small moveable cyst on right hand just below pinky finger when is somewhat painful when touched.     Scheduled appt with Mega Carroll tomorrow at 11:30 am.

## 2022-04-22 ENCOUNTER — LAB ENCOUNTER (OUTPATIENT)
Dept: LAB | Age: 62
End: 2022-04-22
Attending: INTERNAL MEDICINE
Payer: MEDICAID

## 2022-04-22 ENCOUNTER — OFFICE VISIT (OUTPATIENT)
Dept: FAMILY MEDICINE CLINIC | Facility: CLINIC | Age: 62
End: 2022-04-22
Payer: MEDICAID

## 2022-04-22 VITALS
HEIGHT: 64 IN | HEART RATE: 78 BPM | BODY MASS INDEX: 29.37 KG/M2 | DIASTOLIC BLOOD PRESSURE: 80 MMHG | WEIGHT: 172 LBS | SYSTOLIC BLOOD PRESSURE: 127 MMHG

## 2022-04-22 DIAGNOSIS — E11.9 DIABETES MELLITUS (HCC): ICD-10-CM

## 2022-04-22 DIAGNOSIS — Z01.810 PRE-OPERATIVE CARDIOVASCULAR EXAMINATION: Primary | ICD-10-CM

## 2022-04-22 DIAGNOSIS — L98.9 HAND LESION: Primary | ICD-10-CM

## 2022-04-22 DIAGNOSIS — M17.11 OSTEOARTHRITIS OF RIGHT KNEE: ICD-10-CM

## 2022-04-22 LAB
ALBUMIN SERPL-MCNC: 3.9 G/DL (ref 3.4–5)
ALBUMIN/GLOB SERPL: 1.2 {RATIO} (ref 1–2)
ALP LIVER SERPL-CCNC: 78 U/L
ALT SERPL-CCNC: 28 U/L
ANION GAP SERPL CALC-SCNC: 6 MMOL/L (ref 0–18)
AST SERPL-CCNC: 25 U/L (ref 15–37)
BASOPHILS # BLD AUTO: 0.05 X10(3) UL (ref 0–0.2)
BASOPHILS NFR BLD AUTO: 0.8 %
BILIRUB SERPL-MCNC: 0.3 MG/DL (ref 0.1–2)
BILIRUB UR QL: NEGATIVE
BUN BLD-MCNC: 15 MG/DL (ref 7–18)
BUN/CREAT SERPL: 15.6 (ref 10–20)
CALCIUM BLD-MCNC: 9.3 MG/DL (ref 8.5–10.1)
CHLORIDE SERPL-SCNC: 105 MMOL/L (ref 98–112)
CLARITY UR: CLEAR
CO2 SERPL-SCNC: 27 MMOL/L (ref 21–32)
COLOR UR: YELLOW
CREAT BLD-MCNC: 0.96 MG/DL
DEPRECATED RDW RBC AUTO: 41.6 FL (ref 35.1–46.3)
EOSINOPHIL # BLD AUTO: 0.08 X10(3) UL (ref 0–0.7)
EOSINOPHIL NFR BLD AUTO: 1.3 %
ERYTHROCYTE [DISTWIDTH] IN BLOOD BY AUTOMATED COUNT: 14.1 % (ref 11–15)
EST. AVERAGE GLUCOSE BLD GHB EST-MCNC: 140 MG/DL (ref 68–126)
FASTING STATUS PATIENT QL REPORTED: YES
GLOBULIN PLAS-MCNC: 3.2 G/DL (ref 2.8–4.4)
GLUCOSE BLD-MCNC: 95 MG/DL (ref 70–99)
GLUCOSE UR-MCNC: NEGATIVE MG/DL
HBA1C MFR BLD: 6.5 % (ref ?–5.7)
HCT VFR BLD AUTO: 38.8 %
HGB BLD-MCNC: 12.2 G/DL
HGB UR QL STRIP.AUTO: NEGATIVE
IMM GRANULOCYTES # BLD AUTO: 0.01 X10(3) UL (ref 0–1)
IMM GRANULOCYTES NFR BLD: 0.2 %
KETONES UR-MCNC: NEGATIVE MG/DL
LYMPHOCYTES # BLD AUTO: 2.14 X10(3) UL (ref 1–4)
LYMPHOCYTES NFR BLD AUTO: 35.4 %
MCH RBC QN AUTO: 25.7 PG (ref 26–34)
MCHC RBC AUTO-ENTMCNC: 31.4 G/DL (ref 31–37)
MCV RBC AUTO: 81.9 FL
MONOCYTES # BLD AUTO: 0.53 X10(3) UL (ref 0.1–1)
MONOCYTES NFR BLD AUTO: 8.8 %
NEUTROPHILS # BLD AUTO: 3.24 X10 (3) UL (ref 1.5–7.7)
NEUTROPHILS # BLD AUTO: 3.24 X10(3) UL (ref 1.5–7.7)
NEUTROPHILS NFR BLD AUTO: 53.5 %
NITRITE UR QL STRIP.AUTO: NEGATIVE
OSMOLALITY SERPL CALC.SUM OF ELEC: 287 MOSM/KG (ref 275–295)
PH UR: 7 [PH] (ref 5–8)
PLATELET # BLD AUTO: 407 10(3)UL (ref 150–450)
POTASSIUM SERPL-SCNC: 4.9 MMOL/L (ref 3.5–5.1)
PROT SERPL-MCNC: 7.1 G/DL (ref 6.4–8.2)
PROT UR-MCNC: NEGATIVE MG/DL
RBC # BLD AUTO: 4.74 X10(6)UL
SODIUM SERPL-SCNC: 138 MMOL/L (ref 136–145)
SP GR UR STRIP: 1.01 (ref 1–1.03)
UROBILINOGEN UR STRIP-ACNC: <2
VIT C UR-MCNC: NEGATIVE MG/DL
WBC # BLD AUTO: 6.1 X10(3) UL (ref 4–11)

## 2022-04-22 PROCEDURE — 90471 IMMUNIZATION ADMIN: CPT | Performed by: PHYSICIAN ASSISTANT

## 2022-04-22 PROCEDURE — 99213 OFFICE O/P EST LOW 20 MIN: CPT | Performed by: PHYSICIAN ASSISTANT

## 2022-04-22 PROCEDURE — 3074F SYST BP LT 130 MM HG: CPT | Performed by: PHYSICIAN ASSISTANT

## 2022-04-22 PROCEDURE — 83036 HEMOGLOBIN GLYCOSYLATED A1C: CPT | Performed by: INTERNAL MEDICINE

## 2022-04-22 PROCEDURE — 90670 PCV13 VACCINE IM: CPT | Performed by: PHYSICIAN ASSISTANT

## 2022-04-22 PROCEDURE — 3008F BODY MASS INDEX DOCD: CPT | Performed by: PHYSICIAN ASSISTANT

## 2022-04-22 PROCEDURE — 85025 COMPLETE CBC W/AUTO DIFF WBC: CPT | Performed by: INTERNAL MEDICINE

## 2022-04-22 PROCEDURE — 87086 URINE CULTURE/COLONY COUNT: CPT

## 2022-04-22 PROCEDURE — 3044F HG A1C LEVEL LT 7.0%: CPT | Performed by: PHYSICIAN ASSISTANT

## 2022-04-22 PROCEDURE — 3079F DIAST BP 80-89 MM HG: CPT | Performed by: PHYSICIAN ASSISTANT

## 2022-04-22 PROCEDURE — 36415 COLL VENOUS BLD VENIPUNCTURE: CPT | Performed by: INTERNAL MEDICINE

## 2022-04-22 PROCEDURE — 80053 COMPREHEN METABOLIC PANEL: CPT | Performed by: INTERNAL MEDICINE

## 2022-04-22 PROCEDURE — 81001 URINALYSIS AUTO W/SCOPE: CPT | Performed by: INTERNAL MEDICINE

## 2022-04-22 RX ORDER — OMEPRAZOLE 40 MG/1
CAPSULE, DELAYED RELEASE ORAL
COMMUNITY
Start: 2022-04-02 | End: 2022-04-22 | Stop reason: ALTCHOICE

## 2022-04-22 NOTE — TELEPHONE ENCOUNTER
Spoke, with the patient and informed her of the message below. Pt did schedule an appt to see Dr. Alvin Ochoa on 5-18-22 at 1:00 a TCM slot was used. This was approved per the doctors message below.

## 2022-04-27 ENCOUNTER — TELEPHONE (OUTPATIENT)
Dept: INTERNAL MEDICINE CLINIC | Facility: CLINIC | Age: 62
End: 2022-04-27

## 2022-04-27 NOTE — TELEPHONE ENCOUNTER
Generated clearance letter. Faxed letter, labs, and office notes to [phone]. Rec'd fax confirmation.

## 2022-05-04 LAB — AMB EXT COVID-19 RESULT: NOT DETECTED

## 2022-05-12 DIAGNOSIS — E11.9 TYPE 2 DIABETES MELLITUS WITHOUT COMPLICATION, WITHOUT LONG-TERM CURRENT USE OF INSULIN (HCC): ICD-10-CM

## 2022-05-13 NOTE — TELEPHONE ENCOUNTER
Refill passed per [place] [employer] [facility] [facility] protocol. Requested Prescriptions   Pending Prescriptions Disp Refills    METFORMIN 500 MG Oral Tab [Pharmacy Med Name: Metformin Hydrochloride 500 Mg Tab Gran] 180 tablet 0     Sig: Take 2 tablets (1,000 mg total) by mouth 2 (two) times daily.         Diabetes Medication Protocol Passed - 5/12/2022  6:08 PM        Passed - Last A1C < 7.5 and within past 6 months     Lab Results   Component Value Date    A1C 6.5 (H) 04/22/2022               Passed - Appointment in past 6 or next 3 months        Passed - GFR Non- > 50     Lab Results   Component Value Date    GFRNAA 64 04/22/2022                 Passed - GFR in the past 12 months             Recent Outpatient Visits              3 weeks ago Hand lesion    [address] Debora Monteiro PA-C    Office Visit    3 weeks ago Preop cardiovascular exam    Tracy Gomez MD    Office Visit    3 months ago Increased frequency of headaches    Baudilio Wesley, Ethan Stone MD    Telemedicine    5 months ago Diabetes mellitus type 2 without retinopathy [facility])    [place] [employer] [place], Main [address], [place] Levada Brazil, MD    Telemedicine    6 months ago Status migrainosus    [facility] Emily Curran MD    Telemedicine

## 2022-05-17 ENCOUNTER — MED REC SCAN ONLY (OUTPATIENT)
Dept: INTERNAL MEDICINE CLINIC | Facility: CLINIC | Age: 62
End: 2022-05-17

## 2022-06-18 ENCOUNTER — PATIENT MESSAGE (OUTPATIENT)
Dept: INTERNAL MEDICINE CLINIC | Facility: CLINIC | Age: 62
End: 2022-06-18

## 2022-06-18 RX ORDER — OMEPRAZOLE 40 MG/1
40 CAPSULE, DELAYED RELEASE ORAL DAILY
Qty: 90 CAPSULE | Refills: 1 | Status: SHIPPED | OUTPATIENT
Start: 2022-06-18

## 2022-06-18 NOTE — TELEPHONE ENCOUNTER
DR Fish=pended medication, pharmacy updated. From: Quita Gordon  To: Luann Card MD  Sent: 6/18/2022  1:04 AM CDT  Subject: Omeprazole     Hi Dr Merle Cardoso,      Pantoprazole was not approved by insurance so the pharmacy could not fill it. Can you please send a new RX for omeprazole 40 mg (3 month supply) to [facility] in [place].    Thank you

## 2022-06-19 DIAGNOSIS — E03.9 HYPOTHYROIDISM, UNSPECIFIED TYPE: ICD-10-CM

## 2022-06-20 RX ORDER — LEVOTHYROXINE SODIUM 88 UG/1
88 TABLET ORAL
Qty: 90 TABLET | Refills: 0 | Status: SHIPPED | OUTPATIENT
Start: 2022-06-20

## 2022-07-06 ENCOUNTER — TELEPHONE (OUTPATIENT)
Dept: NEUROLOGY | Facility: CLINIC | Age: 62
End: 2022-07-06

## 2022-07-06 NOTE — TELEPHONE ENCOUNTER
Spoke to patient and she stated she has not been able to fill her Aimovig and she has gone a few months without injection. Called pharmacy- they stated they had refills available to patient- let them know they can refill medication. Let patient know they are going to fill medication for patient today. Pt thanked me for assistance.

## 2022-07-07 RX ORDER — DULAGLUTIDE 1.5 MG/.5ML
1.5 INJECTION, SOLUTION SUBCUTANEOUS
Qty: 4 ML | Refills: 0 | Status: SHIPPED | OUTPATIENT
Start: 2022-07-07

## 2022-07-13 ENCOUNTER — OFFICE VISIT (OUTPATIENT)
Dept: INTERNAL MEDICINE CLINIC | Facility: CLINIC | Age: 62
End: 2022-07-13
Payer: MEDICAID

## 2022-07-13 VITALS
HEART RATE: 84 BPM | SYSTOLIC BLOOD PRESSURE: 120 MMHG | HEIGHT: 64 IN | BODY MASS INDEX: 29.37 KG/M2 | DIASTOLIC BLOOD PRESSURE: 78 MMHG | WEIGHT: 172 LBS

## 2022-07-13 DIAGNOSIS — E11.9 TYPE 2 DIABETES MELLITUS WITHOUT COMPLICATION, WITHOUT LONG-TERM CURRENT USE OF INSULIN (HCC): ICD-10-CM

## 2022-07-13 DIAGNOSIS — Z96.651 STATUS POST RIGHT KNEE REPLACEMENT: Primary | ICD-10-CM

## 2022-07-13 PROCEDURE — 3074F SYST BP LT 130 MM HG: CPT | Performed by: INTERNAL MEDICINE

## 2022-07-13 PROCEDURE — 3078F DIAST BP <80 MM HG: CPT | Performed by: INTERNAL MEDICINE

## 2022-07-13 PROCEDURE — 3008F BODY MASS INDEX DOCD: CPT | Performed by: INTERNAL MEDICINE

## 2022-07-13 PROCEDURE — 99214 OFFICE O/P EST MOD 30 MIN: CPT | Performed by: INTERNAL MEDICINE

## 2022-07-13 RX ORDER — FLUTICASONE PROPIONATE 50 MCG
2 SPRAY, SUSPENSION (ML) NASAL DAILY
Qty: 1 EACH | Refills: 2 | Status: SHIPPED | OUTPATIENT
Start: 2022-07-13 | End: 2023-07-08

## 2022-07-13 RX ORDER — LORATADINE 10 MG/1
TABLET ORAL
Qty: 30 TABLET | Refills: 1 | Status: SHIPPED | OUTPATIENT
Start: 2022-07-13

## 2022-07-15 ENCOUNTER — TELEPHONE (OUTPATIENT)
Dept: INTERNAL MEDICINE CLINIC | Facility: CLINIC | Age: 62
End: 2022-07-15

## 2022-07-15 NOTE — TELEPHONE ENCOUNTER
Patient calling (name and  verified), states she took first dose 22 of:  MD Michelle Campbell MD     Medication Quantity Refills Start End   AIMOVIG 140 MG/ML Subcutaneous Solution Auto-injector 1 mL 5 2022      Reporting \"generalized itching\", denies chest tightness or shortness of breath, no respiratory distress noted on call. States she tried taking Claritin, writer advised to add Benadryl 25mg six to eight hours PRN, and to also reach out to the provider who ordered the medication.   Patient verbalizes understanding of instructions

## 2022-07-26 ENCOUNTER — MED REC SCAN ONLY (OUTPATIENT)
Dept: INTERNAL MEDICINE CLINIC | Facility: CLINIC | Age: 62
End: 2022-07-26

## 2022-08-26 ENCOUNTER — PATIENT MESSAGE (OUTPATIENT)
Dept: NEUROLOGY | Facility: CLINIC | Age: 62
End: 2022-08-26

## 2022-08-26 DIAGNOSIS — E78.00 PURE HYPERCHOLESTEROLEMIA: ICD-10-CM

## 2022-08-26 RX ORDER — OMEPRAZOLE 40 MG/1
40 CAPSULE, DELAYED RELEASE ORAL DAILY
Qty: 90 CAPSULE | Refills: 1 | OUTPATIENT
Start: 2022-08-26

## 2022-08-26 RX ORDER — FENOFIBRATE 134 MG/1
134 CAPSULE ORAL
Qty: 90 CAPSULE | Refills: 1 | OUTPATIENT
Start: 2022-08-26

## 2022-08-26 RX ORDER — FENOFIBRATE 134 MG/1
134 CAPSULE ORAL
Qty: 90 CAPSULE | Refills: 1 | Status: SHIPPED | OUTPATIENT
Start: 2022-08-26

## 2022-08-26 NOTE — TELEPHONE ENCOUNTER
Refill passed per [place] [employer] [employer] [facility] protocol. Omeprazole was previously sent to [email or web address]: see 6/18/22 mychart encounter. Requested Prescriptions   Pending Prescriptions Disp Refills    FENOFIBRATE MICRONIZED 134 MG Oral Cap [Pharmacy Med Name: Fenofibrate 134 Mg Cap Chandrika] 90 capsule 0     Sig: Take 1 capsule (134 mg total) by mouth daily with breakfast.        Cholesterol Medication Protocol Passed - 8/26/2022  8:16 AM        Passed - ALT in past 12 months        Passed - LDL in past 12 months        Passed - Last ALT < 80       Lab Results   Component Value Date    ALT 28 04/22/2022             Passed - Last LDL < 130     Lab Results   Component Value Date    LDL 47 12/06/2021               Passed - In person appointment or virtual visit in the past 12 mos or appointment in next 3 mos       Recent Outpatient Visits              1 month ago Status post right knee replacement    [facility],  [address], [place] Everitt Bank, MD    Office Visit    4 months ago Hand lesion    [facility]    Office Visit    4 months ago Preop cardiovascular exam    [facility],  [address], [place] Everitt Bank, MD    Office Visit    6 months ago Increased frequency of headaches    [facility], [address] [facility] Baudilio Mejia Seltjarnarnes, MD    Telemedicine    8 months ago Diabetes mellitus type 2 without retinopathy [facility])    [facility],  [address], [place] Everitt Bank, MD    Telemedicine     Future Appointments         Provider Department Appt Notes    In 4 days Luana Granados, [address] [place] [address], [place] abnormal toenail growing   \"policy informed\"                   OMEPRAZOLE 40 MG Oral Capsule Delayed Release [Pharmacy Med Name: Omeprazole Dr 40 Mg Cap Nort] 90 capsule 0     Sig: Take 1 capsule (40 mg total) by mouth daily.  Take 30-60 minutes before lunch        Gastrointestional Medication Protocol Passed - 8/26/2022  8:16 AM Passed - In person appointment or virtual visit in the past 12 mos or appointment in next 3 mos       Recent Outpatient Visits              1 month ago Status post right knee replacement    [facility], [address] [place] [address], [place] Humphrey Guadeloupe, MD    Office Visit    4 months ago Hand lesion    [address], [place]    Office Visit    4 months ago Preop cardiovascular exam    [facility] [place], [place] Humphrey Guadeloupe, MD    Office Visit    6 months ago Increased frequency of headaches    [address], [address], [place], Seltjarnarnes, MD    Telemedicine    8 months ago Diabetes mellitus type 2 without retinopathy [facility])    [address], [place] Humphrey Guadel[place], MD    Telemedicine     Future Appointments         Provider Department Appt Notes    In 4 days [facility], [address] [facility], [place] abnormal toenail growing   \"policy informed\"                       Recent Outpatient Visits              1 month ago Status post right knee replacement    [facility],  [place], [place] Humphrey Guadeloupe, MD    Office Visit    4 months ago Hand lesion    [address], [place]    Office Visit    4 months ago Preop cardiovascular exam    [place] [place], [place] Humphrey Guadeloupe, MD    Office Visit    6 months ago Increased frequency of headaches    [address], [place], Seltjarnarnes, MD    Telemedicine    8 months ago Diabetes mellitus type 2 without retinopathy [facility])    [address],  [facility] [place] Humphrey Gu[place], MD    Telemedicine            Future Appointments         Provider Department Appt Notes    In 4 days [place], [address] [facility]  [address], [place] abnormal toenail growing   \"policy informed\"

## 2022-08-30 ENCOUNTER — OFFICE VISIT (OUTPATIENT)
Dept: PODIATRY CLINIC | Facility: CLINIC | Age: 62
End: 2022-08-30
Payer: MEDICAID

## 2022-08-30 DIAGNOSIS — M72.2 PLANTAR FASCIITIS OF LEFT FOOT: Primary | ICD-10-CM

## 2022-08-30 DIAGNOSIS — M79.672 PAIN OF LEFT HEEL: ICD-10-CM

## 2022-08-30 DIAGNOSIS — M21.6X2 ACQUIRED EQUINUS DEFORMITY OF LEFT FOOT: ICD-10-CM

## 2022-08-30 PROCEDURE — 99203 OFFICE O/P NEW LOW 30 MIN: CPT | Performed by: PODIATRIST

## 2022-08-30 RX ORDER — RIZATRIPTAN BENZOATE 10 MG/1
TABLET ORAL
COMMUNITY
Start: 2022-08-26

## 2022-09-14 ENCOUNTER — LAB ENCOUNTER (OUTPATIENT)
Dept: LAB | Age: 62
End: 2022-09-14
Attending: INTERNAL MEDICINE
Payer: MEDICAID

## 2022-09-14 LAB
ALBUMIN SERPL-MCNC: 3.8 G/DL (ref 3.4–5)
ALBUMIN/GLOB SERPL: 1.1 {RATIO} (ref 1–2)
ALP LIVER SERPL-CCNC: 77 U/L
ALT SERPL-CCNC: 27 U/L
ANION GAP SERPL CALC-SCNC: 3 MMOL/L (ref 0–18)
AST SERPL-CCNC: 23 U/L (ref 15–37)
BASOPHILS # BLD AUTO: 0.03 X10(3) UL (ref 0–0.2)
BASOPHILS NFR BLD AUTO: 0.5 %
BILIRUB SERPL-MCNC: 0.3 MG/DL (ref 0.1–2)
BUN BLD-MCNC: 13 MG/DL (ref 7–18)
BUN/CREAT SERPL: 14.4 (ref 10–20)
CALCIUM BLD-MCNC: 9.2 MG/DL (ref 8.5–10.1)
CHLORIDE SERPL-SCNC: 107 MMOL/L (ref 98–112)
CO2 SERPL-SCNC: 28 MMOL/L (ref 21–32)
CREAT BLD-MCNC: 0.9 MG/DL
CREAT UR-SCNC: 79.5 MG/DL
DEPRECATED RDW RBC AUTO: 47.1 FL (ref 35.1–46.3)
EOSINOPHIL # BLD AUTO: 0.08 X10(3) UL (ref 0–0.7)
EOSINOPHIL NFR BLD AUTO: 1.4 %
ERYTHROCYTE [DISTWIDTH] IN BLOOD BY AUTOMATED COUNT: 16 % (ref 11–15)
EST. AVERAGE GLUCOSE BLD GHB EST-MCNC: 146 MG/DL (ref 68–126)
FASTING STATUS PATIENT QL REPORTED: YES
GFR SERPLBLD BASED ON 1.73 SQ M-ARVRAT: 72 ML/MIN/1.73M2 (ref 60–?)
GLOBULIN PLAS-MCNC: 3.4 G/DL (ref 2.8–4.4)
GLUCOSE BLD-MCNC: 106 MG/DL (ref 70–99)
HBA1C MFR BLD: 6.7 % (ref ?–5.7)
HCT VFR BLD AUTO: 38.5 %
HGB BLD-MCNC: 11.7 G/DL
IMM GRANULOCYTES # BLD AUTO: 0.01 X10(3) UL (ref 0–1)
IMM GRANULOCYTES NFR BLD: 0.2 %
LYMPHOCYTES # BLD AUTO: 2.2 X10(3) UL (ref 1–4)
LYMPHOCYTES NFR BLD AUTO: 39.9 %
MCH RBC QN AUTO: 24.5 PG (ref 26–34)
MCHC RBC AUTO-ENTMCNC: 30.4 G/DL (ref 31–37)
MCV RBC AUTO: 80.7 FL
MICROALBUMIN UR-MCNC: 0.69 MG/DL
MICROALBUMIN/CREAT 24H UR-RTO: 8.7 UG/MG (ref ?–30)
MONOCYTES # BLD AUTO: 0.56 X10(3) UL (ref 0.1–1)
MONOCYTES NFR BLD AUTO: 10.1 %
NEUTROPHILS # BLD AUTO: 2.64 X10 (3) UL (ref 1.5–7.7)
NEUTROPHILS # BLD AUTO: 2.64 X10(3) UL (ref 1.5–7.7)
NEUTROPHILS NFR BLD AUTO: 47.9 %
OSMOLALITY SERPL CALC.SUM OF ELEC: 287 MOSM/KG (ref 275–295)
PLATELET # BLD AUTO: 426 10(3)UL (ref 150–450)
POTASSIUM SERPL-SCNC: 4.9 MMOL/L (ref 3.5–5.1)
PROT SERPL-MCNC: 7.2 G/DL (ref 6.4–8.2)
RBC # BLD AUTO: 4.77 X10(6)UL
SODIUM SERPL-SCNC: 138 MMOL/L (ref 136–145)
WBC # BLD AUTO: 5.5 X10(3) UL (ref 4–11)

## 2022-09-14 PROCEDURE — 87086 URINE CULTURE/COLONY COUNT: CPT | Performed by: INTERNAL MEDICINE

## 2022-09-14 PROCEDURE — 3061F NEG MICROALBUMINURIA REV: CPT | Performed by: INTERNAL MEDICINE

## 2022-09-14 PROCEDURE — 80053 COMPREHEN METABOLIC PANEL: CPT | Performed by: INTERNAL MEDICINE

## 2022-09-14 PROCEDURE — 36415 COLL VENOUS BLD VENIPUNCTURE: CPT | Performed by: INTERNAL MEDICINE

## 2022-09-14 PROCEDURE — 83036 HEMOGLOBIN GLYCOSYLATED A1C: CPT | Performed by: INTERNAL MEDICINE

## 2022-09-14 PROCEDURE — 85025 COMPLETE CBC W/AUTO DIFF WBC: CPT | Performed by: INTERNAL MEDICINE

## 2022-09-14 PROCEDURE — 3044F HG A1C LEVEL LT 7.0%: CPT | Performed by: INTERNAL MEDICINE

## 2022-09-14 PROCEDURE — 82043 UR ALBUMIN QUANTITATIVE: CPT | Performed by: INTERNAL MEDICINE

## 2022-09-14 PROCEDURE — 82570 ASSAY OF URINE CREATININE: CPT | Performed by: INTERNAL MEDICINE

## 2022-09-22 RX ORDER — OMEPRAZOLE 40 MG/1
40 CAPSULE, DELAYED RELEASE ORAL DAILY
Qty: 90 CAPSULE | Refills: 1 | Status: SHIPPED | OUTPATIENT
Start: 2022-09-22

## 2022-09-22 NOTE — TELEPHONE ENCOUNTER
Refill passed per [facility] protocol. Requested Prescriptions   Pending Prescriptions Disp Refills    Omeprazole 40 MG Oral Capsule Delayed Release 90 capsule 1     Sig: Take 1 capsule (40 mg total) by mouth daily. Gastrointestional Medication Protocol Passed - 9/22/2022  1:02 AM        Passed - In person appointment or virtual visit in the past 12 mos or appointment in next 3 mos       Recent Outpatient Visits              3 weeks ago Plantar fasciitis of left foot    [facility]. [facility], [place] M[place], Delanna Hoh, DPM    Office Visit    2 months ago Status post right knee replacement    [facility] [address], [place] Leonides Gouge, MD    Office Visit    5 months ago Hand lesion    [address] [facility] Pricila Moran [employer]    Office Visit    5 months ago Preop cardiovascular exam    [place] [place], [place] Leonides Gouge, MD    Office Visit    7 months ago Increased frequency of headaches    Baudilio Campbell Seltjarnarnes, MD    Telemedicine                     Recent Outpatient Visits              3 weeks ago Plantar fasciitis of left foot    [facility].  [facility] [address], [place] Meshulam, Delanna Hoh, DPM    Office Visit    2 months ago Status post right knee replacement    [facility] [place], [place] Leonides Gouge, MD    Office Visit    5 months ago Hand lesion    [address] [facility] Pricila Moran, [employer]    Office Visit    5 months ago Preop cardiovascular exam    Heri Ken MD    Office Visit    7 months ago Increased frequency of headaches    Mayelin Justice MD    Telemedicine

## 2022-09-23 DIAGNOSIS — E11.9 TYPE 2 DIABETES MELLITUS WITHOUT COMPLICATION, WITHOUT LONG-TERM CURRENT USE OF INSULIN (HCC): ICD-10-CM

## 2022-09-23 DIAGNOSIS — M54.81 OCCIPITAL NEURALGIA OF LEFT SIDE: ICD-10-CM

## 2022-09-23 DIAGNOSIS — G43.901 STATUS MIGRAINOSUS: ICD-10-CM

## 2022-09-23 DIAGNOSIS — G43.709 CHRONIC MIGRAINE WITHOUT AURA WITHOUT STATUS MIGRAINOSUS, NOT INTRACTABLE: Primary | ICD-10-CM

## 2022-09-23 RX ORDER — ROSUVASTATIN CALCIUM 5 MG/1
5 TABLET, COATED ORAL NIGHTLY
Qty: 90 TABLET | Refills: 0 | Status: SHIPPED | OUTPATIENT
Start: 2022-09-23 | End: 2022-12-22

## 2022-09-23 RX ORDER — NORTRIPTYLINE HYDROCHLORIDE 50 MG/1
100 CAPSULE ORAL NIGHTLY
Qty: 180 CAPSULE | Refills: 3 | Status: SHIPPED | OUTPATIENT
Start: 2022-09-23

## 2022-09-23 NOTE — TELEPHONE ENCOUNTER
Nortriptyline HCl 50 MG Oral Cap  Take 2 capsules (100 mg total) by mouth nightly.   #180, no refills    LOV: 11/5/21- telemed  NOV: None scheduled   Last refilled: 5/28/21

## 2022-09-26 ENCOUNTER — TELEPHONE (OUTPATIENT)
Dept: INTERNAL MEDICINE CLINIC | Facility: CLINIC | Age: 62
End: 2022-09-26

## 2022-09-26 NOTE — TELEPHONE ENCOUNTER
Patient had a knee replacement (titanium) in May 2022. She is requesting a note be placed in her [email or web address] account to show airport security. She is asking for one before 1p today since she traveling this afternoon.

## 2022-09-26 NOTE — TELEPHONE ENCOUNTER
S/w pt. Informed her that this note can come from the surgeon that preformed her surgery. Also, Dr. Juliane Yap is out of the office this whole week. She verbalized understanding.

## 2022-10-05 ENCOUNTER — NURSE TRIAGE (OUTPATIENT)
Dept: INTERNAL MEDICINE CLINIC | Facility: CLINIC | Age: 62
End: 2022-10-05

## 2022-10-05 NOTE — TELEPHONE ENCOUNTER
Spoke to patient, she states she developed some itchiness in the bilateral area of the neck as well as the back and some on abdomen there is no spots ,but there is some itchiness patient denies other symptoms at this time  She is using some Benadryl that helps  Patient denies any other rashes on the rest of the body.     Recommend patient to avoid certain food that might bother her like eggs, nuts, strawberries  Also avoid any new lotions shampoos or fabric softener detergents    Can use hydrocortisone cream twice daily for few days and as needed  Zyrtec once daily  Can use Benadryl at nighttime if itching still persists    Stay in a cool area use the AC-patient is in [place] right now avoid too much  Heat    If any spreading patient to let me know she might also see  Provider at immediate care if needed      Patient agrees with plan verbalized understanding compliance

## 2022-10-06 RX ORDER — TRIAMCINOLONE ACETONIDE 1 MG/G
CREAM TOPICAL 2 TIMES DAILY
Qty: 60 G | Refills: 0 | Status: SHIPPED | OUTPATIENT
Start: 2022-10-06

## 2022-10-06 NOTE — TELEPHONE ENCOUNTER
Spoke with pt,  verified  Pt was informed of MD recommendation, pt stated understanding. Pt would like to be sooner on oct 13 and 14, can we use RES 24?   pls advise, thanks in advance.          Future Appointments   Date Time Provider Laurie Ramirez   10/18/2022  1:40 PM Irineo Felix MD [facility] [place]

## 2022-10-06 NOTE — TELEPHONE ENCOUNTER
Spoke with pt,  verified  Pt informed of MD recommendation, pt stated understanding. appt made.        Future Appointments   Date Time Provider Laurie Ashley   10/13/2022  3:00 PM Lambert Marie MD [facility]

## 2022-10-06 NOTE — TELEPHONE ENCOUNTER
Triamcinolone steroid cream sent to the pharmacy  Apply twice daily for 1 week then as needed  If the rash /itchiness is persistent or worsening patient to be seen-by provider in immediate care in [place]

## 2022-10-06 NOTE — TELEPHONE ENCOUNTER
I received a call from pt and daughter on the phone. That pt has tried taking Zyrtec and benadryl and daughter even gave pt atarax 5 mg (daughters medication) and pt continues to feel the itchiness. Per pt it help very little. Daughter was wanting to know if there is something else that you can prescribe like a cream of pill to help pt with the itchiness. Please advise  ALSO  pt will be back on Oct 12 and wanted to know if she can see you sooner then 10/18.  Please advise   Pharmacy has been updated to giovana in [facility] Please call daughter number in contact     Future Appointments   Date Time Provider Laurie Ramirez   10/18/2022  1:40 PM Lauren Avery MD [facility] [place]

## 2022-10-13 ENCOUNTER — OFFICE VISIT (OUTPATIENT)
Dept: INTERNAL MEDICINE CLINIC | Facility: CLINIC | Age: 62
End: 2022-10-13
Payer: MEDICAID

## 2022-10-13 VITALS
WEIGHT: 174 LBS | HEIGHT: 64 IN | DIASTOLIC BLOOD PRESSURE: 72 MMHG | HEART RATE: 79 BPM | BODY MASS INDEX: 29.71 KG/M2 | SYSTOLIC BLOOD PRESSURE: 113 MMHG

## 2022-10-13 DIAGNOSIS — Z12.31 SCREENING MAMMOGRAM, ENCOUNTER FOR: Primary | ICD-10-CM

## 2022-10-13 DIAGNOSIS — Z78.0 POSTMENOPAUSAL: ICD-10-CM

## 2022-10-13 DIAGNOSIS — Z82.62 FHX: OSTEOPOROSIS: ICD-10-CM

## 2022-10-13 DIAGNOSIS — E03.9 HYPOTHYROIDISM, UNSPECIFIED TYPE: ICD-10-CM

## 2022-10-13 PROCEDURE — 3008F BODY MASS INDEX DOCD: CPT | Performed by: INTERNAL MEDICINE

## 2022-10-13 PROCEDURE — 99214 OFFICE O/P EST MOD 30 MIN: CPT | Performed by: INTERNAL MEDICINE

## 2022-10-13 PROCEDURE — 3074F SYST BP LT 130 MM HG: CPT | Performed by: INTERNAL MEDICINE

## 2022-10-13 PROCEDURE — 3078F DIAST BP <80 MM HG: CPT | Performed by: INTERNAL MEDICINE

## 2022-10-13 RX ORDER — DULAGLUTIDE 1.5 MG/.5ML
1.5 INJECTION, SOLUTION SUBCUTANEOUS
Qty: 12 ML | Refills: 1 | Status: SHIPPED | OUTPATIENT
Start: 2022-10-13

## 2022-10-13 RX ORDER — LEVOTHYROXINE SODIUM 88 UG/1
TABLET ORAL
Qty: 90 TABLET | Refills: 0 | OUTPATIENT
Start: 2022-10-13

## 2022-10-13 RX ORDER — DULAGLUTIDE 1.5 MG/.5ML
1.5 INJECTION, SOLUTION SUBCUTANEOUS
Qty: 4 ML | Refills: 0 | OUTPATIENT
Start: 2022-10-13

## 2022-10-13 RX ORDER — LEVOTHYROXINE SODIUM 88 UG/1
88 TABLET ORAL
Qty: 90 TABLET | Refills: 0 | Status: SHIPPED | OUTPATIENT
Start: 2022-10-13

## 2022-10-13 RX ORDER — OMEPRAZOLE 40 MG/1
40 CAPSULE, DELAYED RELEASE ORAL DAILY
Qty: 90 CAPSULE | Refills: 1 | Status: SHIPPED | OUTPATIENT
Start: 2022-10-13

## 2022-10-13 RX ORDER — LORATADINE 10 MG/1
TABLET ORAL
Qty: 90 TABLET | Refills: 0 | Status: SHIPPED | OUTPATIENT
Start: 2022-10-13

## 2022-10-13 RX ORDER — FAMOTIDINE 20 MG/1
20 TABLET, FILM COATED ORAL 2 TIMES DAILY
Qty: 180 TABLET | Refills: 0 | OUTPATIENT
Start: 2022-10-13

## 2022-11-22 ENCOUNTER — LAB ENCOUNTER (OUTPATIENT)
Dept: LAB | Age: 62
End: 2022-11-22
Attending: INTERNAL MEDICINE
Payer: MEDICAID

## 2022-11-22 DIAGNOSIS — E53.8 LOW SERUM VITAMIN B12: ICD-10-CM

## 2022-11-22 DIAGNOSIS — D50.8 OTHER IRON DEFICIENCY ANEMIA: Primary | ICD-10-CM

## 2022-11-22 LAB
DEPRECATED HBV CORE AB SER IA-ACNC: 35.8 NG/ML
FOLATE SERPL-MCNC: 13.7 NG/ML (ref 8.7–?)
IRON SATN MFR SERPL: 13 %
IRON SERPL-MCNC: 58 UG/DL
TIBC SERPL-MCNC: 437 UG/DL (ref 240–450)
TRANSFERRIN SERPL-MCNC: 293 MG/DL (ref 200–360)
VIT B12 SERPL-MCNC: 178 PG/ML (ref 193–986)

## 2022-11-22 PROCEDURE — 82728 ASSAY OF FERRITIN: CPT

## 2022-11-22 PROCEDURE — 83540 ASSAY OF IRON: CPT

## 2022-11-22 PROCEDURE — 82746 ASSAY OF FOLIC ACID SERUM: CPT

## 2022-11-22 PROCEDURE — 84466 ASSAY OF TRANSFERRIN: CPT

## 2022-11-22 PROCEDURE — 82607 VITAMIN B-12: CPT

## 2022-11-22 PROCEDURE — 36415 COLL VENOUS BLD VENIPUNCTURE: CPT

## 2022-11-26 ENCOUNTER — NURSE TRIAGE (OUTPATIENT)
Dept: INTERNAL MEDICINE CLINIC | Facility: CLINIC | Age: 62
End: 2022-11-26

## 2022-12-01 ENCOUNTER — LAB ENCOUNTER (OUTPATIENT)
Dept: LAB | Age: 62
End: 2022-12-01
Attending: INTERNAL MEDICINE
Payer: MEDICAID

## 2022-12-01 ENCOUNTER — OFFICE VISIT (OUTPATIENT)
Dept: INTERNAL MEDICINE CLINIC | Facility: CLINIC | Age: 62
End: 2022-12-01
Payer: MEDICAID

## 2022-12-01 VITALS
WEIGHT: 180 LBS | BODY MASS INDEX: 30.73 KG/M2 | HEIGHT: 64 IN | SYSTOLIC BLOOD PRESSURE: 132 MMHG | TEMPERATURE: 98 F | DIASTOLIC BLOOD PRESSURE: 81 MMHG | HEART RATE: 92 BPM | RESPIRATION RATE: 16 BRPM

## 2022-12-01 DIAGNOSIS — R59.0 LYMPHADENOPATHY, AXILLARY: Primary | ICD-10-CM

## 2022-12-01 DIAGNOSIS — B34.9 VIRAL SYNDROME: ICD-10-CM

## 2022-12-01 PROBLEM — J32.9 PURULENT POSTNASAL DRAINAGE: Status: ACTIVE | Noted: 2022-12-01

## 2022-12-01 PROCEDURE — 3079F DIAST BP 80-89 MM HG: CPT | Performed by: INTERNAL MEDICINE

## 2022-12-01 PROCEDURE — 82607 VITAMIN B-12: CPT | Performed by: INTERNAL MEDICINE

## 2022-12-01 PROCEDURE — 36415 COLL VENOUS BLD VENIPUNCTURE: CPT | Performed by: INTERNAL MEDICINE

## 2022-12-01 PROCEDURE — 99214 OFFICE O/P EST MOD 30 MIN: CPT | Performed by: INTERNAL MEDICINE

## 2022-12-01 PROCEDURE — 85025 COMPLETE CBC W/AUTO DIFF WBC: CPT | Performed by: INTERNAL MEDICINE

## 2022-12-01 PROCEDURE — 82728 ASSAY OF FERRITIN: CPT | Performed by: INTERNAL MEDICINE

## 2022-12-01 PROCEDURE — 3008F BODY MASS INDEX DOCD: CPT | Performed by: INTERNAL MEDICINE

## 2022-12-01 PROCEDURE — 3075F SYST BP GE 130 - 139MM HG: CPT | Performed by: INTERNAL MEDICINE

## 2022-12-01 RX ORDER — LORATADINE 10 MG/1
TABLET ORAL
Qty: 90 TABLET | Refills: 0 | Status: SHIPPED | OUTPATIENT
Start: 2022-12-01

## 2022-12-01 RX ORDER — LIRAGLUTIDE 6 MG/ML
INJECTION SUBCUTANEOUS
COMMUNITY
Start: 2022-10-12 | End: 2022-12-01 | Stop reason: ALTCHOICE

## 2022-12-14 ENCOUNTER — OFFICE VISIT (OUTPATIENT)
Dept: INTERNAL MEDICINE CLINIC | Facility: CLINIC | Age: 62
End: 2022-12-14
Payer: MEDICAID

## 2022-12-14 VITALS
SYSTOLIC BLOOD PRESSURE: 108 MMHG | HEART RATE: 81 BPM | HEIGHT: 64 IN | WEIGHT: 179 LBS | BODY MASS INDEX: 30.56 KG/M2 | DIASTOLIC BLOOD PRESSURE: 67 MMHG

## 2022-12-14 DIAGNOSIS — R59.0 LYMPHADENOPATHY, AXILLARY: Primary | ICD-10-CM

## 2022-12-14 DIAGNOSIS — I10 PRIMARY HYPERTENSION: ICD-10-CM

## 2022-12-14 DIAGNOSIS — D64.9 ANEMIA, UNSPECIFIED TYPE: ICD-10-CM

## 2022-12-14 DIAGNOSIS — M25.562 LEFT KNEE PAIN, UNSPECIFIED CHRONICITY: ICD-10-CM

## 2022-12-14 PROCEDURE — 3074F SYST BP LT 130 MM HG: CPT | Performed by: INTERNAL MEDICINE

## 2022-12-14 PROCEDURE — 99214 OFFICE O/P EST MOD 30 MIN: CPT | Performed by: INTERNAL MEDICINE

## 2022-12-14 PROCEDURE — 3078F DIAST BP <80 MM HG: CPT | Performed by: INTERNAL MEDICINE

## 2022-12-14 PROCEDURE — 3008F BODY MASS INDEX DOCD: CPT | Performed by: INTERNAL MEDICINE

## 2022-12-15 ENCOUNTER — TELEPHONE (OUTPATIENT)
Dept: HEMATOLOGY/ONCOLOGY | Facility: HOSPITAL | Age: 62
End: 2022-12-15

## 2022-12-15 NOTE — TELEPHONE ENCOUNTER
Please call patient to schedule a consult for Anemia with Dr. Penelope Ware.   Referred by Sparks Marbin

## 2022-12-16 ENCOUNTER — TELEPHONE (OUTPATIENT)
Dept: HEMATOLOGY/ONCOLOGY | Facility: HOSPITAL | Age: 62
End: 2022-12-16

## 2022-12-16 NOTE — TELEPHONE ENCOUNTER
NAYEM to schedule a new consult for  Anemia with Dr. Jonatan Garcia.   Referred by Sg Pappas, Called 12/16/22

## 2022-12-20 ENCOUNTER — OFFICE VISIT (OUTPATIENT)
Dept: HEMATOLOGY/ONCOLOGY | Facility: HOSPITAL | Age: 62
End: 2022-12-20
Attending: INTERNAL MEDICINE
Payer: MEDICAID

## 2022-12-20 VITALS
WEIGHT: 178 LBS | BODY MASS INDEX: 31.94 KG/M2 | TEMPERATURE: 99 F | HEART RATE: 88 BPM | DIASTOLIC BLOOD PRESSURE: 41 MMHG | SYSTOLIC BLOOD PRESSURE: 112 MMHG | RESPIRATION RATE: 16 BRPM | HEIGHT: 62.5 IN

## 2022-12-20 DIAGNOSIS — R59.1 LYMPHADENOPATHY: ICD-10-CM

## 2022-12-20 DIAGNOSIS — D64.9 ANEMIA, UNSPECIFIED TYPE: Primary | ICD-10-CM

## 2022-12-20 DIAGNOSIS — E61.1 IRON DEFICIENCY: ICD-10-CM

## 2022-12-20 PROCEDURE — 99244 OFF/OP CNSLTJ NEW/EST MOD 40: CPT | Performed by: INTERNAL MEDICINE

## 2022-12-21 ENCOUNTER — HOSPITAL ENCOUNTER (OUTPATIENT)
Dept: MAMMOGRAPHY | Facility: HOSPITAL | Age: 62
Discharge: HOME | End: 2022-12-21
Attending: INTERNAL MEDICINE
Payer: MEDICAID

## 2022-12-21 ENCOUNTER — HOSPITAL ENCOUNTER (OUTPATIENT)
Dept: ULTRASOUND IMAGING | Facility: HOSPITAL | Age: 62
Discharge: HOME | End: 2022-12-21
Attending: INTERNAL MEDICINE
Payer: MEDICAID

## 2022-12-21 DIAGNOSIS — R59.0 LYMPHADENOPATHY, AXILLARY: ICD-10-CM

## 2022-12-21 PROCEDURE — 77066 DX MAMMO INCL CAD BI: CPT | Performed by: INTERNAL MEDICINE

## 2022-12-21 PROCEDURE — 77062 BREAST TOMOSYNTHESIS BI: CPT | Performed by: INTERNAL MEDICINE

## 2022-12-21 PROCEDURE — 76642 ULTRASOUND BREAST LIMITED: CPT | Performed by: INTERNAL MEDICINE

## 2022-12-21 NOTE — CONSULTS
Formerly [facility]    PATIENT'S NAME: Meka Delarosa PHYSICIAN: Earnest Shah. Lamont Solo MD   PATIENT ACCOUNT #: [de-identified] LOCATION: [address] [place] [address] RECORD #: [id number] YOB: 1960   CONSULTATION DATE: 12/20/2022       [facility] NEW PATIENT CONSULT    REQUESTING PHYSICIAN:  Yohannes Coats MD    REASON FOR CONSULTATION:  Lymphadenopathy, iron deficiency anemia. HISTORY OF PRESENT ILLNESS:  The patient is a pleasant 78-year-old female being evaluated by Hematology for lymphadenopathy, iron deficiency anemia, B12 deficiency. She states for about 2 months, she has noticed bilateral axillary lymphadenopathy, right greater than left. She has mammography and ultrasound planned. She denies any fevers, chills, night sweats, or unintentional weight loss. She had further labs with her primary care physician. On 09/14/2022, her hemoglobin was found to be 11.7, platelets 561,117, white blood cell count 5000. She went on to have iron studies on 11/22/2022. Iron saturation was 13%, ferritin 35. Her B12 level was 178. PAST MEDICAL HISTORY:  Diabetes, arthritis, status post hysterectomy, hyperlipidemia. MEDICATIONS:   B12, diclofenac, Trulicity, fenofibrate, ferrous sulfate, fluticasone, loratadine, metformin, naproxen, omeprazole, rosuvastatin. ALLERGIES:  Penicillins, codeine, morphine. SOCIAL HISTORY:  Denies alcohol, tobacco, or illicit drug use. FAMILY MEDICAL HISTORY:  No history any hematologic or oncologic disorders in the family, specifically mother, father, or siblings. REVIEW OF SYSTEMS:  All other systems were reviewed and negative x12. PHYSICAL EXAMINATION:    GENERAL:  No acute distress. Alert and oriented. VITAL SIGNS:  ECOG performance status is 0. Weight is 80 kg. Blood pressure 112/41, pulse 88, respiratory rate 16, temperature is 98. 6. HEENT:  Moist mucous membranes. Oropharynx clear. NECK:  Supple. LUNGS:  Symmetric expansion.   HEART: Good distal pulses. ABDOMEN:  Soft. EXTREMITIES:  No edema. SKIN:  No visible lesions. LYMPHATICS:  She has some palpable right axillary lymphadenopathy. NEUROLOGIC:  Moving all extremities. Cranial nerves intact. PSYCHIATRIC:  Appropriate mood, appropriate affect. MUSCULOSKELETAL:  No deformity. LABORATORY DATA:  Reviewed as per HPI. Please see above for details. ASSESSMENT AND PLAN:  The patient is a very pleasant 20-year-old female presenting to Hematology/Oncology with bilateral axillary lymphadenopathy. She has mammogram and ultrasound planned. She also has anemia with very mild iron and B12 deficiencies. She has started supplementation. We will await her imaging studies. We will see her back for followup labs in 4 to 6 weeks. Thank you very much for this consultation request and allowing us to participate in the care of this delightful patient. Dictated By Dilip Winston MD  d: 12/20/2022 17:08:43  t: 12/21/2022 05:29:08  Job [id number]  BCM/    cc: Alicia Campbell MD

## 2022-12-22 RX ORDER — ROSUVASTATIN CALCIUM 5 MG/1
5 TABLET, COATED ORAL NIGHTLY
Qty: 90 TABLET | Refills: 1 | Status: SHIPPED | OUTPATIENT
Start: 2022-12-22

## 2022-12-22 NOTE — TELEPHONE ENCOUNTER
Protocol failed or has No Protocol, please review  Requested Prescriptions   Pending Prescriptions Disp Refills    ROSUVASTATIN 5 MG Oral Tab [Pharmacy Med Name: Rosuvastatin Calcium 5 Mg Tab Nort] 90 tablet 0     Sig: Take 1 tablet (5 mg total) by mouth nightly. Cholesterol Medication Protocol Failed - 12/22/2022 12:16 AM        Failed - LDL in past 12 months        Failed - Last LDL < 130     Lab Results   Component Value Date    LDL 47 12/06/2021             Passed - ALT in past 12 months        Passed - Last ALT < 80     Lab Results   Component Value Date    ALT 27 09/14/2022             Passed - In person appointment or virtual visit in the past 12 mos or appointment in next 3 mos     Recent Outpatient Visits              2 days ago Anemia, unspecified type    [facility] AND [facility] Hematology Oncology Geovani Cosme MD    Office Visit    1 week ago Lymphadenopathy, axillary    [facility] Abide, [place] Kimberlee Comes, MD    Office Visit    3 weeks ago Lymphadenopathy, axillary    Nicole Harrell MD    Office Visit    2 months ago Screening mammogram, encounter for    Baudilio Shin Seltjarnarnes, MD    Office Visit    3 months ago Plantar fasciitis of left foot    [place] [employer] [place], [facility].  [address], [place]    Office Visit          Future Appointments         Provider Department Appt Notes    In 1 week [facility] RM1; [address] [facility] [address] [facility]     In 1 week Sophie Camargo MD [place] [employer] [place], [facility], [address] [place] [address] 2 wk fu    In 4 weeks Maximilian Winston [address] Hematology Oncology 1 m w/ labs caf                  Future Appointments         Provider Department Appt Notes    In 1 week [facility] RM1; [address]     In 1 week Sophie Camargo MD [place] [employer] [place], [facility], [facility] [address], [place] 2 wk fu    In 4 weeks Geovani Cosme MD [facility] Hematology Oncology 1 m w/ labs caf          Recent Outpatient Visits              2 days ago Anemia, unspecified type    [facility] AND [facility] Hematology Oncology Carlos Pineda MD    Office Visit    1 week ago Lymphadenopathy, axillary    [address] [facility], [place] Judye Daniels, MD    Office Visit    3 weeks ago Lymphadenopathy, axillary    Cassidy Garcia MD    Office Visit    2 months ago Screening mammogram, encounter for    Baudilio Burnett Seltjarnarnes, MD    Office Visit    3 months ago Plantar fasciitis of left foot    [facility], [facility].  [address], [place], [place], [place]    Office Visit

## 2022-12-23 NOTE — DISCHARGE INSTRUCTIONS
The Doctor (Radiologist) who performed your procedure was: Dr Thompson Pen an ice pack over the biopsy site on top of your bra or on top of the ACE wrap (never apply ice directly over skin) for 10-15 minutes of every hour until bedtime for your comfort and to decrease bleeding. Keep your sports bra or the ACE wrap (stereotactic and MRI biopsy) in place for 24 hours after your biopsy. This compression decreases bleeding and breast movement for your comfort. Wear a supportive bra for the next couple of days for comfort (sports bra for sleep). Continue to wear, preferably, a sports bra or good supportive bra for 1 week and take off only to shower. No baths or showers during the first 24 hours after biopsy. After this time you may take a shower. It's okay if the strips get wet but do not soak them. NO saunas, hot tubs or swimming until steri-strips fall off (approx. 5 days). This prevents infection and allows time for them to completely close and heal.  DO NOT remove the steri-strips. They will fall off in 5 days. If any type of irritation (redness, itching or blisters) develops in the area around the steri-strips, remove them gently. If the steri-strips do not fall off after 5 days, gently remove them. Keep the area clean and dry. It is normal to have mild discomfort and bruising at the biopsy site. You may take Tylenol as needed for discomfort, as long as you have no allergies to Tylenol. Do not take aspirin, motrin, ibuprofen or any medication containing NSAID (non-steroidal anti-inflammatory drug) product for 48 hours. DO NOT participate in strenuous activity (aerobics, heavy lifting, housework, gardening, etc.) 48 hours after your biopsy to prevent bleeding. You will receive results in 2-3 business days  If you are having an MRI breast biopsy or an Ultrasound guided breast biopsy, you will be billed for the biopsy and unilateral mammogram separately.   If you have any questions about the procedure or your results, please contact the Breast Care Coordinator Nurse at [phone]. Notify your ordering physician or primary physician for increased bleeding, pain or fever over 100. Or contact a Radiology Nurse at [phone] between 8am-4pm (after 4pm, your call will be directed to the [facility] Room).

## 2022-12-27 ENCOUNTER — MED REC SCAN ONLY (OUTPATIENT)
Dept: INTERNAL MEDICINE CLINIC | Facility: CLINIC | Age: 62
End: 2022-12-27

## 2022-12-27 NOTE — DISCHARGE INSTRUCTIONS
The Doctor (Radiologist) who performed your procedure was: Dr Miguel Phan an ice pack over the biopsy site on top of your bra or on top of the ACE wrap (never apply ice directly over skin) for 10-15 minutes of every hour until bedtime for your comfort and to decrease bleeding. Keep your sports bra or the ACE wrap (stereotactic and MRI biopsy) in place for 24 hours after your biopsy. This compression decreases bleeding and breast movement for your comfort. Wear a supportive bra for the next couple of days for comfort (sports bra for sleep). Continue to wear, preferably, a sports bra or good supportive bra for 1 week and take off only to shower. No baths or showers during the first 24 hours after biopsy. After this time you may take a shower. It's okay if the strips get wet but do not soak them. NO saunas, hot tubs or swimming until steri-strips fall off (approx. 5 days). This prevents infection and allows time for them to completely close and heal.  DO NOT remove the steri-strips. They will fall off in 5 days. If any type of irritation (redness, itching or blisters) develops in the area around the steri-strips, remove them gently. If the steri-strips do not fall off after 5 days, gently remove them. Keep the area clean and dry. It is normal to have mild discomfort and bruising at the biopsy site. You may take Tylenol as needed for discomfort, as long as you have no allergies to Tylenol. Do not take aspirin, motrin, ibuprofen or any medication containing NSAID (non-steroidal anti-inflammatory drug) product for 48 hours. DO NOT participate in strenuous activity (aerobics, heavy lifting, housework, gardening, etc.) 48 hours after your biopsy to prevent bleeding. You will receive results in 2-3 business days. If you are having an MRI breast biopsy or an Ultrasound guided breast biopsy, you will be billed for the biopsy and unilateral mammogram separately.   If you have any questions about the procedure or your results, please contact the Breast Care Coordinator Nurse at [phone]. Notify your ordering physician or primary physician for increased bleeding, pain or fever over 100. Or contact a Radiology Nurse at [phone] between 8am-4pm (after 4pm, your call will be directed to the [facility] Emergency Room).

## 2022-12-28 ENCOUNTER — HOSPITAL ENCOUNTER (OUTPATIENT)
Dept: ULTRASOUND IMAGING | Facility: HOSPITAL | Age: 62
Discharge: HOME | End: 2022-12-28
Attending: INTERNAL MEDICINE
Payer: MEDICAID

## 2022-12-28 ENCOUNTER — HOSPITAL ENCOUNTER (OUTPATIENT)
Dept: MAMMOGRAPHY | Facility: HOSPITAL | Age: 62
Discharge: HOME | End: 2022-12-28
Attending: INTERNAL MEDICINE
Payer: MEDICAID

## 2022-12-28 DIAGNOSIS — R92.8 ABNORMAL MAMMOGRAM: ICD-10-CM

## 2022-12-28 DIAGNOSIS — N63.10 BREAST MASS, RIGHT: ICD-10-CM

## 2022-12-28 PROCEDURE — 77065 DX MAMMO INCL CAD UNI: CPT | Performed by: INTERNAL MEDICINE

## 2022-12-28 PROCEDURE — 88305 TISSUE EXAM BY PATHOLOGIST: CPT | Performed by: INTERNAL MEDICINE

## 2022-12-28 PROCEDURE — 19083 BX BREAST 1ST LESION US IMAG: CPT | Performed by: INTERNAL MEDICINE

## 2022-12-28 NOTE — PROCEDURES
[facility]  Procedure Note    Kelsie Peguero Patient Status:  Outpatient    1960 MRN [id number]   Location [address] Attending Sandra Fraga MD   Hosp Day # 0 PCP Patti Dover MD     Procedure: Right breast ultrasound guided biopsy    Pre-Procedure Diagnosis:  Right breast mass    Post-Procedure Diagnosis: Right breast mass    Anesthesia:  Local    Findings:  Right breast mass    Specimens: multiple cores    Blood Loss:  minimal    Tourniquet Time: none  Complications:  None  Drains:  none    Lawrence Burton MD  2022

## 2022-12-29 ENCOUNTER — TELEPHONE (OUTPATIENT)
Dept: ULTRASOUND IMAGING | Facility: HOSPITAL | Age: 62
End: 2022-12-29

## 2022-12-30 ENCOUNTER — HOSPITAL ENCOUNTER (OUTPATIENT)
Dept: MAMMOGRAPHY | Facility: HOSPITAL | Age: 62
Discharge: HOME | End: 2022-12-30
Attending: INTERNAL MEDICINE
Payer: MEDICAID

## 2022-12-30 DIAGNOSIS — N63.10 BREAST MASS, RIGHT: ICD-10-CM

## 2022-12-30 PROCEDURE — 88305 TISSUE EXAM BY PATHOLOGIST: CPT | Performed by: INTERNAL MEDICINE

## 2022-12-30 PROCEDURE — 19082 BX BREAST ADD LESION STRTCTC: CPT | Performed by: INTERNAL MEDICINE

## 2022-12-30 PROCEDURE — 19081 BX BREAST 1ST LESION STRTCTC: CPT | Performed by: INTERNAL MEDICINE

## 2022-12-30 NOTE — PROCEDURES
[facility]  Procedure Note    Leodan Cross Patient Status:  Outpatient    1960 MRN [id number]   Location [address] Attending Azar Mahajan MD   [facility] Day # 0 PCP Ellie Jennings MD     Procedure: Right breast stereotactic/navdeep guided biopsies 2 sites    Pre-Procedure Diagnosis:  Right breast mammographic architectural distortion    Post-Procedure Diagnosis: Right breast mammographic architectural distortion    Anesthesia:  Local    Findings:  Right breast mammographic architectural distortion    Specimens: multiple cores at each site    Blood Loss:  minimal    Tourniquet Time: none  Complications:  None  Drains:  none        Daphney Gilbert MD  2022

## 2023-01-03 ENCOUNTER — TELEPHONE (OUTPATIENT)
Dept: ULTRASOUND IMAGING | Facility: HOSPITAL | Age: 63
End: 2023-01-03

## 2023-01-03 NOTE — TELEPHONE ENCOUNTER
Received a call from Dr Yaima Andersen requesting Duffy Khanh see Ray Monteiro not Dr Spring Padilla due to wants her followed in high risk clinic with Dr Abraham Fay due to strong family hx. Dr Yaima Andersen informed me she contacted Adam Sales inform her of the change.     1226 pm I attempted to contact Dr Clara Miranda office to Cx her appointment tomorrow no answer

## 2023-01-04 ENCOUNTER — OFFICE VISIT (OUTPATIENT)
Dept: SURGERY | Facility: CLINIC | Age: 63
End: 2023-01-04
Payer: MEDICAID

## 2023-01-04 VITALS
HEART RATE: 88 BPM | TEMPERATURE: 98 F | HEIGHT: 62.5 IN | WEIGHT: 180.19 LBS | OXYGEN SATURATION: 100 % | BODY MASS INDEX: 32.33 KG/M2 | SYSTOLIC BLOOD PRESSURE: 133 MMHG | RESPIRATION RATE: 16 BRPM | DIASTOLIC BLOOD PRESSURE: 56 MMHG

## 2023-01-04 DIAGNOSIS — R92.2 DENSE BREAST: ICD-10-CM

## 2023-01-04 DIAGNOSIS — R92.8 ABNORMAL MAMMOGRAM: ICD-10-CM

## 2023-01-04 DIAGNOSIS — Z80.3 FAMILY HISTORY OF BREAST CANCER: Primary | ICD-10-CM

## 2023-01-04 DIAGNOSIS — R59.0 AXILLARY ADENOPATHY: ICD-10-CM

## 2023-01-12 ENCOUNTER — HOSPITAL ENCOUNTER (OUTPATIENT)
Dept: MRI IMAGING | Facility: HOSPITAL | Age: 63
Discharge: HOME | End: 2023-01-12
Attending: SURGERY
Payer: MEDICAID

## 2023-01-12 DIAGNOSIS — R92.2 DENSE BREAST: ICD-10-CM

## 2023-01-12 DIAGNOSIS — Z80.3 FAMILY HISTORY OF BREAST CANCER: ICD-10-CM

## 2023-01-12 DIAGNOSIS — R59.0 AXILLARY ADENOPATHY: ICD-10-CM

## 2023-01-12 PROCEDURE — 77049 MRI BREAST C-+ W/CAD BI: CPT | Performed by: SURGERY

## 2023-01-12 PROCEDURE — A9575 INJ GADOTERATE MEGLUMI 0.1ML: HCPCS | Performed by: SURGERY

## 2023-01-12 RX ORDER — GADOTERATE MEGLUMINE 376.9 MG/ML
20 INJECTION INTRAVENOUS
Status: COMPLETED | OUTPATIENT
Start: 2023-01-12 | End: 2023-01-12

## 2023-01-12 RX ADMIN — GADOTERATE MEGLUMINE 17 ML: 376.9 INJECTION INTRAVENOUS at 18:46:00

## 2023-01-13 ENCOUNTER — TELEPHONE (OUTPATIENT)
Dept: ULTRASOUND IMAGING | Facility: HOSPITAL | Age: 63
End: 2023-01-13

## 2023-01-13 DIAGNOSIS — R93.89 ABNORMAL MRI: Primary | ICD-10-CM

## 2023-01-19 ENCOUNTER — APPOINTMENT (OUTPATIENT)
Dept: HEMATOLOGY/ONCOLOGY | Facility: HOSPITAL | Age: 63
End: 2023-01-19
Attending: INTERNAL MEDICINE
Payer: MEDICAID

## 2023-01-19 ENCOUNTER — APPOINTMENT (OUTPATIENT)
Dept: HEMATOLOGY/ONCOLOGY | Facility: HOSPITAL | Age: 63
End: 2023-01-19
Payer: MEDICAID

## 2023-01-24 ENCOUNTER — HOSPITAL ENCOUNTER (OUTPATIENT)
Dept: MAMMOGRAPHY | Facility: HOSPITAL | Age: 63
Discharge: HOME | End: 2023-01-24
Payer: MEDICAID

## 2023-01-24 ENCOUNTER — HOSPITAL ENCOUNTER (OUTPATIENT)
Dept: MRI IMAGING | Facility: HOSPITAL | Age: 63
Discharge: HOME | End: 2023-01-24
Payer: MEDICAID

## 2023-01-24 ENCOUNTER — HOSPITAL ENCOUNTER (OUTPATIENT)
Dept: MRI IMAGING | Facility: HOSPITAL | Age: 63
End: 2023-01-24
Payer: MEDICAID

## 2023-01-24 DIAGNOSIS — R93.89 ABNORMAL MRI: ICD-10-CM

## 2023-01-24 PROCEDURE — A9575 INJ GADOTERATE MEGLUMI 0.1ML: HCPCS

## 2023-01-24 PROCEDURE — 88342 IMHCHEM/IMCYTCHM 1ST ANTB: CPT

## 2023-01-24 PROCEDURE — 77065 DX MAMMO INCL CAD UNI: CPT

## 2023-01-24 PROCEDURE — 19085 BX BREAST 1ST LESION MR IMAG: CPT

## 2023-01-24 PROCEDURE — 88341 IMHCHEM/IMCYTCHM EA ADD ANTB: CPT

## 2023-01-24 PROCEDURE — 88305 TISSUE EXAM BY PATHOLOGIST: CPT

## 2023-01-24 RX ORDER — GADOTERATE MEGLUMINE 376.9 MG/ML
20 INJECTION INTRAVENOUS
Status: COMPLETED | OUTPATIENT
Start: 2023-01-24 | End: 2023-01-24

## 2023-01-24 RX ADMIN — GADOTERATE MEGLUMINE 20 ML: 376.9 INJECTION INTRAVENOUS at 12:38:00

## 2023-01-24 NOTE — DISCHARGE INSTRUCTIONS
The Doctor (Radiologist) who performed your procedure was: DR Rowe Pock an ice pack over the biopsy site on top of your bra or on top of the ACE wrap (never apply ice directly over skin) for 10-15 minutes of every hour until bedtime for your comfort and to decrease bleeding. Keep your sports bra or the ACE wrap (stereotactic and MRI biopsy) in place for 24 hours after your biopsy. This compression decreases bleeding and breast movement for your comfort. Wear a supportive bra for the next couple of days for comfort (sports bra for sleep). Continue to wear, preferably, a sports bra or good supportive bra for 1 week and take off only to shower. No baths or showers during the first 24 hours after biopsy. After this time you may take a shower. It's okay if the strips get wet but do not soak them. NO saunas, hot tubs or swimming until steri-strips fall off (approx. 5 days). This prevents infection and allows time for them to completely close and heal.  DO NOT remove the steri-strips. They will fall off in 5 days. If any type of irritation (redness, itching or blisters) develops in the area around the steri-strips, remove them gently. If the steri-strips do not fall off after 5 days, gently remove them. Keep the area clean and dry. It is normal to have mild discomfort and bruising at the biopsy site. You may take Tylenol as needed for discomfort, as long as you have no allergies to Tylenol. Do not take aspirin, motrin, ibuprofen or any medication containing NSAID (non-steroidal anti-inflammatory drug) product for 48 hours. DO NOT participate in strenuous activity (aerobics, heavy lifting, housework, gardening, etc.) 48 hours after your biopsy to prevent bleeding. You will receive results in 2-3 business days. If you are having an MRI breast biopsy or an Ultrasound guided breast biopsy, you will be billed for the biopsy and unilateral mammogram separately.   If you have any questions about the procedure or your results, please contact the Breast Care Coordinator Nurse at [phone]. Notify your ordering physician or primary physician for increased bleeding, pain or fever over 100. Or contact a Radiology Nurse at [phone] between 8am-4pm (after 4pm, your call will be directed to the [facility]).

## 2023-01-26 ENCOUNTER — TELEPHONE (OUTPATIENT)
Dept: SURGERY | Facility: CLINIC | Age: 63
End: 2023-01-26

## 2023-01-26 ENCOUNTER — TELEPHONE (OUTPATIENT)
Dept: INTERNAL MEDICINE CLINIC | Facility: CLINIC | Age: 63
End: 2023-01-26

## 2023-01-26 ENCOUNTER — DOCUMENTATION ONLY (OUTPATIENT)
Dept: SURGERY | Facility: CLINIC | Age: 63
End: 2023-01-26

## 2023-01-26 DIAGNOSIS — R92.8 ABNORMAL MAMMOGRAM: Primary | ICD-10-CM

## 2023-01-26 NOTE — PATIENT INSTRUCTIONS
Dr. Nai Venegas  Tel: [phone]  Fax: 1453 [address]  [address] [place], [address] [place] [address]  [phone]     Surgery/Procedure: Right breast two site wire localized excisional biopsy     Anesthesia:   MAC  Surgery Length:   45 minutes CPT:  97450, 52790   Wire LOC:   Yes Nuc Med:   No   Tami Seed:  No       Dx & ICD-10: Abnormal mammogram (R92.8)   Radiology Instructions: 1st site: Right breast, anterior/central lower inner quadrant position, cork shaped clip, biopsy demonstrates benign breast tissue which is discordant from imaging. 2nd site: Right breast, anterior/central subareolar position, top hat shaped clip, biopsy demonstrates benign breast tissue which is discordant from imaging.    _______________________________________________________________________________    Someone must accompany you the day of the procedure to drive you home safely, because of anesthesia. You will need an adult  to stay with you the first night following your surgery. You must remove any kind of makeup, acrylic nails, lotions, powders, creams or deodorant. [place] ONLY: Pre-admission will give instruct you on when to take Gatorade and Tylenol/acetaminophen prior to your surgery, purchase 2 - 12oz bottles of regular Gatorade (NOT RED/SUGAR FREE). Otherwise, you may not eat or drink anything else after 11PM the night before surgery. [place] ONLY: You may not eat or drink anything after midnight the day of your surgery. Wear comfortable clothing that can be easily removed. If you wear dentures, contacts lenses, or any prosthesis, you will be asked to remove them. Do not drink alcohol or smoke 24 hours prior to your procedure. Bring a picture ID and your insurance card. GENERAL ANESTHESIA ONLY: You will be contacted by the hospital for Pre-Admission Covid-19 testing (regardless of vaccination status) to be scheduled as an appointment prior to surgery.  They will call closer to the surgery date to set this up, because the earliest this can be done is 72 hours prior to surgery. The Pre-Admission Testing Department will call the day before to confirm your procedure, give you the time you need to arrive by and directions on where to go. They begin making calls after 2pm, if you are not contacted by 4pm, please call the surgeon's office listed above. Do not take any blood thinners at least one week prior to the procedure/surgery. This includes aspirin, baby aspirin, Ibuprofen products, herbal supplements, diet medications, vitamin E, fish oil and green tea supplements. Please check other supplements for these ingredients. *TYLENOL or acetaminophen is acceptable*  If you take Coumadin, Plavix, Xarelto, or Eliquis, please contact your prescribing physician for special instructions on how long to hold. If you take insulin contact your primary care physician for special instructions. Our surgery scheduler, Ximena Diggs, will be contacting you to discuss surgery dates. If you have any questions related to scheduling your surgery, please reach out to her at [phone].  _____________________________________________________________________  PRE-OPERATIVE TESTING IF INDICATED BELOW  PLEASE COMPLETE ASAP (AT LEAST 14-21 DAYS PRIOR TO SURGERY)  [] CBC [x] BMP [] CMP [x] EKG    [] PT, PTT, INR [] Cardiac Clearance  [x] H&P Medical Clearance [] Chest X-ray     Please call Central Scheduling to schedule an appointment for pre-operative labs/tests @ [phone]    Does the patient have a pacemaker or ICD?      [] Yes   [x] No

## 2023-01-26 NOTE — TELEPHONE ENCOUNTER
Calling pt in regards to scheduling surgery. Informed pt that I have 02/27/2023 available at [facility] with Dr. Beronica Aldana. Patient insurance is the same  Pt verbalized understanding and in agreement with date and location. All questions answered. Encouraged pt to call or [facility] message office with any other questions or concerns.

## 2023-02-14 ENCOUNTER — OFFICE VISIT (OUTPATIENT)
Dept: INTERNAL MEDICINE CLINIC | Facility: CLINIC | Age: 63
End: 2023-02-14

## 2023-02-14 VITALS
SYSTOLIC BLOOD PRESSURE: 121 MMHG | WEIGHT: 179 LBS | HEART RATE: 80 BPM | BODY MASS INDEX: 32.12 KG/M2 | HEIGHT: 62.5 IN | DIASTOLIC BLOOD PRESSURE: 65 MMHG

## 2023-02-14 DIAGNOSIS — E11.9 TYPE 2 DIABETES MELLITUS WITHOUT COMPLICATION, WITHOUT LONG-TERM CURRENT USE OF INSULIN (HCC): ICD-10-CM

## 2023-02-14 DIAGNOSIS — R92.8 BREAST LESION ON MAMMOGRAPHY: ICD-10-CM

## 2023-02-14 DIAGNOSIS — Z86.79 HX OF SUPRAVENTRICULAR TACHYCARDIA: ICD-10-CM

## 2023-02-14 DIAGNOSIS — D64.9 ANEMIA, UNSPECIFIED TYPE: ICD-10-CM

## 2023-02-14 DIAGNOSIS — Z01.810 PREOP CARDIOVASCULAR EXAM: Primary | ICD-10-CM

## 2023-02-14 PROCEDURE — 3008F BODY MASS INDEX DOCD: CPT | Performed by: INTERNAL MEDICINE

## 2023-02-14 PROCEDURE — 99215 OFFICE O/P EST HI 40 MIN: CPT | Performed by: INTERNAL MEDICINE

## 2023-02-14 PROCEDURE — 3078F DIAST BP <80 MM HG: CPT | Performed by: INTERNAL MEDICINE

## 2023-02-14 PROCEDURE — 3074F SYST BP LT 130 MM HG: CPT | Performed by: INTERNAL MEDICINE

## 2023-02-14 RX ORDER — PANTOPRAZOLE SODIUM 40 MG/1
40 TABLET, DELAYED RELEASE ORAL
Qty: 90 TABLET | Refills: 1 | Status: SHIPPED | OUTPATIENT
Start: 2023-02-14

## 2023-02-21 ENCOUNTER — LAB ENCOUNTER (OUTPATIENT)
Dept: LAB | Age: 63
End: 2023-02-21
Attending: INTERNAL MEDICINE
Payer: MEDICAID

## 2023-02-21 ENCOUNTER — TELEPHONE (OUTPATIENT)
Dept: INTERNAL MEDICINE CLINIC | Facility: CLINIC | Age: 63
End: 2023-02-21

## 2023-02-21 ENCOUNTER — EKG ENCOUNTER (OUTPATIENT)
Dept: LAB | Age: 63
End: 2023-02-21
Attending: INTERNAL MEDICINE
Payer: MEDICAID

## 2023-02-21 DIAGNOSIS — E11.9 TYPE 2 DIABETES MELLITUS WITHOUT COMPLICATION, WITHOUT LONG-TERM CURRENT USE OF INSULIN (HCC): Primary | ICD-10-CM

## 2023-02-21 DIAGNOSIS — Z01.810 PREOP CARDIOVASCULAR EXAM: ICD-10-CM

## 2023-02-21 LAB
ALBUMIN SERPL-MCNC: 3.8 G/DL (ref 3.4–5)
ALBUMIN/GLOB SERPL: 1.1 {RATIO} (ref 1–2)
ALP LIVER SERPL-CCNC: 87 U/L
ALT SERPL-CCNC: 30 U/L
ANION GAP SERPL CALC-SCNC: 6 MMOL/L (ref 0–18)
AST SERPL-CCNC: 32 U/L (ref 15–37)
ATRIAL RATE: 74 BPM
BASOPHILS # BLD AUTO: 0.04 X10(3) UL (ref 0–0.2)
BASOPHILS NFR BLD AUTO: 0.6 %
BILIRUB SERPL-MCNC: 0.3 MG/DL (ref 0.1–2)
BUN BLD-MCNC: 16 MG/DL (ref 7–18)
BUN/CREAT SERPL: 20 (ref 10–20)
CALCIUM BLD-MCNC: 9.2 MG/DL (ref 8.5–10.1)
CHLORIDE SERPL-SCNC: 107 MMOL/L (ref 98–112)
CHOLEST SERPL-MCNC: 110 MG/DL (ref ?–200)
CO2 SERPL-SCNC: 26 MMOL/L (ref 21–32)
CREAT BLD-MCNC: 0.8 MG/DL
CREAT UR-SCNC: 98.3 MG/DL
DEPRECATED HBV CORE AB SER IA-ACNC: 35.2 NG/ML
DEPRECATED RDW RBC AUTO: 41.5 FL (ref 35.1–46.3)
EOSINOPHIL # BLD AUTO: 0.14 X10(3) UL (ref 0–0.7)
EOSINOPHIL NFR BLD AUTO: 1.9 %
ERYTHROCYTE [DISTWIDTH] IN BLOOD BY AUTOMATED COUNT: 14.4 % (ref 11–15)
FASTING PATIENT LIPID ANSWER: YES
FASTING STATUS PATIENT QL REPORTED: YES
GFR SERPLBLD BASED ON 1.73 SQ M-ARVRAT: 83 ML/MIN/1.73M2 (ref 60–?)
GLOBULIN PLAS-MCNC: 3.5 G/DL (ref 2.8–4.4)
GLUCOSE BLD-MCNC: 143 MG/DL (ref 70–99)
HCT VFR BLD AUTO: 36.7 %
HDLC SERPL-MCNC: 39 MG/DL (ref 40–59)
HGB BLD-MCNC: 11.6 G/DL
IMM GRANULOCYTES # BLD AUTO: 0.04 X10(3) UL (ref 0–1)
IMM GRANULOCYTES NFR BLD: 0.6 %
LDLC SERPL CALC-MCNC: 40 MG/DL (ref ?–100)
LYMPHOCYTES # BLD AUTO: 1.98 X10(3) UL (ref 1–4)
LYMPHOCYTES NFR BLD AUTO: 27.3 %
MCH RBC QN AUTO: 25.4 PG (ref 26–34)
MCHC RBC AUTO-ENTMCNC: 31.6 G/DL (ref 31–37)
MCV RBC AUTO: 80.3 FL
MICROALBUMIN UR-MCNC: 0.96 MG/DL
MICROALBUMIN/CREAT 24H UR-RTO: 9.8 UG/MG (ref ?–30)
MONOCYTES # BLD AUTO: 0.55 X10(3) UL (ref 0.1–1)
MONOCYTES NFR BLD AUTO: 7.6 %
NEUTROPHILS # BLD AUTO: 4.5 X10 (3) UL (ref 1.5–7.7)
NEUTROPHILS # BLD AUTO: 4.5 X10(3) UL (ref 1.5–7.7)
NEUTROPHILS NFR BLD AUTO: 62 %
NONHDLC SERPL-MCNC: 71 MG/DL (ref ?–130)
OSMOLALITY SERPL CALC.SUM OF ELEC: 292 MOSM/KG (ref 275–295)
P AXIS: 61 DEGREES
P-R INTERVAL: 184 MS
PLATELET # BLD AUTO: 384 10(3)UL (ref 150–450)
POTASSIUM SERPL-SCNC: 4.6 MMOL/L (ref 3.5–5.1)
PROT SERPL-MCNC: 7.3 G/DL (ref 6.4–8.2)
Q-T INTERVAL: 358 MS
QRS DURATION: 68 MS
QTC CALCULATION (BEZET): 397 MS
R AXIS: 16 DEGREES
RBC # BLD AUTO: 4.57 X10(6)UL
SODIUM SERPL-SCNC: 139 MMOL/L (ref 136–145)
T AXIS: -83 DEGREES
TRIGL SERPL-MCNC: 190 MG/DL (ref 30–149)
TSI SER-ACNC: 1.86 MIU/ML (ref 0.36–3.74)
VENTRICULAR RATE: 74 BPM
VIT B12 SERPL-MCNC: 874 PG/ML (ref 193–986)
VLDLC SERPL CALC-MCNC: 26 MG/DL (ref 0–30)
WBC # BLD AUTO: 7.3 X10(3) UL (ref 4–11)

## 2023-02-21 PROCEDURE — 36415 COLL VENOUS BLD VENIPUNCTURE: CPT | Performed by: INTERNAL MEDICINE

## 2023-02-21 PROCEDURE — 82043 UR ALBUMIN QUANTITATIVE: CPT | Performed by: INTERNAL MEDICINE

## 2023-02-21 PROCEDURE — 82607 VITAMIN B-12: CPT | Performed by: INTERNAL MEDICINE

## 2023-02-21 PROCEDURE — 93010 ELECTROCARDIOGRAM REPORT: CPT | Performed by: INTERNAL MEDICINE

## 2023-02-21 PROCEDURE — 80061 LIPID PANEL: CPT | Performed by: INTERNAL MEDICINE

## 2023-02-21 PROCEDURE — 82728 ASSAY OF FERRITIN: CPT | Performed by: INTERNAL MEDICINE

## 2023-02-21 PROCEDURE — 82570 ASSAY OF URINE CREATININE: CPT | Performed by: INTERNAL MEDICINE

## 2023-02-21 PROCEDURE — 85025 COMPLETE CBC W/AUTO DIFF WBC: CPT | Performed by: INTERNAL MEDICINE

## 2023-02-21 PROCEDURE — 80053 COMPREHEN METABOLIC PANEL: CPT | Performed by: INTERNAL MEDICINE

## 2023-02-21 PROCEDURE — 93005 ELECTROCARDIOGRAM TRACING: CPT

## 2023-02-21 PROCEDURE — 84443 ASSAY THYROID STIM HORMONE: CPT | Performed by: INTERNAL MEDICINE

## 2023-02-21 NOTE — TELEPHONE ENCOUNTER
Spoke to patient, verbalized Name and . She has questions regarding EKG done today . This RN also reviewed the results and is asking for provider review. Dr. Ede Licona please advise.

## 2023-02-23 ENCOUNTER — TELEPHONE (OUTPATIENT)
Dept: INTERNAL MEDICINE CLINIC | Facility: CLINIC | Age: 63
End: 2023-02-23

## 2023-02-23 NOTE — TELEPHONE ENCOUNTER
Per Ad Velazquez, please fax it to them the medical clearance, labs EKG with tracing to [phone]. Patient surgery in on 02/27/2023. Please addendum the note of doctor dated 02/14/2023 was official clearance. Per Ad Velazquez, need this as soon as possible.

## 2023-02-24 ENCOUNTER — LAB ENCOUNTER (OUTPATIENT)
Dept: LAB | Facility: HOSPITAL | Age: 63
End: 2023-02-24
Attending: SURGERY
Payer: MEDICAID

## 2023-02-24 DIAGNOSIS — Z01.818 PREOP TESTING: ICD-10-CM

## 2023-02-25 LAB — SARS-COV-2 RNA RESP QL NAA+PROBE: NOT DETECTED

## 2023-02-27 ENCOUNTER — APPOINTMENT (OUTPATIENT)
Dept: MAMMOGRAPHY | Facility: HOSPITAL | Age: 63
End: 2023-02-27
Attending: SURGERY
Payer: MEDICAID

## 2023-02-27 ENCOUNTER — HOSPITAL ENCOUNTER (OUTPATIENT)
Facility: HOSPITAL | Age: 63
Setting detail: HOSPITAL OUTPATIENT SURGERY
Discharge: HOME | End: 2023-02-27
Attending: SURGERY | Admitting: SURGERY
Payer: MEDICAID

## 2023-02-27 ENCOUNTER — HOSPITAL ENCOUNTER (OUTPATIENT)
Dept: MAMMOGRAPHY | Facility: HOSPITAL | Age: 63
Discharge: HOME | End: 2023-02-27
Attending: SURGERY
Payer: MEDICAID

## 2023-02-27 ENCOUNTER — ANESTHESIA (OUTPATIENT)
Dept: SURGERY | Facility: HOSPITAL | Age: 63
End: 2023-02-27
Payer: MEDICAID

## 2023-02-27 ENCOUNTER — ANESTHESIA EVENT (OUTPATIENT)
Dept: SURGERY | Facility: HOSPITAL | Age: 63
End: 2023-02-27
Payer: MEDICAID

## 2023-02-27 VITALS
HEART RATE: 78 BPM | BODY MASS INDEX: 30.05 KG/M2 | SYSTOLIC BLOOD PRESSURE: 116 MMHG | OXYGEN SATURATION: 98 % | RESPIRATION RATE: 12 BRPM | WEIGHT: 176 LBS | HEIGHT: 64 IN | DIASTOLIC BLOOD PRESSURE: 59 MMHG | TEMPERATURE: 99 F

## 2023-02-27 DIAGNOSIS — R92.8 ABNORMAL MAMMOGRAM: ICD-10-CM

## 2023-02-27 DIAGNOSIS — Z01.818 PREOP TESTING: Primary | ICD-10-CM

## 2023-02-27 DIAGNOSIS — R92.8 BREAST LESION ON MAMMOGRAPHY: ICD-10-CM

## 2023-02-27 DIAGNOSIS — E03.9 HYPOTHYROIDISM, UNSPECIFIED TYPE: ICD-10-CM

## 2023-02-27 LAB
GLUCOSE BLDC GLUCOMTR-MCNC: 115 MG/DL (ref 70–99)
GLUCOSE BLDC GLUCOMTR-MCNC: 130 MG/DL (ref 70–99)

## 2023-02-27 PROCEDURE — 19282 PERQ DEVICE BREAST EA IMAG: CPT | Performed by: SURGERY

## 2023-02-27 PROCEDURE — 88300 SURGICAL PATH GROSS: CPT | Performed by: SURGERY

## 2023-02-27 PROCEDURE — 0HBT0ZZ EXCISION OF RIGHT BREAST, OPEN APPROACH: ICD-10-PCS | Performed by: SURGERY

## 2023-02-27 PROCEDURE — 82962 GLUCOSE BLOOD TEST: CPT

## 2023-02-27 PROCEDURE — 88307 TISSUE EXAM BY PATHOLOGIST: CPT | Performed by: SURGERY

## 2023-02-27 PROCEDURE — 19281 PERQ DEVICE BREAST 1ST IMAG: CPT | Performed by: SURGERY

## 2023-02-27 PROCEDURE — S0077 INJECTION, CLINDAMYCIN PHOSP: HCPCS | Performed by: NURSE ANESTHETIST, CERTIFIED REGISTERED

## 2023-02-27 PROCEDURE — 0HBT0ZX EXCISION OF RIGHT BREAST, OPEN APPROACH, DIAGNOSTIC: ICD-10-PCS | Performed by: SURGERY

## 2023-02-27 PROCEDURE — 76098 X-RAY EXAM SURGICAL SPECIMEN: CPT | Performed by: SURGERY

## 2023-02-27 RX ORDER — NICOTINE POLACRILEX 4 MG
30 LOZENGE BUCCAL
Status: DISCONTINUED | OUTPATIENT
Start: 2023-02-27 | End: 2023-02-27 | Stop reason: HOSPADM

## 2023-02-27 RX ORDER — HYDROMORPHONE HYDROCHLORIDE 1 MG/ML
0.2 INJECTION, SOLUTION INTRAMUSCULAR; INTRAVENOUS; SUBCUTANEOUS EVERY 5 MIN PRN
Status: DISCONTINUED | OUTPATIENT
Start: 2023-02-27 | End: 2023-02-27

## 2023-02-27 RX ORDER — NICOTINE POLACRILEX 4 MG
15 LOZENGE BUCCAL
Status: DISCONTINUED | OUTPATIENT
Start: 2023-02-27 | End: 2023-02-27 | Stop reason: HOSPADM

## 2023-02-27 RX ORDER — NICOTINE POLACRILEX 4 MG
30 LOZENGE BUCCAL
Status: DISCONTINUED | OUTPATIENT
Start: 2023-02-27 | End: 2023-02-27

## 2023-02-27 RX ORDER — MORPHINE SULFATE 10 MG/ML
6 INJECTION, SOLUTION INTRAMUSCULAR; INTRAVENOUS EVERY 10 MIN PRN
Status: DISCONTINUED | OUTPATIENT
Start: 2023-02-27 | End: 2023-02-27

## 2023-02-27 RX ORDER — MORPHINE SULFATE 4 MG/ML
2 INJECTION, SOLUTION INTRAMUSCULAR; INTRAVENOUS EVERY 10 MIN PRN
Status: DISCONTINUED | OUTPATIENT
Start: 2023-02-27 | End: 2023-02-27

## 2023-02-27 RX ORDER — HYDROMORPHONE HYDROCHLORIDE 1 MG/ML
0.6 INJECTION, SOLUTION INTRAMUSCULAR; INTRAVENOUS; SUBCUTANEOUS EVERY 5 MIN PRN
Status: DISCONTINUED | OUTPATIENT
Start: 2023-02-27 | End: 2023-02-27

## 2023-02-27 RX ORDER — NALOXONE HYDROCHLORIDE 0.4 MG/ML
80 INJECTION, SOLUTION INTRAMUSCULAR; INTRAVENOUS; SUBCUTANEOUS AS NEEDED
Status: DISCONTINUED | OUTPATIENT
Start: 2023-02-27 | End: 2023-02-27

## 2023-02-27 RX ORDER — SODIUM CHLORIDE, SODIUM LACTATE, POTASSIUM CHLORIDE, CALCIUM CHLORIDE 600; 310; 30; 20 MG/100ML; MG/100ML; MG/100ML; MG/100ML
INJECTION, SOLUTION INTRAVENOUS CONTINUOUS
Status: DISCONTINUED | OUTPATIENT
Start: 2023-02-27 | End: 2023-02-27

## 2023-02-27 RX ORDER — FAMOTIDINE 20 MG/1
20 TABLET, FILM COATED ORAL ONCE
Status: COMPLETED | OUTPATIENT
Start: 2023-02-27 | End: 2023-02-27

## 2023-02-27 RX ORDER — CLINDAMYCIN PHOSPHATE 900 MG/50ML
900 INJECTION INTRAVENOUS ONCE
Status: DISCONTINUED | OUTPATIENT
Start: 2023-02-27 | End: 2023-02-27 | Stop reason: HOSPADM

## 2023-02-27 RX ORDER — DEXTROSE MONOHYDRATE 25 G/50ML
50 INJECTION, SOLUTION INTRAVENOUS
Status: DISCONTINUED | OUTPATIENT
Start: 2023-02-27 | End: 2023-02-27

## 2023-02-27 RX ORDER — NICOTINE POLACRILEX 4 MG
15 LOZENGE BUCCAL
Status: DISCONTINUED | OUTPATIENT
Start: 2023-02-27 | End: 2023-02-27

## 2023-02-27 RX ORDER — MORPHINE SULFATE 4 MG/ML
4 INJECTION, SOLUTION INTRAMUSCULAR; INTRAVENOUS EVERY 10 MIN PRN
Status: DISCONTINUED | OUTPATIENT
Start: 2023-02-27 | End: 2023-02-27

## 2023-02-27 RX ORDER — HYDROMORPHONE HYDROCHLORIDE 1 MG/ML
0.4 INJECTION, SOLUTION INTRAMUSCULAR; INTRAVENOUS; SUBCUTANEOUS EVERY 5 MIN PRN
Status: DISCONTINUED | OUTPATIENT
Start: 2023-02-27 | End: 2023-02-27

## 2023-02-27 RX ORDER — DEXTROSE MONOHYDRATE 25 G/50ML
50 INJECTION, SOLUTION INTRAVENOUS
Status: DISCONTINUED | OUTPATIENT
Start: 2023-02-27 | End: 2023-02-27 | Stop reason: HOSPADM

## 2023-02-27 RX ORDER — ACETAMINOPHEN 500 MG
1000 TABLET ORAL ONCE
Status: COMPLETED | OUTPATIENT
Start: 2023-02-27 | End: 2023-02-27

## 2023-02-27 RX ORDER — LIDOCAINE HYDROCHLORIDE 10 MG/ML
INJECTION, SOLUTION EPIDURAL; INFILTRATION; INTRACAUDAL; PERINEURAL AS NEEDED
Status: DISCONTINUED | OUTPATIENT
Start: 2023-02-27 | End: 2023-02-27 | Stop reason: SURG

## 2023-02-27 RX ORDER — TRAMADOL HYDROCHLORIDE 50 MG/1
TABLET ORAL EVERY 6 HOURS PRN
Qty: 20 TABLET | Refills: 0 | Status: SHIPPED | OUTPATIENT
Start: 2023-02-27

## 2023-02-27 RX ORDER — BUPIVACAINE HYDROCHLORIDE 5 MG/ML
INJECTION, SOLUTION EPIDURAL; INTRACAUDAL AS NEEDED
Status: DISCONTINUED | OUTPATIENT
Start: 2023-02-27 | End: 2023-02-27 | Stop reason: HOSPADM

## 2023-02-27 RX ORDER — CLINDAMYCIN PHOSPHATE 150 MG/ML
INJECTION, SOLUTION INTRAVENOUS AS NEEDED
Status: DISCONTINUED | OUTPATIENT
Start: 2023-02-27 | End: 2023-02-27 | Stop reason: SURG

## 2023-02-27 RX ORDER — LIDOCAINE HYDROCHLORIDE AND EPINEPHRINE 10; 10 MG/ML; UG/ML
INJECTION, SOLUTION INFILTRATION; PERINEURAL AS NEEDED
Status: DISCONTINUED | OUTPATIENT
Start: 2023-02-27 | End: 2023-02-27 | Stop reason: HOSPADM

## 2023-02-27 RX ADMIN — CLINDAMYCIN PHOSPHATE 900 MG: 150 INJECTION, SOLUTION INTRAVENOUS at 09:04:00

## 2023-02-27 RX ADMIN — LIDOCAINE HYDROCHLORIDE 50 MG: 10 INJECTION, SOLUTION EPIDURAL; INFILTRATION; INTRACAUDAL; PERINEURAL at 09:02:00

## 2023-02-27 RX ADMIN — SODIUM CHLORIDE, SODIUM LACTATE, POTASSIUM CHLORIDE, CALCIUM CHLORIDE: 600; 310; 30; 20 INJECTION, SOLUTION INTRAVENOUS at 09:02:00

## 2023-02-27 NOTE — ANESTHESIA POSTPROCEDURE EVALUATION
Patient: Taryn Tarango    Procedure Summary     Date: 02/27/23 Room / Location: [address] [place] [address] MAIN OR [address] [place] [address] MAIN OR    Anesthesia Start: 0900 Anesthesia Stop:     Procedure: Right breast two site wire localized excisional biopsy (Right: Breast) Diagnosis:       Abnormal mammogram      (Abnormal mammogram [R92.8])    Surgeons: Sheryle Hick, MD Anesthesiologist: Bishop Abdirizak MD    Anesthesia Type: MAC ASA Status: 2          Anesthesia Type: MAC    Vitals Value Taken Time   /74 02/27/23 0950   Temp 97.4 02/27/23 0950   Pulse 88 02/27/23 0950   Resp 14 02/27/23 0950   SpO2 98 02/27/23 0950       [facility] AN Post Evaluation:   Patient Evaluated in PACU  Patient Participation: complete - patient participated  Level of Consciousness: awake  Pain Score: 0  Pain Management: adequate  Airway Patency:patent  Dental exam unchanged from preop  Yes    Cardiovascular Status: acceptable  Respiratory Status: acceptable  Postoperative Hydration acceptable      [address], CRNA  2/27/2023 9:50 AM

## 2023-02-27 NOTE — OPERATIVE REPORT
[facility]    PATIENT'S NAME: Darya Zhao   ATTENDING PHYSICIAN: Charley Wing. Suresh Shukla MD   OPERATING PHYSICIAN: Charley Wing. Suresh Shukla MD   PATIENT ACCOUNT#:   [id number]    LOCATION:  [address]  MEDICAL RECORD #:   [id number]       YOB: 1960  ADMISSION DATE:       02/27/2023      OPERATION DATE:  02/27/2023    OPERATIVE REPORT    PREOPERATIVE DIAGNOSIS:  Abnormal mammogram of right breast x2 sites. POSTOPERATIVE DIAGNOSIS:  Abnormal mammogram of right breast x2 sites. PROCEDURE:  Right breast wire localized 2 site bracketed lumpectomy with right breast specimen radiography. ASSISTANT:  Eden Morales CSA    ANESTHESIA:  Monitored anesthesia care and local.    ESTIMATED BLOOD LOSS:  10 mL. DRAINS:  None. COMPLICATIONS:  None. DISPOSITION:  Stable on transfer to recovery room. INDICATIONS:  The patient is a 58-year-old female. She had a self-detected concern of the right axillary lesion that upon further inspection and imaging was unremarkable. However, she was incidentally found to have 2 areas of distortion in the right breast, underwent stereotactic biopsies demonstrating benign tissue in this area on both sites and therefore was thought to be discordant and therefore, surgical excision has been formally recommended to understand the etiology of the abnormal imaging. Risks and possible complications of a 2 site wire bracketed lumpectomy were discussed with the patient including but not limited to infection, bleeding, injury to surrounding structures, possible need for reoperation. She agreed to the proposed surgery. OPERATIVE TECHNIQUE:  The patient brought to the imaging suite. She underwent a wire localization of both sites of the areas of concern in the right breast.  She was then brought to the OR, placed in supine position, properly padded and secured.   She was given a dose of IV antibiotics, and sequential compression devices were applied to legs for DVT prophylaxis. Monitored anesthesia care was induced. The right breast was prepped and draped in usual sterile fashion. Lidocaine 1% with epinephrine was used to infiltrate the skin and subcutaneous tissue at the targeted incision site. A curvilinear incision was made along the superior medial areolar border with a 15 blade knife through the skin. Both wires were identified, brought into the field, and using sharp dissection and electrocautery, a segment of breast tissue surrounding the tip of both wires was excised en bloc in bracketed fashion. This was carefully oriented with a short stitch single clip superiorly and a long stitch double clip laterally, in order to allow for appropriate pathological margin for assessment and review. A clip was then placed within the cavity to assist with subsequent surveillance. Wound was irrigated. Hemostasis assured with electrocautery. Closure of the deep tissue included a running 3-0 PDS to approximate deep tissue. Closure of the wound included an interrupted 3-0 Vicryl for deep layer, running 4-0 subcuticular Monocryl for skin. Mastisol, Steri-Strips were applied, 0.5% Marcaine was instilled in the cavity to assist with postoperative analgesia. A sterile dressing and compression bra were placed. Her blood loss was minimal.  All counts were correct at the conclusion of the procedure. She tolerated the procedure well. She was transferred to the recovery area in stable condition. Dictated By Rafita Anders. Reji Rich MD  d: 02/27/2023 09:45:04  t: 02/27/2023 15:42:44  Job [id number]  CXQ/    cc: MD Tiera Mendez MD

## 2023-02-27 NOTE — IMAGING NOTE
0710 Pt  to mammography department scouts completed by  [facility] mammography technologist     3121 Hx taken procedure explained questions answered. Iv patent no redness or swelling noted    0718 Consent signed and verified      0720 Dr Josesito Aguilar here scanning completed Order verified and signed by all  procedural staff members    [id number] Time out taken     0725  site marked RIGHT  Breast    0729  Chloro prep as skin prep to site. Site 1  0730 Lidocaine   1% 10 milligrams per ml given as anesthetic affect from kit  3.5  ml total given. 0732 Ghiatas needle  20 gauge  X 9 cm   placed . Site 2     0734 Lidocaine   1% 10 milligrams per ml given as anesthetic affect from kit  3.5  ml total given. 0114 Ghiatas needle  20 gauge  X 9 cm   placed . Pt re  images procedure complete. 6162 BB marker to site wire secured to breast  strips. A 4x4 dsg secured  over wire with tape by nurse  KALR HIGGINS RN  after images completed     0800 To AMB SURGERY  department .

## 2023-02-27 NOTE — PROCEDURES
[facility]  Procedure Note    Taryn Tarango Patient Status:  Outpatient    1960 MRN [id number]   Location [address] [facility] [place] Attending Sheryle Hick, MD   Hosp Day # 0 PCP Elvira Ann MD     Procedure: Right breast mammographic guided wire localization 2 sites    Pre-Procedure Diagnosis:  Right breast mammographic architectural distortion 2 sites    Post-Procedure Diagnosis: Right breast mammographic architectural distortion 2 sites    Anesthesia:  Local    Findings:  Right breast mammographic architectural distortion 2 sites    Specimens: n/a    Blood Loss:  minimal    Tourniquet Time: none  Complications:  None  Drains:  none        Italia Zuniga MD  2023

## 2023-02-27 NOTE — BRIEF OP NOTE
Pre-Operative Diagnosis: Abnormal mammogram [R92.8]     Post-Operative Diagnosis: Abnormal mammogram [R92.8]      Procedure Performed:     Right breast two site wire localized excisional biopsy    Surgeon(s) and Role:     Lincoln Fried MD - Primary    Assistant(s):  Surgical Assistant.: Lydia Sheppard CSA     Surgical Findings: Clip x2 seen in xray     Specimen: R lumpectomy bracketed     Estimated Blood Loss: 10cc    Pinky Willoughby MD  2/27/2023  9:35 AM

## 2023-02-28 RX ORDER — LEVOTHYROXINE SODIUM 88 UG/1
88 TABLET ORAL
Qty: 90 TABLET | Refills: 1 | Status: SHIPPED | OUTPATIENT
Start: 2023-02-28

## 2023-02-28 NOTE — TELEPHONE ENCOUNTER
Routed to Dr Kate Taveras for advise on ferrous gluconate,  thanks. Refill passed per [facility] protocol   Requested Prescriptions   Pending Prescriptions Disp Refills    LEVOTHYROXINE 88 MCG Oral Tab [Pharmacy Med Name: Levothyroxine Sodium 88 Mcg Tab Lupi] 90 tablet 0     Sig: Take 1 tablet (88 mcg total) by mouth before breakfast.       Thyroid Medication Protocol Passed - 2/27/2023  3:17 PM        Passed - TSH in past 12 months        Passed - Last TSH value is normal     Lab Results   Component Value Date    TSH 1.860 02/21/2023    THYROIDFUNC 2.61 10/08/2015                 Passed - In person appointment or virtual visit in the past 12 mos or appointment in next 3 mos     Recent Outpatient Visits              2 weeks ago Preop cardiovascular exam    Dennis Garcia MD    Office Visit    1 month ago Family history of breast cancer    Sandra Robertson MD    Office Visit    2 months ago Anemia, unspecified type    [facility] Hematology Oncology Viktor Love MD    Office Visit    2 months ago Lymphadenopathy, axillary    Dennis Garcia MD    Office Visit    2 months ago Lymphadenopathy, axillary    Roland Levine MD    Office Visit          Future Appointments         Provider Department Appt Notes    In 1 week Janine lGaser MD [address] [facility],[address], [address] [place] [address] 1st post op  OR: 2/27- Right breast two site wire localized excisional biopsy    In 3 weeks MD Juven[facility], [address], [place] Left hand finger arthritic discomfort and pain.                  FERROUS GLUCONATE 324 (37.5 Fe) MG Oral Tab [Pharmacy Med Name: Ferrous Gluconate 324 Mg Tab Zee] 90 tablet 0     Sig: Take 1 tablet (324 mg total) by mouth daily.        There is no refill protocol information for this order

## 2023-03-01 RX ORDER — FERROUS GLUCONATE 324(37.5)
1 TABLET ORAL DAILY
Qty: 90 TABLET | Refills: 1 | Status: SHIPPED | OUTPATIENT
Start: 2023-03-01

## 2023-03-10 ENCOUNTER — OFFICE VISIT (OUTPATIENT)
Dept: SURGERY | Facility: CLINIC | Age: 63
End: 2023-03-10
Payer: MEDICAID

## 2023-03-10 VITALS
TEMPERATURE: 98 F | DIASTOLIC BLOOD PRESSURE: 72 MMHG | HEART RATE: 87 BPM | WEIGHT: 175.81 LBS | OXYGEN SATURATION: 99 % | BODY MASS INDEX: 30.02 KG/M2 | SYSTOLIC BLOOD PRESSURE: 118 MMHG | RESPIRATION RATE: 16 BRPM | HEIGHT: 64 IN

## 2023-03-10 DIAGNOSIS — R92.2 DENSE BREAST: ICD-10-CM

## 2023-03-10 DIAGNOSIS — R92.8 ABNORMAL MAMMOGRAM OF RIGHT BREAST: Primary | ICD-10-CM

## 2023-03-10 DIAGNOSIS — Z80.3 FAMILY HISTORY OF BREAST CANCER: ICD-10-CM

## 2023-03-10 PROCEDURE — 99024 POSTOP FOLLOW-UP VISIT: CPT | Performed by: SURGERY

## 2023-03-10 PROCEDURE — 3074F SYST BP LT 130 MM HG: CPT | Performed by: SURGERY

## 2023-03-10 PROCEDURE — 3078F DIAST BP <80 MM HG: CPT | Performed by: SURGERY

## 2023-03-10 PROCEDURE — 3008F BODY MASS INDEX DOCD: CPT | Performed by: SURGERY

## 2023-03-21 ENCOUNTER — OFFICE VISIT (OUTPATIENT)
Dept: RHEUMATOLOGY | Facility: CLINIC | Age: 63
End: 2023-03-21

## 2023-03-21 ENCOUNTER — HOSPITAL ENCOUNTER (OUTPATIENT)
Dept: GENERAL RADIOLOGY | Age: 63
Discharge: HOME | End: 2023-03-21
Attending: INTERNAL MEDICINE
Payer: MEDICAID

## 2023-03-21 ENCOUNTER — LAB ENCOUNTER (OUTPATIENT)
Dept: LAB | Facility: REFERENCE LAB | Age: 63
End: 2023-03-21
Attending: INTERNAL MEDICINE
Payer: MEDICAID

## 2023-03-21 VITALS
DIASTOLIC BLOOD PRESSURE: 75 MMHG | SYSTOLIC BLOOD PRESSURE: 118 MMHG | HEART RATE: 84 BPM | BODY MASS INDEX: 29.71 KG/M2 | WEIGHT: 174 LBS | HEIGHT: 64 IN

## 2023-03-21 DIAGNOSIS — M79.641 PAIN IN BOTH HANDS: ICD-10-CM

## 2023-03-21 DIAGNOSIS — M79.642 PAIN IN BOTH HANDS: ICD-10-CM

## 2023-03-21 DIAGNOSIS — M79.89 BILATERAL HAND SWELLING: Primary | ICD-10-CM

## 2023-03-21 DIAGNOSIS — M79.89 BILATERAL HAND SWELLING: ICD-10-CM

## 2023-03-21 LAB
CRP SERPL-MCNC: <0.29 MG/DL (ref ?–0.3)
ERYTHROCYTE [SEDIMENTATION RATE] IN BLOOD: 6 MM/HR

## 2023-03-21 PROCEDURE — 86431 RHEUMATOID FACTOR QUANT: CPT | Performed by: INTERNAL MEDICINE

## 2023-03-21 PROCEDURE — 36415 COLL VENOUS BLD VENIPUNCTURE: CPT | Performed by: INTERNAL MEDICINE

## 2023-03-21 PROCEDURE — 3078F DIAST BP <80 MM HG: CPT | Performed by: INTERNAL MEDICINE

## 2023-03-21 PROCEDURE — 73130 X-RAY EXAM OF HAND: CPT | Performed by: INTERNAL MEDICINE

## 2023-03-21 PROCEDURE — 86140 C-REACTIVE PROTEIN: CPT | Performed by: INTERNAL MEDICINE

## 2023-03-21 PROCEDURE — 3074F SYST BP LT 130 MM HG: CPT | Performed by: INTERNAL MEDICINE

## 2023-03-21 PROCEDURE — 85652 RBC SED RATE AUTOMATED: CPT | Performed by: INTERNAL MEDICINE

## 2023-03-21 PROCEDURE — 3008F BODY MASS INDEX DOCD: CPT | Performed by: INTERNAL MEDICINE

## 2023-03-21 PROCEDURE — 99244 OFF/OP CNSLTJ NEW/EST MOD 40: CPT | Performed by: INTERNAL MEDICINE

## 2023-03-21 PROCEDURE — 86200 CCP ANTIBODY: CPT | Performed by: INTERNAL MEDICINE

## 2023-03-21 RX ORDER — ACETAMINOPHEN 160 MG
2000 TABLET,DISINTEGRATING ORAL DAILY
COMMUNITY

## 2023-03-21 NOTE — PROGRESS NOTES
Dear Magdalene Barker:    I saw your patient Estefani Moreno in consultation this afternoon at your request, regarding finger pain. As you know, she is a 70-year-old woman who for years on and off has had swelling and pain in her hands and wrists. It affects her PIP, DIP and MCP joints. It comes for about a week. 2 weeks ago her hands became swollen and pain. There can be some redness. Tylenol helps. When hurting, it is hard to carry a gallon of milk. She sleeps well. Her energy is good. When she gets out of bed, there can be pain in her knees, occasionally in her hands. Her hands are not more swollen. It is not harder to make tight fists or  objects. Her hands hurt with use. Both hands are equal.    She had a right total knee replacement May of 2022 at [facility], for bone-on-bone osteoarthritis. In the past she saw Dr. Yelena Richmond at [facility], and Dr. Yolanda Mejía, rheumatologist at [facility]. At [facility], her sed rates had been elevated. Bilateral temporal artery biopsies were negative in September of 2017. When she saw Dr. Yolanda Mejía in 2018, her sed rate and C-reactive proteins were normal, so her prednisone was tapered off, without flare of joint pain or stiffness. Her last inflammation markers at [facility] were normal in May of 2019.    2/21/2023, CBC was normal except hemoglobin of 11.6. CMP was normal, except glucose of 143. TSH normal.  In September of 2018 at [facility], SPEP, rheumatoid factor, CCP antibody, RYAN, 5 specific antibodies, Susanne 1 antibody, scleroderma 70 antibody, complements, and ANCA were negative. September of 2017, ANCA, RYAN, specific antibodies were negative. Rheumatoid factor and CCP antibody were negative in March 2016. Past medical history:  She has controlled adult onset diabetes. She has migraine headaches that come and go. Her last was last night. She gets tension headaches. She has high cholesterol. She has acid reflux and has had esophagitis. She had ablation for SVT in 2003. She has hypothyroidism. She had a benign right lumpectomy in February 2023. She had a hysterectomy, cholecystectomy, appendectomy. She is on vitamin D, vitamin B12, diclofenac gel which helps a short time, Trulicity, fenofibrate, iron gluconate, Flonase, levothyroxine, loratadine, metformin, naproxen 500 mg occasionally which helps maybe 2-3 times a week, nortriptyline 100 mg at bedtime, pantoprazole, Maxalt as needed, rosuvastatin. She did not tolerate codeine or morphine as of nausea. Family history:  Her mother, brother, and sister have arthritis. She does not know of any autoimmune disorders. Social history:  She is  with 3 children. She is a housewife. No cigarettes or alcohol. Tea once a day. She exercises at home. Review of systems:  She has migraines. Her energy is low. She can get tension headaches. No fevers, chills, sweats, anorexia. She has lost 15 pounds on Trulicity. Her skin can be sensitive to the sun. Her hair has been thinning for 2 months. No internal eye inflammation, oral or nasal ulcers, of adenopathy. No shortness of breath or chest pain. Some acid reflux. No stomach pain. Occasional nausea but no vomiting. Occasional constipation. No blood in her stools. No trouble urinating. She uses artificial tears for dry eyes. She drinks a lot of water for dry mouth. When cold her right fourth finger tip can turn bluish and right third fingertip white. She has frequent headaches. Physical exam:  Pleasant woman in no acute distress. Blood pressure 118/75, pulse 84, height 5' 4\" (1.626 m), weight 174 lb (78.9 kg), not currently breastfeeding. No rash. No alopecia. No conjunctival injection. No nasal or oral lesions. No cervical adenopathy. Lungs clear. S1 and S2 regular. Abdomen without hepatosplenomegaly or tenderness. Normal bowel sounds. No lower extremity edema. Cervical spine motion is good. Shoulders move okay. Elbows and wrists move okay. No synovitis in her elbows, wrists, hand MCP, PIP, or DIP joints.  strength is good. Capillary refill is good in her fingertips. Hips move okay. Some crepitance with flexion and extension of her knees. Surgical scar right knee. Ankles move well. The balls of her feet are tender to squeeze. There is some myofascial discomfort to palpation from her neck into her shoulders, across her lower back, etc.    Assessment and plan:    1. Several year history of on and off swelling/pain in her hand joints diffusely. I do not detect synovitis on exam today. Inflammation markers, rheumatoid factor, and CCP antibody will be done. X-rays will be done of her hands. She will schedule a follow-up in 1 to 2 weeks. I suspect we will find some osteoarthritis. 2.  Osteoarthritis, status post right knee replacement. 3.  History of very elevated sed rate. She has chronic migraine headaches. Temporal artery biopsies bilaterally were negative in September of 2017. She transferred to [address] [place], [place] [address] [facility] [place], and her C-reactive proteins and sed rates were normal on multiple checks. 4.  Adult onset diabetes, frequent headaches, high cholesterol, acid reflux, history of SVT status post ablation, hypothyroidism. Thank you for inviting me to participate in her care. Sincerely,      Nina Hassan MD   Rheumatology.

## 2023-03-21 NOTE — TELEPHONE ENCOUNTER
Per 4/20/22 OV note:  Take pantoprazole 40 mg to start qd in the morning   Famotidine discontinued = \"alternate therapy\"
RADHA PAGAN RN

## 2023-03-22 LAB — RHEUMATOID FACT SERPL-ACNC: 12 IU/ML (ref ?–15)

## 2023-03-23 PROBLEM — Z80.3 FAMILY HISTORY OF BREAST CANCER: Status: ACTIVE | Noted: 2023-03-23

## 2023-03-23 PROBLEM — R92.2 DENSE BREAST: Status: ACTIVE | Noted: 2023-03-23

## 2023-03-23 PROBLEM — R92.30 DENSE BREAST: Status: ACTIVE | Noted: 2023-03-23

## 2023-03-23 LAB — CCP IGG SERPL-ACNC: 0.9 U/ML (ref 0–6.9)

## 2023-05-01 DIAGNOSIS — E11.9 TYPE 2 DIABETES MELLITUS WITHOUT COMPLICATION, WITHOUT LONG-TERM CURRENT USE OF INSULIN (HCC): ICD-10-CM

## 2023-05-05 ENCOUNTER — NURSE TRIAGE (OUTPATIENT)
Dept: INTERNAL MEDICINE CLINIC | Facility: CLINIC | Age: 63
End: 2023-05-05

## 2023-05-05 NOTE — TELEPHONE ENCOUNTER
Agree with advice given. If this was strep throat then Biaxin is fine. Continue with symptomatic treatment as symptoms will not resolve right away. New antibiotic not needed.

## 2023-05-05 NOTE — TELEPHONE ENCOUNTER
Number of cell has a message that does not correlate with patient name -- no message left. I called # on most recent verbal release [12/1/2022] --> no answer; Movistat message sent --> se message.  [1st attempt]

## 2023-05-10 ENCOUNTER — OFFICE VISIT (OUTPATIENT)
Dept: RHEUMATOLOGY | Facility: CLINIC | Age: 63
End: 2023-05-10

## 2023-05-10 VITALS
HEIGHT: 64 IN | WEIGHT: 176 LBS | DIASTOLIC BLOOD PRESSURE: 80 MMHG | BODY MASS INDEX: 30.05 KG/M2 | HEART RATE: 83 BPM | SYSTOLIC BLOOD PRESSURE: 135 MMHG

## 2023-05-10 DIAGNOSIS — M15.9 PRIMARY OSTEOARTHRITIS INVOLVING MULTIPLE JOINTS: Primary | ICD-10-CM

## 2023-05-10 PROCEDURE — 99213 OFFICE O/P EST LOW 20 MIN: CPT | Performed by: INTERNAL MEDICINE

## 2023-05-10 PROCEDURE — 3079F DIAST BP 80-89 MM HG: CPT | Performed by: INTERNAL MEDICINE

## 2023-05-10 PROCEDURE — 3008F BODY MASS INDEX DOCD: CPT | Performed by: INTERNAL MEDICINE

## 2023-05-10 PROCEDURE — 3075F SYST BP GE 130 - 139MM HG: CPT | Performed by: INTERNAL MEDICINE

## 2023-05-10 RX ORDER — CLARITHROMYCIN 250 MG/1
250 TABLET, FILM COATED ORAL 2 TIMES DAILY
COMMUNITY
Start: 2023-05-02

## 2023-05-10 RX ORDER — IBUPROFEN 600 MG/1
600 TABLET ORAL 3 TIMES DAILY PRN
COMMUNITY
Start: 2023-05-02

## 2023-05-10 NOTE — PROGRESS NOTES
Dear Esa Lezama:    I saw your patient Javi Luke in follow-up this afternoon in my rheumatology clinic. Her knuckles can hurt when she is working and lifting. Her joints are okay today. We reviewed her results from 3/21/2023. Her sed rate was normal at 6.  C-reactive protein normal less than 0.29. Rheumatoid factor and CCP antibody negative. We reviewed her bilateral hand x-rays on the computer. Right hand x-rays show stable subcortical cyst in her lunate, and mild degenerative change in her thumb IP joint and third finger DIP joint. Left hand x-rays show subcortical cyst in her lunate. Punctate calcification over the medial aspect of the head of her left fifth middle phalanx. Physical exam:  Pleasant woman in no acute distress. Blood pressure 135/80, pulse 83, height 5' 4\" (1.626 m), weight 176 lb (79.8 kg), not currently breastfeeding. Otherwise unchanged. Assessment and plan:    1. Diffuse osteoarthritis. We discussed osteoarthritis. She was reassured that she does not have rheumatoid arthritis. She can take Tylenol up to 3000 mg a day, as needed for pain. She will remain active. 2.  Adult onset diabetes, frequent headaches, high cholesterol, acid reflux, history of SVT status post ablation, hypothyroidism. Rheumatology will see her back when and if needed. Thanks again for the opportunity to participate in her care. Sincerely,      Bishop Saucedo MD   Rheumatology.
normal...

## 2023-06-15 ENCOUNTER — NURSE TRIAGE (OUTPATIENT)
Dept: INTERNAL MEDICINE CLINIC | Facility: CLINIC | Age: 63
End: 2023-06-15

## 2023-06-19 ENCOUNTER — HOSPITAL ENCOUNTER (OUTPATIENT)
Dept: GENERAL RADIOLOGY | Age: 63
Discharge: HOME | End: 2023-06-19
Attending: NURSE PRACTITIONER
Payer: MEDICAID

## 2023-06-19 ENCOUNTER — OFFICE VISIT (OUTPATIENT)
Dept: INTERNAL MEDICINE CLINIC | Facility: CLINIC | Age: 63
End: 2023-06-19

## 2023-06-19 VITALS
HEART RATE: 87 BPM | BODY MASS INDEX: 29.37 KG/M2 | WEIGHT: 172 LBS | HEIGHT: 64 IN | SYSTOLIC BLOOD PRESSURE: 120 MMHG | DIASTOLIC BLOOD PRESSURE: 78 MMHG

## 2023-06-19 DIAGNOSIS — M54.30 SCIATIC LEG PAIN: Primary | ICD-10-CM

## 2023-06-19 DIAGNOSIS — M54.30 SCIATIC LEG PAIN: ICD-10-CM

## 2023-06-19 PROCEDURE — 72110 X-RAY EXAM L-2 SPINE 4/>VWS: CPT | Performed by: NURSE PRACTITIONER

## 2023-06-19 RX ORDER — ERENUMAB-AOOE 140 MG/ML
INJECTION, SOLUTION SUBCUTANEOUS
COMMUNITY
Start: 2023-06-09

## 2023-06-26 DIAGNOSIS — E11.9 TYPE 2 DIABETES MELLITUS WITHOUT COMPLICATION, WITHOUT LONG-TERM CURRENT USE OF INSULIN (HCC): ICD-10-CM

## 2023-07-13 NOTE — PROGRESS NOTES
Please call patient with blood test results. Kidney and liver function are normal,  Sugars elevated A1c 3 months sugar-is 8. 4-that is some worsening from  1 year ago was 7.0 goal is between 6 and 7    There is mild anemia -hemoglobin is 11.8 -  -iron is
13-Jul-2023 05:15

## 2023-07-18 ENCOUNTER — OFFICE VISIT (OUTPATIENT)
Dept: PHYSICAL MEDICINE AND REHAB | Facility: CLINIC | Age: 63
End: 2023-07-18
Payer: MEDICAID

## 2023-07-18 VITALS
HEIGHT: 64 IN | WEIGHT: 172 LBS | HEART RATE: 82 BPM | BODY MASS INDEX: 29.37 KG/M2 | OXYGEN SATURATION: 98 % | DIASTOLIC BLOOD PRESSURE: 64 MMHG | SYSTOLIC BLOOD PRESSURE: 100 MMHG

## 2023-07-18 DIAGNOSIS — F41.9 ANXIETY: ICD-10-CM

## 2023-07-18 DIAGNOSIS — M54.16 LEFT LUMBAR RADICULOPATHY: Primary | ICD-10-CM

## 2023-07-18 DIAGNOSIS — E78.2 MIXED HYPERLIPIDEMIA: ICD-10-CM

## 2023-07-18 DIAGNOSIS — I10 PRIMARY HYPERTENSION: ICD-10-CM

## 2023-07-18 DIAGNOSIS — E11.9 TYPE 2 DIABETES MELLITUS WITHOUT COMPLICATION, WITHOUT LONG-TERM CURRENT USE OF INSULIN (HCC): ICD-10-CM

## 2023-07-18 DIAGNOSIS — K21.9 GASTROESOPHAGEAL REFLUX DISEASE WITHOUT ESOPHAGITIS: ICD-10-CM

## 2023-07-18 PROCEDURE — 3074F SYST BP LT 130 MM HG: CPT | Performed by: PHYSICAL MEDICINE & REHABILITATION

## 2023-07-18 PROCEDURE — 99244 OFF/OP CNSLTJ NEW/EST MOD 40: CPT | Performed by: PHYSICAL MEDICINE & REHABILITATION

## 2023-07-18 PROCEDURE — 3078F DIAST BP <80 MM HG: CPT | Performed by: PHYSICAL MEDICINE & REHABILITATION

## 2023-07-18 PROCEDURE — 3008F BODY MASS INDEX DOCD: CPT | Performed by: PHYSICAL MEDICINE & REHABILITATION

## 2023-07-18 RX ORDER — METHYLPREDNISOLONE 4 MG/1
TABLET ORAL
Qty: 1 EACH | Refills: 0 | Status: SHIPPED | OUTPATIENT
Start: 2023-07-18

## 2023-07-18 NOTE — PATIENT INSTRUCTIONS
-Start physical therapy and home exercises  -Medrol dose pack to be started  -Ice/Heat as tolerated  -Please stop the medication if you have any side effects and call the office if you have any questions or concerns  -If no better will consider MRI for further evaluation  -Follow up in 4 weeks

## 2023-07-31 ENCOUNTER — TELEPHONE (OUTPATIENT)
Dept: INTERNAL MEDICINE CLINIC | Facility: CLINIC | Age: 63
End: 2023-07-31

## 2023-07-31 NOTE — TELEPHONE ENCOUNTER
Per patient, she is planning to schedule an appointment  with DR GOMEZ [facility] for medication discussion. Requesting if she needs to do a blood test prior to her visit. Informed that there is an active order for the [facility],RN will send a message to PCP if she needs additional tests.        Future Appointments   Date Time Provider Laurie Ramirez   8/21/2023  1:45 PM Georgia Elizabeth DO PM&R [facility]         See labs=[facility] lab order  since 2/23/23

## 2023-08-14 ENCOUNTER — LAB ENCOUNTER (OUTPATIENT)
Dept: LAB | Age: 63
End: 2023-08-14
Attending: INTERNAL MEDICINE
Payer: MEDICAID

## 2023-08-14 PROCEDURE — 36415 COLL VENOUS BLD VENIPUNCTURE: CPT | Performed by: INTERNAL MEDICINE

## 2023-08-14 PROCEDURE — 3051F HG A1C>EQUAL 7.0%<8.0%: CPT | Performed by: INTERNAL MEDICINE

## 2023-08-14 PROCEDURE — 83036 HEMOGLOBIN GLYCOSYLATED A1C: CPT | Performed by: INTERNAL MEDICINE

## 2023-08-22 ENCOUNTER — OFFICE VISIT (OUTPATIENT)
Dept: INTERNAL MEDICINE CLINIC | Facility: CLINIC | Age: 63
End: 2023-08-22

## 2023-08-22 VITALS
DIASTOLIC BLOOD PRESSURE: 84 MMHG | SYSTOLIC BLOOD PRESSURE: 126 MMHG | HEIGHT: 64 IN | HEART RATE: 76 BPM | BODY MASS INDEX: 30.05 KG/M2 | WEIGHT: 176 LBS

## 2023-08-22 DIAGNOSIS — E03.9 HYPOTHYROIDISM, UNSPECIFIED TYPE: ICD-10-CM

## 2023-08-22 DIAGNOSIS — D64.9 ANEMIA, UNSPECIFIED TYPE: ICD-10-CM

## 2023-08-22 DIAGNOSIS — I10 ESSENTIAL HYPERTENSION WITH GOAL BLOOD PRESSURE LESS THAN 140/90: ICD-10-CM

## 2023-08-22 DIAGNOSIS — Z86.79 HX OF SUPRAVENTRICULAR TACHYCARDIA: ICD-10-CM

## 2023-08-22 DIAGNOSIS — E11.9 TYPE 2 DIABETES MELLITUS WITHOUT COMPLICATION, WITHOUT LONG-TERM CURRENT USE OF INSULIN (HCC): Primary | ICD-10-CM

## 2023-08-22 PROCEDURE — 3074F SYST BP LT 130 MM HG: CPT | Performed by: INTERNAL MEDICINE

## 2023-08-22 PROCEDURE — 3079F DIAST BP 80-89 MM HG: CPT | Performed by: INTERNAL MEDICINE

## 2023-08-22 PROCEDURE — 3008F BODY MASS INDEX DOCD: CPT | Performed by: INTERNAL MEDICINE

## 2023-08-22 PROCEDURE — 99214 OFFICE O/P EST MOD 30 MIN: CPT | Performed by: INTERNAL MEDICINE

## 2023-08-22 PROCEDURE — 3072F LOW RISK FOR RETINOPATHY: CPT | Performed by: INTERNAL MEDICINE

## 2023-08-22 RX ORDER — PANTOPRAZOLE SODIUM 40 MG/1
40 TABLET, DELAYED RELEASE ORAL
Qty: 90 TABLET | Refills: 2 | Status: SHIPPED | OUTPATIENT
Start: 2023-08-22

## 2023-08-22 RX ORDER — DULAGLUTIDE 3 MG/.5ML
3 INJECTION, SOLUTION SUBCUTANEOUS
Qty: 4 EACH | Refills: 0 | Status: SHIPPED | OUTPATIENT
Start: 2023-08-22

## 2023-08-22 NOTE — TELEPHONE ENCOUNTER
Refill passed per [facility] protocol.     Requested Prescriptions   Pending Prescriptions Disp Refills    PANTOPRAZOLE 40 MG Oral Tab EC [Pharmacy Med Name: Pantoprazole Sodium Ec 40 Mg Tab Auro] 90 tablet 0     Sig: Take 1 tablet (40 mg total) by mouth every morning before breakfast. 1 tablet in am 30 - 60  Minutes before meal       Gastrointestional Medication Protocol Passed - 8/21/2023  1:32 AM        Passed - In person appointment or virtual visit in the past 12 mos or appointment in next 3 mos     Recent Outpatient Visits              1 month ago Left lumbar radiculopathy    [facility], [address] [facility], [place]    Office Visit    2 months ago Sciatic leg pain    [facility], Yaima Martell, APRN    Office Visit    3 months ago Primary osteoarthritis involving multiple joints    [facility], Dimitry Guzman MD    Office Visit    5 months ago Bilateral hand swelling    [facility], Dimitry Guzman MD    Office Visit    5 months ago Abnormal mammogram of [facility], Daniella Self MD    Office Visit          Future Appointments         Provider Department Appt Notes    Today Daly Jimenez MD [facility] [address], [place] Follow up on diabetes, policy informed    In 2 weeks Shilo Cota, DO [facility], [address] [facility],[address], [place] 1 MO F/U                  Future Appointments         Provider Department Appt Notes    Today MD Enrique Bosch, [address], [place] Follow up on diabetes, policy informed    In 2 weeks DO Enrique Aviles [facility], [address] [facility],[address] [facility], [place] 1 MO F/U          Recent Outpatient Visits              1 month ago Left lumbar radiculopathy    [facility], [address], [place], [place]    Office Visit    2 months ago Sciatic leg pain    [facility], [address], [place] Kennie Sieve., LING    Office Visit    3 months ago Primary osteoarthritis involving multiple joints    [address], [address] [place], Sheryl Gagnon MD    Office Visit    5 months ago Bilateral hand swelling    [address], [place], Sheryl Gagnon MD    Office Visit    5 months ago Abnormal mammogram of right breast    Valentin Mays MD    Office Visit

## 2023-08-22 NOTE — PROGRESS NOTES
Subjective:   Patient ID: Javi Luke is a 61year old female. Patient presents with:  Patient presents with:  Diabetes: A1c completed on 8/14/23      State d doing welll   denies any   Cp ,shortness of breath, dyspnea on exertion or heart palpitations with rest or activity , can walk few block without any Cp, Sob, Recinos or Palpitations , denies nausea, vomiting, diarrhea, constipation or abdominal pain. No fever, chills, or UTI symptoms. The rest of the review of systems, see below. Anemia  This is a chronic problem. The problem occurs intermittently. Pertinent negatives include no abdominal pain, arthralgias, chest pain, chills, congestion, coughing, fatigue, fever, headaches, nausea, sore throat or vomiting. Treatments tried: ferros sulfate 325 mg qd. The treatment provided significant relief. Diabetes  Pertinent negatives for hypoglycemia include no confusion, dizziness, headaches, nervousness/anxiousness or pallor. Pertinent negatives for diabetes include no chest pain and no fatigue. History/Other:   Review of Systems   Constitutional:  Negative for chills, fatigue and fever. HENT:  Negative for congestion, ear pain, postnasal drip, rhinorrhea, sneezing and sore throat. Eyes:  Negative for pain and redness. Respiratory:  Negative for cough, shortness of breath and wheezing. Cardiovascular:  Negative for chest pain, palpitations and leg swelling. Gastrointestinal:  Negative for abdominal pain, anal bleeding, blood in stool, constipation, diarrhea, nausea and vomiting. Genitourinary:  Negative for dysuria and frequency. Musculoskeletal:  Negative for arthralgias. Skin:  Negative for color change and pallor. Neurological:  Negative for dizziness and headaches. Hematological:  Does not bruise/bleed easily. Psychiatric/Behavioral:  Negative for confusion. The patient is not nervous/anxious.       Current Outpatient Medications   Medication Sig Dispense Refill    pantoprazole 40 MG Oral Tab EC Take 1 tablet (40 mg total) by mouth every morning before breakfast. 90 tablet 2    metFORMIN 500 MG Oral Tab Take 2 tablets (1,000 mg total) by mouth 2 (two) times daily. 360 tablet 2    fluticasone propionate 50 MCG/ACT Nasal Suspension 2 sprays by Nasal route daily. FOR 1-2 WEEKS THEN AS NEEDED 16 g 3    rosuvastatin 5 MG Oral Tab Take 1 tablet (5 mg total) by mouth nightly. 90 tablet 3    cholecalciferol 50 MCG (2000 UT) Oral Cap Take 1 capsule (2,000 Units total) by mouth daily. fenofibrate micronized 134 MG Oral Cap Take 1 capsule (134 mg total) by mouth daily with breakfast. 90 capsule 3    Ferrous Gluconate 324 (37.5 Fe) MG Oral Tab Take 1 tablet (324 mg total) by mouth daily. 90 tablet 1    levothyroxine 88 MCG Oral Tab Take 1 tablet (88 mcg total) by mouth before breakfast. 90 tablet 1    Acetaminophen-Caffeine (EXCEDRIN ASPIRIN FREE OR) Take by mouth. diclofenac 1 % External Gel Apply 2 g topically 4 (four) times daily. On left  knee area 1 each 0    loratadine 10 MG Oral Tab TAKE 1 TABLET BY MOUTH ONCE DAILY FOR 3-4 WEEKS, THEN AS NEEDED 90 tablet 0    Cyanocobalamin (VITAMIN B-12) 2500 MCG Sublingual SL Tab Take 1  Tab under the tongue daily (Patient taking differently: Take 1  Tab under the tongue daily) 90 tablet 3    Dulaglutide (TRULICITY) 1.5 JV/3.7DW Subcutaneous Solution Pen-injector Inject 1.5 mg into the skin every 7 days. 12 mL 1    nortriptyline 50 MG Oral Cap Take 2 capsules (100 mg total) by mouth nightly. 180 capsule 3    Rizatriptan Benzoate 10 MG Oral Tab TAKE 1 TABLET AT ONSET OF HEADACHE; MAY REPEAT 1 TABLET IN 2 HOURS AS NEEDED. MAX 2 TABLETS IN 24 HOURS.  MAX USE 9 DAYS PER MONTH      Blood Glucose Monitoring Suppl (ONETOUCH ULTRA 2) w/Device Does not apply Kit Check blood sugar 2 times daily (Patient taking differently: Check blood sugar daily) 1 kit 0    Glucose Blood (ONETOUCH ULTRA) In Vitro Strip Check blood sugar 2 times daily (Patient taking differently: Check blood sugar daily) 200 each 3    OneTouch Delica Lancets 34U Does not apply Misc 1 each 2 (two) times a day. Check blood sugar 2 times daily 200 each 3    PREVIDENT 5000 SENSITIVE 1.1-5 % Dental Paste BRUSH TWICE DAILY      Insulin Pen Needle (PEN NEEDLES) 32G X 4 MM Does not apply Misc 1 each by Does not apply route daily. 90 each 0    AIMOVIG 140 MG/ML Subcutaneous Solution Auto-injector  (Patient not taking: Reported on 8/22/2023)       Allergies:  Penicillins             HIVES, RASH  Codeine                 NAUSEA ONLY, DIZZINESS  Morphine                NAUSEA ONLY, DIZZINESS    Objective:   Physical Exam  Vitals and nursing note reviewed. Constitutional:       General: She is not in acute distress. HENT:      Head: Normocephalic and atraumatic. Left Ear: Tympanic membrane normal.      Nose: Nose normal.      Mouth/Throat:      Mouth: Mucous membranes are moist.      Comments: Post nasal drainage +  Cardiovascular:      Rate and Rhythm: Normal rate and regular rhythm. Heart sounds: Normal heart sounds. No murmur heard. Pulmonary:      Effort: Pulmonary effort is normal.      Breath sounds: Normal breath sounds. No wheezing or rales. Chest:      Chest wall: No mass. Breasts:     Right: No inverted nipple, mass, nipple discharge, skin change or tenderness. Left: No inverted nipple, mass, nipple discharge, skin change or tenderness. Comments: + Bill  Axillary lumps  -fatty tissue lymph none? No erythema   Minimal tenderness   Abdominal:      Palpations: Abdomen is soft. There is no mass. Tenderness: There is no abdominal tenderness. Musculoskeletal:         General: Normal range of motion. Cervical back: Normal range of motion. Right lower leg: No edema. Left lower leg: No edema. Lymphadenopathy:      Head:      Right side of head: No submental, submandibular or posterior auricular adenopathy.       Left side of head: No submental, submandibular or posterior auricular adenopathy. Cervical: No cervical adenopathy. Right cervical: No superficial cervical adenopathy. Left cervical: No superficial cervical adenopathy. Upper Body:      Right upper body: No supraclavicular or pectoral adenopathy. Left upper body: No supraclavicular or pectoral adenopathy. Lower Body: No right inguinal adenopathy. No left inguinal adenopathy. Skin:     General: Skin is warm and dry. Findings: No erythema. Neurological:      Mental Status: She is alert and oriented to person, place, and time. Blood pressure 126/84, pulse 76, height 5' 4\" (1.626 m), weight 176 lb (79.8 kg), not currently breastfeeding. Bilateral barefoot skin diabetic exam is normal, visualized feet and the appearance is normal.  Bilateral monofilament/sensation of both feet is normal.  Pulsation pedal pulse exam of both lower legs/feet is normal as well. Assessment & Plan: Sydni Sheikh Type 2 diabetes mellitus without complication, without long-term current use of insulin ([facility])  Keep sugars glycemic control to prevent complications of DM2  Keep 1800 keegan ADA- Diabetic diet   Walking and activity as tolerated   accu-checks   Eye exam  completed and foot exam yearly today  completer   Take medications as perscribed  Directions and side effects of medications discussed w/pt  Labs To complete   A1c 7/3   stable continue present medication labs  Trulicity  increase  to 3 mg q week  - pt like increase of medication -tolerate medication well   Side effects and directions of medication discussed with patient. Patient verbalized understanding and compliance.     Pt verbalized understanding and compliance    - MICROALB/CREAT RATIO, RANDOM URINE          Anemia   patient is taking ferrous sulfate 325 mg daily  Diet education   iron rich diet-  Labs to complete      Hypothyroidism, unspecified type  Stable cpm   Labs          No orders of the defined types were placed in this encounter.       Meds This Visit:  Requested Prescriptions      No prescriptions requested or ordered in this encounter       Imaging & Referrals:  None

## 2023-09-05 ENCOUNTER — OFFICE VISIT (OUTPATIENT)
Dept: PHYSICAL MEDICINE AND REHAB | Facility: CLINIC | Age: 63
End: 2023-09-05
Payer: MEDICAID

## 2023-09-05 VITALS
OXYGEN SATURATION: 98 % | SYSTOLIC BLOOD PRESSURE: 118 MMHG | HEART RATE: 81 BPM | HEIGHT: 64 IN | WEIGHT: 174 LBS | DIASTOLIC BLOOD PRESSURE: 76 MMHG | BODY MASS INDEX: 29.71 KG/M2

## 2023-09-05 DIAGNOSIS — F41.9 ANXIETY: ICD-10-CM

## 2023-09-05 DIAGNOSIS — E78.2 MIXED HYPERLIPIDEMIA: ICD-10-CM

## 2023-09-05 DIAGNOSIS — K21.9 GASTROESOPHAGEAL REFLUX DISEASE WITHOUT ESOPHAGITIS: ICD-10-CM

## 2023-09-05 DIAGNOSIS — M54.16 LEFT LUMBAR RADICULOPATHY: Primary | ICD-10-CM

## 2023-09-05 DIAGNOSIS — I10 PRIMARY HYPERTENSION: ICD-10-CM

## 2023-09-05 DIAGNOSIS — E11.9 TYPE 2 DIABETES MELLITUS WITHOUT COMPLICATION, WITHOUT LONG-TERM CURRENT USE OF INSULIN (HCC): ICD-10-CM

## 2023-09-05 PROCEDURE — 99214 OFFICE O/P EST MOD 30 MIN: CPT | Performed by: PHYSICAL MEDICINE & REHABILITATION

## 2023-09-05 PROCEDURE — 3074F SYST BP LT 130 MM HG: CPT | Performed by: PHYSICAL MEDICINE & REHABILITATION

## 2023-09-05 PROCEDURE — 3008F BODY MASS INDEX DOCD: CPT | Performed by: PHYSICAL MEDICINE & REHABILITATION

## 2023-09-05 PROCEDURE — 3078F DIAST BP <80 MM HG: CPT | Performed by: PHYSICAL MEDICINE & REHABILITATION

## 2023-09-05 NOTE — PATIENT INSTRUCTIONS
-Start PT and home exercises  -Start medrol dose pack now  -Follow up after returning back from [place]  -If no better, will get MRI

## 2023-09-18 DIAGNOSIS — M54.81 OCCIPITAL NEURALGIA OF LEFT SIDE: ICD-10-CM

## 2023-09-18 DIAGNOSIS — G43.709 CHRONIC MIGRAINE WITHOUT AURA WITHOUT STATUS MIGRAINOSUS, NOT INTRACTABLE: ICD-10-CM

## 2023-09-18 DIAGNOSIS — G43.901 STATUS MIGRAINOSUS: ICD-10-CM

## 2023-09-18 RX ORDER — NORTRIPTYLINE HYDROCHLORIDE 50 MG/1
100 CAPSULE ORAL NIGHTLY
Qty: 180 CAPSULE | Refills: 3 | OUTPATIENT
Start: 2023-09-18

## 2023-10-12 DIAGNOSIS — E03.9 HYPOTHYROIDISM, UNSPECIFIED TYPE: ICD-10-CM

## 2023-10-13 RX ORDER — LEVOTHYROXINE SODIUM 88 UG/1
88 TABLET ORAL
Qty: 90 TABLET | Refills: 3 | Status: SHIPPED | OUTPATIENT
Start: 2023-10-13

## 2023-10-13 NOTE — TELEPHONE ENCOUNTER
Refill passed per [address] protocol.   Requested Prescriptions   Pending Prescriptions Disp Refills    LEVOTHYROXINE 88 MCG Oral Tab [Pharmacy Med Name: Levothyroxine Sodium 88 Mcg Tab Lupi] 90 tablet 0     Sig: Take 1 tablet (88 mcg total) by mouth before breakfast.       Thyroid Medication Protocol Passed - 10/12/2023  7:45 PM        Passed - TSH in past 12 months        Passed - Last TSH value is normal     Lab Results   Component Value Date    TSH 1.860 02/21/2023    [facility] 2.61 10/08/2015                 Passed - In person appointment or virtual visit in the past 12 mos or appointment in next 3 mos     Recent Outpatient Visits              1 month ago Left lumbar radiculopathy    [facility], [address] [facility] [place], [place]    Office Visit    1 month ago Type 2 diabetes mellitus without complication, without long-term current use of insulin ([facility] [address]    [address], [place] Bryce Shin MD    Office Visit    2 months ago Left lumbar radiculopathy    [facility], [address] [facility], [place], [place]    Office Visit    3 months ago Sciatic leg pain    [facility], [facility], [facility]., APR    Office Visit    5 months ago Primary osteoarthritis involving multiple joints    [address], [facility], Ana Douglas MD    Office Visit          Future Appointments         Provider Department Appt Notes    In 1 week Bryce Shin MD [address], [address], [place] physical - last px 9/29/2021    In 1 month Jaci Pereira MD [address], [address] [facility] f/u for medication    In 2 months [facility] [address] [place] [facility]                          Recent Outpatient Visits              1 month ago Left lumbar radiculopathy    [address], [address] [facility] Shilo Cota DO    Office Visit    1 month ago Type 2 diabetes mellitus without complication, without long-term current use of insulin ([facility]    [address], [address], [place] Daly Jimenez MD    Office Visit    2 months ago Left lumbar radiculopathy    [facility], [address], [place], [place]    Office Visit    3 months ago Sciatic leg pain    [facility], [facility], Yaima PerkinstDamián, APRN    Office Visit    5 months ago Primary osteoarthritis involving multiple joints    [address], [facility], Dimitry Guzman MD    Office Visit          Future Appointments         Provider Department Appt Notes    In 1 week MD Enrique Bosch, [address], [place] physical - last px 9/29/2021    In 1 month MD Enrique Peñaloza, [address] [facility] f/u for medication    In 2 months [facility] [address]

## 2023-10-17 ENCOUNTER — LAB ENCOUNTER (OUTPATIENT)
Dept: LAB | Age: 63
End: 2023-10-17
Attending: INTERNAL MEDICINE
Payer: MEDICAID

## 2023-10-17 DIAGNOSIS — E11.9 DIABETES MELLITUS WITH NO COMPLICATION (HCC): Primary | ICD-10-CM

## 2023-10-17 LAB
BASOPHILS # BLD AUTO: 0.03 X10(3) UL (ref 0–0.2)
BASOPHILS NFR BLD AUTO: 0.6 %
DEPRECATED HBV CORE AB SER IA-ACNC: 35.7 NG/ML
DEPRECATED RDW RBC AUTO: 43.8 FL (ref 35.1–46.3)
EOSINOPHIL # BLD AUTO: 0.11 X10(3) UL (ref 0–0.7)
EOSINOPHIL NFR BLD AUTO: 2.1 %
ERYTHROCYTE [DISTWIDTH] IN BLOOD BY AUTOMATED COUNT: 15 % (ref 11–15)
HCT VFR BLD AUTO: 38.1 %
HGB BLD-MCNC: 11.9 G/DL
IMM GRANULOCYTES # BLD AUTO: 0.01 X10(3) UL (ref 0–1)
IMM GRANULOCYTES NFR BLD: 0.2 %
LYMPHOCYTES # BLD AUTO: 1.88 X10(3) UL (ref 1–4)
LYMPHOCYTES NFR BLD AUTO: 36.6 %
MCH RBC QN AUTO: 25.3 PG (ref 26–34)
MCHC RBC AUTO-ENTMCNC: 31.2 G/DL (ref 31–37)
MCV RBC AUTO: 81.1 FL
MONOCYTES # BLD AUTO: 0.42 X10(3) UL (ref 0.1–1)
MONOCYTES NFR BLD AUTO: 8.2 %
NEUTROPHILS # BLD AUTO: 2.68 X10 (3) UL (ref 1.5–7.7)
NEUTROPHILS # BLD AUTO: 2.68 X10(3) UL (ref 1.5–7.7)
NEUTROPHILS NFR BLD AUTO: 52.3 %
PLATELET # BLD AUTO: 357 10(3)UL (ref 150–450)
RBC # BLD AUTO: 4.7 X10(6)UL
VIT B12 SERPL-MCNC: 924 PG/ML (ref 193–986)
WBC # BLD AUTO: 5.1 X10(3) UL (ref 4–11)

## 2023-10-17 PROCEDURE — 82570 ASSAY OF URINE CREATININE: CPT | Performed by: INTERNAL MEDICINE

## 2023-10-17 PROCEDURE — 82607 VITAMIN B-12: CPT | Performed by: INTERNAL MEDICINE

## 2023-10-17 PROCEDURE — 82728 ASSAY OF FERRITIN: CPT | Performed by: INTERNAL MEDICINE

## 2023-10-17 PROCEDURE — 85025 COMPLETE CBC W/AUTO DIFF WBC: CPT | Performed by: INTERNAL MEDICINE

## 2023-10-17 PROCEDURE — 3061F NEG MICROALBUMINURIA REV: CPT | Performed by: INTERNAL MEDICINE

## 2023-10-17 PROCEDURE — 80053 COMPREHEN METABOLIC PANEL: CPT | Performed by: INTERNAL MEDICINE

## 2023-10-17 PROCEDURE — 36415 COLL VENOUS BLD VENIPUNCTURE: CPT | Performed by: INTERNAL MEDICINE

## 2023-10-17 PROCEDURE — 80061 LIPID PANEL: CPT | Performed by: INTERNAL MEDICINE

## 2023-10-17 PROCEDURE — 3051F HG A1C>EQUAL 7.0%<8.0%: CPT | Performed by: INTERNAL MEDICINE

## 2023-10-17 PROCEDURE — 83036 HEMOGLOBIN GLYCOSYLATED A1C: CPT

## 2023-10-17 PROCEDURE — 82043 UR ALBUMIN QUANTITATIVE: CPT | Performed by: INTERNAL MEDICINE

## 2023-10-17 PROCEDURE — 84443 ASSAY THYROID STIM HORMONE: CPT | Performed by: INTERNAL MEDICINE

## 2023-10-18 LAB
EST. AVERAGE GLUCOSE BLD GHB EST-MCNC: 163 MG/DL (ref 68–126)
HBA1C MFR BLD: 7.3 % (ref ?–5.7)

## 2023-10-24 ENCOUNTER — OFFICE VISIT (OUTPATIENT)
Dept: INTERNAL MEDICINE CLINIC | Facility: CLINIC | Age: 63
End: 2023-10-24

## 2023-10-24 VITALS
HEART RATE: 81 BPM | BODY MASS INDEX: 29.53 KG/M2 | WEIGHT: 173 LBS | HEIGHT: 64 IN | SYSTOLIC BLOOD PRESSURE: 117 MMHG | DIASTOLIC BLOOD PRESSURE: 74 MMHG

## 2023-10-24 DIAGNOSIS — I10 PRIMARY HYPERTENSION: ICD-10-CM

## 2023-10-24 DIAGNOSIS — Z00.00 PE (PHYSICAL EXAM), ROUTINE: Primary | ICD-10-CM

## 2023-10-24 DIAGNOSIS — D64.9 ANEMIA, UNSPECIFIED TYPE: ICD-10-CM

## 2023-10-24 DIAGNOSIS — Z28.39 BEHIND ON IMMUNIZATIONS: ICD-10-CM

## 2023-10-24 DIAGNOSIS — E03.9 HYPOTHYROIDISM, UNSPECIFIED TYPE: ICD-10-CM

## 2023-10-24 DIAGNOSIS — E11.9 TYPE 2 DIABETES MELLITUS WITHOUT COMPLICATION, WITHOUT LONG-TERM CURRENT USE OF INSULIN (HCC): ICD-10-CM

## 2023-10-24 DIAGNOSIS — J30.9 ALLERGIC RHINITIS, UNSPECIFIED SEASONALITY, UNSPECIFIED TRIGGER: ICD-10-CM

## 2023-10-24 PROCEDURE — 3078F DIAST BP <80 MM HG: CPT | Performed by: INTERNAL MEDICINE

## 2023-10-24 PROCEDURE — 99396 PREV VISIT EST AGE 40-64: CPT | Performed by: INTERNAL MEDICINE

## 2023-10-24 PROCEDURE — 99213 OFFICE O/P EST LOW 20 MIN: CPT | Performed by: INTERNAL MEDICINE

## 2023-10-24 PROCEDURE — 3008F BODY MASS INDEX DOCD: CPT | Performed by: INTERNAL MEDICINE

## 2023-10-24 PROCEDURE — 90686 IIV4 VACC NO PRSV 0.5 ML IM: CPT | Performed by: INTERNAL MEDICINE

## 2023-10-24 PROCEDURE — 3074F SYST BP LT 130 MM HG: CPT | Performed by: INTERNAL MEDICINE

## 2023-10-24 PROCEDURE — 90471 IMMUNIZATION ADMIN: CPT | Performed by: INTERNAL MEDICINE

## 2023-10-24 RX ORDER — LORATADINE 10 MG/1
TABLET ORAL
Qty: 90 TABLET | Refills: 0 | Status: SHIPPED | OUTPATIENT
Start: 2023-10-24

## 2023-10-24 RX ORDER — FLUTICASONE PROPIONATE 50 MCG
2 SPRAY, SUSPENSION (ML) NASAL DAILY
Qty: 16 G | Refills: 1 | Status: SHIPPED | OUTPATIENT
Start: 2023-10-24

## 2023-10-24 NOTE — PROGRESS NOTES
Subjective:   Patient ID: Maude Carmichael is a 61year old female. Patient presents with:  Patient presents with: Annual    Patient presents today for a physical examination  [facility] doing welll ,  denies any   Cp ,shortness of breath, dyspnea on exertion or heart palpitations with rest or activity , can walk few block without any Cp, Sob, Recinos or Palpitations , denies nausea, vomiting, diarrhea, constipation or abdominal pain. No fever, chills, or UTI symptoms. The rest of the review of systems, see below. Anemia  This is a chronic problem. The problem occurs intermittently. Pertinent negatives include no abdominal pain, arthralgias, chest pain, chills, congestion, coughing, fatigue, fever, headaches, nausea, sore throat or vomiting. Treatments tried: ferros sulfate 325 mg qd. The treatment provided significant relief. Diabetes  Pertinent negatives for hypoglycemia include no confusion, dizziness, headaches, nervousness/anxiousness or pallor. Pertinent negatives for diabetes include no chest pain and no fatigue. History/Other:   Review of Systems   Constitutional:  Negative for chills, fatigue and fever. HENT:  Negative for congestion, ear pain, postnasal drip, rhinorrhea, sneezing and sore throat. Eyes:  Negative for pain and redness. Respiratory:  Negative for cough, shortness of breath and wheezing. Cardiovascular:  Negative for chest pain, palpitations and leg swelling. Gastrointestinal:  Negative for abdominal pain, anal bleeding, blood in stool, constipation, diarrhea, nausea and vomiting. Genitourinary:  Negative for dysuria and frequency. Musculoskeletal:  Negative for arthralgias. Skin:  Negative for color change and pallor. Neurological:  Negative for dizziness and headaches. Hematological:  Does not bruise/bleed easily. Psychiatric/Behavioral:  Negative for confusion. The patient is not nervous/anxious.       Current Outpatient Medications   Medication Sig Dispense Refill    loratadine 10 MG Oral Tab TAKE 1 TABLET BY MOUTH ONCE DAILY FOR 3-4 WEEKS, THEN AS NEEDED 90 tablet 0    fluticasone propionate 50 MCG/ACT Nasal Suspension 2 sprays by Nasal route daily. FOR 1-2 WEEKS THEN AS NEEDED 16 g 1    levothyroxine 88 MCG Oral Tab Take 1 tablet (88 mcg total) by mouth before breakfast. 90 tablet 3    nortriptyline 50 MG Oral Cap Take 2 capsules (100 mg total) by mouth nightly. 180 capsule 0    pantoprazole 40 MG Oral Tab EC Take 1 tablet (40 mg total) by mouth every morning before breakfast. 90 tablet 2    Dulaglutide (TRULICITY) 3 TC/9.6RT Subcutaneous Solution Pen-injector Inject 3 mg into the skin every 7 days. 4 each 0    metFORMIN 500 MG Oral Tab Take 2 tablets (1,000 mg total) by mouth 2 (two) times daily. 360 tablet 2    rosuvastatin 5 MG Oral Tab Take 1 tablet (5 mg total) by mouth nightly. 90 tablet 3    cholecalciferol 50 MCG (2000 UT) Oral Cap Take 1 capsule (2,000 Units total) by mouth daily. fenofibrate micronized 134 MG Oral Cap Take 1 capsule (134 mg total) by mouth daily with breakfast. 90 capsule 3    Ferrous Gluconate 324 (37.5 Fe) MG Oral Tab Take 1 tablet (324 mg total) by mouth daily. 90 tablet 1    Acetaminophen-Caffeine (EXCEDRIN ASPIRIN FREE OR) Take by mouth. diclofenac 1 % External Gel Apply 2 g topically 4 (four) times daily. On left  knee area 1 each 0    Cyanocobalamin (VITAMIN B-12) 2500 MCG Sublingual SL Tab Take 1  Tab under the tongue daily (Patient taking differently: Take 1  Tab under the tongue daily) 90 tablet 3    Rizatriptan Benzoate 10 MG Oral Tab TAKE 1 TABLET AT ONSET OF HEADACHE; MAY REPEAT 1 TABLET IN 2 HOURS AS NEEDED. MAX 2 TABLETS IN 24 HOURS.  MAX USE 9 DAYS PER MONTH      Blood Glucose Monitoring Suppl (ONETOUCH ULTRA 2) w/Device Does not apply Kit Check blood sugar 2 times daily (Patient taking differently: Check blood sugar daily) 1 kit 0    Glucose Blood (ONETOUCH ULTRA) In Vitro Strip Check blood sugar 2 times daily (Patient taking differently: Check blood sugar daily) 200 each 3    OneTouch Delica Lancets 03Y Does not apply Misc 1 each 2 (two) times a day. Check blood sugar 2 times daily 200 each 3    PREVIDENT 5000 SENSITIVE 1.1-5 % Dental Paste BRUSH TWICE DAILY      Insulin Pen Needle (PEN NEEDLES) 32G X 4 MM Does not apply Misc 1 each by Does not apply route daily. 90 each 0     Allergies:  Penicillins             HIVES, RASH  Codeine                 NAUSEA ONLY, DIZZINESS  Morphine                NAUSEA ONLY, DIZZINESS    Objective:   Physical Exam  Vitals and nursing note reviewed. Constitutional:       General: She is not in acute distress. HENT:      Head: Normocephalic and atraumatic. Left Ear: Tympanic membrane normal.      Nose: Nose normal.      Mouth/Throat:      Mouth: Mucous membranes are moist.   Cardiovascular:      Rate and Rhythm: Normal rate and regular rhythm. Heart sounds: Normal heart sounds. No murmur heard. Pulmonary:      Effort: Pulmonary effort is normal.      Breath sounds: Normal breath sounds. No wheezing or rales. Abdominal:      Palpations: Abdomen is soft. There is no mass. Tenderness: There is no abdominal tenderness. Musculoskeletal:         General: Normal range of motion. Cervical back: Normal range of motion. Right lower leg: No edema. Left lower leg: No edema. Lymphadenopathy:      Head:      Right side of head: No submental, submandibular or posterior auricular adenopathy. Left side of head: No submental, submandibular or posterior auricular adenopathy. Cervical: No cervical adenopathy. Right cervical: No superficial cervical adenopathy. Left cervical: No superficial cervical adenopathy. Lower Body: No right inguinal adenopathy. No left inguinal adenopathy. Skin:     General: Skin is warm and dry. Findings: No erythema. Neurological:      Mental Status: She is alert and oriented to person, place, and time. Blood pressure 117/74, pulse 81, height 5' 4\" (1.626 m), weight 173 lb (78.5 kg), not currently breastfeeding. Assessment & Plan:   Physical examination  Maintain a healthy diet , low saturated fat and low sugar diet  Keep good hydration  Maintain a regular activity /walking as tolerated   Complete labs as ordered,   Mammogram  utd 12/23   Coloncopy utd  1/26   Preventative health maintenance tests reviewed   Immunizations reviewed flu shot  today     covid 19   - booster -in the pharmacy  Patient verbalized understanding and compliance      . Type 2 diabetes mellitus without complication, without long-term current use of insulin (HCC)  Keep sugars glycemic control to prevent complications of DM2  Keep 1800 keegan ADA- Diabetic diet   Walking and activity as tolerated   accu-checks   Eye exam  completed  3 months     foot exam yearly   completed   Take medications as perscribed  Directions and side effects of medications discussed w/pt  Labs To complete   A1c 7/3 -Labs discussed with patient  stable continue present medication labs  Trulicity  increase  to 3 mg q week  - -tolerate medication well   Side effects and directions of medication discussed with patient. Patient verbalized understanding and compliance.     Pt verbalized understanding and compliance    - MICROALB/CREAT RATIO, RANDOM URINE          Anemia improving    patient is taking ferrous sulfate 325 mg daily  Diet education   iron rich diet-  Labs to complete 3 months       Hypothyroidism, unspecified type  Stable cpm   Labs   Levothyroxine 88  mcg qam         Allergic  rhynitis  Loratidine  every day as needed   Flonase nasal spray 2 puffs in each nostril once daily as needed       Orders Placed This Encounter      Comp Metabolic Panel (14)      Hemoglobin A1C      Microalb/Creat Ratio, Random Urine      CBC With Differential With Platelet      Ferritin      Iron And Tibc      Fluzone Quadrivalent 6mo+ 0.5mL      Meds This Visit:  Requested Prescriptions     Signed Prescriptions Disp Refills    loratadine 10 MG Oral Tab 90 tablet 0     Sig: TAKE 1 TABLET BY MOUTH ONCE DAILY FOR 3-4 WEEKS, THEN AS NEEDED    fluticasone propionate 50 MCG/ACT Nasal Suspension 16 g 1     Si sprays by Nasal route daily.  FOR 1-2 WEEKS THEN AS NEEDED       Imaging & Referrals:  INFLUENZA VACCINE, QUAD, PRESERVATIVE FREE, 0.5 ML

## 2023-11-16 ENCOUNTER — OFFICE VISIT (OUTPATIENT)
Dept: NEUROLOGY | Facility: CLINIC | Age: 63
End: 2023-11-16
Payer: MEDICAID

## 2023-11-16 VITALS — BODY MASS INDEX: 29.71 KG/M2 | WEIGHT: 174 LBS | HEIGHT: 64 IN

## 2023-11-16 DIAGNOSIS — G43.709 CHRONIC MIGRAINE WITHOUT AURA WITHOUT STATUS MIGRAINOSUS, NOT INTRACTABLE: Primary | ICD-10-CM

## 2023-11-16 DIAGNOSIS — G43.901 STATUS MIGRAINOSUS: ICD-10-CM

## 2023-11-16 DIAGNOSIS — M54.81 OCCIPITAL NEURALGIA OF LEFT SIDE: ICD-10-CM

## 2023-11-16 PROCEDURE — 99214 OFFICE O/P EST MOD 30 MIN: CPT | Performed by: OTHER

## 2023-11-16 PROCEDURE — 3008F BODY MASS INDEX DOCD: CPT | Performed by: OTHER

## 2023-11-16 RX ORDER — NORTRIPTYLINE HYDROCHLORIDE 50 MG/1
100 CAPSULE ORAL NIGHTLY
Qty: 180 CAPSULE | Refills: 3 | Status: SHIPPED | OUTPATIENT
Start: 2023-11-16

## 2023-11-16 RX ORDER — RIZATRIPTAN BENZOATE 10 MG/1
TABLET ORAL
Qty: 9 TABLET | Refills: 11 | Status: SHIPPED | OUTPATIENT
Start: 2023-11-16

## 2023-11-16 RX ORDER — ERENUMAB-AOOE 140 MG/ML
140 INJECTION, SOLUTION SUBCUTANEOUS
Qty: 1 ML | Refills: 11 | Status: SHIPPED | OUTPATIENT
Start: 2023-11-16 | End: 2024-11-15

## 2023-11-16 NOTE — PROGRESS NOTES
I conducted a telehealth visit with Sade Redding today, 11/05/21, which was completed using two-way, real-time interactive audio and video communication. This has been done in good [place] to provide continuity of care in the best interest of the provider-patient relationship, due to the COVID -19 public health crisis/national emergency where restrictions of face-to-face office visits are ongoing. Every conscious effort was taken to allow for sufficient and adequate time to complete the visit. The patient was made aware of the limitations of the telehealth visit, including treatment limitations as no physical exam could be performed. The patient was advised to call 911 or to go to the ER in case there was an emergency. The patient was also advised of the potential privacy & security concerns related to the telehealth platform. The patient was made aware of where to find [facility] notice of privacy practices, telehealth consent form and other related consent forms and documents. which are located on the [facility] website. The patient verbally agreed to telehealth consent form, related consents and the risks discussed. Lastly, the patient confirmed that they were in [place]. Included in this visit, time may have been spent reviewing labs, medications, radiology tests and decision making. Appropriate medical decision-making and tests are ordered as detailed in the plan of care above. Coding/billing information is submitted for this visit based on complexity of care and/or time spent for the visit. Neurology Follow up Visit     Referred By: Dr. Denny ref. provider found    Chief Complaint:   Chief Complaint   Patient presents with    Migraine     LOV 09/17/2021 patient present with migraine. HPI:     Sade Redding is a 61year old female, who presents for history of headaches. Patient's been seen by Dr. Gigi Hood and then later by Dr. Teofilo Foster.   She was tried on Depakote at 1 point, doses were increased slowly 2015 but then switched to Topamax, possibly due to lack of efficacy. Then patient also was tried on nortriptyline. Topamax was stopped. She did get some benefit originally from nortriptyline, raise the dose up to 100 mg at night. Patient was very interested in trying different new medications. She had discussion about Emgality with Dr. Yanely Leyva. She had discussion about Nurtec as well. She did try Botox back in June 2020. Some benefit. But then it collapsed. When she came to see me first time in May 2021 she had headaches 2 or 3 times a week, at least 8 migraine days a month, at least 15 headache days a month. Severe, light sensitivity associated, nausea, lasting for 4 hours each time. He denies any other focal neurological symptoms. Her friends were telling her about new drugs and she was interested in retrying them as well. The patient was also tried on sumatriptan, ketorolac, Tylenol with no significant benefit so far. In the past patient had MRI of the brain as well as temporal artery biopsy were done but were not remarkable. She gets relatively okay until middle of August 2021 when she started having headache lasting for many days. At that point migraine cocktail was prescribed. Patient however did not start it until she had a video appointment with me in mid August 2021. We discussed how to use the cocktail. Nurtec was not approved. Therefore Botox treatment was done in September 2021. Patient followed up with a telemedicine appointment in November 2021. Ended with the same frequency of migraines about once or twice a week, severe throbbing headaches that she cannot function with. Naproxen works about 60% of the time as abortive therapy. At that point [facility] was tried but was not approved, therefore Izabella El was tried instead. Patient was lost to follow-up for 2 years until November 2023.   It seems that the Izabella El was approved but she only tried it once or twice, at that point she would not do see a second opinion neurologist at [facility] in 2021. Rizatriptan was prescribed, and Benay Sill was prescribed again and she was planning to start in 2023 and at that point she switched back to us again. She continued with nortriptyline at 100 mg nightly dose, she felt it was providing some benefit but still had at least 4 migraine days a month.     Past Medical History:   Diagnosis Date    Acid reflux     Age-related nuclear cataract of both eyes 04/09/2015    Anxiety state     Arrhythmia     ablation 2003    Arthritis     Cataract     Disorder of thyroid     Esophageal reflux     Esophagitis 09/10/2002    Floaters 04/09/2015    High cholesterol     Migraines     Muscle weakness     bilateral knees at times    Osteoarthritis     Other and unspecified hyperlipidemia     S/P ablation of accessory bypass tract     Type II or unspecified type diabetes mellitus without mention of complication, not stated as uncontrolled     Visual impairment     glasses       Past Surgical History:   Procedure Laterality Date    APPENDECTOMY  2005    CATARACT Right 04/2018    CHOLECYSTECTOMY  2007    COLONOSCOPY      HYSTERECTOMY  2012    LUMPECTOMY RIGHT Right 02/27/2023    Right bracketed YVES LOC    YVES BIOPSY STEREO NODULE 1 SITE RIGHT (CPT=19081)  12/30/2022    2 sites    NEEDLE BIOPSY RIGHT  12/28/2022    us guided bx    TEMPORAL ARTERY LIGATN OR BX Bilateral 09/07/2017    Temporal artery biopsies, bilateral    TOTAL KNEE REPLACEMENT Right     UPPER GI ENDOSCOPY PERFORMED  09/10/2002       Social history:  History   Smoking Status    Never   Smokeless Tobacco    Never     History   Alcohol Use No     History   Drug Use No       Family History   Problem Relation Age of Onset    Diabetes Father     Diabetes Mother     Heart Disorder Mother     Breast Cancer Mother 72    Other (arthritis) Mother     Diabetes Sister     Diabetes Brother     Heart Disorder Brother     Glaucoma Neg     Macular degeneration Neg          Current Outpatient Medications:     loratadine 10 MG Oral Tab, TAKE 1 TABLET BY MOUTH ONCE DAILY FOR 3-4 WEEKS, THEN AS NEEDED, Disp: 90 tablet, Rfl: 0    fluticasone propionate 50 MCG/ACT Nasal Suspension, 2 sprays by Nasal route daily. FOR 1-2 WEEKS THEN AS NEEDED, Disp: 16 g, Rfl: 1    levothyroxine 88 MCG Oral Tab, Take 1 tablet (88 mcg total) by mouth before breakfast., Disp: 90 tablet, Rfl: 3    nortriptyline 50 MG Oral Cap, Take 2 capsules (100 mg total) by mouth nightly., Disp: 180 capsule, Rfl: 0    pantoprazole 40 MG Oral Tab EC, Take 1 tablet (40 mg total) by mouth every morning before breakfast., Disp: 90 tablet, Rfl: 2    Dulaglutide (TRULICITY) 3 QK/7.2MZ Subcutaneous Solution Pen-injector, Inject 3 mg into the skin every 7 days. , Disp: 4 each, Rfl: 0    metFORMIN 500 MG Oral Tab, Take 2 tablets (1,000 mg total) by mouth 2 (two) times daily. , Disp: 360 tablet, Rfl: 2    rosuvastatin 5 MG Oral Tab, Take 1 tablet (5 mg total) by mouth nightly., Disp: 90 tablet, Rfl: 3    cholecalciferol 50 MCG (2000 UT) Oral Cap, Take 1 capsule (2,000 Units total) by mouth daily. , Disp: , Rfl:     fenofibrate micronized 134 MG Oral Cap, Take 1 capsule (134 mg total) by mouth daily with breakfast., Disp: 90 capsule, Rfl: 3    Ferrous Gluconate 324 (37.5 Fe) MG Oral Tab, Take 1 tablet (324 mg total) by mouth daily. , Disp: 90 tablet, Rfl: 1    Acetaminophen-Caffeine (EXCEDRIN ASPIRIN FREE OR), Take by mouth., Disp: , Rfl:     diclofenac 1 % External Gel, Apply 2 g topically 4 (four) times daily. On left  knee area, Disp: 1 each, Rfl: 0    Cyanocobalamin (VITAMIN B-12) 2500 MCG Sublingual SL Tab, Take 1  Tab under the tongue daily (Patient taking differently: Take 1  Tab under the tongue daily), Disp: 90 tablet, Rfl: 3    Rizatriptan Benzoate 10 MG Oral Tab, TAKE 1 TABLET AT ONSET OF HEADACHE; MAY REPEAT 1 TABLET IN 2 HOURS AS NEEDED. MAX 2 TABLETS IN 24 HOURS.  MAX USE 9 DAYS PER MONTH, Disp: , Rfl:     Blood Glucose Monitoring Suppl (ONETOUCH ULTRA 2) w/Device Does not apply Kit, Check blood sugar 2 times daily (Patient taking differently: Check blood sugar daily), Disp: 1 kit, Rfl: 0    Glucose Blood (ONETOUCH ULTRA) In Vitro Strip, Check blood sugar 2 times daily (Patient taking differently: Check blood sugar daily), Disp: 200 each, Rfl: 3    OneTouch Delica Lancets 74L Does not apply Misc, 1 each 2 (two) times a day. Check blood sugar 2 times daily, Disp: 200 each, Rfl: 3    PREVIDENT 5000 SENSITIVE 1.1-5 % Dental Paste, BRUSH TWICE DAILY, Disp: , Rfl:     Insulin Pen Needle (PEN NEEDLES) 32G X 4 MM Does not apply Misc, 1 each by Does not apply route daily. , Disp: 90 each, Rfl: 0    Allergies   Allergen Reactions    Penicillins HIVES and RASH    Codeine NAUSEA ONLY and DIZZINESS    Morphine NAUSEA ONLY and DIZZINESS       ROS:   As in HPI, the rest of the 14 system review was done and was negative      Physical Exam:  There were no vitals filed for this visit. General: No apparent distress, well nourished, well groomed. Head- Normocephalic, atraumatic  Eyes- No redness or swelling  ENT- Hearing intake, normal glutition  Neck- No masses or adenopathy    Neurological:     Mental Status- Alert and oriented x3. Normal attention span and concentration  Thought process intact  Memory intact- recent and remote  Mood intact  Fund of knowledge appropriate for education and age    Language intact including: comprehension, naming, repetition, vocabulary    Cranial Nerves:    VII. Face symmetric, no facial weakness  VIII. Hearing intact to whisper. IX. Pallet elevates symmetrically. XI. Shoulder shrug is intact  XII.  Tongue is midline        Labs:    Lab Results   Component Value Date    TSH 1.040 10/17/2023     Lab Results   Component Value Date    HDL 45 10/17/2023    LDL 46 10/17/2023    TRIG 125 10/17/2023     Lab Results   Component Value Date    HGB 11.9 (L) 10/17/2023    HCT 38.1 10/17/2023    MCV 81.1 10/17/2023 WBC 5.1 10/17/2023    .0 10/17/2023      Lab Results   Component Value Date    BUN 13 10/17/2023    CREAT 1.0 05/01/2016    CA 9.2 10/17/2023    ALT 38 10/17/2023    AST 36 10/17/2023    ALKPHOS 64 07/23/2016    ALB 3.8 10/17/2023     10/17/2023    K 4.7 10/17/2023     10/17/2023    CO2 27.0 10/17/2023      I have reviewed labs. Assessment   1. Chronic migraine without aura without status migrainosus, not intractable  Patient with chronic migraines. It seems that Botox was not helpful at all this time, therefore it was stopped. Nortriptyline will be continued since it provides some benefit. However is still not fully controlled and therefore it is reasonable to go back on Aimovig again. We talked about potential side effects. If it works well then she will come back in 6 months if it does not work well that she come back in couple of months to discuss a different options. Again emphasized importance of tracking the headaches. Education and counseling provided to patient. Instructed patient to call my office or seek medical attention immediately if symptoms worsen. Patient verbalized understanding of information given. All questions were answered. All side effects of drugs were discussed. Return to clinic in: No follow-ups on file.     Craig Chiu MD

## 2023-12-12 ENCOUNTER — HOSPITAL ENCOUNTER (OUTPATIENT)
Dept: MAMMOGRAPHY | Facility: HOSPITAL | Age: 63
Discharge: HOME | End: 2023-12-12
Attending: SURGERY
Payer: MEDICAID

## 2023-12-12 DIAGNOSIS — R92.8 ABNORMAL MAMMOGRAM OF RIGHT BREAST: ICD-10-CM

## 2023-12-12 PROCEDURE — 77066 DX MAMMO INCL CAD BI: CPT | Performed by: SURGERY

## 2023-12-12 PROCEDURE — 77062 BREAST TOMOSYNTHESIS BI: CPT | Performed by: SURGERY

## 2024-01-01 ENCOUNTER — PATIENT MESSAGE (OUTPATIENT)
Dept: INTERNAL MEDICINE CLINIC | Facility: CLINIC | Age: 64
End: 2024-01-01

## 2024-01-01 DIAGNOSIS — E11.9 TYPE 2 DIABETES MELLITUS WITHOUT COMPLICATION, WITHOUT LONG-TERM CURRENT USE OF INSULIN (HCC): Primary | ICD-10-CM

## 2024-01-02 NOTE — TELEPHONE ENCOUNTER
From: Tawny Benitez  To: Jose Miguel Fish  Sent: 1/1/2024 6:26 PM CST  Subject: Jalil Jimenes is on back order, and I haven’t been able to take my medication for 3 weeks. Is there something else you can substitute with? Please send to pharmacy on file. Thanks.

## 2024-01-03 RX ORDER — LIRAGLUTIDE 6 MG/ML
INJECTION SUBCUTANEOUS
Qty: 3 EACH | Refills: 0 | Status: SHIPPED | OUTPATIENT
Start: 2024-01-03 | End: 2024-01-04 | Stop reason: ALTCHOICE

## 2024-01-03 NOTE — TELEPHONE ENCOUNTER
Patient called back, verified name/. Given message below.  She would prefer not to take a medication requiring daily injection.  Asking for something to be taken weekly?  Asking for Mounjaro?  Dr Fish, please advise?

## 2024-01-03 NOTE — TELEPHONE ENCOUNTER
Routed to Dr Fish for advise, thanks.  Rx pended.     Requested Prescriptions     Pending Prescriptions Disp Refills    Liraglutide (VICTOZA) 18 MG/3ML Subcutaneous Solution Pen-injector  0     Sig: Every day       Last Office Visit with PCP: 10/24/2023

## 2024-01-04 ENCOUNTER — TELEPHONE (OUTPATIENT)
Dept: FAMILY MEDICINE CLINIC | Facility: CLINIC | Age: 64
End: 2024-01-04

## 2024-01-04 RX ORDER — DULAGLUTIDE 3 MG/.5ML
3 INJECTION, SOLUTION SUBCUTANEOUS
Qty: 4 EACH | Refills: 1 | Status: CANCELLED
Start: 2024-01-04

## 2024-01-04 NOTE — TELEPHONE ENCOUNTER
Victoza was sent already  to the pharmacy  Ozempic or Mounjaro do not believe it is covered by patient insurance

## 2024-01-04 NOTE — TELEPHONE ENCOUNTER
Liraglutide (VICTOZA) 18 MG/3ML Subcutaneous Solution Pen-injector, Every day, Disp: 3 each, Rfl: 0      Pharmacy message: how many MG injecting 0.62, 1.7, or 1.82? They need clarification.

## 2024-01-04 NOTE — TELEPHONE ENCOUNTER
Spoke with Carl Jean, at [facility] verified  He stated they never rec'd Rx for ozempic for today, also they don't have it in stock any ways.   They still have Trulicity 3 mg on file, he will process Trulicy 3 mg and will be ready for pt tomorrow noc.   He stated per their record they have Trulicity 3 mg last ref 23 for 1 month with 1 additional ref  Also per med list Victoza was discontinued.   OK for pt to stay on Trulicity as other meds are not available.    pls advise, thanks in advance.

## 2024-01-04 NOTE — TELEPHONE ENCOUNTER
Medication Quantity Refills Start End   Liraglutide (VICTOZA) 18 MG/3ML Subcutaneous Solution Pen-injector (Discontinued) 3 each 0 1/3/2024 1/4/2024   Sig:   Every day     Route:   (none)     Note to Pharmacy:   ATTN: The order is for generic Victoza     Reason for Discontinue:   Alternate therapy     Order #:   [id number]

## 2024-01-04 NOTE — TELEPHONE ENCOUNTER
We can try Ozempic 1 mg once weekly-into the skin prescription sent to the pharmacy -to control diabetes since Trulicity is not available      PA   to complete if needed

## 2024-01-04 NOTE — TELEPHONE ENCOUNTER
Nurses ,please call patient  pharmacy to see if Ozempic or Mounjaro  -PA possible , would be covered by patient insurance Medicaid   Victoza  was already sent  but pt  requesting other medication if covered  by her insurance and PA  ?  Please let me know .  Thanks

## 2024-01-04 NOTE — TELEPHONE ENCOUNTER
Dr. Fish, please see below and advise on prescription for Ozempic, updating prescription instructions for Victoza, or other recommendations. Thank you.    Contacted pharmacist from [facility] (name and  of patient verified). She states the only way to determine if PA is possible is to order medication and submit prior authorization from PCP office.  She also states she is not able to dispense Victoza as directions were unclear in the written prescription. Also, there is also no generic Victoza available.  She reports Ozepic is in-stock in limited doses and quantities.    No generic form available; needs clarification on dose per day:  Medication Quantity Refills Start End   Liraglutide (VICTOZA) 18 MG/3ML Subcutaneous Solution Pen-injector 3 each 0 1/3/2024 --   Sig:   Every day     Route:   (none)     Note to Pharmacy:   ATTN: The order is for generic Victoza

## 2024-01-05 DIAGNOSIS — G43.901 STATUS MIGRAINOSUS: ICD-10-CM

## 2024-01-05 DIAGNOSIS — M54.81 OCCIPITAL NEURALGIA OF LEFT SIDE: ICD-10-CM

## 2024-01-05 DIAGNOSIS — G43.709 CHRONIC MIGRAINE WITHOUT AURA WITHOUT STATUS MIGRAINOSUS, NOT INTRACTABLE: ICD-10-CM

## 2024-01-05 RX ORDER — NORTRIPTYLINE HYDROCHLORIDE 50 MG/1
100 CAPSULE ORAL NIGHTLY
Qty: 180 CAPSULE | Refills: 3 | OUTPATIENT
Start: 2024-01-05

## 2024-01-05 NOTE — TELEPHONE ENCOUNTER
Requested Prescriptions     Pending Prescriptions Disp Refills    nortriptyline 50 MG Oral Cap 180 capsule 3     Sig: Take 2 capsules (100 mg total) by mouth nightly.     Last refilled: 11/16/23 #180/3 refills  Refills are on file.

## 2024-01-05 NOTE — TELEPHONE ENCOUNTER
Patient advised of PINEDA Walker's note below. Patient will contact [place] [facility] to verify and if any issues will call us back.

## 2024-01-07 RX ORDER — PEN NEEDLE, DIABETIC 30 GX3/16"
1 NEEDLE, DISPOSABLE MISCELLANEOUS DAILY
Qty: 90 EACH | Refills: 0 | Status: SHIPPED | OUTPATIENT
Start: 2024-01-07

## 2024-01-07 RX ORDER — BLOOD SUGAR DIAGNOSTIC
STRIP MISCELLANEOUS
Qty: 200 EACH | Refills: 3 | Status: SHIPPED | OUTPATIENT
Start: 2024-01-07

## 2024-01-07 RX ORDER — LANCETS 30 GAUGE
1 EACH MISCELLANEOUS 2 TIMES DAILY
Qty: 200 EACH | Refills: 3 | Status: SHIPPED | OUTPATIENT
Start: 2024-01-07

## 2024-01-07 NOTE — TELEPHONE ENCOUNTER
Refill Passed per Protocol.     Requested Prescriptions   Pending Prescriptions Disp Refills    Insulin Pen Needle (PEN NEEDLES) 32G X 4 MM Does not apply Misc 90 each 0     Si each daily.       Diabetic Supplies Protocol Passed - 2024  1:20 AM        Passed - In person appointment or virtual visit in the past 12 mos or appointment in next 3 mos     Recent Outpatient Visits              1 month ago Chronic migraine without aura without status migrainosus, not intractable    [facility] [place] [facility]    Office Visit    2 months ago PE (physical exam), routine    [facility] [place] Vukomanovic, Emela, MD    Office Visit    4 months ago Left lumbar radiculopathy    [facility] [place],     Office Visit    4 months ago Type 2 diabetes mellitus without complication, without long-term current use of insulin (HCC)    [facility] [place] Vukomanovic, Emela, MD    Office Visit    5 months ago Left lumbar radiculopathy    [facility] [place],     Office Visit                        Glucose Blood (ONETOUCH ULTRA) In Vitro Strip 200 each 3     Sig: Check blood sugar 2 times daily       Diabetic Supplies Protocol Passed - 2024  1:20 AM        Passed - In person appointment or virtual visit in the past 12 mos or appointment in next 3 mos     Recent Outpatient Visits              1 month ago Chronic migraine without aura without status migrainosus, not intractable    [facility]    2 months ago PE (physical exam), routine    [facility] [place] Vukomanovic, Emela, MD    Office Visit    4 months ago Left lumbar radiculopathy    [facility] [place]  [place]    Office Visit    4 months ago Type 2 diabetes mellitus without complication, without long-term current use of insulin ([facility])    [facility] [place] Vukomanovic, Emela, MD    Office Visit    5 months ago Left lumbar radiculopathy    [facility] Xiomara Holley, DO    Office Visit                        OneTouch Delica Lancets 30G Does not apply Misc 200 each 3     Si each  in the morning and 1 each before bedtime. Check blood sugar 2 times daily.       Diabetic Supplies Protocol Passed - 2024  1:20 AM        Passed - In person appointment or virtual visit in the past 12 mos or appointment in next 3 mos     Recent Outpatient Visits              1 month ago Chronic migraine without aura without status migrainosus, not intractable    [facility] [place]    Office Visit    2 months ago PE (physical exam), routine    [facility] [place] Vukomanovic, Emela, MD    Office Visit    4 months ago Left lumbar radiculopathy    [facility]    [facility]    4 months ago Type 2 diabetes mellitus without complication, without long-term current use of insulin ([facility])    [facility] [place] Vukomanovic, Emela, MD    Office Visit    5 months ago Left lumbar radiculopathy    [facility]                        Ferrous Gluconate 324 (37.5 Fe) MG Oral Tab 90 tablet 1     Sig: Take 1 tablet (324 mg total) by mouth daily.       There is no refill protocol information for this order             Recent Outpatient Visits              1 month ago Chronic migraine without aura without status migrainosus, not intractable    [facility]    [facility]  Visit    2 months ago PE (physical exam), routine    [facility] [place] Vukomanovic, Emela, MD    Office Visit    4 months ago Left lumbar radiculopathy    [facility] [place],     Office Visit    4 months ago Type 2 diabetes mellitus without complication, without long-term current use of insulin (HCC)    [facility] [place] Vukomanovic, Emela, MD    Office Visit    5 months ago Left lumbar radiculopathy    [facility] [place],     Office Visit

## 2024-01-07 NOTE — TELEPHONE ENCOUNTER
Please review. Protocol Failed or has No Protocol.    Requested Prescriptions   Pending Prescriptions Disp Refills    FERROUS GLUCONATE 324 (37.5 Fe) MG Oral Tab [Pharmacy Med Name: Ferrous Gluconate 324 Mg Tab Zee] 90 tablet 0     Sig: Take 1 tablet (324 mg total) by mouth daily.       There is no refill protocol information for this order          Recent Outpatient Visits              1 month ago Chronic migraine without aura without status migrainosus, not intractable    [facility] [place] [facility]    [facility] Visit    2 months ago PE (physical exam), routine    [facility] [place] Vukomanovic, Emela, MD    Office Visit    4 months ago [facility] rad[facility]     [facility]    4 months ago Type 2 diabetes mellitus without complication, without long-term current use of insulin (HCC)    [facility] [place] Vukomanovic, Emela, MD    Office Visit    5 months ago Left lumbar radiculopathy    [facility]     [facility] Visit

## 2024-01-07 NOTE — TELEPHONE ENCOUNTER
Failed Protocol/No Protocol.    Requested Prescriptions   Pending Prescriptions Disp Refills    Ferrous Gluconate 324 (37.5 Fe) MG Oral Tab 90 tablet 1     Sig: Take 1 tablet (324 mg total) by mouth daily.       There is no refill protocol information for this order      Signed Prescriptions Disp Refills    Insulin Pen Needle (PEN NEEDLES) 32G X 4 MM Does not apply Misc 90 each 0     Si each daily.       Diabetic Supplies Protocol Passed - 2024  1:20 AM        Passed - In person appointment or virtual visit in the past 12 mos or appointment in next 3 mos     Recent Outpatient Visits              1 month ago Chronic migraine without aura without status migrainosus, not intractable    [facility]    [facility]    2 months ago PE (physical exam), routine    [facility] [place] Vukomanovic, Emela, MD    Office Visit    4 months ago Left lumbar radiculopathy    [facility] [place] [facility],     Office Visit    4 months ago Type 2 diabetes mellitus without complication, without long-term current use of insulin (HCC)    [facility] [place] Vukomanovic, Emela, MD    Office Visit    5 months ago Left lumbar radiculopathy    [facility]     [facility] Visit                        Glucose Blood (ONETOUCH ULTRA) In Vitro Strip 200 each 3     Sig: Check blood sugar 2 times daily       Diabetic Supplies Protocol Passed - 2024  1:20 AM        Passed - In person appointment or virtual visit in the past 12 mos or appointment in next 3 mos     Recent Outpatient Visits              1 month ago Chronic migraine without aura without status migrainosus, not intractable    [facility] [place] [facility]    Office Visit    2 months ago PE (physical exam),  routine    [facility] [place] Vukomanovic, Emela, MD    Office Visit    4 months ago Left lumbar radiculopathy    [facility]     [facility]    4 months ago Type 2 diabetes mellitus without complication, without long-term current use of insulin (HCC)    [facility]    Office Visit    5 months ago Left lumbar radiculopathy    [facility]    [facility]                        OneTouch Delica Lancets 30G Does not apply Misc 200 each 3     Si each  in the morning and 1 each before bedtime. Check blood sugar 2 times daily.       Diabetic Supplies Protocol Passed - 2024  1:20 AM        Passed - In person appointment or virtual visit in the past 12 mos or appointment in next 3 mos     Recent Outpatient Visits              1 month ago Chronic migraine without aura without status migrainosus, not intractable    [facility]    Office Visit    2 months ago PE (physical exam), routine    [facility]    Office Visit    4 months ago Left lumbar radiculopathy    [facility]     Office Visit    4 months ago Type 2 diabetes mellitus without complication, without long-term current use of insulin ([facility])    [facility] [place] Vukomanovic, Emela, MD    Office Visit    5 months ago Left lumbar radiculopathy    [facility]     [facility]                             Recent Outpatient Visits              1 month ago Chronic migraine without aura without status migrainosus, not intractable    [facility]  Timur Enriquez MD    Office Visit    2 months ago PE (physical exam), routine    [facility]    Office Visit    4 months ago Left lumbar radiculopathy    [facility] [place] Xiomara Holley, DO    Office Visit    4 months ago Type 2 diabetes mellitus without complication, without long-term current use of insulin (HCC)    [facility]    [facility] Visit    5 months ago Left lumbar radiculopathy    [facility] [place]    Office Visit

## 2024-01-08 ENCOUNTER — TELEPHONE (OUTPATIENT)
Dept: INTERNAL MEDICINE CLINIC | Facility: CLINIC | Age: 64
End: 2024-01-08

## 2024-01-08 RX ORDER — DULAGLUTIDE 0.75 MG/.5ML
1.5 INJECTION, SOLUTION SUBCUTANEOUS
Qty: 6 EACH | Refills: 0 | Status: SHIPPED | OUTPATIENT
Start: 2024-01-08 | End: 2024-01-08

## 2024-01-08 RX ORDER — DULAGLUTIDE 0.75 MG/.5ML
1.5 INJECTION, SOLUTION SUBCUTANEOUS
Qty: 6 EACH | Refills: 0 | Status: SHIPPED | OUTPATIENT
Start: 2024-01-08

## 2024-01-08 RX ORDER — FERROUS GLUCONATE 324(37.5)
1 TABLET ORAL DAILY
Qty: 90 TABLET | Refills: 0 | Status: SHIPPED | OUTPATIENT
Start: 2024-01-08

## 2024-01-08 NOTE — TELEPHONE ENCOUNTER
Spoke to patient. She will call local pharmacies to see if they have Trulicity in stock and call us back.

## 2024-01-08 NOTE — TELEPHONE ENCOUNTER
Patient states that the current pharmacy does not have the Trulicity medication in stock, but they do have the Mounjaro and the Wegovy medication in stock, so the patient is requesting to get a new prescription for Mounjaro or the Wegovy. patient states that she is out of her medication.

## 2024-01-08 NOTE — TELEPHONE ENCOUNTER
Patient can have Trulicity 0.75  mg every week   she can  take 2 shots at the same time =1.5  mcg q week     Script  send to the pharmacy.

## 2024-01-08 NOTE — TELEPHONE ENCOUNTER
Patient states her pharmacy does not have Trulicity 1.5mg in stock. States she has called different pharmacies to inquire as well with no luck. However the pharmacies listed below have Trulicity 0.75mg in stock. Patient asking for provider's recommendations.        [facility] [address], [place] [address] [phone]      [facility]  [address], [place], [place] [id number] [phone]

## 2024-01-09 ENCOUNTER — OFFICE VISIT (OUTPATIENT)
Dept: DERMATOLOGY CLINIC | Facility: CLINIC | Age: 64
End: 2024-01-09
Payer: MEDICAID

## 2024-01-09 DIAGNOSIS — L30.9 DERMATITIS: Primary | ICD-10-CM

## 2024-01-09 DIAGNOSIS — L81.9 HYPERPIGMENTATION: ICD-10-CM

## 2024-01-09 PROCEDURE — 99213 OFFICE O/P EST LOW 20 MIN: CPT | Performed by: PHYSICIAN ASSISTANT

## 2024-01-09 NOTE — TELEPHONE ENCOUNTER
Patient contacted and made aware of Dr. Fish's recommendation and Rx sent. She states Rx should have gone to [facility] in [place]. I made her aware I will send to correct pharmacy--> sent to correct pharmacy. Patient verbalized understanding. No further questions or concerns at this time.

## 2024-01-09 NOTE — PROGRESS NOTES
HPI:    Patient ID: Tawny Benitez is a 63 year old female.    Patient presents with itching around her forehead and cheekbones area. States she dyed her hair with hair dye on 1/6. Had some of the dye fall on her forehead and near the eyes. Caused an allergic reaction. States areas where red, inflamed and itchy, the redness and inflammation went down, but still itchy on the areas. Hx of allergies to medications noted.         Review of Systems   Constitutional:  Negative for chills and fever.   Musculoskeletal:  Negative for arthralgias and myalgias.   Skin:  Positive for rash. Negative for color change and wound.            Current Outpatient Medications   Medication Sig Dispense Refill    hydrocortisone 2.5 % External Ointment Apply 1 Application topically 2 (two) times daily. Use as needed 20 g 0    tretinoin 0.025 % External Cream Apply 1 Application topically nightly. Use as tolerated 30 g 3    Ferrous Gluconate 324 (37.5 Fe) MG Oral Tab Take 1 tablet (324 mg total) by mouth daily. 90 tablet 0    FERROUS GLUCONATE 324 (37.5 Fe) MG Oral Tab Take 1 tablet (324 mg total) by mouth daily. 90 tablet 0    Dulaglutide (TRULICITY) 0.75 MG/0.5ML Subcutaneous Solution Pen-injector Inject 1.5 mg into the skin every 7 days. 6 each 0    Insulin Pen Needle (PEN NEEDLES) 32G X 4 MM Does not apply Misc 1 each daily. 90 each 0    Glucose Blood (ONETOUCH ULTRA) In Vitro Strip Check blood sugar 2 times daily 200 each 3    OneTouch Delica Lancets 30G Does not apply Misc 1 each  in the morning and 1 each before bedtime. Check blood sugar 2 times daily. 200 each 3    nortriptyline 50 MG Oral Cap Take 2 capsules (100 mg total) by mouth nightly. 180 capsule 3    Rizatriptan Benzoate 10 MG Oral Tab TAKE 1 TABLET AT ONSET OF HEADACHE; MAY REPEAT 1 TABLET IN 2 HOURS AS NEEDED. MAX 2 TABLETS IN 24 HOURS. MAX USE 9 DAYS PER MONTH 9 tablet 11    Erenumab-aooe (AIMOVIG) 140 MG/ML Subcutaneous Solution Auto-injector Inject 1 mL (140 mg total)  into the skin every 30 (thirty) days. 1 mL 11    loratadine 10 MG Oral Tab TAKE 1 TABLET BY MOUTH ONCE DAILY FOR 3-4 WEEKS, THEN AS NEEDED 90 tablet 0    fluticasone propionate 50 MCG/ACT Nasal Suspension 2 sprays by Nasal route daily. FOR 1-2 WEEKS THEN AS NEEDED 16 g 1    levothyroxine 88 MCG Oral Tab Take 1 tablet (88 mcg total) by mouth before breakfast. 90 tablet 3    pantoprazole 40 MG Oral Tab EC Take 1 tablet (40 mg total) by mouth every morning before breakfast. 90 tablet 2    metFORMIN 500 MG Oral Tab Take 2 tablets (1,000 mg total) by mouth 2 (two) times daily. 360 tablet 2    rosuvastatin 5 MG Oral Tab Take 1 tablet (5 mg total) by mouth nightly. 90 tablet 3    cholecalciferol 50 MCG (2000 UT) Oral Cap Take 1 capsule (2,000 Units total) by mouth daily.      fenofibrate micronized 134 MG Oral Cap Take 1 capsule (134 mg total) by mouth daily with breakfast. 90 capsule 3    Acetaminophen-Caffeine (EXCEDRIN ASPIRIN FREE OR) Take by mouth.      diclofenac 1 % External Gel Apply 2 g topically 4 (four) times daily. On left  knee area 1 each 0    Cyanocobalamin (VITAMIN B-12) 2500 MCG Sublingual SL Tab Take 1  Tab under the tongue daily (Patient taking differently: Take 1  Tab under the tongue daily) 90 tablet 3    Blood Glucose Monitoring Suppl (ONETOUCH ULTRA 2) w/Device Does not apply Kit Check blood sugar 2 times daily (Patient taking differently: Check blood sugar daily) 1 kit 0    PREVIDENT 5000 SENSITIVE 1.1-5 % Dental Paste BRUSH TWICE DAILY       Allergies:  Allergies   Allergen Reactions    Penicillins HIVES and RASH    Codeine NAUSEA ONLY and DIZZINESS    Morphine NAUSEA ONLY and DIZZINESS      There were no vitals taken for this visit.  There is no height or weight on file to calculate BMI.  PHYSICAL EXAM:   Physical Exam  Constitutional:       General: She is not in acute distress.     Appearance: Normal appearance.   Skin:     General: Skin is warm and dry.      Findings: Rash present.       Comments: Slight hyperpigmentation noted around the temples. No draining or tenderness noted. No scaling noted. Slight erythema noted on the cheeks.    Neurological:      Mental Status: She is alert and oriented to person, place, and time.                ASSESSMENT/PLAN:   1. Dermatitis  -After discussion with patient, advised the following:  -Start hydrocortisone  -Educated to apply 2 times per day for 1 week then stop  -To call or follow-up with worsening symptoms or concerns.   -Pt was agreeable to plan and will comply with discussion above.       2. Hyperpigmentation  -After discussion with patient, advised the following:  -Start retin-A  -Educated to apply nightly with small amounts  -Will take 2-3 months to improve.   -To call or follow-up with worsening symptoms or concerns.   -Pt was agreeable to plan and will comply with discussion above.     - tretinoin 0.025 % External Cream; Apply 1 Application topically nightly. Use as tolerated  Dispense: 30 g; Refill: 3      No orders of the defined types were placed in this encounter.      Meds This Visit:  Requested Prescriptions     Signed Prescriptions Disp Refills    hydrocortisone 2.5 % External Ointment 20 g 0     Sig: Apply 1 Application topically 2 (two) times daily. Use as needed    tretinoin 0.025 % External Cream 30 g 3     Sig: Apply 1 Application topically nightly. Use as tolerated       Imaging & Referrals:  None         ID#[id number]

## 2024-01-29 ENCOUNTER — TELEPHONE (OUTPATIENT)
Dept: INTERNAL MEDICINE CLINIC | Facility: CLINIC | Age: 64
End: 2024-01-29

## 2024-01-29 NOTE — TELEPHONE ENCOUNTER
Verified name and .    Patient calling to confirm blood test orders are active and asks if fasting is required.    Patient advised that labs are present and advised of the following:        Please follow these instructions in preparation of your upcoming lab tests.     1. Please fast 10-12 hours prior to your test.   2. Do not eat food, chew gum or exercise during your fast.   3. You should drink plain water to maintain good hydration.   4. You may drink plain black coffee or tea during your fast.    5. You may take necessary medication during your fast.

## 2024-01-30 ENCOUNTER — LAB ENCOUNTER (OUTPATIENT)
Dept: LAB | Age: 64
End: 2024-01-30
Attending: INTERNAL MEDICINE
Payer: MEDICAID

## 2024-01-30 LAB
ALBUMIN SERPL-MCNC: 4.3 G/DL (ref 3.2–4.8)
ALBUMIN/GLOB SERPL: 1.7 {RATIO} (ref 1–2)
ALP LIVER SERPL-CCNC: 71 U/L
ALT SERPL-CCNC: 31 U/L
ANION GAP SERPL CALC-SCNC: 5 MMOL/L (ref 0–18)
AST SERPL-CCNC: 42 U/L (ref ?–34)
BASOPHILS # BLD AUTO: 0.04 X10(3) UL (ref 0–0.2)
BASOPHILS NFR BLD AUTO: 0.8 %
BILIRUB SERPL-MCNC: 0.3 MG/DL (ref 0.2–1.1)
BUN BLD-MCNC: 11 MG/DL (ref 9–23)
BUN/CREAT SERPL: 12.5 (ref 10–20)
CALCIUM BLD-MCNC: 9.3 MG/DL (ref 8.7–10.4)
CHLORIDE SERPL-SCNC: 106 MMOL/L (ref 98–112)
CO2 SERPL-SCNC: 27 MMOL/L (ref 21–32)
CREAT BLD-MCNC: 0.88 MG/DL
CREAT UR-SCNC: 99.3 MG/DL
DEPRECATED HBV CORE AB SER IA-ACNC: 41.2 NG/ML
DEPRECATED RDW RBC AUTO: 41.3 FL (ref 35.1–46.3)
EGFRCR SERPLBLD CKD-EPI 2021: 74 ML/MIN/1.73M2 (ref 60–?)
EOSINOPHIL # BLD AUTO: 0.11 X10(3) UL (ref 0–0.7)
EOSINOPHIL NFR BLD AUTO: 2.1 %
ERYTHROCYTE [DISTWIDTH] IN BLOOD BY AUTOMATED COUNT: 14.1 % (ref 11–15)
EST. AVERAGE GLUCOSE BLD GHB EST-MCNC: 169 MG/DL (ref 68–126)
FASTING STATUS PATIENT QL REPORTED: YES
GLOBULIN PLAS-MCNC: 2.6 G/DL (ref 2.8–4.4)
GLUCOSE BLD-MCNC: 120 MG/DL (ref 70–99)
HBA1C MFR BLD: 7.5 % (ref ?–5.7)
HCT VFR BLD AUTO: 36.3 %
HGB BLD-MCNC: 11.7 G/DL
IMM GRANULOCYTES # BLD AUTO: 0.01 X10(3) UL (ref 0–1)
IMM GRANULOCYTES NFR BLD: 0.2 %
IRON SATN MFR SERPL: 17 %
IRON SERPL-MCNC: 70 UG/DL
LYMPHOCYTES # BLD AUTO: 2.07 X10(3) UL (ref 1–4)
LYMPHOCYTES NFR BLD AUTO: 40.4 %
MCH RBC QN AUTO: 26.2 PG (ref 26–34)
MCHC RBC AUTO-ENTMCNC: 32.2 G/DL (ref 31–37)
MCV RBC AUTO: 81.2 FL
MICROALBUMIN UR-MCNC: 2.2 MG/DL
MICROALBUMIN/CREAT 24H UR-RTO: 22.2 UG/MG (ref ?–30)
MONOCYTES # BLD AUTO: 0.45 X10(3) UL (ref 0.1–1)
MONOCYTES NFR BLD AUTO: 8.8 %
NEUTROPHILS # BLD AUTO: 2.44 X10 (3) UL (ref 1.5–7.7)
NEUTROPHILS # BLD AUTO: 2.44 X10(3) UL (ref 1.5–7.7)
NEUTROPHILS NFR BLD AUTO: 47.7 %
OSMOLALITY SERPL CALC.SUM OF ELEC: 287 MOSM/KG (ref 275–295)
PLATELET # BLD AUTO: 326 10(3)UL (ref 150–450)
POTASSIUM SERPL-SCNC: 4.7 MMOL/L (ref 3.5–5.1)
PROT SERPL-MCNC: 6.9 G/DL (ref 5.7–8.2)
RBC # BLD AUTO: 4.47 X10(6)UL
SODIUM SERPL-SCNC: 138 MMOL/L (ref 136–145)
TIBC SERPL-MCNC: 417 UG/DL (ref 250–425)
TRANSFERRIN SERPL-MCNC: 280 MG/DL (ref 250–380)
WBC # BLD AUTO: 5.1 X10(3) UL (ref 4–11)

## 2024-01-30 PROCEDURE — 83540 ASSAY OF IRON: CPT | Performed by: INTERNAL MEDICINE

## 2024-01-30 PROCEDURE — 84466 ASSAY OF TRANSFERRIN: CPT | Performed by: INTERNAL MEDICINE

## 2024-01-30 PROCEDURE — 80053 COMPREHEN METABOLIC PANEL: CPT | Performed by: INTERNAL MEDICINE

## 2024-01-30 PROCEDURE — 85025 COMPLETE CBC W/AUTO DIFF WBC: CPT | Performed by: INTERNAL MEDICINE

## 2024-01-30 PROCEDURE — 82570 ASSAY OF URINE CREATININE: CPT | Performed by: INTERNAL MEDICINE

## 2024-01-30 PROCEDURE — 82728 ASSAY OF FERRITIN: CPT | Performed by: INTERNAL MEDICINE

## 2024-01-30 PROCEDURE — 82043 UR ALBUMIN QUANTITATIVE: CPT | Performed by: INTERNAL MEDICINE

## 2024-01-30 PROCEDURE — 36415 COLL VENOUS BLD VENIPUNCTURE: CPT | Performed by: INTERNAL MEDICINE

## 2024-01-30 PROCEDURE — 83036 HEMOGLOBIN GLYCOSYLATED A1C: CPT | Performed by: INTERNAL MEDICINE

## 2024-03-07 ENCOUNTER — MED REC SCAN ONLY (OUTPATIENT)
Dept: INTERNAL MEDICINE CLINIC | Facility: CLINIC | Age: 64
End: 2024-03-07

## 2024-03-26 ENCOUNTER — TELEPHONE (OUTPATIENT)
Dept: INTERNAL MEDICINE CLINIC | Facility: CLINIC | Age: 64
End: 2024-03-26

## 2024-03-26 NOTE — TELEPHONE ENCOUNTER
Patient requesting an appointment for a follow up visit with only Dr. Fish to discuss medications prior to 4/1 due to insurance changing, please advise

## 2024-03-26 NOTE — TELEPHONE ENCOUNTER
Called and s/w pt.  Informed her that you are all booked for today and tomorrow.  I can not guarantee her to be seen with you in the required time she gave us.  Informed pt too that you are not in the office on Thursday as well.  Offered appt to see a nurse practitioner or another MD that has openings.  She requested for me to send you a message to see if you can fit her in tomorrow.  If we can't fit her in then she will schedule an office visit with another provider.  Pls advise

## 2024-03-27 NOTE — TELEPHONE ENCOUNTER
Spoke to patient patient states she needed refills and she has a change of her insurance patient like to be seen  Schedule patient for next week April 2 at 340  Patient agrees with appointment

## 2024-04-24 ENCOUNTER — OFFICE VISIT (OUTPATIENT)
Dept: INTERNAL MEDICINE CLINIC | Facility: CLINIC | Age: 64
End: 2024-04-24

## 2024-04-24 VITALS
DIASTOLIC BLOOD PRESSURE: 76 MMHG | BODY MASS INDEX: 30.56 KG/M2 | HEART RATE: 85 BPM | WEIGHT: 179 LBS | HEIGHT: 64 IN | SYSTOLIC BLOOD PRESSURE: 131 MMHG

## 2024-04-24 DIAGNOSIS — E78.00 PURE HYPERCHOLESTEROLEMIA: ICD-10-CM

## 2024-04-24 DIAGNOSIS — E11.9 TYPE 2 DIABETES MELLITUS WITHOUT COMPLICATION, WITHOUT LONG-TERM CURRENT USE OF INSULIN (HCC): ICD-10-CM

## 2024-04-24 PROCEDURE — 99214 OFFICE O/P EST MOD 30 MIN: CPT | Performed by: INTERNAL MEDICINE

## 2024-04-24 RX ORDER — FERROUS GLUCONATE 324(37.5)
1 TABLET ORAL DAILY
Qty: 90 TABLET | Refills: 1 | Status: SHIPPED | OUTPATIENT
Start: 2024-04-24

## 2024-04-24 RX ORDER — DULAGLUTIDE 3 MG/.5ML
3 INJECTION, SOLUTION SUBCUTANEOUS
Qty: 4 PEN | Refills: 1 | Status: SHIPPED
Start: 2024-04-24 | End: 2024-04-25

## 2024-04-24 RX ORDER — PANTOPRAZOLE SODIUM 40 MG/1
40 TABLET, DELAYED RELEASE ORAL
Qty: 90 TABLET | Refills: 1 | Status: SHIPPED | OUTPATIENT
Start: 2024-04-24

## 2024-04-24 RX ORDER — DULAGLUTIDE 0.75 MG/.5ML
1.5 INJECTION, SOLUTION SUBCUTANEOUS
Qty: 6 EACH | Refills: 0 | Status: CANCELLED | OUTPATIENT
Start: 2024-04-24

## 2024-04-24 NOTE — PROGRESS NOTES
Subjective:   Patient ID: Tawny Benitez is a 63 year old female.  Patient presents with:  Chief Complaint   Patient presents with    Hypertension     Recent labs completed on 1/30/24     Patient presents today for DM2 follow-up  and the most recent blood test results from January 24 A1c 3-month sugar average was 7.5  Anemia still present hemoglobin 11.7 with mildly decreased ferritin of 41.2  Patient states she is taking iron tablets but not every day.  Patient states she is in some stress at home due to her  recent sickness but overall she is feeling well .  State d doing welll   denies any   Cp ,shortness of breath, dyspnea on exertion or heart palpitations with rest or activity , can walk few block without any Cp, Sob, Recinos or Palpitations , denies nausea, vomiting, diarrhea, constipation or abdominal pain. No fever, chills, or UTI symptoms.   The rest of the review of systems, see below.   Anemia  This is a chronic problem. The problem occurs intermittently. Pertinent negatives include no abdominal pain, arthralgias, chills, congestion, coughing, fatigue, fever, nausea, sore throat or vomiting. Treatments tried: ferros sulfate 325 mg qd. The treatment provided significant relief.   Diabetes  She presents for her follow-up diabetic visit. She has type 2 diabetes mellitus. Pertinent negatives for hypoglycemia include no confusion, dizziness, nervousness/anxiousness or pallor. Pertinent negatives for diabetes include no fatigue. Current diabetic treatment includes oral agent (monotherapy) and diet. Her weight is decreasing steadily. She is following a diabetic, low fat/cholesterol and low salt diet. Meal planning includes avoidance of concentrated sweets. Her overall blood glucose range is 130-140 mg/dl.       History/Other:   Review of Systems   Constitutional:  Negative for chills, fatigue and fever.   HENT:  Negative for congestion, ear pain, postnasal drip, rhinorrhea, sneezing and sore throat.    Eyes:   Negative for pain and redness.   Respiratory:  Negative for cough and wheezing.    Cardiovascular:  Negative for leg swelling.   Gastrointestinal:  Negative for abdominal pain, anal bleeding, blood in stool, constipation, diarrhea, nausea and vomiting.   Genitourinary:  Negative for dysuria and frequency.   Musculoskeletal:  Negative for arthralgias.   Skin:  Negative for color change and pallor.   Neurological:  Negative for dizziness.   Hematological:  Does not bruise/bleed easily.   Psychiatric/Behavioral:  Negative for confusion. The patient is not nervous/anxious.      Current Outpatient Medications   Medication Sig Dispense Refill    metFORMIN 500 MG Oral Tab Take 2 tablets (1,000 mg total) by mouth 2 (two) times daily. 360 tablet 2    Dulaglutide (TRULICITY) 3 MG/0.5ML Subcutaneous Solution Pen-injector Inject 3 mg into the skin every 7 days. 4 Pen 1    pantoprazole 40 MG Oral Tab EC Take 1 tablet (40 mg total) by mouth before evening meal. 30-60 minutes 90 tablet 1    Ferrous Gluconate 324 (37.5 Fe) MG Oral Tab Take 1 tablet (324 mg total) by mouth daily. 90 tablet 1    hydrocortisone 2.5 % External Ointment Apply 1 Application topically 2 (two) times daily. Use as needed 20 g 0    tretinoin 0.025 % External Cream Apply 1 Application topically nightly. Use as tolerated 30 g 3    Insulin Pen Needle (PEN NEEDLES) 32G X 4 MM Does not apply Misc 1 each daily. 90 each 0    Glucose Blood (ONETOUCH ULTRA) In Vitro Strip Check blood sugar 2 times daily 200 each 3    OneTouch Delica Lancets 30G Does not apply Misc 1 each  in the morning and 1 each before bedtime. Check blood sugar 2 times daily. 200 each 3    nortriptyline 50 MG Oral Cap Take 2 capsules (100 mg total) by mouth nightly. 180 capsule 3    Rizatriptan Benzoate 10 MG Oral Tab TAKE 1 TABLET AT ONSET OF HEADACHE; MAY REPEAT 1 TABLET IN 2 HOURS AS NEEDED. MAX 2 TABLETS IN 24 HOURS. MAX USE 9 DAYS PER MONTH 9 tablet 11    loratadine 10 MG Oral Tab TAKE 1  TABLET BY MOUTH ONCE DAILY FOR 3-4 WEEKS, THEN AS NEEDED 90 tablet 0    fluticasone propionate 50 MCG/ACT Nasal Suspension 2 sprays by Nasal route daily. FOR 1-2 WEEKS THEN AS NEEDED 16 g 1    levothyroxine 88 MCG Oral Tab Take 1 tablet (88 mcg total) by mouth before breakfast. 90 tablet 3    rosuvastatin 5 MG Oral Tab Take 1 tablet (5 mg total) by mouth nightly. 90 tablet 3    cholecalciferol 50 MCG (2000 UT) Oral Cap Take 1 capsule (2,000 Units total) by mouth daily.      fenofibrate micronized 134 MG Oral Cap Take 1 capsule (134 mg total) by mouth daily with breakfast. 90 capsule 3    Acetaminophen-Caffeine (EXCEDRIN ASPIRIN FREE OR) Take by mouth as needed.      diclofenac 1 % External Gel Apply 2 g topically 4 (four) times daily. On left  knee area 1 each 0    Cyanocobalamin (VITAMIN B-12) 2500 MCG Sublingual SL Tab Take 1  Tab under the tongue daily (Patient taking differently: Take 1  Tab under the tongue daily) 90 tablet 3    Blood Glucose Monitoring Suppl (ONETOUCH ULTRA 2) w/Device Does not apply Kit Check blood sugar 2 times daily (Patient taking differently: Check blood sugar daily) 1 kit 0    PREVIDENT 5000 SENSITIVE 1.1-5 % Dental Paste BRUSH TWICE DAILY      Erenumab-aooe (AIMOVIG) 140 MG/ML Subcutaneous Solution Auto-injector Inject 1 mL (140 mg total) into the skin every 30 (thirty) days. (Patient not taking: Reported on 4/24/2024) 1 mL 11     Allergies:  Allergies   Allergen Reactions    Penicillins HIVES and RASH    Codeine NAUSEA ONLY and DIZZINESS    Morphine NAUSEA ONLY and DIZZINESS       Objective:   Physical Exam  Vitals and nursing note reviewed.   Constitutional:       General: She is not in acute distress.  HENT:      Head: Normocephalic and atraumatic.      Left Ear: Tympanic membrane normal.      Nose: Nose normal.      Mouth/Throat:      Mouth: Mucous membranes are moist.      Comments: Post nasal drainage +  Cardiovascular:      Rate and Rhythm: Normal rate and regular rhythm.       Heart sounds: Normal heart sounds. No murmur heard.  Pulmonary:      Effort: Pulmonary effort is normal.      Breath sounds: Normal breath sounds. No wheezing or rales.   Abdominal:      Palpations: Abdomen is soft. There is no mass.      Tenderness: There is no abdominal tenderness.   Musculoskeletal:         General: Normal range of motion.      Cervical back: Normal range of motion.      Right lower leg: No edema.      Left lower leg: No edema.   Lymphadenopathy:      Head:      Right side of head: No submental, submandibular or posterior auricular adenopathy.      Left side of head: No submental, submandibular or posterior auricular adenopathy.      Cervical: No cervical adenopathy.      Right cervical: No superficial cervical adenopathy.     Left cervical: No superficial cervical adenopathy.      Lower Body: No right inguinal adenopathy. No left inguinal adenopathy.   Skin:     General: Skin is warm and dry.      Findings: No erythema.   Neurological:      Mental Status: She is alert and oriented to person, place, and time.     Blood pressure 131/76, pulse 85, height 5' 4\" (1.626 m), weight 179 lb (81.2 kg), not currently breastfeeding.      Bilateral barefoot skin diabetic exam is normal, visualized feet and the appearance is normal.  Bilateral monofilament/sensation of both feet is normal.  Pulsation pedal pulse exam of both lower legs/feet is normal as well.     Assessment & Plan:   . Type 2 diabetes mellitus without complication, without long-term current use of insulin (HCC)  Keep sugars glycemic control to prevent complications of DM2  Keep 1800 keegan ADA- Diabetic diet   Walking and activity as tolerated   accu-checks   Eye exam  completed and foot exam yearly today  completer   Take medications as perscribed  Directions and side effects of medications discussed w/pt  Labs To complete   A1c 7.5   stable continue present medication labs  Metformine 1000  mg bid w  meals   Trulicity  increase  to 3 mg q week   -   pt like increase of medication -patient states that she tolerate medication well   Side effects and directions of medication discussed with patient. Patient verbalized understanding and compliance.    Pt verbalized understanding and compliance    - MICROALB/CREAT RATIO, RANDOM URINE      Anemia  Stable     patient is taking ferrous sulfate 325 mg daily  Diet education   iron rich diet-  Labs stable  cpm   Patient had colonoscopy the next colonoscopy January in 2026  Patient denies any blood in stool or any GI symptoms  She has some heartburns-not on medication at this time restart pantoprazole  Patient might see the gastroenterology possible EGD           Gerd  Patient advised to avoid spicy food, coffee, tea, caffeinated drinks, alcohol, acidic food/juices  Advised to avoid NSAID's - Aspirin-based medications  Advised to avoid wearing  tight clothes   Advised to elevate the head of the bed   Avoid eating at least 3 hours before bedtime   Counseling on ideal weight/BMI  Recommend patient to restart pantoprazole take PPIs qd in the morning 30-60 minutes before breakfast always on empty stomach Side effects and directions of medication discussed with patient. Patient verbalized understanding and compliance.        Hypothyroidism, unspecified type  Stable cpm   Labs       No orders of the defined types were placed in this encounter.      Meds This Visit:  Requested Prescriptions     Signed Prescriptions Disp Refills    metFORMIN 500 MG Oral Tab 360 tablet 2     Sig: Take 2 tablets (1,000 mg total) by mouth 2 (two) times daily.    Dulaglutide (TRULICITY) 3 MG/0.5ML Subcutaneous Solution Pen-injector 4 Pen 1     Sig: Inject 3 mg into the skin every 7 days.    pantoprazole 40 MG Oral Tab EC 90 tablet 1     Sig: Take 1 tablet (40 mg total) by mouth before evening meal. 30-60 minutes    Ferrous Gluconate 324 (37.5 Fe) MG Oral Tab 90 tablet 1     Sig: Take 1 tablet (324 mg total) by mouth daily.       Imaging &  Referrals:  None

## 2024-04-25 RX ORDER — ROSUVASTATIN CALCIUM 5 MG/1
5 TABLET, COATED ORAL NIGHTLY
Qty: 90 TABLET | Refills: 3 | Status: SHIPPED | OUTPATIENT
Start: 2024-04-25

## 2024-04-25 RX ORDER — FENOFIBRATE 134 MG/1
134 CAPSULE ORAL
Qty: 90 CAPSULE | Refills: 3 | Status: SHIPPED | OUTPATIENT
Start: 2024-04-25

## 2024-04-25 RX ORDER — DULAGLUTIDE 3 MG/.5ML
3 INJECTION, SOLUTION SUBCUTANEOUS
Qty: 4 ML | Refills: 3 | Status: SHIPPED | OUTPATIENT
Start: 2024-04-25

## 2024-04-25 NOTE — TELEPHONE ENCOUNTER
Refill passed per [facility] protocol.  Requested Prescriptions   Pending Prescriptions Disp Refills    FENOFIBRATE MICRONIZED 134 MG Oral Cap [Pharmacy Med Name: Fenofibrate 134 Mg Cap Chandrika] 90 capsule 0     Sig: Take 1 capsule (134 mg total) by mouth daily with breakfast.       Cholesterol Medication Protocol Passed - 4/24/2024  7:07 PM        Passed - ALT < 80     Lab Results   Component Value Date    ALT 31 01/30/2024             Passed - ALT resulted within past year        Passed - Lipid panel within past 12 months     Lab Results   Component Value Date    CHOLEST 113 10/17/2023    TRIG 125 10/17/2023    HDL 45 10/17/2023    LDL 46 10/17/2023    VLDL 18 10/17/2023    NONHDLC 68 10/17/2023             Passed - In person appointment or virtual visit in the past 12 mos or appointment in next 3 mos     Recent Outpatient Visits              Yesterday Type 2 diabetes mellitus without complication, without long-term current use of insulin (HCC)    [facility]    Office Visit    3 months ago Dermatitis    [facility] Arnaud Rooney PA-C    Office Visit    5 months ago Chronic migraine without aura without status migrainosus, not intractable    [facility] Timur Enriquez MD    Office Visit    6 months ago PE (physical exam), routine    [facility] [place] Jose Miguel Fish MD    Office Visit    7 months ago Left lumbar radiculopathy    [facility] Xiomara Holley DO    Office Visit                        TRULICITY 3 MG/0.5ML Subcutaneous Solution Pen-injector [Pharmacy Med Name: Trulicity 3 Mg/0.5ml Inj Lill] 4 mL 0     Sig: Inject 3 mg into the skin every 7 days.       Diabetes Medication Protocol Passed - 4/24/2024  7:07 PM        Passed - Last A1C < 7.5 and within past 6 months      Lab Results   Component Value Date    A1C 7.5 (H) 01/30/2024             Passed - In person appointment or virtual visit in the past 6 mos or appointment in next 3 mos     Recent Outpatient Visits              Yesterday Type 2 diabetes mellitus without complication, without long-term current use of insulin (HCC)    [facility] [place] Vukomanovic, Emela, MD    Office Visit    3 months ago Dermatitis    [facility], [place] Arnaud Rooney PA-C    Office Visit    5 months ago Chronic migraine without aura without status migrainosus, not intractable    [facility]    [facility]    6 months ago PE (physical exam), routine    [facility] [place] Vukomanovic, Emela, MD    Office Visit    7 months ago Left lumbar radiculopathy    [facility] [place] Xiomara Holley DO    Office Visit                      Passed - Microalbumin procedure in past 12 months or taking ACE/ARB        Passed - EGFRCR or GFRNAA > 50     GFR Evaluation  EGFRCR: 74 , resulted on 1/30/2024          Passed - GFR in the past 12 months          ROSUVASTATIN 5 MG Oral Tab [Pharmacy Med Name: Rosuvastatin Calcium 5 Mg Tab Nort] 90 tablet 0     Sig: Take 1 tablet (5 mg total) by mouth nightly.       Cholesterol Medication Protocol Passed - 4/24/2024  7:07 PM        Passed - ALT < 80     Lab Results   Component Value Date    ALT 31 01/30/2024             Passed - ALT resulted within past year        Passed - Lipid panel within past 12 months     Lab Results   Component Value Date    CHOLEST 113 10/17/2023    TRIG 125 10/17/2023    HDL 45 10/17/2023    LDL 46 10/17/2023    VLDL 18 10/17/2023    NONHDLC 68 10/17/2023             Passed - In person appointment or virtual visit in the past 12 mos or appointment in next 3 mos     Recent Outpatient Visits               Yesterday Type 2 diabetes mellitus without complication, without long-term current use of insulin ([facility])    [facility], [place] Vukomanovic, Emela, MD    Office Visit    3 months ago Dermatitis    [facility] [place] [facility]    Office Visit    5 months ago Chronic migraine without aura without status migrainosus, not intractable    [facility] [place]    Office Visit    6 months ago PE (physical exam), routine    [facility] [place] Vukomanovic, Emela, MD    Office Visit    7 months ago Left lumbar radiculopathy    [facility] [place],     Office Visit                         Recent Outpatient Visits              Yesterday Type 2 diabetes mellitus without complication, without long-term current use of insulin ([facility])    [facility], [place] Vukomanovic, Emela, MD    Office Visit    3 months ago Dermatitis    [facility] [place]    Office Visit    5 months ago Chronic migraine without aura without status migrainosus, not intractable    [facility]    [facility]    6 months ago PE (physical exam), routine    [facility] [place] Vukomanovic, Emela, MD    Office Visit    7 months ago Left lumbar radiculopathy    [facility] [place]    Office Visit

## 2024-05-08 ENCOUNTER — TELEPHONE (OUTPATIENT)
Dept: INTERNAL MEDICINE CLINIC | Facility: CLINIC | Age: 64
End: 2024-05-08

## 2024-05-08 RX ORDER — DULAGLUTIDE 3 MG/.5ML
3 INJECTION, SOLUTION SUBCUTANEOUS
Qty: 4 ML | Refills: 3 | Status: SHIPPED | OUTPATIENT
Start: 2024-05-08 | End: 2024-06-04 | Stop reason: ALTCHOICE

## 2024-05-08 RX ORDER — DULAGLUTIDE 3 MG/.5ML
3 INJECTION, SOLUTION SUBCUTANEOUS
Qty: 4 ML | Refills: 3 | Status: SHIPPED | OUTPATIENT
Start: 2024-05-08 | End: 2024-05-08

## 2024-05-08 NOTE — TELEPHONE ENCOUNTER
Spoke with patient, HAMILTON verified.   She cannot get Trulicity at [facility] or [facility].  She doubts any other pharmacies will have it.  Asking if Dr Fish can prescribe alternative?

## 2024-05-08 NOTE — TELEPHONE ENCOUNTER
Patient can try at   [facility] pharmacy   [facility] or [facility]   Send script to [facility] ,    Advice  to ask to be placed on  the wait list in Pharmacy     since I do not believe brand Ozempic  ..  Mounjaro   is covered by patient insurance .

## 2024-05-08 NOTE — TELEPHONE ENCOUNTER
Unable to leave detailed message, voicemail did not identify name/number. Left message for patient to call back and discuss.  [employer] message sent as well.

## 2024-05-08 NOTE — TELEPHONE ENCOUNTER
Patient called to request a refill on below medication. Patient states she is aware there are inventory issues and if her pharmacy does not have it can she be prescribed an alternative. [facility] on file verified.       Medication Quantity Refills Start End   Dulaglutide (TRULICITY) 3 MG/0.5ML Subcutaneous Solution Pen-injector 4 mL 3 4/25/2024 --   Sig:   Inject 3 mg into the skin every 7 days.

## 2024-05-09 NOTE — TELEPHONE ENCOUNTER
Spoke with patient (name/ confirmed) and informed patient of Dr. Fish's instructions/recommendations.  Patient verbalizes understanding and agrees to the plan of care.  Patient had no further questions at this time    Patient will check all local pharmacies to see if any have Trulicity 3 mg in stock and will call us back to re-send script if needed  Patient will also ask pharmacies if they have a wait list and if so, will let us know which pharmacy to send the script to.    Patient will keep our office updated

## 2024-05-10 NOTE — TELEPHONE ENCOUNTER
Patient states she spoke with insurance and stated trulicity will need prior auth and that prior auth form for trulicity has been faxed to office.    Patient also added she will call her insurance back to confirm if mounjaro or ozempic is not covered by her insurance.     Reason for call changed from med question to prior auth.

## 2024-05-10 NOTE — TELEPHONE ENCOUNTER
Per rep [employer] patient no longer has coverage with them since 4/30/2024.  Patient was informed via telephone  She was transferred to scheduling department to help update the insurance and then we can work on the prior authorization.  Patient verbalized understanding

## 2024-05-14 ENCOUNTER — TELEPHONE (OUTPATIENT)
Dept: INTERNAL MEDICINE CLINIC | Facility: CLINIC | Age: 64
End: 2024-05-14

## 2024-05-14 NOTE — TELEPHONE ENCOUNTER
Patient called, verified Name and . States she is applying for financing for Trulicity thru [facility]. A form is needed to be completed by primary care provider verifying her credentials and other information for this. Patient gave this RN Milla Pritchard contact number.    Attempted to call [phone], option #1. Automated message received that there is no agent available to talk to at this time.    Patient updated. Advised to follow up with Milla Pritchard. Patient given Dr. Fish's fax number to provide to [facility] so they can fax the form mentioned above. Patient verbalized understanding and had no further questions at this time.      [facility] Staff kindly keep an eye on the form mentioned above. Thank you.

## 2024-06-04 ENCOUNTER — TELEPHONE (OUTPATIENT)
Dept: INTERNAL MEDICINE CLINIC | Facility: CLINIC | Age: 64
End: 2024-06-04

## 2024-06-04 DIAGNOSIS — E11.9 TYPE 2 DIABETES MELLITUS WITHOUT COMPLICATION, WITHOUT LONG-TERM CURRENT USE OF INSULIN (HCC): Primary | ICD-10-CM

## 2024-06-04 RX ORDER — SEMAGLUTIDE 0.68 MG/ML
0.5 INJECTION, SOLUTION SUBCUTANEOUS WEEKLY
Qty: 2 ML | Refills: 0 | Status: SHIPPED | OUTPATIENT
Start: 2024-06-04

## 2024-06-04 NOTE — TELEPHONE ENCOUNTER
Spoke with patient, Date of Birth verified, she stated she was prescribed Trulicity but her insurance don't cover meds  It will cost her $1000, they will give her 10 % discount.   She will call her insurance and see what they will cover and let us know.   Patient  then 170 yesterday.   She will set up a follow up appointment with Dr Fish.  Warm transferred to [facility] staff.  pls advise, thanks in advance.   Last office visit 4-24-24.      No future appointments.

## 2024-06-04 NOTE — TELEPHONE ENCOUNTER
Recommend patient to possibly try Ozempic -if covered   If not- will try something else       We can try once a week 0.5 mg subcu every 7 days     patient to follow-up in 1 month for further evaluation and treatment.

## 2024-06-05 ENCOUNTER — TELEPHONE (OUTPATIENT)
Dept: INTERNAL MEDICINE CLINIC | Facility: CLINIC | Age: 64
End: 2024-06-05

## 2024-06-05 NOTE — TELEPHONE ENCOUNTER
Prior authorization initiated for Ozempic through cover my meds  KEY: : [id number]  If you have any questions about your PA submission, contact [employer] at [phone].  Pending results

## 2024-06-07 NOTE — TELEPHONE ENCOUNTER
Patient is asking to have Ozempic sent to [facility] she has listed as one of her pharmacies since [facility] is giving her a hard time.  She is aware this may not get done right away.  She verbalized understanding.

## 2024-06-07 NOTE — TELEPHONE ENCOUNTER
Patient called us back wants to ask provider is it ok for her to go to [facility] since the pharmacy can get it for her on Monday 6/10/2024.  She was informed it shows it is covered for her by the insurance, even though we have the approval for Ozempic.  Spoke with pharmacist Alex and he was able to find the prescription of Ozempic and will start working on it for patient.  It will get ordered today and ready for patient on Monday.  Mentioned this to the patient and will wait for Ozempic to come in on Monday.  No need to send this to provider.

## 2024-06-12 ENCOUNTER — NURSE TRIAGE (OUTPATIENT)
Dept: INTERNAL MEDICINE CLINIC | Facility: CLINIC | Age: 64
End: 2024-06-12

## 2024-06-12 RX ORDER — ONDANSETRON 4 MG/1
4 TABLET, ORALLY DISINTEGRATING ORAL EVERY 8 HOURS PRN
Qty: 20 TABLET | Refills: 0 | Status: SHIPPED | OUTPATIENT
Start: 2024-06-12

## 2024-06-12 NOTE — TELEPHONE ENCOUNTER
Verified name and .    Patient was advised of Dr. Mims's message.  Patient verbalizes understanding and agrees with plan.

## 2024-06-12 NOTE — TELEPHONE ENCOUNTER
- stay hydrated  - small frequent meals  - decrease the ozempic dose to 0.25 mg from next Monday  - I am sending limited supply of zofran as needed for nausea

## 2024-06-12 NOTE — TELEPHONE ENCOUNTER
Action Requested: Summary for Provider     []  Critical Lab, Recommendations Needed  [x] Need Additional Advice  []   FYI    []   Need Orders  [] Need Medications Sent to Pharmacy  []  Other     SUMMARY: Patient calling, was previously on Trulicity which became unavailable.   She was changed to Ozempic. Was on all injectables for one month. Started with Ozempic Monday .50 dose. She started with some nausea and had vomiting Tuesday one time but threw up twice, and she threw up again this morning. She is reporting headache yesterday and today - she has hx of migraines and takes migraine med last night, helped some and RICHARDSON continues today. She has some slight heartburn symptoms but no pain or cramping.     Vomiting episodes come then it passes and she starts feeling a bit better, she is keeping water down. Trying to eat small bland foods to try to maintain sugars and keep food in. Tea, Banana, Brando crackers.     Patient asking if she should be on lower dose of this since off injectable meds for 4 weeks and starting new med ozempic  - dose written is .50 which is what she took Monday.    She knows this is side effect of medication but is wondering if she should be concerned about this.     Taking metformin for blood sugars - sugars today 180-190   When she was on Trulicity and and metformin sugars were more controlled and BS was in the 120's.     Patient takes pantoprazole daily. Took today.     Please advise.     Reason for call: Vomiting  Onset: Tuesday, took first ozempic dose Monday       Reason for Disposition   Vomiting a prescription medication    Protocols used: Vomiting-A-OH

## 2024-07-02 ENCOUNTER — OFFICE VISIT (OUTPATIENT)
Dept: INTERNAL MEDICINE CLINIC | Facility: CLINIC | Age: 64
End: 2024-07-02

## 2024-07-02 VITALS
BODY MASS INDEX: 30.56 KG/M2 | HEART RATE: 77 BPM | WEIGHT: 179 LBS | DIASTOLIC BLOOD PRESSURE: 74 MMHG | HEIGHT: 64 IN | SYSTOLIC BLOOD PRESSURE: 124 MMHG

## 2024-07-02 DIAGNOSIS — E78.00 PURE HYPERCHOLESTEROLEMIA: ICD-10-CM

## 2024-07-02 DIAGNOSIS — E11.9 TYPE 2 DIABETES MELLITUS WITHOUT COMPLICATION, WITHOUT LONG-TERM CURRENT USE OF INSULIN (HCC): ICD-10-CM

## 2024-07-02 DIAGNOSIS — D64.9 ANEMIA, UNSPECIFIED TYPE: Primary | ICD-10-CM

## 2024-07-02 DIAGNOSIS — E55.9 VITAMIN D DEFICIENCY: ICD-10-CM

## 2024-07-02 DIAGNOSIS — I10 ESSENTIAL HYPERTENSION: ICD-10-CM

## 2024-07-02 PROCEDURE — 99214 OFFICE O/P EST MOD 30 MIN: CPT | Performed by: INTERNAL MEDICINE

## 2024-07-02 RX ORDER — SEMAGLUTIDE 1.34 MG/ML
1 INJECTION, SOLUTION SUBCUTANEOUS WEEKLY
Qty: 4 EACH | Refills: 0 | Status: SHIPPED | OUTPATIENT
Start: 2024-07-02 | End: 2024-07-02 | Stop reason: ALTCHOICE

## 2024-07-02 RX ORDER — DULAGLUTIDE 1.5 MG/.5ML
1.5 INJECTION, SOLUTION SUBCUTANEOUS
Qty: 2 ML | Refills: 0 | Status: SHIPPED | OUTPATIENT
Start: 2024-07-02

## 2024-07-02 NOTE — PROGRESS NOTES
Subjective:   Patient ID: Tawny Benitez is a 63 year old female.  Patient presents with:  Chief Complaint   Patient presents with    Diabetes     Patient presents today for DM2 follow-up  per pt   would like Trulicity refill -works better fro her   the most recent blood test results from January 24 A1c 3-month sugar average was 7.5  Anemia still present hemoglobin 11.7 with mildly decreased ferritin of 41.2  Patient states she is taking iron tablets but not every day.  State d doing welll   denies any   Cp ,shortness of breath, dyspnea on exertion or heart palpitations with rest or activity , can walk few block without any Cp, Sob, Recinos or Palpitations , denies nausea, vomiting, diarrhea, constipation or abdominal pain. No fever, chills, or UTI symptoms.   The rest of the review of systems, see below.   Anemia  This is a chronic problem. The problem occurs intermittently. Pertinent negatives include no abdominal pain, arthralgias, chills, congestion, coughing, fatigue, fever, nausea, sore throat or vomiting. Treatments tried: ferros sulfate 325 mg qd. The treatment provided significant relief.   Diabetes  She presents for her follow-up diabetic visit. She has type 2 diabetes mellitus. Pertinent negatives for hypoglycemia include no confusion, dizziness, nervousness/anxiousness or pallor. Pertinent negatives for diabetes include no fatigue. Current diabetic treatment includes oral agent (monotherapy) and diet. Her weight is decreasing steadily. She is following a diabetic, low fat/cholesterol and low salt diet. Meal planning includes avoidance of concentrated sweets. Her overall blood glucose range is 130-140 mg/dl.       History/Other:   Review of Systems   Constitutional:  Negative for chills, fatigue and fever.   HENT:  Negative for congestion, ear pain, postnasal drip, rhinorrhea, sneezing and sore throat.    Eyes:  Negative for pain and redness.   Respiratory:  Negative for cough and wheezing.    Cardiovascular:   Negative for leg swelling.   Gastrointestinal:  Negative for abdominal pain, anal bleeding, blood in stool, constipation, diarrhea, nausea and vomiting.   Genitourinary:  Negative for dysuria and frequency.   Musculoskeletal:  Negative for arthralgias.   Skin:  Negative for color change and pallor.   Neurological:  Negative for dizziness.   Hematological:  Does not bruise/bleed easily.   Psychiatric/Behavioral:  Negative for confusion. The patient is not nervous/anxious.      Current Outpatient Medications   Medication Sig Dispense Refill    Dulaglutide (TRULICITY) 1.5 MG/0.5ML Subcutaneous Solution Pen-injector Inject 1.5 mg into the skin every 7 days. 2 mL 0    ondansetron 4 MG Oral Tablet Dispersible Take 1 tablet (4 mg total) by mouth every 8 (eight) hours as needed for Nausea. 20 tablet 0    fenofibrate micronized 134 MG Oral Cap Take 1 capsule (134 mg total) by mouth daily with breakfast. 90 capsule 3    rosuvastatin 5 MG Oral Tab Take 1 tablet (5 mg total) by mouth nightly. 90 tablet 3    metFORMIN 500 MG Oral Tab Take 2 tablets (1,000 mg total) by mouth 2 (two) times daily. 360 tablet 2    pantoprazole 40 MG Oral Tab EC Take 1 tablet (40 mg total) by mouth before evening meal. 30-60 minutes 90 tablet 1    Ferrous Gluconate 324 (37.5 Fe) MG Oral Tab Take 1 tablet (324 mg total) by mouth daily. 90 tablet 1    hydrocortisone 2.5 % External Ointment Apply 1 Application topically 2 (two) times daily. Use as needed 20 g 0    tretinoin 0.025 % External Cream Apply 1 Application topically nightly. Use as tolerated 30 g 3    Insulin Pen Needle (PEN NEEDLES) 32G X 4 MM Does not apply Misc 1 each daily. 90 each 0    Glucose Blood (ONETOUCH ULTRA) In Vitro Strip Check blood sugar 2 times daily 200 each 3    OneTouch Delica Lancets 30G Does not apply Misc 1 each  in the morning and 1 each before bedtime. Check blood sugar 2 times daily. 200 each 3    nortriptyline 50 MG Oral Cap Take 2 capsules (100 mg total) by mouth  nightly. 180 capsule 3    Rizatriptan Benzoate 10 MG Oral Tab TAKE 1 TABLET AT ONSET OF HEADACHE; MAY REPEAT 1 TABLET IN 2 HOURS AS NEEDED. MAX 2 TABLETS IN 24 HOURS. MAX USE 9 DAYS PER MONTH 9 tablet 11    loratadine 10 MG Oral Tab TAKE 1 TABLET BY MOUTH ONCE DAILY FOR 3-4 WEEKS, THEN AS NEEDED 90 tablet 0    fluticasone propionate 50 MCG/ACT Nasal Suspension 2 sprays by Nasal route daily. FOR 1-2 WEEKS THEN AS NEEDED 16 g 1    levothyroxine 88 MCG Oral Tab Take 1 tablet (88 mcg total) by mouth before breakfast. 90 tablet 3    cholecalciferol 50 MCG (2000 UT) Oral Cap Take 1 capsule (2,000 Units total) by mouth daily.      Acetaminophen-Caffeine (EXCEDRIN ASPIRIN FREE OR) Take by mouth as needed.      Cyanocobalamin (VITAMIN B-12) 2500 MCG Sublingual SL Tab Take 1  Tab under the tongue daily (Patient taking differently: Take 1  Tab under the tongue daily) 90 tablet 3    Blood Glucose Monitoring Suppl (ONETOUCH ULTRA 2) w/Device Does not apply Kit Check blood sugar 2 times daily (Patient taking differently: Check blood sugar daily) 1 kit 0    PREVIDENT 5000 SENSITIVE 1.1-5 % Dental Paste BRUSH TWICE DAILY      diclofenac 1 % External Gel Apply 2 g topically 4 (four) times daily. On left  knee area (Patient not taking: Reported on 7/2/2024) 1 each 0     Allergies:  Allergies   Allergen Reactions    Penicillins HIVES and RASH    Codeine NAUSEA ONLY and DIZZINESS    Morphine NAUSEA ONLY and DIZZINESS       Objective:   Physical Exam  Vitals and nursing note reviewed.   Constitutional:       General: She is not in acute distress.  HENT:      Head: Normocephalic and atraumatic.      Left Ear: Tympanic membrane normal.      Nose: Nose normal.      Mouth/Throat:      Mouth: Mucous membranes are moist.      Comments: Post nasal drainage +  Cardiovascular:      Rate and Rhythm: Normal rate and regular rhythm.      Heart sounds: Normal heart sounds. No murmur heard.  Pulmonary:      Effort: Pulmonary effort is normal.       Breath sounds: Normal breath sounds. No wheezing or rales.   Abdominal:      Palpations: Abdomen is soft. There is no mass.      Tenderness: There is no abdominal tenderness.   Musculoskeletal:         General: Normal range of motion.      Cervical back: Normal range of motion.      Right lower leg: No edema.      Left lower leg: No edema.   Lymphadenopathy:      Head:      Right side of head: No submental, submandibular or posterior auricular adenopathy.      Left side of head: No submental, submandibular or posterior auricular adenopathy.      Cervical: No cervical adenopathy.      Right cervical: No superficial cervical adenopathy.     Left cervical: No superficial cervical adenopathy.      Lower Body: No right inguinal adenopathy. No left inguinal adenopathy.   Skin:     General: Skin is warm and dry.      Findings: No erythema.   Neurological:      Mental Status: She is alert and oriented to person, place, and time.     Blood pressure 124/74, pulse 77, height 5' 4\" (1.626 m), weight 179 lb (81.2 kg), not currently breastfeeding.      Bilateral barefoot skin diabetic exam is normal, visualized feet and the appearance is normal.  Bilateral monofilament/sensation of both feet is normal.  Pulsation pedal pulse exam of both lower legs/feet is normal as well.     Assessment & Plan:   . Type 2 diabetes mellitus without complication, without long-term current use of insulin (HCC)  Keep sugars glycemic control to prevent complications of DM2  Keep 1800 keegan ADA- Diabetic diet   Walking and activity as tolerated   accu-checks   Eye exam  completed and foot exam yearly today  completer   Dr Jeff Nielsen  1 week  ago   6/24 /24    Take medications as perscribed  Directions and side effects of medications discussed w/pt  Labs To complete   A1c 7.5   stable continue present medication labs  Metformine 1000  mg bid w  meals   Trulicity subcutaneous inj restart 1.5  mg q week  -  Tolerated medication well    pt like increase  of medication -patient states that she tolerate medication well   Side effects and directions of medication discussed with patient. Patient verbalized understanding and compliance.    Pt verbalized understanding and compliance    - MICROALB/CREAT RATIO, RANDOM URINE      Anemia  Stable     patient is taking ferrous sulfate 325 mg daily   Diet education   iron rich diet-  Labs stable  cpm   Patient had colonoscopy the next colonoscopy January in 2026  Patient denies any blood in stool or any GI symptoms  She has some heartburns-not on medication at this time restart pantoprazole  Patient might see the gastroenterology possible EGD     Labs       Gerd  Patient advised to avoid spicy food, coffee, tea, caffeinated drinks, alcohol, acidic food/juices  Advised to avoid NSAID's - Aspirin-based medications  Advised to avoid wearing  tight clothes   Advised to elevate the head of the bed   Avoid eating at least 3 hours before bedtime   Counseling on ideal weight/BMI  Recommend patient to restart pantoprazole take PPIs qd in the morning 30-60 minutes before breakfast always on empty stomach Side effects and directions of medication discussed with patient. Patient verbalized understanding and compliance.    Stable   Cpm     Hypothyroidism, unspecified type  Stable cpm   Labs       Orders Placed This Encounter   Procedures    Comp Metabolic Panel (14)    Hemoglobin A1C    TSH W Reflex To Free T4    Ferritin    CBC With Differential With Platelet    Vitamin B12    VITAMIN D, SCREEN [64568][Q]       Meds This Visit:  Requested Prescriptions     Signed Prescriptions Disp Refills    Dulaglutide (TRULICITY) 1.5 MG/0.5ML Subcutaneous Solution Pen-injector 2 mL 0     Sig: Inject 1.5 mg into the skin every 7 days.       Imaging & Referrals:  None

## 2024-08-01 ENCOUNTER — TELEPHONE (OUTPATIENT)
Dept: INTERNAL MEDICINE CLINIC | Facility: CLINIC | Age: 64
End: 2024-08-01

## 2024-08-01 DIAGNOSIS — E11.9 TYPE 2 DIABETES MELLITUS WITHOUT COMPLICATION, WITHOUT LONG-TERM CURRENT USE OF INSULIN (HCC): ICD-10-CM

## 2024-08-01 NOTE — TELEPHONE ENCOUNTER
Trulicity sent 7/2/24.    Notified via [employer].  Postponed to confirm patient has reviewed message.

## 2024-08-01 NOTE — TELEPHONE ENCOUNTER
Patient called to request a different medication. Patient does not want to take ozempic. Would rather take trulicity. Wants to ask the doctor if she can prescribe that. Patient states pharmacy has availability now for trulicity and would like that prescription sent asap. Please advise.

## 2024-08-02 RX ORDER — DULAGLUTIDE 1.5 MG/.5ML
1.5 INJECTION, SOLUTION SUBCUTANEOUS
Qty: 2 ML | Refills: 0 | Status: SHIPPED | OUTPATIENT
Start: 2024-08-02

## 2024-08-02 NOTE — TELEPHONE ENCOUNTER
Spoke to patient. She already picked up the script sent on 7/2/24. She  needs a refill and pharmacy had it in stock. Script already pending under refill encounter. Resent high priority.

## 2024-08-02 NOTE — TELEPHONE ENCOUNTER
Please review; protocol failed/No Protocol    Routing to [facility]- Dr. Fish is out of office and patient is out of medication     Sent [employer] message to patient to complete labs from 07/02/2024    Requested Prescriptions   Pending Prescriptions Disp Refills    TRULICITY 1.5 MG/0.5ML Subcutaneous Solution Pen-injector [Pharmacy Med Name: Trulicity 1.5 Mg/0.5ml Inj Lill] 2 mL 0     Sig: Inject 1.5 mg into the skin every 7 days.       Diabetes Medication Protocol Failed - 8/2/2024 12:37 PM        Failed - Last A1C < 7.5 and within past 6 months     Lab Results   Component Value Date    A1C 7.5 (H) 01/30/2024             Passed - In person appointment or virtual visit in the past 6 mos or appointment in next 3 mos     Recent Outpatient Visits              1 month ago Anemia, unspecified type    [facility], [address], [place] Jose Miguel Fish MD    Office Visit    3 months ago Type 2 diabetes mellitus without complication, without long-term current use of insulin (HCC)    [facility]    [facility]    6 months ago Dermatitis    [facility] Arnaud Rooney PA-C    Office Visit    8 months ago Chronic migraine without aura without status migrainosus, not intractable    [facility] Timur Enriquez MD    Office Visit    9 months ago PE (physical exam), routine    [facility], [address], [place] Jose Miguel Fish MD    Office Visit          Future Appointments         Provider Department Appt Notes    In 1 month Jose Miguel Fish MD [facility], [address], [place] Medication fu                    Passed - Microalbumin procedure in past 12 months or taking ACE/ARB        Passed - EGFRCR or GFRNAA > 50     GFR Evaluation  EGFRCR: 74 , resulted on 1/30/2024          Passed - GFR in the past 12 months            Future Appointments         Provider Department Appt Notes    In 1 month Jose Miguel Fish MD [facility], [address], [place] Medication fu          Recent Outpatient Visits              1 month ago Anemia, unspecified type    [facility] [place] Vukomanovic, Emela, MD    Office Visit    3 months ago Type 2 diabetes mellitus without complication, without long-term current use of insulin (HCC)    [facility] [place] Vukomanovic, Emela, MD    Office Visit    6 months ago Dermatitis    [facility] Arnaud Rooney PA-C    Office Visit    8 months ago Chronic migraine without aura without status migrainosus, not intractable    [facility] Timur Enriquez MD    Office Visit    9 months ago [facility] (physical exam), routine    [facility]    [facility] Visit

## 2024-08-05 ENCOUNTER — LAB ENCOUNTER (OUTPATIENT)
Dept: LAB | Age: 64
End: 2024-08-05
Attending: INTERNAL MEDICINE
Payer: MEDICAID

## 2024-08-05 LAB
ALBUMIN SERPL-MCNC: 4.4 G/DL (ref 3.2–4.8)
ALBUMIN/GLOB SERPL: 1.6 {RATIO} (ref 1–2)
ALP LIVER SERPL-CCNC: 65 U/L
ALT SERPL-CCNC: 31 U/L
ANION GAP SERPL CALC-SCNC: 5 MMOL/L (ref 0–18)
AST SERPL-CCNC: 44 U/L (ref ?–34)
BASOPHILS # BLD AUTO: 0.05 X10(3) UL (ref 0–0.2)
BASOPHILS NFR BLD AUTO: 0.8 %
BILIRUB SERPL-MCNC: 0.3 MG/DL (ref 0.2–1.1)
BUN BLD-MCNC: 19 MG/DL (ref 9–23)
BUN/CREAT SERPL: 18.8 (ref 10–20)
CALCIUM BLD-MCNC: 9.7 MG/DL (ref 8.7–10.4)
CHLORIDE SERPL-SCNC: 108 MMOL/L (ref 98–112)
CO2 SERPL-SCNC: 28 MMOL/L (ref 21–32)
CREAT BLD-MCNC: 1.01 MG/DL
DEPRECATED HBV CORE AB SER IA-ACNC: 35.1 NG/ML
DEPRECATED RDW RBC AUTO: 42.1 FL (ref 35.1–46.3)
EGFRCR SERPLBLD CKD-EPI 2021: 63 ML/MIN/1.73M2 (ref 60–?)
EOSINOPHIL # BLD AUTO: 0.09 X10(3) UL (ref 0–0.7)
EOSINOPHIL NFR BLD AUTO: 1.5 %
ERYTHROCYTE [DISTWIDTH] IN BLOOD BY AUTOMATED COUNT: 14.6 % (ref 11–15)
EST. AVERAGE GLUCOSE BLD GHB EST-MCNC: 177 MG/DL (ref 68–126)
FASTING STATUS PATIENT QL REPORTED: YES
GLOBULIN PLAS-MCNC: 2.7 G/DL (ref 2–3.5)
GLUCOSE BLD-MCNC: 128 MG/DL (ref 70–99)
HBA1C MFR BLD: 7.8 % (ref ?–5.7)
HCT VFR BLD AUTO: 37.5 %
HGB BLD-MCNC: 12 G/DL
IMM GRANULOCYTES # BLD AUTO: 0.02 X10(3) UL (ref 0–1)
IMM GRANULOCYTES NFR BLD: 0.3 %
LYMPHOCYTES # BLD AUTO: 2.24 X10(3) UL (ref 1–4)
LYMPHOCYTES NFR BLD AUTO: 37.8 %
MCH RBC QN AUTO: 25.5 PG (ref 26–34)
MCHC RBC AUTO-ENTMCNC: 32 G/DL (ref 31–37)
MCV RBC AUTO: 79.8 FL
MONOCYTES # BLD AUTO: 0.49 X10(3) UL (ref 0.1–1)
MONOCYTES NFR BLD AUTO: 8.3 %
NEUTROPHILS # BLD AUTO: 3.03 X10 (3) UL (ref 1.5–7.7)
NEUTROPHILS # BLD AUTO: 3.03 X10(3) UL (ref 1.5–7.7)
NEUTROPHILS NFR BLD AUTO: 51.3 %
OSMOLALITY SERPL CALC.SUM OF ELEC: 296 MOSM/KG (ref 275–295)
PLATELET # BLD AUTO: 347 10(3)UL (ref 150–450)
POTASSIUM SERPL-SCNC: 5 MMOL/L (ref 3.5–5.1)
PROT SERPL-MCNC: 7.1 G/DL (ref 5.7–8.2)
RBC # BLD AUTO: 4.7 X10(6)UL
SODIUM SERPL-SCNC: 141 MMOL/L (ref 136–145)
TSI SER-ACNC: 0.98 MIU/ML (ref 0.55–4.78)
VIT B12 SERPL-MCNC: 1023 PG/ML (ref 211–911)
VIT D+METAB SERPL-MCNC: 22.4 NG/ML (ref 30–100)
WBC # BLD AUTO: 5.9 X10(3) UL (ref 4–11)

## 2024-08-05 PROCEDURE — 85025 COMPLETE CBC W/AUTO DIFF WBC: CPT | Performed by: INTERNAL MEDICINE

## 2024-08-05 PROCEDURE — 82306 VITAMIN D 25 HYDROXY: CPT | Performed by: INTERNAL MEDICINE

## 2024-08-05 PROCEDURE — 36415 COLL VENOUS BLD VENIPUNCTURE: CPT | Performed by: INTERNAL MEDICINE

## 2024-08-05 PROCEDURE — 83036 HEMOGLOBIN GLYCOSYLATED A1C: CPT | Performed by: INTERNAL MEDICINE

## 2024-08-05 PROCEDURE — 80053 COMPREHEN METABOLIC PANEL: CPT | Performed by: INTERNAL MEDICINE

## 2024-08-05 PROCEDURE — 84443 ASSAY THYROID STIM HORMONE: CPT | Performed by: INTERNAL MEDICINE

## 2024-08-05 PROCEDURE — 82607 VITAMIN B-12: CPT | Performed by: INTERNAL MEDICINE

## 2024-08-05 PROCEDURE — 82728 ASSAY OF FERRITIN: CPT | Performed by: INTERNAL MEDICINE

## 2024-09-04 ENCOUNTER — TELEPHONE (OUTPATIENT)
Dept: INTERNAL MEDICINE CLINIC | Facility: CLINIC | Age: 64
End: 2024-09-04

## 2024-09-04 ENCOUNTER — OFFICE VISIT (OUTPATIENT)
Dept: INTERNAL MEDICINE CLINIC | Facility: CLINIC | Age: 64
End: 2024-09-04

## 2024-09-04 VITALS
HEIGHT: 64 IN | BODY MASS INDEX: 30.56 KG/M2 | SYSTOLIC BLOOD PRESSURE: 117 MMHG | WEIGHT: 179 LBS | DIASTOLIC BLOOD PRESSURE: 73 MMHG | HEART RATE: 79 BPM

## 2024-09-04 DIAGNOSIS — E06.3 HYPOTHYROIDISM DUE TO HASHIMOTO'S THYROIDITIS: ICD-10-CM

## 2024-09-04 DIAGNOSIS — E78.2 MIXED HYPERLIPIDEMIA: ICD-10-CM

## 2024-09-04 DIAGNOSIS — E11.00 TYPE 2 DIABETES MELLITUS WITH HYPEROSMOLARITY WITHOUT COMA, WITHOUT LONG-TERM CURRENT USE OF INSULIN (HCC): ICD-10-CM

## 2024-09-04 DIAGNOSIS — I10 ESSENTIAL HYPERTENSION: Primary | ICD-10-CM

## 2024-09-04 PROCEDURE — 99214 OFFICE O/P EST MOD 30 MIN: CPT | Performed by: INTERNAL MEDICINE

## 2024-09-04 RX ORDER — DULAGLUTIDE 3 MG/.5ML
3 INJECTION, SOLUTION SUBCUTANEOUS
Qty: 2 ML | Refills: 1 | Status: SHIPPED | OUTPATIENT
Start: 2024-09-04

## 2024-09-04 NOTE — PROGRESS NOTES
Subjective:   Patient ID: Tawny Benitez is a 64 year old female.  Patient presents with:  Chief Complaint   Patient presents with    Test Results     Patient presents today for  DM2 follow-up  per pt  feels good on Trulicity  suggars elevated  A1  7.8  not losing weight -  the most recent blood test results from 8/24 A1c 3-month sugar average was 7.8  Had  migraine headache  last  week twice  State d doing welll   denies any   Cp ,shortness of breath, dyspnea on exertion or heart palpitations with rest or activity , can walk few block without any Cp, Sob, Recinos or Palpitations , denies nausea, vomiting, diarrhea, constipation or abdominal pain. No fever, chills, or UTI symptoms.   The rest of the review of systems, see below.   Anemia  This is a chronic problem. The problem occurs intermittently. Pertinent negatives include no abdominal pain, arthralgias, chills, congestion, coughing, fatigue, fever, nausea, sore throat or vomiting. Treatments tried: ferros sulfate 325 mg qd. The treatment provided significant relief.   Diabetes  She presents for her follow-up diabetic visit. She has type 2 diabetes mellitus. Pertinent negatives for hypoglycemia include no confusion, dizziness, nervousness/anxiousness or pallor. Pertinent negatives for diabetes include no fatigue. Current diabetic treatment includes oral agent (monotherapy) and diet. Her weight is decreasing steadily. She is following a diabetic, low fat/cholesterol and low salt diet. Meal planning includes avoidance of concentrated sweets. Her overall blood glucose range is 130-140 mg/dl.       History/Other:   Review of Systems   Constitutional:  Negative for chills, fatigue and fever.   HENT:  Negative for congestion, ear pain, postnasal drip, rhinorrhea, sneezing and sore throat.    Eyes:  Negative for pain and redness.   Respiratory:  Negative for cough and wheezing.    Cardiovascular:  Negative for leg swelling.   Gastrointestinal:  Negative for abdominal pain,  anal bleeding, blood in stool, constipation, diarrhea, nausea and vomiting.   Genitourinary:  Negative for dysuria and frequency.   Musculoskeletal:  Negative for arthralgias.   Skin:  Negative for color change and pallor.   Neurological:  Negative for dizziness.   Hematological:  Does not bruise/bleed easily.   Psychiatric/Behavioral:  Negative for confusion. The patient is not nervous/anxious.      Current Outpatient Medications   Medication Sig Dispense Refill    Dulaglutide (TRULICITY) 3 MG/0.5ML Subcutaneous Solution Pen-injector Inject 3 mg into the skin every 7 days. 2 mL 1    ondansetron 4 MG Oral Tablet Dispersible Take 1 tablet (4 mg total) by mouth every 8 (eight) hours as needed for Nausea. 20 tablet 0    fenofibrate micronized 134 MG Oral Cap Take 1 capsule (134 mg total) by mouth daily with breakfast. 90 capsule 3    rosuvastatin 5 MG Oral Tab Take 1 tablet (5 mg total) by mouth nightly. 90 tablet 3    metFORMIN 500 MG Oral Tab Take 2 tablets (1,000 mg total) by mouth 2 (two) times daily. 360 tablet 2    pantoprazole 40 MG Oral Tab EC Take 1 tablet (40 mg total) by mouth before evening meal. 30-60 minutes 90 tablet 1    Ferrous Gluconate 324 (37.5 Fe) MG Oral Tab Take 1 tablet (324 mg total) by mouth daily. 90 tablet 1    Insulin Pen Needle (PEN NEEDLES) 32G X 4 MM Does not apply Misc 1 each daily. 90 each 0    Glucose Blood (ONETOUCH ULTRA) In Vitro Strip Check blood sugar 2 times daily 200 each 3    OneTouch Delica Lancets 30G Does not apply Misc 1 each  in the morning and 1 each before bedtime. Check blood sugar 2 times daily. 200 each 3    nortriptyline 50 MG Oral Cap Take 2 capsules (100 mg total) by mouth nightly. 180 capsule 3    Rizatriptan Benzoate 10 MG Oral Tab TAKE 1 TABLET AT ONSET OF HEADACHE; MAY REPEAT 1 TABLET IN 2 HOURS AS NEEDED. MAX 2 TABLETS IN 24 HOURS. MAX USE 9 DAYS PER MONTH 9 tablet 11    loratadine 10 MG Oral Tab TAKE 1 TABLET BY MOUTH ONCE DAILY FOR 3-4 WEEKS, THEN AS NEEDED  90 tablet 0    fluticasone propionate 50 MCG/ACT Nasal Suspension 2 sprays by Nasal route daily. FOR 1-2 WEEKS THEN AS NEEDED 16 g 1    levothyroxine 88 MCG Oral Tab Take 1 tablet (88 mcg total) by mouth before breakfast. 90 tablet 3    cholecalciferol 50 MCG (2000 UT) Oral Cap Take 1 capsule (2,000 Units total) by mouth daily.      Acetaminophen-Caffeine (EXCEDRIN ASPIRIN FREE OR) Take by mouth as needed.      Cyanocobalamin (VITAMIN B-12) 2500 MCG Sublingual SL Tab Take 1  Tab under the tongue daily (Patient taking differently: Take 1  Tab under the tongue daily) 90 tablet 3    Blood Glucose Monitoring Suppl (ONETOUCH ULTRA 2) w/Device Does not apply Kit Check blood sugar 2 times daily (Patient taking differently: Check blood sugar daily) 1 kit 0    PREVIDENT 5000 SENSITIVE 1.1-5 % Dental Paste BRUSH TWICE DAILY       Allergies:  Allergies   Allergen Reactions    Penicillins HIVES and RASH    Codeine NAUSEA ONLY and DIZZINESS    Morphine NAUSEA ONLY and DIZZINESS       Objective:   Physical Exam  Vitals and nursing note reviewed.   Constitutional:       General: She is not in acute distress.  HENT:      Head: Normocephalic and atraumatic.      Left Ear: Tympanic membrane normal.      Nose: Nose normal.      Mouth/Throat:      Mouth: Mucous membranes are moist.      Comments:     Cardiovascular:      Rate and Rhythm: Normal rate and regular rhythm.      Heart sounds: Normal heart sounds. No murmur heard.  Pulmonary:      Effort: Pulmonary effort is normal.      Breath sounds: Normal breath sounds. No wheezing or rales.   Abdominal:      Palpations: Abdomen is soft. There is no mass.      Tenderness: There is no abdominal tenderness.   Musculoskeletal:         General: Normal range of motion.      Cervical back: Normal range of motion.      Right lower leg: No edema.      Left lower leg: No edema.   Lymphadenopathy:      Head:      Right side of head: No submental, submandibular or posterior auricular adenopathy.       Left side of head: No submental, submandibular or posterior auricular adenopathy.      Cervical: No cervical adenopathy.      Right cervical: No superficial cervical adenopathy.     Left cervical: No superficial cervical adenopathy.      Lower Body: No right inguinal adenopathy. No left inguinal adenopathy.   Skin:     General: Skin is warm and dry.      Findings: No erythema.   Neurological:      Mental Status: She is alert and oriented to person, place, and time.     Blood pressure 117/73, pulse 79, height 5' 4\" (1.626 m), weight 179 lb (81.2 kg), not currently breastfeeding.    Bilateral barefoot skin diabetic exam is normal, visualized feet and the appearance is normal.  Bilateral monofilament/sensation of both feet is normal.  Pulsation pedal pulse exam of both lower legs/feet is normal as well.        Assessment & Plan:   . Type 2 diabetes mellitus without complication, without long-term current use of insulin ([facility])  Keep sugars glycemic control to prevent complications of DM2  Keep 1800 keegan ADA- Diabetic diet   Walking and activity as tolerated   accu-checks   Eye exam  completed and foot exam yearly today  completer   Dr Jeff Nielsen  1 week  ago   7/24 /24  -  6 months 1/25   Take medications as perscribed  Directions and side effects of medications discussed w/pt  Labs To complete   A1c 7.5 --7/8   stable continue present medication labs  Metformine 1000  mg bid w  meals   Trulicity subcutaneous inj  increase 3 mg   mg q week  -  Tolerated medication well    pt like increase of medication -patient states that she tolerate medication well   Side effects and directions of medication discussed with patient. Patient verbalized understanding and compliance.    Pt verbalized understanding and compliance    - MICROALB/CREAT RATIO, RANDOM URINE      Anemia -resolved   Low ferritin   patient is taking ferrous sulfate 325 mg daily   Diet education   iron rich diet-  Labs stable  cpm   Patient had colonoscopy  the next colonoscopy January in 2026  Patient denies any blood in stool or any GI symptoms  She has some heartburns-not on medication at this time restart pantoprazole  Patient might see the gastroenterology possible EGD     Labs       Gerd  Patient advised to avoid spicy food, coffee, tea, caffeinated drinks, alcohol, acidic food/juices  Advised to avoid NSAID's - Aspirin-based medications  Advised to avoid wearing  tight clothes   Advised to elevate the head of the bed   Avoid eating at least 3 hours before bedtime   Counseling on ideal weight/BMI  Recommend patient to restart pantoprazole take PPIs qd in the morning 30-60 minutes before breakfast always on empty stomach Side effects and directions of medication discussed with patient. Patient verbalized understanding and compliance.    Stable   Cpm     Hypothyroidism, unspecified type  Stable cpm   Labs       No orders of the defined types were placed in this encounter.      Meds This Visit:  Requested Prescriptions     Signed Prescriptions Disp Refills    Dulaglutide (TRULICITY) 3 MG/0.5ML Subcutaneous Solution Pen-injector 2 mL 1     Sig: Inject 3 mg into the skin every 7 days.       Imaging & Referrals:  None

## 2024-09-10 ENCOUNTER — MED REC SCAN ONLY (OUTPATIENT)
Dept: INTERNAL MEDICINE CLINIC | Facility: CLINIC | Age: 64
End: 2024-09-10

## 2024-09-20 ENCOUNTER — HOSPITAL ENCOUNTER (OUTPATIENT)
Age: 64
Discharge: HOME | End: 2024-09-20
Payer: MEDICAID

## 2024-09-20 VITALS
HEART RATE: 84 BPM | TEMPERATURE: 98 F | RESPIRATION RATE: 16 BRPM | SYSTOLIC BLOOD PRESSURE: 130 MMHG | OXYGEN SATURATION: 100 % | DIASTOLIC BLOOD PRESSURE: 65 MMHG

## 2024-09-20 DIAGNOSIS — R53.1 WEAKNESS GENERALIZED: ICD-10-CM

## 2024-09-20 DIAGNOSIS — R51.9 ACUTE NONINTRACTABLE HEADACHE, UNSPECIFIED HEADACHE TYPE: Primary | ICD-10-CM

## 2024-09-20 LAB
#MXD IC: 0.4 X10ˆ3/UL (ref 0.1–1)
BUN BLD-MCNC: 14 MG/DL (ref 7–18)
BUN BLD-MCNC: 19 MG/DL (ref 7–18)
CHLORIDE BLD-SCNC: 104 MMOL/L (ref 98–112)
CHLORIDE BLD-SCNC: 105 MMOL/L (ref 98–112)
CO2 BLD-SCNC: 22 MMOL/L (ref 21–32)
CO2 BLD-SCNC: 25 MMOL/L (ref 21–32)
CREAT BLD-MCNC: 0.8 MG/DL
CREAT BLD-MCNC: 0.8 MG/DL
EGFRCR SERPLBLD CKD-EPI 2021: 82 ML/MIN/1.73M2 (ref 60–?)
EGFRCR SERPLBLD CKD-EPI 2021: 82 ML/MIN/1.73M2 (ref 60–?)
GLUCOSE BLD-MCNC: 121 MG/DL (ref 70–99)
GLUCOSE BLD-MCNC: 126 MG/DL (ref 70–99)
HCT VFR BLD AUTO: 39.7 %
HCT VFR BLD CALC: 39 %
HCT VFR BLD CALC: 42 %
HGB BLD-MCNC: 12.4 G/DL
ISTAT IONIZED CALCIUM FOR CHEM 8: 1.19 MMOL/L (ref 1.12–1.32)
ISTAT IONIZED CALCIUM FOR CHEM 8: 1.22 MMOL/L (ref 1.12–1.32)
LYMPHOCYTES # BLD AUTO: 3.5 X10ˆ3/UL (ref 1–4)
LYMPHOCYTES NFR BLD AUTO: 45.5 %
MCH RBC QN AUTO: 25.6 PG (ref 26–34)
MCHC RBC AUTO-ENTMCNC: 31.2 G/DL (ref 31–37)
MCV RBC AUTO: 82 FL (ref 80–100)
MIXED CELL %: 5.7 %
NEUTROPHILS # BLD AUTO: 3.7 X10ˆ3/UL (ref 1.5–7.7)
NEUTROPHILS NFR BLD AUTO: 48.8 %
PLATELET # BLD AUTO: 377 X10ˆ3/UL (ref 150–450)
POTASSIUM BLD-SCNC: 4.5 MMOL/L (ref 3.6–5.1)
POTASSIUM BLD-SCNC: 5.6 MMOL/L (ref 3.6–5.1)
RBC # BLD AUTO: 4.84 X10ˆ6/UL
SARS-COV-2 RNA RESP QL NAA+PROBE: NOT DETECTED
SODIUM BLD-SCNC: 137 MMOL/L (ref 136–145)
SODIUM BLD-SCNC: 139 MMOL/L (ref 136–145)
WBC # BLD AUTO: 7.6 X10ˆ3/UL (ref 4–11)

## 2024-09-20 PROCEDURE — 80047 BASIC METABLC PNL IONIZED CA: CPT

## 2024-09-20 PROCEDURE — 99214 OFFICE O/P EST MOD 30 MIN: CPT

## 2024-09-20 PROCEDURE — 85025 COMPLETE CBC W/AUTO DIFF WBC: CPT | Performed by: NURSE PRACTITIONER

## 2024-09-20 PROCEDURE — 96361 HYDRATE IV INFUSION ADD-ON: CPT

## 2024-09-20 PROCEDURE — 96374 THER/PROPH/DIAG INJ IV PUSH: CPT

## 2024-09-20 RX ORDER — METOCLOPRAMIDE HYDROCHLORIDE 5 MG/ML
10 INJECTION INTRAMUSCULAR; INTRAVENOUS ONCE
Status: COMPLETED | OUTPATIENT
Start: 2024-09-20 | End: 2024-09-20

## 2024-09-20 RX ORDER — SODIUM CHLORIDE 9 MG/ML
1000 INJECTION, SOLUTION INTRAVENOUS ONCE
Status: COMPLETED | OUTPATIENT
Start: 2024-09-20 | End: 2024-09-20

## 2024-09-20 NOTE — ED INITIAL ASSESSMENT (HPI)
Pt presents with weakness and body aches with mild headache x 2 days. Pt reports symptoms since taking weekly dose of Trulicity on Wednesday. Pt has been taking Trulicity for one year.     No fever.    Pt has hx of Migraine Headaches, treats with Rizatriptan and Nortriptyline. No current headache reported.

## 2024-09-20 NOTE — ED PROVIDER NOTES
Patient Seen in: [facility] [place]      History     Chief Complaint   Patient presents with    Weakness     Stated Complaint: Migraine, weakness    Subjective:   HPI    This is a 64-year-old female with history of migraines, thyroid disorder, arthritis, anxiety, high cholesterol, diabetes, hypertension presenting with weakness body aches mild headache.  Patient states on Wednesday she took her Trulicity which she has been on for a year and after taking that she developed weakness body aches chills nausea and a mild headache.  Patient states she does have a history of migraine headaches and she took nortriptyline last night.  Patient states her headache did not really feel like a migraine headache she was not sure if something else was wrong.  Denies sudden onset headache or the worst headache of her life.  Denies any head injury or trauma.  Denies chest pain or shortness of breath vomiting diarrhea abdominal pain back pain or vision changes.    Objective:   Past Medical History:    Acid reflux    Age-related nuclear cataract of both eyes    Anxiety state    Arrhythmia    ablation 2003    Arthritis    Cataract    Disorder of thyroid    Esophageal reflux    Esophagitis    Floaters    High cholesterol    Migraines    Muscle weakness    bilateral knees at times    Osteoarthritis    Other and unspecified hyperlipidemia    S/P ablation of accessory bypass tract    Type II or unspecified type diabetes mellitus without mention of complication, not stated as uncontrolled    Visual impairment    glasses              Past Surgical History:   Procedure Laterality Date    Appendectomy  2005    Cataract Right 04/2018    Cholecystectomy  2007    Colonoscopy      Hysterectomy  2012    Lumpectomy right Right 02/27/2023    Right bracketed GREYSON LOC    Greyson biopsy stereo nodule 1 site right (cpt=19081)  12/30/2022    2 sites    Needle biopsy right  12/28/2022    us guided bx    Temporal artery ligatn or bx Bilateral 09/07/2017     Temporal artery biopsies, bilateral    Total knee replacement Right     Upper gi endoscopy performed  09/10/2002                Social History     Socioeconomic History    Marital status:    Tobacco Use    Smoking status: Never    Smokeless tobacco: Never   Vaping Use    Vaping status: Never Used   Substance and Sexual Activity    Alcohol use: No     Alcohol/week: 0.0 standard drinks of alcohol    Drug use: No   Other Topics Concern    Caffeine Concern Yes     Comment: tea 1 cup daily and rare coffee    Exercise No    Breast feeding No    Reaction to local anesthetic No    Pt has a pacemaker No    Pt has a defibrillator No   Social History Narrative    The patient does not use an assistive device..      The patient does live in a home with stairs.     Social Determinants of Health     Financial Resource Strain: Patient Declined (4/3/2024)    Received from [facility]    Overall Financial Resource Strain (CARDIA)     Difficulty of Paying Living Expenses: Patient declined   Food Insecurity: Patient Declined (4/3/2024)    Received from [facility]    Hunger Vital Sign     Worried About Running Out of Food in the Last Year: Patient declined     Ran Out of Food in the Last Year: Patient declined   Transportation Needs: No Transportation Needs (4/3/2024)    Received from [facility]    PRAPARE - Transportation     Lack of Transportation (Medical): No     Lack of Transportation (Non-Medical): No   Housing Stability: Low Risk  (4/3/2024)    Received from [facility]    Housing Stability Vital Sign     Unable to Pay for Housing in the Last Year: No     Number of Places Lived in the Last Year: 1     Unstable Housing in the Last Year: No              Review of Systems    Positive for stated Chief Complaint: Weakness    Other systems are as noted in HPI.  Constitutional and vital signs reviewed.      All other systems reviewed and  negative except as noted above.    Physical Exam     ED Triage Vitals [09/20/24 1507]   /65   Pulse 84   Resp 16   Temp 98.2 °F (36.8 °C)   Temp src Temporal   SpO2 100 %   O2 Device None (Room air)       Current Vitals:   Vital Signs  BP: 130/65  Pulse: 84  Resp: 16  Temp: 98.2 °F (36.8 °C)  Temp src: Temporal    Oxygen Therapy  SpO2: 100 %  O2 Device: None (Room air)            Physical Exam  Vitals and nursing note reviewed.   Constitutional:       Appearance: Normal appearance.   HENT:      Head: Normocephalic.      Right Ear: Tympanic membrane normal.      Left Ear: Tympanic membrane normal.      Nose: Nose normal. No congestion or rhinorrhea.      Mouth/Throat:      Mouth: Mucous membranes are moist.      Pharynx: Oropharynx is clear. No posterior oropharyngeal erythema.   Eyes:      Extraocular Movements: Extraocular movements intact.      Conjunctiva/sclera: Conjunctivae normal.      Pupils: Pupils are equal, round, and reactive to light.   Cardiovascular:      Rate and Rhythm: Normal rate.   Pulmonary:      Effort: Pulmonary effort is normal. No respiratory distress.      Breath sounds: Normal breath sounds. No wheezing.   Abdominal:      Palpations: Abdomen is soft.      Tenderness: There is no abdominal tenderness.   Musculoskeletal:         General: Normal range of motion.      Cervical back: Normal range of motion.   Skin:     General: Skin is warm.      Capillary Refill: Capillary refill takes less than 2 seconds.   Neurological:      General: No focal deficit present.      Mental Status: She is alert and oriented to person, place, and time.      Cranial Nerves: No cranial nerve deficit.      Motor: No weakness.      Gait: Gait normal.   Psychiatric:         Mood and Affect: Mood normal.               ED Course     Labs Reviewed   POCT CBC - Abnormal; Notable for the following components:       Result Value    MCH IC 25.6 (*)     All other components within normal limits   POCT ISTAT CHEM8  CARTRIDGE - Abnormal; Notable for the following components:    ISTAT BUN 19 (*)     ISTAT Potassium 5.6 (*)     ISTAT Glucose 126 (*)     All other components within normal limits   POCT ISTAT CHEM8 CARTRIDGE - Abnormal; Notable for the following components:    ISTAT Glucose 121 (*)     All other components within normal limits   RAPID SARS-COV-2 BY PCR - Normal         MDM                      Medical Decision Making  64-year-old female nontoxic-appearing no neurodeficits with bodyaches chills nausea weakness and mild headache after taking her Trulicity on Wednesday.  DDx migraine headache versus viral illness versus COVID versus electrolyte imbalance versus tension headache versus CVA versus subarachnoid hemorrhage.  Low suspicion of a CVA or subarachnoid hemorrhage given not an sudden onset headache and no neurodeficits on exam.  After discussing this patient with my attending COVID swab will be collected basic labs patient receive IV fluids and Reglan for symptoms and will reevaluate.  Discussed this with the patient and she is agreeable with the plan of care.    CBC no significant findings Chem-8 likely hemolyzed as potassium BUN and glucose slightly elevated redraw will be ordered for Chem-8 these results and discussed rationale for redraw with the patient she is agreeable with the plan of care patient was made aware the COVID is negative.    Repeat Chem-8 improved glucose is still slightly elevated patient made aware of these results on reevaluation after receiving IV fluids and Reglan patient states she feels much better does not have a headache at this time.  Discussed that symptoms she could have been experiencing could have been her migraine headache she is presenting differently.  Discussed continuing her home medications for her migraine headaches and following up with her primary care provider eating regularly and hydrating well discussed strict ER precautions with any new or worsening symptoms.   Patient feels comfortable with the plan of care discharge home and acknowledges understanding discharge instructions.    Problems Addressed:  Acute nonintractable headache, unspecified headache type: acute illness or injury  Weakness generalized: acute illness or injury    Amount and/or Complexity of Data Reviewed  Labs: ordered. Decision-making details documented in ED Course.  Discussion of management or test interpretation with external provider(s): This patient was discussed with my attending Dr. Juarez who agrees with this provider's management and plan of care.    Risk  OTC drugs.        Disposition and Plan     Clinical Impression:  1. Acute nonintractable headache, unspecified headache type    2. Weakness generalized         Disposition:  Discharge  9/20/2024  4:40 pm    Follow-up:  Jose Miguel Fish MD  [address] [place] [address] [phone]    Schedule an appointment as soon as possible for a visit in 3 days            Medications Prescribed:  Discharge Medication List as of 9/20/2024  4:43 PM

## 2024-09-20 NOTE — DISCHARGE INSTRUCTIONS
Continue to eat and drink regularly continue your home medications that you are prescribed for your migraine headaches drink plenty of fluids you can be up and active as normal.  If you develop severe headache sudden onset headache or the worst headache of your life blurry vision or double vision chest pain shortness of breath numbness or tingling in the extremity nausea or vomiting abdominal pain back pain or any new or worsening symptoms go to the nearest emergency department.

## 2024-09-24 ENCOUNTER — OFFICE VISIT (OUTPATIENT)
Dept: INTERNAL MEDICINE CLINIC | Facility: CLINIC | Age: 64
End: 2024-09-24

## 2024-09-24 VITALS
HEIGHT: 64 IN | HEART RATE: 79 BPM | SYSTOLIC BLOOD PRESSURE: 120 MMHG | DIASTOLIC BLOOD PRESSURE: 82 MMHG | WEIGHT: 180 LBS | BODY MASS INDEX: 30.73 KG/M2

## 2024-09-24 DIAGNOSIS — E11.00 TYPE 2 DIABETES MELLITUS WITH HYPEROSMOLARITY WITHOUT COMA, WITHOUT LONG-TERM CURRENT USE OF INSULIN (HCC): ICD-10-CM

## 2024-09-24 DIAGNOSIS — I10 ESSENTIAL HYPERTENSION: Primary | ICD-10-CM

## 2024-09-24 DIAGNOSIS — E78.2 MIXED HYPERLIPIDEMIA: ICD-10-CM

## 2024-09-24 PROCEDURE — 99214 OFFICE O/P EST MOD 30 MIN: CPT | Performed by: INTERNAL MEDICINE

## 2024-09-24 RX ORDER — DULAGLUTIDE 1.5 MG/.5ML
1.5 INJECTION, SOLUTION SUBCUTANEOUS
Qty: 6 ML | Refills: 0 | Status: SHIPPED | OUTPATIENT
Start: 2024-09-24

## 2024-09-24 NOTE — PROGRESS NOTES
Subjective:   Patient ID: Tawny Benitez is a 64 year old female.  Patient presents with:  Chief Complaint   Patient presents with    Urgent Care F/u     Seen at [facility] on 9/20/24     Patient presents today for  f/u IC visit she took Trulicity  3 mg and  felt week  nausea  vomiting and  weakness   Feels better now just have still some fatigue and headache - migraine medication helped .  State otherwise doing welll   denies any   Cp ,shortness of breath, dyspnea on exertion or heart palpitations with rest or activity , can walk few block without any Cp, Sob, Recinos or Palpitations , denies nausea, vomiting, diarrhea, constipation or abdominal pain. No fever, chills, or UTI symptoms.   The rest of the review of systems, see below.   Diabetes  She presents for her follow-up diabetic visit. She has type 2 diabetes mellitus. Pertinent negatives for hypoglycemia include no confusion, dizziness, nervousness/anxiousness or pallor. Associated symptoms include fatigue. Current diabetic treatment includes oral agent (monotherapy) and diet. Her weight is decreasing steadily. She is following a diabetic, low fat/cholesterol and low salt diet. Meal planning includes avoidance of concentrated sweets. Her overall blood glucose range is 130-140 mg/dl.       History/Other:   Review of Systems   Constitutional:  Positive for fatigue.   HENT:  Negative for ear pain, postnasal drip, rhinorrhea and sneezing.    Eyes:  Negative for pain and redness.   Respiratory:  Negative for wheezing.    Cardiovascular:  Negative for leg swelling.   Gastrointestinal:  Negative for anal bleeding, blood in stool, constipation and diarrhea.   Genitourinary:  Negative for dysuria and frequency.   Skin:  Negative for color change and pallor.   Neurological:  Negative for dizziness.   Hematological:  Does not bruise/bleed easily.   Psychiatric/Behavioral:  Negative for confusion. The patient is not nervous/anxious.      Current Outpatient  Medications   Medication Sig Dispense Refill    Dulaglutide (TRULICITY) 1.5 MG/0.5ML Subcutaneous Solution Pen-injector Inject 1.5 mcg into the skin every 7 days. 6 mL 0    fenofibrate micronized 134 MG Oral Cap Take 1 capsule (134 mg total) by mouth daily with breakfast. 90 capsule 3    rosuvastatin 5 MG Oral Tab Take 1 tablet (5 mg total) by mouth nightly. 90 tablet 3    metFORMIN 500 MG Oral Tab Take 2 tablets (1,000 mg total) by mouth 2 (two) times daily. 360 tablet 2    pantoprazole 40 MG Oral Tab EC Take 1 tablet (40 mg total) by mouth before evening meal. 30-60 minutes 90 tablet 1    Ferrous Gluconate 324 (37.5 Fe) MG Oral Tab Take 1 tablet (324 mg total) by mouth daily. 90 tablet 1    Insulin Pen Needle (PEN NEEDLES) 32G X 4 MM Does not apply Misc 1 each daily. 90 each 0    Glucose Blood (ONETOUCH ULTRA) In Vitro Strip Check blood sugar 2 times daily 200 each 3    OneTouch Delica Lancets 30G Does not apply Misc 1 each  in the morning and 1 each before bedtime. Check blood sugar 2 times daily. 200 each 3    nortriptyline 50 MG Oral Cap Take 2 capsules (100 mg total) by mouth nightly. 180 capsule 3    Rizatriptan Benzoate 10 MG Oral Tab TAKE 1 TABLET AT ONSET OF HEADACHE; MAY REPEAT 1 TABLET IN 2 HOURS AS NEEDED. MAX 2 TABLETS IN 24 HOURS. MAX USE 9 DAYS PER MONTH 9 tablet 11    loratadine 10 MG Oral Tab TAKE 1 TABLET BY MOUTH ONCE DAILY FOR 3-4 WEEKS, THEN AS NEEDED 90 tablet 0    fluticasone propionate 50 MCG/ACT Nasal Suspension 2 sprays by Nasal route daily. FOR 1-2 WEEKS THEN AS NEEDED 16 g 1    levothyroxine 88 MCG Oral Tab Take 1 tablet (88 mcg total) by mouth before breakfast. 90 tablet 3    cholecalciferol 50 MCG (2000 UT) Oral Cap Take 1 capsule (2,000 Units total) by mouth daily.      Acetaminophen-Caffeine (EXCEDRIN ASPIRIN FREE OR) Take by mouth as needed.      Cyanocobalamin (VITAMIN B-12) 2500 MCG Sublingual SL Tab Take 1  Tab under the tongue daily (Patient taking differently: Take 1  Tab under  the tongue daily) 90 tablet 3    Blood Glucose Monitoring Suppl (ONETOUCH ULTRA 2) w/Device Does not apply Kit Check blood sugar 2 times daily (Patient taking differently: Check blood sugar daily) 1 kit 0    PREVIDENT 5000 SENSITIVE 1.1-5 % Dental Paste BRUSH TWICE DAILY      ondansetron 4 MG Oral Tablet Dispersible Take 1 tablet (4 mg total) by mouth every 8 (eight) hours as needed for Nausea. (Patient not taking: Reported on 9/24/2024) 20 tablet 0     Allergies:  Allergies   Allergen Reactions    Penicillins HIVES and RASH    Codeine NAUSEA ONLY and DIZZINESS    Morphine NAUSEA ONLY and DIZZINESS       Objective:   Physical Exam  Vitals and nursing note reviewed.   Constitutional:       General: She is not in acute distress.  HENT:      Head: Normocephalic and atraumatic.      Left Ear: Tympanic membrane normal.      Nose: Nose normal.      Mouth/Throat:      Mouth: Mucous membranes are moist.      Comments:     Cardiovascular:      Rate and Rhythm: Normal rate and regular rhythm.      Heart sounds: Normal heart sounds. No murmur heard.  Pulmonary:      Effort: Pulmonary effort is normal.      Breath sounds: Normal breath sounds. No wheezing or rales.   Abdominal:      Palpations: Abdomen is soft. There is no mass.      Tenderness: There is no abdominal tenderness.   Musculoskeletal:         General: Normal range of motion.      Cervical back: Normal range of motion.      Right lower leg: No edema.      Left lower leg: No edema.   Lymphadenopathy:      Head:      Right side of head: No submental, submandibular or posterior auricular adenopathy.      Left side of head: No submental, submandibular or posterior auricular adenopathy.      Cervical: No cervical adenopathy.      Right cervical: No superficial cervical adenopathy.     Left cervical: No superficial cervical adenopathy.      Lower Body: No right inguinal adenopathy. No left inguinal adenopathy.   Skin:     General: Skin is warm and dry.      Findings: No  erythema.   Neurological:      Mental Status: She is alert and oriented to person, place, and time.     Blood pressure 120/82, pulse 79, height 5' 4\" (1.626 m), weight 180 lb (81.6 kg), not currently breastfeeding.        Assessment & Plan:   . Type 2 diabetes mellitus without complication, without long-term current use of insulin (HCC)  Keep sugars glycemic control to prevent complications of DM2  Keep 1800 keegan ADA- Diabetic diet   Walking and activity as tolerated   accu-checks   Eye exam  completed and foot exam yearly today  completer   Dr Jeff Nielsen  1 week  ago   7/24 /24  -  6 months 1/25   Take medications as perscribed  Directions and side effects of medications discussed w/pt  Labs To complete   A1c 7.5 --7/8   stable continue present medication labs  Metformine 1000  mg bid w  meals   Trulicity subcutaneous inj   decreased to1.5  mg   mg q week  -could not  tolerate 3 mg  weakness and nausea headaches ..    Side effects and directions of medication discussed with patient. Patient verbalized understanding and compliance.    Pt verbalized understanding and compliance      Migraine   Has apt  with Neurologist  from [place]   Cpm     Gerd  Patient advised to avoid spicy food, coffee, tea, caffeinated drinks, alcohol, acidic food/juices  Advised to avoid NSAID's - Aspirin-based medications  Advised to avoid wearing  tight clothes   Advised to elevate the head of the bed   Avoid eating at least 3 hours before bedtime   Counseling on ideal weight/BMI  Recommend patient to restart pantoprazole take PPIs qd in the morning 30-60 minutes before breakfast always on empty stomach Side effects and directions of medication discussed with patient. Patient verbalized understanding and compliance.    Stable   Cpm     Hypothyroidism, unspecified type  Stable cpm   Labs         Hyperlipidemia   Keep low saturated fat  diet   Avoid red meat and fast, fried food  Take fruits and vegetables   Keep active /walking ,exercise  as  tolerated  Reach healthy weight   Continue present management    Rosuvastatin 5 mg at bedtime   Stable cpm        F/u  on 10/19/25   No orders of the defined types were placed in this encounter.      Meds This Visit:  Requested Prescriptions     Signed Prescriptions Disp Refills    Dulaglutide (TRULICITY) 1.5 MG/0.5ML Subcutaneous Solution Pen-injector 6 mL 0     Sig: Inject 1.5 mcg into the skin every 7 days.       Imaging & Referrals:  None

## 2024-10-03 DIAGNOSIS — J30.9 ALLERGIC RHINITIS, UNSPECIFIED SEASONALITY, UNSPECIFIED TRIGGER: ICD-10-CM

## 2024-10-04 RX ORDER — LORATADINE 10 MG/1
TABLET ORAL
Qty: 90 TABLET | Refills: 3 | Status: SHIPPED | OUTPATIENT
Start: 2024-10-04

## 2024-10-04 NOTE — TELEPHONE ENCOUNTER
Refill Per Protocol     Requested Prescriptions   Pending Prescriptions Disp Refills    LORATADINE 10 MG Oral Tab [Pharmacy Med Name: Loratadine 10 Mg Tab Pada] 90 tablet 0     Sig: TAKE 1 TABLET BY MOUTH ONCE DAILY FOR 3-4 WEEKS, THEN AS NEEDED       Allergy Medication Protocol Passed - 10/3/2024  6:27 PM        Passed - In person appointment or virtual visit in the past 12 mos or appointment in next 3 mos     Recent Outpatient Visits              [facility] week ago [facility]    [facility] Visit    1 month ago Essential hypertension    [facility] [place] Vukomanovic, Emela, MD    Office Visit    3 months ago Anemia, unspecified type    [facility], [place] Vukomanovic, Emela, MD    Office Visit    5 months ago Type 2 diabetes mellitus without complication, without long-term current use of insulin ([facility])    [facility] [place] Vukomanovic, Emela, MD    Office Visit    8 months ago [facility]    [facility]          Future Appointments         Provider Department Appt Notes    In 3 weeks Jose Miguel Fish MD [facility], [address], [place] [facility]                           Future Appointments         Provider Department Appt Notes    In 3 weeks Jose Miguel Fish MD [facility], [address], [place] [facility]          Recent Outpatient Visits              1 week ago [facility]    Office Visit    1 month ago [facility]ential hypertension    [facility]    [facility] Visit    3 months ago [facility], [facility] type    [facility]     Office Visit    5 months ago Type 2 diabetes mellitus without complication, without long-term current use of insulin (HCC)    [facility], [place] Vukomanovic, Emela, MD    Office Visit    8 months ago Dermatitis    [facility], [facility], Arnaud Bhagat PA-C    Office Visit

## 2024-10-29 ENCOUNTER — OFFICE VISIT (OUTPATIENT)
Dept: INTERNAL MEDICINE CLINIC | Facility: CLINIC | Age: 64
End: 2024-10-29

## 2024-10-29 VITALS
DIASTOLIC BLOOD PRESSURE: 72 MMHG | BODY MASS INDEX: 30.05 KG/M2 | SYSTOLIC BLOOD PRESSURE: 115 MMHG | HEART RATE: 79 BPM | HEIGHT: 64 IN | WEIGHT: 176 LBS

## 2024-10-29 DIAGNOSIS — Z00.00 PE (PHYSICAL EXAM), ROUTINE: ICD-10-CM

## 2024-10-29 DIAGNOSIS — E11.00 TYPE 2 DIABETES MELLITUS WITH HYPEROSMOLARITY WITHOUT COMA, WITHOUT LONG-TERM CURRENT USE OF INSULIN (HCC): Primary | ICD-10-CM

## 2024-10-29 DIAGNOSIS — E06.3 HYPOTHYROIDISM DUE TO HASHIMOTO'S THYROIDITIS: ICD-10-CM

## 2024-10-29 DIAGNOSIS — E78.2 MIXED HYPERLIPIDEMIA: ICD-10-CM

## 2024-10-29 DIAGNOSIS — Z12.31 SCREENING MAMMOGRAM, ENCOUNTER FOR: ICD-10-CM

## 2024-10-29 DIAGNOSIS — M79.672 FOOT PAIN, LEFT: ICD-10-CM

## 2024-10-29 PROCEDURE — 99396 PREV VISIT EST AGE 40-64: CPT | Performed by: INTERNAL MEDICINE

## 2024-10-29 PROCEDURE — 99213 OFFICE O/P EST LOW 20 MIN: CPT | Performed by: INTERNAL MEDICINE

## 2024-10-29 RX ORDER — FERROUS GLUCONATE 324(37.5)
1 TABLET ORAL DAILY
Qty: 90 TABLET | Refills: 1 | Status: SHIPPED | OUTPATIENT
Start: 2024-10-29

## 2024-10-29 NOTE — PROGRESS NOTES
Subjective:   Patient ID: Tawny Benitez is a 64 year old female.  Patient presents with:  Chief Complaint   Patient presents with    Physical     Patient presents today physical exam   State otherwise doing welll   denies any   Cp ,shortness of breath, dyspnea on exertion or heart palpitations with rest or activity , can walk few block without any Cp, Sob, Recinos or Palpitations , denies nausea, vomiting, diarrhea, constipation or abdominal pain. No fever, chills, or UTI symptoms.   The rest of the review of systems, see below.   Diabetes  She presents for her follow-up diabetic visit. She has type 2 diabetes mellitus. Pertinent negatives for hypoglycemia include no confusion, dizziness, nervousness/anxiousness or pallor. Associated symptoms include fatigue. Current diabetic treatment includes oral agent (monotherapy) and diet. Her weight is decreasing steadily. She is following a diabetic, low fat/cholesterol and low salt diet. Meal planning includes avoidance of concentrated sweets. Her overall blood glucose range is 130-140 mg/dl.       History/Other:   Review of Systems   Constitutional:  Positive for fatigue.   HENT:  Negative for ear pain, postnasal drip, rhinorrhea and sneezing.    Eyes:  Negative for pain and redness.   Respiratory:  Negative for wheezing.    Cardiovascular:  Negative for leg swelling.   Gastrointestinal:  Negative for anal bleeding, blood in stool, constipation and diarrhea.   Genitourinary:  Negative for dysuria and frequency.   Skin:  Negative for color change and pallor.   Neurological:  Negative for dizziness.   Hematological:  Does not bruise/bleed easily.   Psychiatric/Behavioral:  Negative for confusion. The patient is not nervous/anxious.      Current Outpatient Medications   Medication Sig Dispense Refill    loratadine 10 MG Oral Tab TAKE 1 TABLET BY MOUTH ONCE DAILY FOR 3-4 WEEKS, THEN AS NEEDED 90 tablet 3    Dulaglutide (TRULICITY) 1.5 MG/0.5ML Subcutaneous Solution Pen-injector  Inject 1.5 mcg into the skin every 7 days. 6 mL 0    ondansetron 4 MG Oral Tablet Dispersible Take 1 tablet (4 mg total) by mouth every 8 (eight) hours as needed for Nausea. 20 tablet 0    fenofibrate micronized 134 MG Oral Cap Take 1 capsule (134 mg total) by mouth daily with breakfast. 90 capsule 3    rosuvastatin 5 MG Oral Tab Take 1 tablet (5 mg total) by mouth nightly. 90 tablet 3    metFORMIN 500 MG Oral Tab Take 2 tablets (1,000 mg total) by mouth 2 (two) times daily. 360 tablet 2    pantoprazole 40 MG Oral Tab EC Take 1 tablet (40 mg total) by mouth before evening meal. 30-60 minutes 90 tablet 1    Ferrous Gluconate 324 (37.5 Fe) MG Oral Tab Take 1 tablet (324 mg total) by mouth daily. 90 tablet 1    Insulin Pen Needle (PEN NEEDLES) 32G X 4 MM Does not apply Misc 1 each daily. 90 each 0    Glucose Blood (ONETOUCH ULTRA) In Vitro Strip Check blood sugar 2 times daily 200 each 3    OneTouch Delica Lancets 30G Does not apply Misc 1 each  in the morning and 1 each before bedtime. Check blood sugar 2 times daily. 200 each 3    nortriptyline 50 MG Oral Cap Take 2 capsules (100 mg total) by mouth nightly. (Patient taking differently: Take 3 capsules (150 mg total) by mouth nightly.) 180 capsule 3    Rizatriptan Benzoate 10 MG Oral Tab TAKE 1 TABLET AT ONSET OF HEADACHE; MAY REPEAT 1 TABLET IN 2 HOURS AS NEEDED. MAX 2 TABLETS IN 24 HOURS. MAX USE 9 DAYS PER MONTH 9 tablet 11    fluticasone propionate 50 MCG/ACT Nasal Suspension 2 sprays by Nasal route daily. FOR 1-2 WEEKS THEN AS NEEDED 16 g 1    levothyroxine 88 MCG Oral Tab Take 1 tablet (88 mcg total) by mouth before breakfast. 90 tablet 3    cholecalciferol 50 MCG (2000 UT) Oral Cap Take 1 capsule (2,000 Units total) by mouth daily.      Acetaminophen-Caffeine (EXCEDRIN ASPIRIN FREE OR) Take by mouth as needed.      Cyanocobalamin (VITAMIN B-12) 2500 MCG Sublingual SL Tab Take 1  Tab under the tongue daily (Patient taking differently: Take 1  Tab under the  tongue every other day) 90 tablet 3    Blood Glucose Monitoring Suppl (ONETOUCH ULTRA 2) w/Device Does not apply Kit Check blood sugar 2 times daily (Patient taking differently: Check blood sugar daily) 1 kit 0    PREVIDENT 5000 SENSITIVE 1.1-5 % Dental Paste BRUSH TWICE DAILY       Allergies:  Allergies   Allergen Reactions    Penicillins HIVES and RASH    Codeine NAUSEA ONLY and DIZZINESS    Morphine NAUSEA ONLY and DIZZINESS       Objective:   Physical Exam  Vitals and nursing note reviewed.   Constitutional:       General: She is not in acute distress.  HENT:      Head: Normocephalic and atraumatic.      Left Ear: Tympanic membrane normal.      Nose: Nose normal.      Mouth/Throat:      Mouth: Mucous membranes are moist.      Comments:     Cardiovascular:      Rate and Rhythm: Normal rate and regular rhythm.      Heart sounds: Normal heart sounds. No murmur heard.  Pulmonary:      Effort: Pulmonary effort is normal.      Breath sounds: Normal breath sounds. No wheezing or rales.   Abdominal:      Palpations: Abdomen is soft. There is no mass.      Tenderness: There is no abdominal tenderness.   Musculoskeletal:         General: Normal range of motion.      Cervical back: Normal range of motion.      Right lower leg: No edema.      Left lower leg: No edema.   Lymphadenopathy:      Head:      Right side of head: No submental, submandibular or posterior auricular adenopathy.      Left side of head: No submental, submandibular or posterior auricular adenopathy.      Cervical: No cervical adenopathy.      Right cervical: No superficial cervical adenopathy.     Left cervical: No superficial cervical adenopathy.      Lower Body: No right inguinal adenopathy. No left inguinal adenopathy.   Skin:     General: Skin is warm and dry.      Findings: No erythema.   Neurological:      Mental Status: She is alert and oriented to person, place, and time.     Blood pressure 115/72, pulse 79, height 5' 4\" (1.626 m), weight 176 lb  (79.8 kg), not currently breastfeeding.        Assessment & Plan:     Physical exm   Maintain a healthy diet , low saturated fat and low sugar diet  Keep good hydration  Maintain a regular activity /walking as tolerated   Complete labs as ordered,   Preventative health maintenance tests reviewed   Mammogram  -utd  ordered   Colonocopy - utd 1/26   Hx  hysterectomy    Immunizations reviewed   covid 19   booster   Patient verbalized understanding and compliance        . Type 2 diabetes mellitus without complication, without long-term current use of insulin (HCC)  Keep sugars glycemic control to prevent complications of DM2  Keep 1800 keegan ADA- Diabetic diet   Walking and activity as tolerated   accu-checks   Eye exam  completed and foot exam yearly today  completer   Dr Jeff Nielsen  1 week  ago   7/24 /24  -  6 months 1/25   Take medications as perscribed  Directions and side effects of medications discussed w/pt  Labs To complete   A1c 7.5 --7/8   stable continue present medication labs  Metformine 1000  mg bid w  meals   Trulicity subcutaneous inj   decreased to 1.5   mg   mg q week  -could not  tolerate 3 mg    weakness and nausea headaches     Side effects and directions of medication discussed with patient. Patient verbalized understanding and compliance.    Pt verbalized understanding and compliance      Migraine   Has apt  with Neurologist  from [place]   Cpm     Gerd  Patient advised to avoid spicy food, coffee, tea, caffeinated drinks, alcohol, acidic food/juices  Advised to avoid NSAID's - Aspirin-based medications  Advised to avoid wearing  tight clothes   Advised to elevate the head of the bed   Avoid eating at least 3 hours before bedtime   Counseling on ideal weight/BMI  Recommend patient to restart pantoprazole take PPIs qd in the morning 30-60 minutes before breakfast always on empty stomach Side effects and directions of medication discussed with patient. Patient verbalized understanding and  compliance.    Stable   Cpm     Hypothyroidism, unspecified type  Stable cpm   Labs         Hyperlipidemia   Keep low saturated fat  diet   Avoid red meat and fast, fried food  Take fruits and vegetables   Keep active /walking ,exercise as  tolerated  Reach healthy weight   Continue present management    Rosuvastatin 5 mg at bedtime   Stable cpm        Low ferritin   Ferros gluconate 325 mg every other day   Iron rich  diet   Labs       Left foot ache   Comfortable shores   Refer to podiatry if persistent       No orders of the defined types were placed in this encounter.      Meds This Visit:  Requested Prescriptions      No prescriptions requested or ordered in this encounter       Imaging & Referrals:  None

## 2024-11-02 DIAGNOSIS — E11.9 TYPE 2 DIABETES MELLITUS WITHOUT COMPLICATION, WITHOUT LONG-TERM CURRENT USE OF INSULIN (HCC): ICD-10-CM

## 2024-11-06 RX ORDER — DULAGLUTIDE 1.5 MG/.5ML
1.5 INJECTION, SOLUTION SUBCUTANEOUS
Qty: 2 ML | Refills: 0 | OUTPATIENT
Start: 2024-11-06

## 2024-12-16 ENCOUNTER — HOSPITAL ENCOUNTER (OUTPATIENT)
Dept: MAMMOGRAPHY | Age: 64
Discharge: HOME | End: 2024-12-16
Attending: INTERNAL MEDICINE
Payer: MEDICAID

## 2024-12-16 DIAGNOSIS — G43.709 CHRONIC MIGRAINE WITHOUT AURA WITHOUT STATUS MIGRAINOSUS, NOT INTRACTABLE: ICD-10-CM

## 2024-12-16 DIAGNOSIS — M54.81 OCCIPITAL NEURALGIA OF LEFT SIDE: ICD-10-CM

## 2024-12-16 DIAGNOSIS — G43.901 STATUS MIGRAINOSUS: ICD-10-CM

## 2024-12-16 DIAGNOSIS — Z12.31 SCREENING MAMMOGRAM, ENCOUNTER FOR: ICD-10-CM

## 2024-12-16 PROCEDURE — 77063 BREAST TOMOSYNTHESIS BI: CPT | Performed by: INTERNAL MEDICINE

## 2024-12-16 PROCEDURE — 77067 SCR MAMMO BI INCL CAD: CPT | Performed by: INTERNAL MEDICINE

## 2024-12-16 RX ORDER — NORTRIPTYLINE HYDROCHLORIDE 50 MG/1
100 CAPSULE ORAL NIGHTLY
Qty: 1810 CAPSULE | Refills: 3 | Status: SHIPPED | OUTPATIENT
Start: 2024-12-16

## 2024-12-16 NOTE — TELEPHONE ENCOUNTER
Requested Prescriptions     Pending Prescriptions Disp Refills    NORTRIPTYLINE 50 MG Oral Cap [Pharmacy Med Name: Nortriptyline Hydrochloride 50 Mg Cap Teva] 180 capsule 0     Sig: Take 2 capsules (100 mg total) by mouth nightly.       Last OV: 11/16/2023 one year ago with a Return in about 6 months (around 5/16/2024).  Next OV: NONE    [place]/;  Nortriptyline HCl     Dispensed Written Strength Quantity Days Supply Provider Pharmacy   NORTRIPTYLIN 50 MG CAP TEVA 09/28/2024 11/16/2023 50 180 each 90 Timur Enriquez MD [id number] [place]   NORTRIPTYLIN 50 MG CAP TEVA 06/30/2024 11/16/2023 50 180 each 90 Timur Enriquez MD [id number] [place]       Last office visit plan;   Assessment  1. Chronic migraine without aura without status migrainosus, not intractable  Patient with chronic migraines.  It seems that Botox was not helpful at all this time, therefore it was stopped.  Nortriptyline will be continued since it provides some benefit.  However is still not fully controlled and therefore it is reasonable to go back on Aimovig again.  We talked about potential side effects.  If it works well then she will come back in 6 months if it does not work well that she come back in couple of months to discuss a different options.  Again emphasized importance of tracking the headaches.     Education and counseling provided to patient. Instructed patient to call my office or seek medical attention immediately if symptoms worsen.  Patient verbalized understanding of information given. All questions were answered. All side effects of drugs were discussed.         Return to clinic in: No follow-ups on file.     Timur Enriquez MD          Instructions    Return in about 6 months (around 5/16/2024).

## 2024-12-17 ENCOUNTER — TELEPHONE (OUTPATIENT)
Dept: INTERNAL MEDICINE CLINIC | Facility: CLINIC | Age: 64
End: 2024-12-17

## 2024-12-17 NOTE — TELEPHONE ENCOUNTER
I called mammography department, spoke with Linda; she states it can take up to 1 week for results.     Patient contacted and made aware of above; we will be contacting her once resulted. Patient verbalized understanding. No further questions or concerns at this time.

## 2024-12-18 NOTE — TELEPHONE ENCOUNTER
Patient calling,verified name and date of birth.  Patient calling to check back on mammogram results.   Advised patient that our office will contact her as soon as results are reviewed by her provider.  Patient verbalizes understanding .

## 2025-01-16 DIAGNOSIS — E11.9 TYPE 2 DIABETES MELLITUS WITHOUT COMPLICATION, WITHOUT LONG-TERM CURRENT USE OF INSULIN (HCC): Primary | ICD-10-CM

## 2025-01-16 NOTE — TELEPHONE ENCOUNTER
Dulaglutide (TRULICITY) 1.5 MG/0.5ML Subcutaneous Solution Pen-injector, Inject 1.5 mcg into the skin every 7 days., Disp: 6 mL, Rfl: 0

## 2025-01-20 NOTE — TELEPHONE ENCOUNTER
Routing to pod mate/alternate provider due to High Priority status and Dr. Fish is out of office.    Please kindly review; protocol failed or medication has no protocol attached.   Medication request is marked high priority: patient states they are out of their medication    Recent Visits  Date Type Provider Dept   10/29/24 Office Visit Jose Miguel Fish MD [facility]     Future Appointments  Date Type Provider Dept   02/19/25 Appointment Jose Miguel Fish MD [facility]     Lab Results   Component Value Date    A1C 7.8 (H) 08/05/2024     Requested Prescriptions   Pending Prescriptions Disp Refills    Dulaglutide 1.5 MG/0.5ML Subcutaneous Solution Auto-injector 6 mL 3     Sig: Inject 1.5 mcg into the skin once a week.       Diabetes Medication Protocol Failed - 1/20/2025  4:01 PM        Failed - Last A1C < 7.5 and within past 6 months     Passed - In person appointment or virtual visit in the past 6 mos or appointment in next 3 mos     Passed - Microalbumin procedure in past 12 months or taking ACE/ARB     Passed - EGFRCR or GFRNAA > 50     Passed - GFR in the past 12 months     Passed - Medication is active on med list

## 2025-01-20 NOTE — TELEPHONE ENCOUNTER
Patient called for status of refill request. She is out of Rx, she usually takes it every Monday [due today]. She is aware Dr. Fish is out of the office and this may be sent to a covering provider. I will request as urgent. Per last visit notes patient could not tolerate the 3 mg and is to continue with 1.5 mg. Patient verbalized understanding. No further questions or concerns at this time.    Future Appointments   Date Time Provider Department Center   2/19/2025 10:40 AM Jose Miguel Fish MD [address] [place]     Routing as URGENT

## 2025-02-06 ENCOUNTER — LAB ENCOUNTER (OUTPATIENT)
Dept: LAB | Age: 65
End: 2025-02-06
Attending: INTERNAL MEDICINE
Payer: MEDICAID

## 2025-02-06 DIAGNOSIS — E11.00 TYPE 2 DIABETES MELLITUS WITH HYPEROSMOLARITY WITHOUT COMA, WITHOUT LONG-TERM CURRENT USE OF INSULIN (HCC): ICD-10-CM

## 2025-02-06 LAB
ALBUMIN SERPL-MCNC: 4.6 G/DL (ref 3.2–4.8)
ALBUMIN/GLOB SERPL: 1.8 {RATIO} (ref 1–2)
ALP LIVER SERPL-CCNC: 67 U/L
ALT SERPL-CCNC: 29 U/L
ANION GAP SERPL CALC-SCNC: 9 MMOL/L (ref 0–18)
AST SERPL-CCNC: 39 U/L (ref ?–34)
BILIRUB SERPL-MCNC: 0.4 MG/DL (ref 0.2–1.1)
BUN BLD-MCNC: 13 MG/DL (ref 9–23)
BUN/CREAT SERPL: 14.6 (ref 10–20)
CALCIUM BLD-MCNC: 9.4 MG/DL (ref 8.7–10.4)
CHLORIDE SERPL-SCNC: 101 MMOL/L (ref 98–112)
CO2 SERPL-SCNC: 27 MMOL/L (ref 21–32)
CREAT BLD-MCNC: 0.89 MG/DL
EGFRCR SERPLBLD CKD-EPI 2021: 72 ML/MIN/1.73M2 (ref 60–?)
EST. AVERAGE GLUCOSE BLD GHB EST-MCNC: 163 MG/DL (ref 68–126)
FASTING STATUS PATIENT QL REPORTED: YES
GLOBULIN PLAS-MCNC: 2.6 G/DL (ref 2–3.5)
GLUCOSE BLD-MCNC: 138 MG/DL (ref 70–99)
HBA1C MFR BLD: 7.3 % (ref ?–5.7)
OSMOLALITY SERPL CALC.SUM OF ELEC: 286 MOSM/KG (ref 275–295)
POTASSIUM SERPL-SCNC: 4.5 MMOL/L (ref 3.5–5.1)
PROT SERPL-MCNC: 7.2 G/DL (ref 5.7–8.2)
SODIUM SERPL-SCNC: 137 MMOL/L (ref 136–145)

## 2025-02-06 PROCEDURE — 36415 COLL VENOUS BLD VENIPUNCTURE: CPT

## 2025-02-06 PROCEDURE — 80053 COMPREHEN METABOLIC PANEL: CPT

## 2025-02-06 PROCEDURE — 83036 HEMOGLOBIN GLYCOSYLATED A1C: CPT

## 2025-04-22 RX ORDER — PANTOPRAZOLE SODIUM 40 MG/1
TABLET, DELAYED RELEASE ORAL
Qty: 90 TABLET | Refills: 3 | Status: SHIPPED | OUTPATIENT
Start: 2025-04-22

## 2025-04-29 ENCOUNTER — OFFICE VISIT (OUTPATIENT)
Dept: INTERNAL MEDICINE CLINIC | Facility: CLINIC | Age: 65
End: 2025-04-29

## 2025-04-29 VITALS
BODY MASS INDEX: 29.71 KG/M2 | TEMPERATURE: 97 F | DIASTOLIC BLOOD PRESSURE: 71 MMHG | HEIGHT: 64 IN | HEART RATE: 81 BPM | SYSTOLIC BLOOD PRESSURE: 115 MMHG | WEIGHT: 174 LBS

## 2025-04-29 DIAGNOSIS — E78.00 PURE HYPERCHOLESTEROLEMIA: ICD-10-CM

## 2025-04-29 DIAGNOSIS — I83.893 VARICOSE VEINS OF BILATERAL LOWER EXTREMITIES WITH OTHER COMPLICATIONS: ICD-10-CM

## 2025-04-29 DIAGNOSIS — E11.9 TYPE 2 DIABETES MELLITUS WITHOUT COMPLICATION, WITHOUT LONG-TERM CURRENT USE OF INSULIN (HCC): Primary | ICD-10-CM

## 2025-04-29 PROCEDURE — 99214 OFFICE O/P EST MOD 30 MIN: CPT | Performed by: NURSE PRACTITIONER

## 2025-04-29 RX ORDER — DULAGLUTIDE 1.5 MG/.5ML
1.5 INJECTION, SOLUTION SUBCUTANEOUS
Qty: 2 ML | Refills: 3 | Status: SHIPPED | OUTPATIENT
Start: 2025-04-29

## 2025-04-29 RX ORDER — FENOFIBRATE 134 MG/1
134 CAPSULE ORAL
Qty: 90 CAPSULE | Refills: 3 | Status: SHIPPED | OUTPATIENT
Start: 2025-04-29

## 2025-04-29 RX ORDER — BLOOD SUGAR DIAGNOSTIC
STRIP MISCELLANEOUS 2 TIMES DAILY
Qty: 200 STRIP | Refills: 3 | Status: SHIPPED | OUTPATIENT
Start: 2025-04-29

## 2025-04-29 RX ORDER — LANCETS 30 GAUGE
1 EACH MISCELLANEOUS 2 TIMES DAILY
Qty: 200 EACH | Refills: 3 | Status: SHIPPED | OUTPATIENT
Start: 2025-04-29

## 2025-04-29 NOTE — ASSESSMENT & PLAN NOTE
Medications refilled along with diabetic testing supplies.  Patient plans to follow-up with PCP in 6 weeks.  Orders:    Dulaglutide (TRULICITY) 1.5 MG/0.5ML Subcutaneous Solution Auto-injector; Inject 1.5 mg into the skin every 7 days.    Diabetic Test Strips and Supplies

## 2025-04-29 NOTE — TELEPHONE ENCOUNTER
Please review. Protocol Failed; No Protocol    Future Appointments   Date Time Provider Department Center   4/29/2025 12:40 PM Che Huitron, [id number]  [place] Dr. Vukomanovic patient

## 2025-04-29 NOTE — PROGRESS NOTES
Tawny Benitez is a 64 year old female.  HPI:   Patient presents to clinic for refill on fenofibrate and diabetic testing strips.  She has had recent improvement with her blood sugar and is currently on Trulicity.  Tolerating medication well.  Reports she has some varicose veins of both legs, legs feel tired.  Denies any swelling or burning.  Denies any chest pain, shortness of breath, dizziness, nausea, vomiting, diarrhea, constipation, appetite or weight changes.  Current Medications[1]   Past Medical History[2]   Social History:  Short Social Hx on File[3]     REVIEW OF SYSTEMS:   Review of Systems   Constitutional:  Negative for activity change, appetite change, chills, diaphoresis, fatigue, fever and unexpected weight change.   HENT:  Negative for congestion.    Eyes:  Negative for visual disturbance.   Respiratory:  Negative for cough, chest tightness, shortness of breath and wheezing.    Cardiovascular:  Negative for chest pain, palpitations and leg swelling.   Gastrointestinal:  Negative for abdominal pain, constipation, diarrhea, nausea and vomiting.   Endocrine: Negative.    Genitourinary:  Negative for difficulty urinating and vaginal bleeding.   Musculoskeletal:  Negative for arthralgias and back pain.   Skin: Negative.    Neurological:  Negative for dizziness, seizures, numbness and headaches.   Hematological: Negative.    Psychiatric/Behavioral: Negative.            EXAM:   /71 (BP Location: Left arm, Patient Position: Sitting, Cuff Size: large)   Pulse 81   Temp 97.3 °F (36.3 °C) (Temporal)   Ht 5' 4\" (1.626 m)   Wt 174 lb (78.9 kg)   BMI 29.87 kg/m²     Physical Exam  Vitals reviewed.   Constitutional:       General: She is not in acute distress.  Eyes:      General: No scleral icterus.     Conjunctiva/sclera: Conjunctivae normal.   Cardiovascular:      Rate and Rhythm: Normal rate and regular rhythm.      Pulses: Normal pulses.      Heart sounds: Normal heart sounds.   Pulmonary:       Effort: Pulmonary effort is normal.      Breath sounds: Normal breath sounds.   Musculoskeletal:      Comments: Bilateral lower extremity varicosities, no palpable cord, no swelling, no tenderness to palpation.,  No erythema   Skin:     General: Skin is warm.      Coloration: Skin is not jaundiced.   Neurological:      Mental Status: She is alert and oriented to person, place, and time.   Psychiatric:         Thought Content: Thought content normal.         Judgment: Judgment normal.            ASSESSMENT AND PLAN:     Assessment & Plan  Pure hypercholesterolemia  Refilled fenofibrate.  Follow-up with PCP in 3 months.  CPM.  Orders:    fenofibrate micronized 134 MG Oral Cap; Take 1 capsule (134 mg total) by mouth daily with breakfast.    Type 2 diabetes mellitus without complication, without long-term current use of insulin (HCC)  Medications refilled along with diabetic testing supplies.  Patient plans to follow-up with PCP in 6 weeks.  Orders:    Dulaglutide (TRULICITY) 1.5 MG/0.5ML Subcutaneous Solution Auto-injector; Inject 1.5 mg into the skin every 7 days.    Diabetic Test Strips and Supplies    Varicose veins of bilateral lower extremities with other complications  Referral to vascular surgery  Orders:    Vascular Surgery - In Network         The patient indicates understanding of these issues and agrees to the plan.  The patient is asked to return in 6 weeks with PCP.     The above note was creating using Dragon speech recognition technology. Please excuse any typos.       [1]   Current Outpatient Medications   Medication Sig Dispense Refill    Lancets (ONETOUCH DELICA PLUS [id number]) Does not apply Misc 1 Lancet by Finger stick route in the morning and 1 Lancet before bedtime. 200 each 3    Glucose Blood (ONETOUCH ULTRA) In Vitro Strip CHECK BLOOD SUGAR 2 TIMES DAILY 200 strip 3    fenofibrate micronized 134 MG Oral Cap Take 1 capsule (134 mg total) by mouth daily with breakfast. 90 capsule 3    Dulaglutide  (TRULICITY) 1.5 MG/0.5ML Subcutaneous Solution Auto-injector Inject 1.5 mg into the skin every 7 days. 2 mL 3    pantoprazole 40 MG Oral Tab EC TAKE 1 TABLET (40 MG) BY MOUTH 30-60 MINUTES BEFORE EVENING MEAL 90 tablet 3    Dulaglutide 1.5 MG/0.5ML Subcutaneous Solution Auto-injector Inject 1.5 mcg into the skin once a week. 6 mL 3    nortriptyline 50 MG Oral Cap Take 2 capsules (100 mg total) by mouth nightly. 1810 capsule 3    Ferrous Gluconate 324 (37.5 Fe) MG Oral Tab Take 1 tablet (324 mg total) by mouth daily. 90 tablet 1    loratadine 10 MG Oral Tab TAKE 1 TABLET BY MOUTH ONCE DAILY FOR 3-4 WEEKS, THEN AS NEEDED 90 tablet 3    Dulaglutide (TRULICITY) 1.5 MG/0.5ML Subcutaneous Solution Pen-injector Inject 1.5 mcg into the skin every 7 days. 6 mL 0    ondansetron 4 MG Oral Tablet Dispersible Take 1 tablet (4 mg total) by mouth every 8 (eight) hours as needed for Nausea. 20 tablet 0    rosuvastatin 5 MG Oral Tab Take 1 tablet (5 mg total) by mouth nightly. 90 tablet 3    metFORMIN 500 MG Oral Tab Take 2 tablets (1,000 mg total) by mouth 2 (two) times daily. 360 tablet 2    Insulin Pen Needle (PEN NEEDLES) 32G X 4 MM Does not apply Misc 1 each daily. 90 each 0    Rizatriptan Benzoate 10 MG Oral Tab TAKE 1 TABLET AT ONSET OF HEADACHE; MAY REPEAT 1 TABLET IN 2 HOURS AS NEEDED. MAX 2 TABLETS IN 24 HOURS. MAX USE 9 DAYS PER MONTH 9 tablet 11    fluticasone propionate 50 MCG/ACT Nasal Suspension 2 sprays by Nasal route daily. FOR 1-2 WEEKS THEN AS NEEDED 16 g 1    levothyroxine 88 MCG Oral Tab Take 1 tablet (88 mcg total) by mouth before breakfast. 90 tablet 3    cholecalciferol 50 MCG (2000 UT) Oral Cap Take 1 capsule (2,000 Units total) by mouth daily.      Acetaminophen-Caffeine (EXCEDRIN ASPIRIN FREE OR) Take by mouth as needed.      Cyanocobalamin (VITAMIN B-12) 2500 MCG Sublingual SL Tab Take 1  Tab under the tongue daily (Patient taking differently: Take 1  Tab under the tongue every other day) 90 tablet 3     Blood Glucose Monitoring Suppl (ONETOUCH ULTRA 2) w/Device Does not apply Kit Check blood sugar 2 times daily (Patient taking differently: Check blood sugar daily) 1 kit 0    PREVIDENT 5000 SENSITIVE 1.1-5 % Dental Paste BRUSH TWICE DAILY     [2]   Past Medical History:   Acid reflux    Age-related nuclear cataract of both eyes    Anxiety state    Arrhythmia    ablation 2003    Arthritis    Cataract    Disorder of thyroid    Esophageal reflux    Esophagitis    Floaters    High cholesterol    Migraines    Muscle weakness    bilateral knees at times    Osteoarthritis    Other and unspecified hyperlipidemia    S/P ablation of accessory bypass tract    Type II or unspecified type diabetes mellitus without mention of complication, not stated as uncontrolled    Visual impairment    glasses   [3]   Social History  Socioeconomic History    Marital status:    Tobacco Use    Smoking status: Never     Passive exposure: Never    Smokeless tobacco: Never   Vaping Use    Vaping status: Never Used   Substance and Sexual Activity    Alcohol use: No     Alcohol/week: 0.0 standard drinks of alcohol    Drug use: No   Other Topics Concern    Caffeine Concern Yes     Comment: tea 1 cup daily and rare coffee    Exercise No    Breast feeding No    Reaction to local anesthetic No    Pt has a pacemaker No    Pt has a defibrillator No   Social History Narrative    The patient does not use an assistive device..      The patient does live in a home with stairs.     Social Drivers of Health     Food Insecurity: Patient Declined (4/3/2024)    Received from [facility]    Hunger Vital Sign     Worried About Running Out of Food in the Last Year: Patient declined     Ran Out of Food in the Last Year: Patient declined   Transportation Needs: No Transportation Needs (4/3/2024)    Received from [facility]    PRAPARE - Transportation     Lack of Transportation (Medical): No     Lack of Transportation  (Non-Medical): No   Housing Stability: Low Risk  (4/3/2024)    Received from [facility]    Housing Stability Vital Sign     Unable to Pay for Housing in the Last Year: No     Number of Places Lived in the Last Year: 1     Unstable Housing in the Last Year: No

## 2025-05-06 ENCOUNTER — OFFICE VISIT (OUTPATIENT)
Facility: CLINIC | Age: 65
End: 2025-05-06

## 2025-05-06 VITALS — DIASTOLIC BLOOD PRESSURE: 64 MMHG | BODY MASS INDEX: 30 KG/M2 | SYSTOLIC BLOOD PRESSURE: 110 MMHG | WEIGHT: 175 LBS

## 2025-05-06 DIAGNOSIS — I83.813 VARICOSE VEINS OF BILATERAL LOWER EXTREMITIES WITH PAIN: Primary | ICD-10-CM

## 2025-05-06 PROCEDURE — 99203 OFFICE O/P NEW LOW 30 MIN: CPT

## 2025-05-06 RX ORDER — NORTRIPTYLINE HYDROCHLORIDE 25 MG/1
75 CAPSULE ORAL NIGHTLY
COMMUNITY
Start: 2025-04-24

## 2025-05-06 RX ORDER — UBROGEPANT 100 MG/1
TABLET ORAL
COMMUNITY
Start: 2025-01-22

## 2025-05-06 RX ORDER — UBROGEPANT 100 MG/1
TABLET ORAL
COMMUNITY
Start: 2025-04-18

## 2025-05-06 RX ORDER — BENZONATATE 100 MG/1
100 CAPSULE ORAL 3 TIMES DAILY
COMMUNITY
Start: 2024-12-29

## 2025-05-06 RX ORDER — AMMONIUM LACTATE 12 G/100G
LOTION TOPICAL
COMMUNITY

## 2025-05-06 RX ORDER — DEXAMETHASONE 2 MG/1
2 TABLET ORAL 2 TIMES DAILY
COMMUNITY
Start: 2024-12-28

## 2025-05-06 RX ORDER — UBROGEPANT 100 MG/1
100 TABLET ORAL
COMMUNITY
Start: 2024-10-16

## 2025-05-06 RX ORDER — AZITHROMYCIN 250 MG/1
TABLET, FILM COATED ORAL
COMMUNITY
Start: 2024-12-28

## 2025-05-06 NOTE — PROGRESS NOTES
VASCULAR SURGERY CONSULT NOTE      Tawny Benitez   :  1960  MR#  [id number]    REFERRING PHYSICIAN:  Che Huitron  PRIMARY CARE PHYSICIAN:  Jose Miguel Fish MD    Chief Complaint   Patient presents with    Referral     Referral from Dr. Jose Miguel Fish for Bilateral leg edema, pain and discoloration.  The patient reports three year history of Bilateral calf and extremity pain.  The pain is increased in the am and she rates the pain as L6.             HPI:    The patient is a 64 year old female who has been referred to the clinic today for an evaluation of her bilateral lower extremity heaviness, tiredness, edema, and pain after prolonged standing. The pain is reported in her calves, distal legs, ankles, and feet. This is interfering with her activities of daily living and exercise. She has not worn compression stockings in the recent past. She also endorses a medical history of type 2 diabetes.     PAST MEDICAL HISTORY:   Past Medical History[1]    PAST SURGICAL HISTORY:   Past Surgical History[2]     MEDICATIONS:   Current Medications[3]    ALLERGIES:   Allergies[4]    SOCIAL HISTORY:   Short Social Hx on File[5]     FAMILY HISTORY:   Family History[6]    ROS:   A 12 point review of systems with pertinent positives and negatives listed in the HPI.    PHYSICAL EXAM:   /64 (BP Location: Right arm)   Wt 175 lb (79.4 kg)   BMI 30.04 kg/m²   GENERAL: alert and orientated X 3, well developed, well nourished, in no apparent distress  HEENT: ears and throat are clear  NECK: supple, no lymphadenopathy, thyroid wnl  CAROTID: No bruits  RESPIRATORY: no rales, rhonchi, or wheezes B  CARDIO: RRR without murmur, no murmur, no gallop   ABDOMEN: soft, non-tender with no palpable aneurysm or masses  BACK: normal, no tenderness  SKIN: no rashes, warm and dry  NEURO/PSYCH: orientated x3, normal mood and affect, no sensory or motor deficit  EXTREMITIES: full range of motion, no tenderness or edema in either leg.    VASCULAR  Pulse exam right: femoral 2+, DP  1+, PT  2+  Pulse exam left: femoral  2+, DP  1+, PT  2+      Vein Assessment:      Mild scattered bilateral  LE varicose veins with no significant hemosiderin deposition.     IMPRESSION:   Bilateral  lower extremity pain due to venous insufficiency.   Symptomatic calf and feet varicosities.    PLAN:     We reviewed the options for management of venous insufficiency, including conservative therapy, sclerotherapy, stab phlebectomy, and endovenous laser ablation. The patient was educated in the benign condition of venous disease.  I have given the patient a prescription for Grade II compression stockings (20-30 mmHg, thigh-highs). We reviewed the importance of wearing these daily and consistently. We also reviewed other types of conservative measures, such as periodic leg elevation, walking for exercise, analgesic use, attempts at weight loss, and the avoidance of prolonged standing.  I have sent the patient for a venous reflux ultrasound. Should the ultrasound study reveal venous reflux with dilation and she does not have relief of symptoms with conservative therapy, then endovenous laser ablation may be a possible treatment option.  I explained to the patient that her insurance company may require a 6-12 week trial period of conservative therapy prior to authorizing endovenous ablation treatment.  The patient understood and agreed to proceed with this treatment plan, all of her questions were answered during this clinic visit.       Thank you for allowing to participate in the care of your patient.    SENDY Navarro  Division of Vascular Surgery.        [1]   Past Medical History:   Acid reflux    Age-related nuclear cataract of both eyes    Anxiety state    Arrhythmia    ablation 2003    Arthritis    Cataract    Disorder of thyroid    Esophageal reflux    Esophagitis    Floaters    High cholesterol    Migraines    Muscle weakness    bilateral knees at times     Osteoarthritis    Other and unspecified hyperlipidemia    S/P ablation of accessory bypass tract    Type II or unspecified type diabetes mellitus without mention of complication, not stated as uncontrolled    Visual impairment    glasses   [2]   Past Surgical History:  Procedure Laterality Date    Appendectomy  2005    Cataract Right 04/2018    Cholecystectomy  2007    Colonoscopy      Hysterectomy  2012    Lumpectomy right Right 02/27/2023    Right bracketed GREYSON LOC    Greyson biopsy stereo nodule 1 site right (cpt=19081)  12/30/2022    2 sites    Needle biopsy right  12/28/2022    us guided bx    Temporal artery ligatn or bx Bilateral 09/07/2017    Temporal artery biopsies, bilateral    Total knee replacement Right     Upper gi endoscopy performed  09/10/2002   [3]   Current Outpatient Medications   Medication Sig Dispense Refill    Lancets (ONETOUCH DELICA PLUS [id number]) Does not apply Misc 1 Lancet by Finger stick route in the morning and 1 Lancet before bedtime. 200 each 3    Glucose Blood (ONETOUCH ULTRA) In Vitro Strip CHECK BLOOD SUGAR 2 TIMES DAILY 200 strip 3    fenofibrate micronized 134 MG Oral Cap Take 1 capsule (134 mg total) by mouth daily with breakfast. 90 capsule 3    Dulaglutide (TRULICITY) 1.5 MG/0.5ML Subcutaneous Solution Auto-injector Inject 1.5 mg into the skin every 7 days. 2 mL 3    pantoprazole 40 MG Oral Tab EC TAKE 1 TABLET (40 MG) BY MOUTH 30-60 MINUTES BEFORE EVENING MEAL 90 tablet 3    Dulaglutide 1.5 MG/0.5ML Subcutaneous Solution Auto-injector Inject 1.5 mcg into the skin once a week. 6 mL 3    nortriptyline 50 MG Oral Cap Take 2 capsules (100 mg total) by mouth nightly. 1810 capsule 3    Ferrous Gluconate 324 (37.5 Fe) MG Oral Tab Take 1 tablet (324 mg total) by mouth daily. 90 tablet 1    loratadine 10 MG Oral Tab TAKE 1 TABLET BY MOUTH ONCE DAILY FOR 3-4 WEEKS, THEN AS NEEDED 90 tablet 3    Dulaglutide (TRULICITY) 1.5 MG/0.5ML Subcutaneous Solution Pen-injector Inject 1.5 mcg into  the skin every 7 days. 6 mL 0    ondansetron 4 MG Oral Tablet Dispersible Take 1 tablet (4 mg total) by mouth every 8 (eight) hours as needed for Nausea. 20 tablet 0    rosuvastatin 5 MG Oral Tab Take 1 tablet (5 mg total) by mouth nightly. 90 tablet 3    metFORMIN 500 MG Oral Tab Take 2 tablets (1,000 mg total) by mouth 2 (two) times daily. 360 tablet 2    Insulin Pen Needle (PEN NEEDLES) 32G X 4 MM Does not apply Misc 1 each daily. 90 each 0    Rizatriptan Benzoate 10 MG Oral Tab TAKE 1 TABLET AT ONSET OF HEADACHE; MAY REPEAT 1 TABLET IN 2 HOURS AS NEEDED. MAX 2 TABLETS IN 24 HOURS. MAX USE 9 DAYS PER MONTH 9 tablet 11    fluticasone propionate 50 MCG/ACT Nasal Suspension 2 sprays by Nasal route daily. FOR 1-2 WEEKS THEN AS NEEDED 16 g 1    levothyroxine 88 MCG Oral Tab Take 1 tablet (88 mcg total) by mouth before breakfast. 90 tablet 3    cholecalciferol 50 MCG (2000 UT) Oral Cap Take 1 capsule (2,000 Units total) by mouth daily.      Acetaminophen-Caffeine (EXCEDRIN ASPIRIN FREE OR) Take by mouth as needed.      Cyanocobalamin (VITAMIN B-12) 2500 MCG Sublingual SL Tab Take 1  Tab under the tongue daily (Patient taking differently: Take 1  Tab under the tongue every other day) 90 tablet 3    Blood Glucose Monitoring Suppl (ONETOUCH ULTRA 2) w/Device Does not apply Kit Check blood sugar 2 times daily (Patient taking differently: Check blood sugar daily) 1 kit 0    PREVIDENT 5000 SENSITIVE 1.1-5 % Dental Paste BRUSH TWICE DAILY     [4]   Allergies  Allergen Reactions    Penicillins HIVES and RASH    Codeine DIZZINESS and NAUSEA ONLY    Morphine NAUSEA ONLY and DIZZINESS   [5]   Social History  Socioeconomic History    Marital status:    Tobacco Use    Smoking status: Never     Passive exposure: Never    Smokeless tobacco: Never   Vaping Use    Vaping status: Never Used   Substance and Sexual Activity    Alcohol use: No     Alcohol/week: 0.0 standard drinks of alcohol    Drug use: No   Other Topics Concern     Caffeine Concern Yes     Comment: tea 1 cup daily and rare coffee    Exercise No    Breast feeding No    Reaction to local anesthetic No    Pt has a pacemaker No    Pt has a defibrillator No   Social History Narrative    The patient does not use an assistive device..      The patient does live in a home with stairs.     Social Drivers of Health     Food Insecurity: Patient Declined (4/3/2024)    Received from [facility]    Hunger Vital Sign     Worried About Running Out of Food in the Last Year: Patient declined     Ran Out of Food in the Last Year: Patient declined   Transportation Needs: No Transportation Needs (4/3/2024)    Received from [facility]    PRAPARE - Transportation     Lack of Transportation (Medical): No     Lack of Transportation (Non-Medical): No   Housing Stability: Low Risk  (4/3/2024)    Received from [facility]    Housing Stability Vital Sign     Unable to Pay for Housing in the Last Year: No     Number of Places Lived in the Last Year: 1     Unstable Housing in the Last Year: No   [6]   Family History  Problem Relation Age of Onset    Diabetes Mother     Heart Disorder Mother     Breast Cancer Mother 65    Other (arthritis) Mother     Diabetes Father     Diabetes Sister     Diabetes Brother     Heart Disorder Brother     Breast Cancer Maternal Cousin Female 60    Glaucoma Neg     Macular degeneration Neg     Ovarian Cancer Neg     Pancreatic Cancer Neg     Prostate Cancer Neg

## 2025-05-07 ENCOUNTER — TELEPHONE (OUTPATIENT)
Facility: CLINIC | Age: 65
End: 2025-05-07

## 2025-05-07 ENCOUNTER — MED REC SCAN ONLY (OUTPATIENT)
Dept: INTERNAL MEDICINE CLINIC | Facility: CLINIC | Age: 65
End: 2025-05-07

## 2025-05-07 NOTE — TELEPHONE ENCOUNTER
SG Called ZAHIRA @ [phone] to see if prior auth is required for CPT code 97087    Spoke with Mandy DAWKINS No prior auth is required for CPT code: 97165 , dx code: I83.813  Duration: 1 visit x 60 days    Call Ref #: [id number]

## 2025-05-08 ENCOUNTER — TELEPHONE (OUTPATIENT)
Dept: INTERNAL MEDICINE CLINIC | Facility: CLINIC | Age: 65
End: 2025-05-08

## 2025-05-08 ENCOUNTER — NURSE ONLY (OUTPATIENT)
Facility: CLINIC | Age: 65
End: 2025-05-08

## 2025-05-08 DIAGNOSIS — I83.813 VARICOSE VEINS OF BILATERAL LOWER EXTREMITIES WITH PAIN: ICD-10-CM

## 2025-05-08 PROCEDURE — 93970 EXTREMITY STUDY: CPT | Performed by: SURGERY

## 2025-05-08 RX ORDER — LANCETS 33 GAUGE
1 EACH MISCELLANEOUS 2 TIMES DAILY
Qty: 200 EACH | Refills: 3 | Status: SHIPPED | OUTPATIENT
Start: 2025-05-08

## 2025-05-08 RX ORDER — BLOOD-GLUCOSE METER
1 EACH MISCELLANEOUS 2 TIMES DAILY
Qty: 1 KIT | Refills: 0 | Status: SHIPPED | OUTPATIENT
Start: 2025-05-08

## 2025-05-08 RX ORDER — BLOOD SUGAR DIAGNOSTIC
STRIP MISCELLANEOUS
Qty: 200 EACH | Refills: 3 | Status: SHIPPED | OUTPATIENT
Start: 2025-05-08

## 2025-05-08 NOTE — TELEPHONE ENCOUNTER
Simón howell from [facility] insurance will only cover Contour plus products for BG testing   Patient will need new orders for Kit, strips and lancets     New RX sent as requested

## 2025-05-27 DIAGNOSIS — E03.9 HYPOTHYROIDISM, UNSPECIFIED TYPE: ICD-10-CM

## 2025-05-28 RX ORDER — LEVOTHYROXINE SODIUM 88 UG/1
88 TABLET ORAL
Qty: 90 TABLET | Refills: 3 | Status: SHIPPED | OUTPATIENT
Start: 2025-05-28

## 2025-05-28 NOTE — TELEPHONE ENCOUNTER
Refill passed per Clinic protocol.  Requested Prescriptions   Pending Prescriptions Disp Refills    LEVOTHYROXINE 88 MCG Oral Tab [Pharmacy Med Name: Levothyroxine Sodium 88 Mcg Tab Lupi] 90 tablet 0     Sig: Take 1 tablet (88 mcg total) by mouth before breakfast.       Thyroid Medication Protocol Passed - 5/28/2025  2:35 PM        Passed - TSH in past 12 months        Passed - Last TSH value is normal     Lab Results   Component Value Date    TSH 0.978 08/05/2024    THYROIDFUNC 2.61 10/08/2015                 Passed - In person appointment or virtual visit in the past 12 mos or appointment in next 3 mos     Recent Outpatient Visits              2 weeks ago Varicose veins of bilateral lower extremities with pain    [facility]    Nurse Only    3 weeks [facility] of [facility]    [facility] Visit    4 weeks ago Type 2 diabetes mellitus without complication, without long-term current use of insulin ([facility])    [facility] [address], [place] Che Huitron APRN    Office Visit    7 months ago Type 2 diabetes mellitus with hyperosmolarity without coma, without long-term current use of insulin ([facility]    [facility] Visit    8 months ago [facility]    [facility] Visit          Future Appointments         Provider Department Appt Notes    In 1 week Mary Mccarthy MD [facility], [address], [place]                     Passed - Medication is active on med list

## 2025-06-09 ENCOUNTER — OFFICE VISIT (OUTPATIENT)
Dept: INTERNAL MEDICINE CLINIC | Facility: CLINIC | Age: 65
End: 2025-06-09

## 2025-06-09 ENCOUNTER — TELEPHONE (OUTPATIENT)
Dept: INTERNAL MEDICINE CLINIC | Facility: CLINIC | Age: 65
End: 2025-06-09

## 2025-06-09 VITALS
HEART RATE: 76 BPM | HEIGHT: 64 IN | DIASTOLIC BLOOD PRESSURE: 81 MMHG | BODY MASS INDEX: 29.88 KG/M2 | WEIGHT: 175 LBS | SYSTOLIC BLOOD PRESSURE: 133 MMHG

## 2025-06-09 DIAGNOSIS — E11.9 TYPE 2 DIABETES MELLITUS WITHOUT COMPLICATION, WITHOUT LONG-TERM CURRENT USE OF INSULIN (HCC): ICD-10-CM

## 2025-06-09 DIAGNOSIS — E61.1 IRON DEFICIENCY: Primary | ICD-10-CM

## 2025-06-09 PROCEDURE — 99213 OFFICE O/P EST LOW 20 MIN: CPT | Performed by: INTERNAL MEDICINE

## 2025-06-09 NOTE — PROGRESS NOTES
Subjective:     Patient ID: Tawny Benitez is a 64 year old female.  Presents for follow-up on diabetes, hyperlipidemia.    HPI  Patient usually sees  due for follow-up diabetes, checks blood sugar at home it is running in 130s 140s, she is watching basic diet, takes metformin and Trulicity.  She sees ophthalmologist at [facility].  Glaucoma specialist, last diabetic eye exam June 2024 she is due for one this year.  Last hemoglobin A1c 7.2.  She is suffering from chronic migraine headaches and being treated by neurologist.  Still may have 2 headaches a week    Current Medications[1]  Allergies:Allergies[2]    Past Medical History[3]   Past Surgical History[4]   Family History[5]   Social History: Short Social Hx on File[6]     /81 (BP Location: Left arm, Patient Position: Sitting, Cuff Size: adult)   Pulse 76   Ht 5' 4\" (1.626 m)   Wt 175 lb (79.4 kg)   BMI 30.04 kg/m²    Physical Exam  Constitutional:       Appearance: Normal appearance.   HENT:      Head: Normocephalic and atraumatic.      Right Ear: Tympanic membrane, ear canal and external ear normal.      Left Ear: Tympanic membrane, ear canal and external ear normal.   Eyes:      General: No scleral icterus.     Extraocular Movements: Extraocular movements intact.      Conjunctiva/sclera: Conjunctivae normal.      Pupils: Pupils are equal, round, and reactive to light.   Neck:      Vascular: No carotid bruit.   Cardiovascular:      Rate and Rhythm: Normal rate and regular rhythm.   Pulmonary:      Effort: Pulmonary effort is normal.      Breath sounds: No rales.   Chest:      Chest wall: No tenderness.   Abdominal:      General: Abdomen is flat.      Palpations: There is no mass.      Tenderness: There is no abdominal tenderness. There is no guarding or rebound.   Musculoskeletal:         General: Normal range of motion.      Cervical back: Normal range of motion and neck supple.      Right lower leg: No edema.      Left lower leg: No edema.    Lymphadenopathy:      Cervical: No cervical adenopathy.   Skin:     General: Skin is warm.   Neurological:      General: No focal deficit present.      Mental Status: She is alert and oriented to person, place, and time. Mental status is at baseline.      Gait: Gait normal.      Deep Tendon Reflexes: Reflexes normal.   Psychiatric:         Mood and Affect: Mood normal.         Thought Content: Thought content normal.         Judgment: Judgment normal.         Assessment & Plan:   1. Iron deficiency will check CBC and iron studies treat if necessary   2. Type 2 diabetes mellitus without complication, without long-term current use of insulin (HCC) controlled, will check labs CMP microalbumin hemoglobin A1c continue same medications for now   3.      Hyperlipidemia controlled on rosuvastatin will check lipids continue medication    Orders Placed This Encounter   Procedures    CBC With Differential With Platelet    Comp Metabolic Panel (14)    Lipid Panel    TSH W Reflex To Free T4    Hemoglobin A1C    Microalb/Creat Ratio, Random Urine [E]    Ferritin    Iron And Tibc     Follow-up in 6 months sooner if needed  Meds This Visit:  Requested Prescriptions     Signed Prescriptions Disp Refills    Dulaglutide 1.5 MG/0.5ML Subcutaneous Solution Auto-injector 6 mL 3     Sig: Inject 1.5 mcg into the skin once a week.    metFORMIN 500 MG Oral Tab 360 tablet 3     Sig: Take 2 tablets (1,000 mg total) by mouth 2 (two) times daily.       Imaging & Referrals:  None            [1]   Current Outpatient Medications   Medication Sig Dispense Refill    Dulaglutide 1.5 MG/0.5ML Subcutaneous Solution Auto-injector Inject 1.5 mcg into the skin once a week. 6 mL 3    metFORMIN 500 MG Oral Tab Take 2 tablets (1,000 mg total) by mouth 2 (two) times daily. 360 tablet 3    levothyroxine 88 MCG Oral Tab Take 1 tablet (88 mcg total) by mouth before breakfast. 90 tablet 3    Glucose Blood (CONTOUR PLUS TEST) In Vitro Strip Test blood sugar twice  a day 200 each 3    Blood Glucose Monitoring Suppl (CONTOUR PLUS BLUE) w/Device Does not apply Kit 1 each in the morning and 1 each before bedtime. Test blood sugar twice a day. 1 kit 0    Lancets 33G Does not apply Misc 1 Lancet in the morning and 1 Lancet before bedtime. Test blood sugar twice a day. 200 each 3    ammonium lactate 12 % External Lotion Apply to feet daily. Rub in thoroughly, may cause sun sensitvity      azithromycin 250 MG Oral Tab       benzonatate 100 MG Oral Cap Take 1 capsule (100 mg total) by mouth 3 (three) times daily.      dexamethasone 2 MG Oral Tab Take 1 tablet (2 mg total) by mouth 2 (two) times daily.      UBRELVY 100 MG Oral Tab       UBRELVY 100 MG Oral Tab       UBRELVY 100 MG Oral Tab Take 100 mg by mouth.      nortriptyline 25 MG Oral Cap Take 3 capsules (75 mg total) by mouth nightly. AT BEDTIME      Lancets (ONETOUCH DELICA PLUS NDOVDK07M) Does not apply Misc 1 Lancet by Finger stick route in the morning and 1 Lancet before bedtime. 200 each 3    Glucose Blood (ONETOUCH ULTRA) In Vitro Strip CHECK BLOOD SUGAR 2 TIMES DAILY 200 strip 3    fenofibrate micronized 134 MG Oral Cap Take 1 capsule (134 mg total) by mouth daily with breakfast. 90 capsule 3    Dulaglutide (TRULICITY) 1.5 MG/0.5ML Subcutaneous Solution Auto-injector Inject 1.5 mg into the skin every 7 days. 2 mL 3    pantoprazole 40 MG Oral Tab EC TAKE 1 TABLET (40 MG) BY MOUTH 30-60 MINUTES BEFORE EVENING MEAL 90 tablet 3    nortriptyline 50 MG Oral Cap Take 2 capsules (100 mg total) by mouth nightly. 1810 capsule 3    Ferrous Gluconate 324 (37.5 Fe) MG Oral Tab Take 1 tablet (324 mg total) by mouth daily. 90 tablet 1    loratadine 10 MG Oral Tab TAKE 1 TABLET BY MOUTH ONCE DAILY FOR 3-4 WEEKS, THEN AS NEEDED 90 tablet 3    Dulaglutide (TRULICITY) 1.5 MG/0.5ML Subcutaneous Solution Pen-injector Inject 1.5 mcg into the skin every 7 days. 6 mL 0    ondansetron 4 MG Oral Tablet Dispersible Take 1 tablet (4 mg total) by  mouth every 8 (eight) hours as needed for Nausea. 20 tablet 0    rosuvastatin 5 MG Oral Tab Take 1 tablet (5 mg total) by mouth nightly. 90 tablet 3    Insulin Pen Needle (PEN NEEDLES) 32G X 4 MM Does not apply Misc 1 each daily. 90 each 0    Rizatriptan Benzoate 10 MG Oral Tab TAKE 1 TABLET AT ONSET OF HEADACHE; MAY REPEAT 1 TABLET IN 2 HOURS AS NEEDED. MAX 2 TABLETS IN 24 HOURS. MAX USE 9 DAYS PER MONTH 9 tablet 11    fluticasone propionate 50 MCG/ACT Nasal Suspension 2 sprays by Nasal route daily. FOR 1-2 WEEKS THEN AS NEEDED 16 g 1    cholecalciferol 50 MCG (2000 UT) Oral Cap Take 1 capsule (2,000 Units total) by mouth daily.      Acetaminophen-Caffeine (EXCEDRIN ASPIRIN FREE OR) Take by mouth as needed.      Cyanocobalamin (VITAMIN B-12) 2500 MCG Sublingual SL Tab Take 1  Tab under the tongue daily (Patient taking differently: Take 1  Tab under the tongue every other day) 90 tablet 3    Blood Glucose Monitoring Suppl (ONETOUCH ULTRA 2) w/Device Does not apply Kit Check blood sugar 2 times daily (Patient taking differently: Check blood sugar daily) 1 kit 0    PREVIDENT 5000 SENSITIVE 1.1-5 % Dental Paste BRUSH TWICE DAILY     [2]   Allergies  Allergen Reactions    Penicillins HIVES and RASH    Codeine DIZZINESS and NAUSEA ONLY    Morphine NAUSEA ONLY and DIZZINESS   [3]   Past Medical History:   Acid reflux    Age-related nuclear cataract of both eyes    Anxiety state    Arrhythmia    ablation 2003    Arthritis    Cataract    Disorder of thyroid    Esophageal reflux    Esophagitis    Floaters    High cholesterol    Migraines    Muscle weakness    bilateral knees at times    Osteoarthritis    Other and unspecified hyperlipidemia    S/P ablation of accessory bypass tract    Type II or unspecified type diabetes mellitus without mention of complication, not stated as uncontrolled    Visual impairment    glasses   [4]   Past Surgical History:  Procedure Laterality Date    Appendectomy  2005    Cataract Right 04/2018     Cholecystectomy  2007    Colonoscopy      Hysterectomy  2012    Lumpectomy right Right 02/27/2023    Right bracketed GREYSON LOC    Greyson biopsy stereo nodule 1 site right (cpt=19081)  12/30/2022    2 sites    Needle biopsy right  12/28/2022    us guided bx    Temporal artery ligatn or bx Bilateral 09/07/2017    Temporal artery biopsies, bilateral    Total knee replacement Right     Upper gi endoscopy performed  09/10/2002   [5]   Family History  Problem Relation Age of Onset    Diabetes Mother     Heart Disorder Mother     Breast Cancer Mother 65    Other (arthritis) Mother     Diabetes Father     Diabetes Sister     Diabetes Brother     Heart Disorder Brother     Breast Cancer Maternal Cousin Female 60    Glaucoma Neg     Macular degeneration Neg     Ovarian Cancer Neg     Pancreatic Cancer Neg     Prostate Cancer Neg    [6]   Social History  Socioeconomic History    Marital status:    Tobacco Use    Smoking status: Never     Passive exposure: Never    Smokeless tobacco: Never   Vaping Use    Vaping status: Never Used   Substance and Sexual Activity    Alcohol use: No     Alcohol/week: 0.0 standard drinks of alcohol    Drug use: No   Other Topics Concern    Caffeine Concern Yes     Comment: tea 1 cup daily and rare coffee    Exercise No    Breast feeding No    Reaction to local anesthetic No    Pt has a pacemaker No    Pt has a defibrillator No   Social History Narrative    The patient does not use an assistive device..      The patient does live in a home with stairs.     Social Drivers of Health     Food Insecurity: No Food Insecurity (6/4/2025)    Received from [facility]    Hunger Vital Sign     Worried About Running Out of Food in the Last Year: Never true     Ran Out of Food in the Last Year: Never true   Transportation Needs: No Transportation Needs (6/4/2025)    Received from [facility]    PRAPARE - Transportation     Lack of Transportation (Medical): No      Lack of Transportation (Non-Medical): No   Housing Stability: Low Risk  (4/3/2024)    Received from [facility]    Housing Stability Vital Sign     Unable to Pay for Housing in the Last Year: No     Number of Places Lived in the Last Year: 1     Unstable Housing in the Last Year: No

## 2025-07-07 NOTE — TELEPHONE ENCOUNTER
Please review.  Protocol failed / Has no protocol.     U4EA message sent to patient to complete labs outstanding from last office visit 6/9/25.    Requested Prescriptions   Pending Prescriptions Disp Refills    ROSUVASTATIN 5 MG Oral Tab [Pharmacy Med Name: Rosuvastatin Calcium 5 Mg Tab Scie] 90 tablet 0     Sig: Take 1 tablet (5 mg total) by mouth nightly.       Cholesterol Medication Protocol Failed - 7/7/2025  3:12 PM        Failed - Lipid panel within past 12 months        Passed - ALT < 80        Passed - ALT resulted within past year        Passed - In person appointment or virtual visit in the past 12 mos or appointment in next 3 mos        Passed - Medication is active on med list

## 2025-07-10 RX ORDER — ROSUVASTATIN CALCIUM 5 MG/1
5 TABLET, COATED ORAL NIGHTLY
Qty: 90 TABLET | Refills: 0 | Status: SHIPPED | OUTPATIENT
Start: 2025-07-10

## 2025-07-23 ENCOUNTER — TELEPHONE (OUTPATIENT)
Dept: INTERNAL MEDICINE CLINIC | Facility: CLINIC | Age: 65
End: 2025-07-23

## 2025-07-24 NOTE — TELEPHONE ENCOUNTER
Please Review. Protocol Failed; No Protocol     Requested Prescriptions   Pending Prescriptions Disp Refills    FERROUS GLUCONATE 324 (37.5 Fe) MG Oral Tab [Pharmacy Med Name: Ferrous Gluconate 324 Mg Tab Zee] 90 tablet 0     Sig: Take 1 tablet (324 mg total) by mouth daily.       There is no refill protocol information for this order

## 2025-07-25 RX ORDER — FERROUS GLUCONATE 324(37.5)
1 TABLET ORAL DAILY
Qty: 90 TABLET | Refills: 3 | OUTPATIENT
Start: 2025-07-25

## 2025-07-25 NOTE — TELEPHONE ENCOUNTER
Spoke to patient (verified Name and ) and relayed provider's message below. Patient verbalized understanding and had no further questions or concerns at this time.     Future Appointments   Date Time Provider Department Center   2025  3:00 PM Emperatriz Puckett MD [id number]   10/28/2025  8:20 AM Jose Miguel Fish MD [id number] [place]

## 2025-07-25 NOTE — TELEPHONE ENCOUNTER
Refusal reason: Have patient call the office (pt need labs to check on  her iron level and f/u visit  ag]fter labs )     Non-formulary

## 2025-08-02 ENCOUNTER — LAB ENCOUNTER (OUTPATIENT)
Dept: LAB | Age: 65
End: 2025-08-02
Attending: INTERNAL MEDICINE

## 2025-08-02 DIAGNOSIS — E61.1 IRON DEFICIENCY: ICD-10-CM

## 2025-08-02 DIAGNOSIS — E11.9 TYPE 2 DIABETES MELLITUS WITHOUT COMPLICATION, WITHOUT LONG-TERM CURRENT USE OF INSULIN (HCC): ICD-10-CM

## 2025-08-02 LAB
ALBUMIN SERPL-MCNC: 4.7 G/DL (ref 3.2–4.8)
ALBUMIN/GLOB SERPL: 2 (ref 1–2)
ALP LIVER SERPL-CCNC: 73 U/L (ref 50–130)
ALT SERPL-CCNC: 31 U/L (ref 10–49)
ANION GAP SERPL CALC-SCNC: 8 MMOL/L (ref 0–18)
AST SERPL-CCNC: 48 U/L (ref ?–34)
BASOPHILS # BLD AUTO: 0.05 X10(3) UL (ref 0–0.2)
BASOPHILS NFR BLD AUTO: 0.8 %
BILIRUB SERPL-MCNC: 0.3 MG/DL (ref 0.2–1.1)
BUN BLD-MCNC: 14 MG/DL (ref 9–23)
BUN/CREAT SERPL: 15.2 (ref 10–20)
CALCIUM BLD-MCNC: 9.3 MG/DL (ref 8.7–10.4)
CHLORIDE SERPL-SCNC: 104 MMOL/L (ref 98–112)
CHOLEST SERPL-MCNC: 112 MG/DL (ref ?–200)
CO2 SERPL-SCNC: 24 MMOL/L (ref 21–32)
CREAT BLD-MCNC: 0.92 MG/DL (ref 0.55–1.02)
CREAT UR-SCNC: 93.1 MG/DL
DEPRECATED HBV CORE AB SER IA-ACNC: 40 NG/ML (ref 50–306)
DEPRECATED RDW RBC AUTO: 43.5 FL (ref 35.1–46.3)
EGFRCR SERPLBLD CKD-EPI 2021: 70 ML/MIN/1.73M2 (ref 60–?)
EOSINOPHIL # BLD AUTO: 0.14 X10(3) UL (ref 0–0.7)
EOSINOPHIL NFR BLD AUTO: 2.1 %
ERYTHROCYTE [DISTWIDTH] IN BLOOD BY AUTOMATED COUNT: 14.9 % (ref 11–15)
FASTING PATIENT LIPID ANSWER: YES
FASTING STATUS PATIENT QL REPORTED: YES
GLOBULIN PLAS-MCNC: 2.4 G/DL (ref 2–3.5)
GLUCOSE BLD-MCNC: 106 MG/DL (ref 70–99)
HCT VFR BLD AUTO: 37.9 % (ref 35–48)
HDLC SERPL-MCNC: 49 MG/DL (ref 40–59)
HGB BLD-MCNC: 12.1 G/DL (ref 12–16)
IMM GRANULOCYTES # BLD AUTO: 0.02 X10(3) UL (ref 0–1)
IMM GRANULOCYTES NFR BLD: 0.3 %
IRON SATN MFR SERPL: 16 % (ref 15–50)
IRON SERPL-MCNC: 63 UG/DL (ref 50–170)
LDLC SERPL CALC-MCNC: 36 MG/DL (ref ?–100)
LYMPHOCYTES # BLD AUTO: 2.28 X10(3) UL (ref 1–4)
LYMPHOCYTES NFR BLD AUTO: 34.9 %
MCH RBC QN AUTO: 26.1 PG (ref 26–34)
MCHC RBC AUTO-ENTMCNC: 31.9 G/DL (ref 31–37)
MCV RBC AUTO: 81.7 FL (ref 80–100)
MICROALBUMIN UR-MCNC: <0.3 MG/DL
MONOCYTES # BLD AUTO: 0.54 X10(3) UL (ref 0.1–1)
MONOCYTES NFR BLD AUTO: 8.3 %
NEUTROPHILS # BLD AUTO: 3.5 X10 (3) UL (ref 1.5–7.7)
NEUTROPHILS # BLD AUTO: 3.5 X10(3) UL (ref 1.5–7.7)
NEUTROPHILS NFR BLD AUTO: 53.6 %
NONHDLC SERPL-MCNC: 63 MG/DL (ref ?–130)
OSMOLALITY SERPL CALC.SUM OF ELEC: 283 MOSM/KG (ref 275–295)
PLATELET # BLD AUTO: 381 10(3)UL (ref 150–450)
POTASSIUM SERPL-SCNC: 4.8 MMOL/L (ref 3.5–5.1)
PROT SERPL-MCNC: 7.1 G/DL (ref 5.7–8.2)
RBC # BLD AUTO: 4.64 X10(6)UL (ref 3.8–5.3)
SODIUM SERPL-SCNC: 136 MMOL/L (ref 136–145)
TOTAL IRON BINDING CAPACITY: 384 UG/DL (ref 250–425)
TRANSFERRIN SERPL-MCNC: 304 MG/DL (ref 250–380)
TRIGL SERPL-MCNC: 164 MG/DL (ref 30–149)
TSI SER-ACNC: 1.28 UIU/ML (ref 0.55–4.78)
VLDLC SERPL CALC-MCNC: 22 MG/DL (ref 0–30)
WBC # BLD AUTO: 6.5 X10(3) UL (ref 4–11)

## 2025-08-02 PROCEDURE — 84443 ASSAY THYROID STIM HORMONE: CPT

## 2025-08-02 PROCEDURE — 83540 ASSAY OF IRON: CPT

## 2025-08-02 PROCEDURE — 82728 ASSAY OF FERRITIN: CPT

## 2025-08-02 PROCEDURE — 80053 COMPREHEN METABOLIC PANEL: CPT

## 2025-08-02 PROCEDURE — 36415 COLL VENOUS BLD VENIPUNCTURE: CPT

## 2025-08-02 PROCEDURE — 82570 ASSAY OF URINE CREATININE: CPT

## 2025-08-02 PROCEDURE — 85025 COMPLETE CBC W/AUTO DIFF WBC: CPT

## 2025-08-02 PROCEDURE — 82043 UR ALBUMIN QUANTITATIVE: CPT

## 2025-08-02 PROCEDURE — 84466 ASSAY OF TRANSFERRIN: CPT

## 2025-08-02 PROCEDURE — 83036 HEMOGLOBIN GLYCOSYLATED A1C: CPT

## 2025-08-02 PROCEDURE — 80061 LIPID PANEL: CPT

## 2025-08-03 LAB
EST. AVERAGE GLUCOSE BLD GHB EST-MCNC: 157 MG/DL (ref 68–126)
HBA1C MFR BLD: 7.1 % (ref ?–5.7)

## (undated) DIAGNOSIS — G43.709 CHRONIC MIGRAINE WITHOUT AURA WITHOUT STATUS MIGRAINOSUS, NOT INTRACTABLE: Primary | ICD-10-CM

## (undated) DIAGNOSIS — R51.9 PERSISTENT HEADACHES: Primary | ICD-10-CM

## (undated) DEVICE — SUT SILK 2-0 FS 685G

## (undated) DEVICE — FLEXIBLE YANKAUER,MEDIUM TIP, NO VACUUM CONTROL: Brand: ARGYLE

## (undated) DEVICE — Device: Brand: JELCO

## (undated) DEVICE — GAUZE SPONGES,12 PLY: Brand: CURITY

## (undated) DEVICE — GAUZE TRAY STERILE 4X4 12PLY

## (undated) DEVICE — DRAPE PACK CHEST & U BAR

## (undated) DEVICE — CLIP SM INTNL HRZN TI LGT LTWT

## (undated) DEVICE — SUTURE CHROMIC GUT 4-0 FS-2

## (undated) DEVICE — GEL AQUASONIC 100 20GR

## (undated) DEVICE — DRAPE SRG 50X36IN HD TRBN STRL

## (undated) DEVICE — SUT MONOCRYL 4-0 PS-2 Y426H

## (undated) DEVICE — ELECTRODE ESURG 2.75IN EZ CLN

## (undated) DEVICE — DRAPE TAPE: Brand: CONVERTORS

## (undated) DEVICE — BRA SURG ELIZ PINK XL

## (undated) DEVICE — DRAPE SRG 26X15IN UTL TPE STRL

## (undated) DEVICE — HEMOCLIP HORIZON MED 002200

## (undated) DEVICE — DERMABOND LIQUID ADHESIVE

## (undated) DEVICE — SUT VICRYL 3-0 SH J416H

## (undated) DEVICE — 6 ML SYRINGE LUER-LOCK TIP: Brand: MONOJECT

## (undated) DEVICE — SOL NACL IRRIG 0.9% 1000ML BTL

## (undated) DEVICE — CLIP SM INTNL HMCLP TNTLM ESCP

## (undated) DEVICE — NON-ADHERENT DRESSING: Brand: TELFA

## (undated) DEVICE — GAMMEX® PI HYBRID SIZE 6.5, STERILE POWDER-FREE SURGICAL GLOVE, POLYISOPRENE AND NEOPRENE BLEND: Brand: GAMMEX

## (undated) DEVICE — SUT PDS II 3-0 PS-1 Z683G

## (undated) DEVICE — DRAPE SRG U 124X80IN U REINF

## (undated) DEVICE — SUTURE VICRYL 4-0 J494G

## (undated) DEVICE — CONTAINER,SPECIMEN,OR STERILE,4OZ: Brand: MEDLINE

## (undated) DEVICE — 3M™ STERI-STRIP™ REINFORCED ADHESIVE SKIN CLOSURES, R1547, 1/2 IN X 4 IN (12 MM X 100 MM), 6 STRIPS/ENVELOPE: Brand: 3M™ STERI-STRIP™

## (undated) DEVICE — REM POLYHESIVE ADULT PATIENT RETURN ELECTRODE: Brand: VALLEYLAB

## (undated) DEVICE — CLIP MED INTNL HMCLP TNTLM

## (undated) DEVICE — SUTURE CHROMIC 3-0 LIGAPAK L

## (undated) DEVICE — TOWEL OR BLU 16X26 STRL

## (undated) DEVICE — SUTURE VICRYL 3-0 RB-1

## (undated) DEVICE — ADHESIVE MASTISOL 2/3ML

## (undated) DEVICE — OUTPATIENT: Brand: MEDLINE INDUSTRIES, INC.

## (undated) DEVICE — SUTURE VICRYL 4-0 RB-1

## (undated) DEVICE — SUTURE CHROMIC GUT 4-0 SH

## (undated) DEVICE — TRAY SRGPRP PVP IOD WT SCRB SM

## (undated) DEVICE — COVER SGL STRL LGHT HNDL BLU

## (undated) DEVICE — E-Z CLEAN, NON-STICK, PTFE COATED, ELECTROSURGICAL BLADE ELECTRODE, MODIFIED EXTENDED INSULATION, 2.5 INCH (6.35 CM): Brand: MEGADYNE

## (undated) DEVICE — SUCTION CANISTER, 3000CC,SAFELINER: Brand: DEROYAL

## (undated) DEVICE — PAD,ABDOMINAL,8"X7.5",STERILE,LF,1/PK: Brand: MEDLINE

## (undated) DEVICE — SOL  .9 1000ML BTL

## (undated) DEVICE — STERILE LATEX POWDER-FREE SURGICAL GLOVESWITH NITRILE COATING: Brand: PROTEXIS

## (undated) DEVICE — MINOR GENERAL: Brand: MEDLINE INDUSTRIES, INC.

## (undated) DEVICE — 12 ML SYRINGE LUER-LOCK TIP: Brand: MONOJECT

## (undated) NOTE — LETTER
08/19/20        [address]      Dear Freida Penny records indicate that you have outstanding lab work and or testing that was ordered for you and has not yet been completed:  Orders Placed This Encounter

## (undated) NOTE — LETTER
AUTHORIZATION FOR SURGICAL OPERATION OR OTHER PROCEDURE    1. I hereby authorize Dr. Shubham Waller and the [facility] staff assigned to my case to perform the following operation and/or procedure at the [facility]:    Botox_    2.   My physician has explaine []  Parent    Responsible person                          []  Spouse  In case of minor or                    [] Other  _____________   Incompetent name:  __________________________________________________                               (please prin

## (undated) NOTE — ED AVS SNAPSHOT
[facility] AND [facility] in [address] [facility]  [address]    Phone:  [phone]    Fax:  Ilir [place]   MRN: [id number]    Department:  [facility] AND [facility] in [address] [place] [address]   Date of Visit:  1 presentation today. Symptoms may change or worsen over time, or new symptoms my develop. If you are not improving as expected see your doctor or return sooner than  we discussed.  If your are worsening or develop new symptoms or problems that concern you, [facility]. Follow-up care is at the discretion of that Physician.   If you need additional assistance selecting a physician, you may call the [place] [address] Physician Referral and Class Registration line at [phone] or f Additional Information       We are concerned for your overall well being:    - If you are a smoker or have smoked in the last 12 months, we encourage you to explore options for quitting.     - If you have concerns related to behavioral health issues or th

## (undated) NOTE — LETTER
08/09/21        [address]      Dear Freida Penny records indicate that you have outstanding lab work and or testing that was ordered for you and has not yet been completed:  Orders Placed This Encounter

## (undated) NOTE — ED AVS SNAPSHOT
[facility] AND [facility] in [address] [address] [address]    Phone:  [phone]    Fax:  Cheryl Daniel   MRN: [id number]    Department:  [facility] AND [facility] in [place]   Date of Visit:  6 CONCUSSION, DISCHARGE INSTRUCTIONS FOR (ENGLISH)      Disclosure     Insurance plans vary and the physician(s) referred by the Immediate Care may not be covered by your plan.   It is possible that the physician may not participate in your health insurance IF THERE IS ANY CHANGE OR WORSENING OF YOUR CONDITION, CALL YOUR PRIMARY CARE PHYSICIAN AT ONCE OR GO TO THE EMERGENCY DEPARTMENT.     If you have been prescribed any medication(s), please fill your prescription right away and begin taking the medication(s) you to explore options for quitting.     - If you have concerns related to behavioral health issues or thoughts of harming yourself, contact [address] [facility] at [phone].     - If you don’t have insurance, Filiberto Barraza

## (undated) NOTE — LETTER
[address]  Authorization for Surgical Operation and Procedure                                                                                           1. I hereby authorize Samara Garcia MD, my physician and his/her assistants (if applicable), which may include medical students, residents, and/or fellows, to perform the following surgical operation/ procedure and administer such anesthesia as may be determined necessary by my physician: Operation/Procedure name (s)   Right breast  excisional biopsy on Tawn[address] Benitez   2. I recognize that during the surgical operation/procedure, unforeseen conditions may necessitate additional or different procedures than those listed above. I, therefore, further authorize and request that the above-named surgeon, assistants, or designees perform such procedures as are, in their judgment, necessary and desirable. 3.   My surgeon/physician has discussed prior to my surgery the potential benefits, risks and side effects of this procedure; the likelihood of achieving goals; and potential problems that might occur during recuperation. They also discussed reasonable alternatives to the procedure, including risks, benefits, and side effects related to the alternatives and risks related to not receiving this procedure. I have had all my questions answered and I acknowledge that no guarantee has been made as to the result that may be obtained. 4.   Should the need arise during my operation/procedure, which includes change of level of care prior to discharge, I also consent to the administration of blood and/or blood products. Further, I understand that despite careful testing and screening of blood or blood products by collecting agencies, I may still be subject to ill effects as a result of receiving a blood transfusion and/or blood products.   The following are some, but not all, of the potential risks that can occur: fever and allergic reactions, hemolytic reactions, transmission of diseases such as Hepatitis, AIDS and Cytomegalovirus (CMV) and fluid overload. In the event that I wish to have an autologous transfusion of my own blood, or a directed donor transfusion, I will discuss this with my physician. Check only if Refusing Blood or Blood Products  I understand refusal of blood or blood products as deemed necessary by my physician may have serious consequences to my condition to include possible death. I hereby assume responsibility for my refusal and release the hospital, its personnel, and my physicians from any responsibility for the consequences of my refusal.    o  Refuse   5. I authorize the use of any specimen, organs, tissues, body parts or foreign objects that may be removed from my body during the operation/procedure for diagnosis, research or teaching purposes and their subsequent disposal by hospital authorities. I also authorize the release of specimen test results and/or written reports to my treating physician on the hospital medical staff or other referring or consulting physicians involved in my care, at the discretion of the Pathologist or my treating physician. 6.   I consent to the photographing or videotaping of the operations or procedures to be performed, including appropriate portions of my body for medical, scientific, or educational purposes, provided my identity is not revealed by the pictures or by descriptive texts accompanying them. If the procedure has been photographed/videotaped, the surgeon will obtain the original picture, image, videotape or CD. The hospital will not be responsible for storage, release or maintenance of the picture, image, tape or CD.    7.   I consent to the presence of a  or observers in the operating room as deemed necessary by my physician or their designees.     8.   I recognize that in the event my procedure results in extended X-Ray/fluoroscopy time, I may develop a skin reaction. 9. If I have a Do Not Attempt Resuscitation (DNAR) order in place, that status will be suspended while in the operating room, procedural suite, and during the recovery period unless otherwise explicitly stated by me (or a person authorized to consent on my behalf). The surgeon or my attending physician will determine when the applicable recovery period ends for purposes of reinstating the DNAR order. 10. Patients having a sterilization procedure: I understand that if the procedure is successful the results will be permanent and it will therefore be impossible for me to inseminate, conceive, or bear children. I also understand that the procedure is intended to result in sterility, although the result has not been guaranteed. 11. I acknowledge that my physician has explained sedation/analgesia administration to me including the risk and benefits I consent to the administration of sedation/analgesia as may be necessary or desirable in the judgment of my physician. I CERTIFY THAT I HAVE READ AND FULLY UNDERSTAND THE ABOVE CONSENT TO OPERATION and/or OTHER PROCEDURE.     _________________________________________ _________________________________     ___________________________________  Signature of Patient     Signature of Responsible Person                   Printed Name of Responsible Person                              _________________________________________ ______________________________        ___________________________________  Signature of Witness         Date  Time         Relationship to Patient    STATEMENT OF PHYSICIAN My signature below affirms that prior to the time of the procedure; I have explained to the patient and/or his/her legal representative, the risks and benefits involved in the proposed treatment and any reasonable alternative to the proposed treatment.  I have also explained the risks and benefits involved in refusal of the proposed treatment and alternatives to the proposed treatment and have answered the patient's questions.  If I have a significant financial interest in a co-management agreement or a significant financial interest in any product or implant, or other significant relationship used in this procedure/surgery, I have disclosed this and had a discussion with my patient.     _______________________________________________________________ _____________________________  Roselyn Coke of Physician)                                                                                         (Date)                                   (Time)  Patient Name: Myron Eason    : 1960   Printed: 2023      Medical Record #: [id number]                                              Page 1 of 1

## (undated) NOTE — LETTER
4/27/2022              [address] Benitez        [address]        [place] [address]         To Whom It May Concern,    Graham Abrams is currently under my medical care. Michel Ramirez is approved for her upcoming right total knee replacement surgery with Dr. Cherry Roper on 5/6/2022. If you have any questions please contact our office.       Sincerely,        Felicia Cabello MD  [address], [place] [address] [id number]        Document electronically generated by:  Fred Gonzalez LPN

## (undated) NOTE — MR AVS SNAPSHOT
[address] [address]  [phone]               Thank you for choosing us for your health care visit with Eliot Fisher MD.  We are glad to serve you and happy to provide you with this graham 4. Return to clinic in 4-6 months. 5. Glucosamine chondroitin - for righ tknee   6. Knee brace for right knee   7. Physical therapy for knee.    8. Check labs        Allergies as of Apr 24, 2017     Codeine Nausea only, Dizziness    Morphine Nausea only, TAKE 1 CAPSULE BY MOUTH 30-60 MIN BEFORE BREAKFAST           simvastatin 10 MG Tabs   Take 1 tablet (10 mg total) by mouth nightly.    Commonly known as:  ZOCOR           SUMAtriptan Succinate 100 MG Tabs   TAKE 1 TABLET BY MOUTH PER  DAY   AS NEEDED  FOR

## (undated) NOTE — LETTER
[address]  [address]  [facility] [address]  [phone]  Authorization for Imaging Procedure  Date of Procedure:     1. I hereby authorize Dr. Kary Fontaine , my physician and his/her assistants (if applicable), which may include medical students, residents, and/or fellows, to perform the following procedure and administer such anesthesia as may be determined necessary by my physician: STEREOTACTIC BIOPSY WITH TOMOSYNTHESIS OF THE RIGHT BREAST TIMES (2) TWO SITES WITH CLIP PLACEMNET TO [address] [place] [address]. 2.  I recognize that during the procedure, unforeseen conditions may necessitate additional or different procedures than those listed above. I, therefore, further authorize and request that the above-named physician, assistants, or designees perform such procedures as are, in their judgment, necessary and desirable. 3.  My physician has discussed prior to my procedure the potential benefits, risks and side effects of this procedure; the likelihood of achieving goals; and potential problems that might occur during recuperation. They also discussed reasonable alternatives to the procedure, including risks, benefits, and side effects related to the alternatives and risks related to not receiving this procedure. I have had all my questions answered and I acknowledge that no guarantee has been made as to the result that may be obtained. 4.  Should the need arise during my procedure, which includes change of level of care prior to discharge, I also consent to the administration of blood and/or blood products. Further, I understand that despite careful testing and screening of blood or blood products by collecting agencies, I may still be subject to ill effects as a result of receiving a blood transfusion and/or blood products.  The following are some, but not all, of the potential risks that can occur: fever and allergic reactions, hemolytic reactions, transmission of diseases such as Hepatitis, AIDS and Cytomegalovirus (CMV) and fluid overload. In the event that I wish to have an autologous transfusion of my own blood, or a directed donor transfusion, I will discuss this with my physician. Check only if Refusing Blood or Blood Products  I understand refusal of blood or blood products as deemed necessary by my physician may have serious consequences to my condition to include possible death. I hereby assume responsibility for my refusal and release the hospital, its personnel, and my physicians from any responsibility for the consequences of my refusal.   [  ] Patient Refuses Blood      5. I authorize the use of any specimen, organs, tissues, body parts or foreign objects that may be removed from my body during the procedure for diagnosis, research or teaching purposes and their subsequent disposal by hospital authorities. I also authorize the release of specimen test results and/or written reports to my treating physician on the hospital medical staff or other referring or consulting physicians involved in my care, at the discretion of the Pathologist or my treating physician. 6.  I consent to the photographing or videotaping of the procedures to be performed, including appropriate portions of my body for medical, scientific, or educational purposes, provided my identity is not revealed by the pictures or by descriptive texts accompanying them. If the procedure has been photographed/videotaped, the physician will obtain the original picture, image, videotape or CD. The hospital will not be responsible for storage, release or maintenance of the picture, image, tape or CD.   7.  I consent to the presence of a  or observers in the operating room as deemed necessary by my physician or their designees. 8.  I recognize that in the event my procedure results in extended X-Ray/fluoroscopy time, I may develop a skin reaction. 9.  If I have a Do Not Attempt Resuscitation (DNAR) order in place, that status will be suspended while in the operating room, procedural suite, and during the recovery period unless otherwise explicitly stated by me (or a person authorized to consent on my behalf). The performing physician or my attending physician will determine when the applicable recovery period ends for purposes of reinstating the DNAR order. 10.  I acknowledge that my physician has explained sedation/analgesia administration to me including the risk and benefits I consent to the administration of sedation/analgesia as may be necessary or desirable in the judgment of my physician. I CERTIFY THAT I HAVE READ AND FULLY UNDERSTAND THE ABOVE CONSENT FOR THE PROCEDURE.    Signature of Patient: _____________________________________________________________  Responsible person in case of minor, unconscious: ____________________________________  Relationship to patient:  __________________________________________________________    Signature of Witness: _______________________________Date: _________Time: __________    Patient Name: Humberto Alonso : 1960  Printed: 2022   Medical Record #: [id number]

## (undated) NOTE — MR AVS SNAPSHOT
[facility]  [address] [place] [address], [address] [place] [address]               Thank you for choosing us for your health care visit with Karina Gu MD.  We are glad to serve you and happy to provide you with this summary Take 1 tablet by mouth every 12 (twelve) hours.    Commonly known as:  MUCUS-DM           fenofibrate micronized 134 MG Caps   Take 1 capsule (134 mg total) by mouth daily with breakfast.   Commonly known as:  LOFIBRA           Fluticasone Propionate 50 MCG Where to Get Your Medications      These medications were sent to [facility] - [address] [place] - [address] [place] [address] [address] [facility],[address], [phone], [phone]  [place] [address]     Phone:  [phone]    - azharrison

## (undated) NOTE — MR AVS SNAPSHOT
After Visit Summary   8/23/2017    Gio Encinas    MRN: [id number]           Visit Information     Date & Time  8/23/2017  3:15 PM Provider  MD Narendra Perez. Marcio Callaway  Cardiology Dept.  Phone  [phone]      Your Vi AS NEEDED  FOR  HEADACHE  MAXIMUM   2 PER  24 hours    Hydroxychloroquine Sulfate 200 MG Oral Tab Take 1 tablet (200 mg total) by mouth 2 (two) times daily.     Elastic Bandages & Supports (KNEE BRACE) Does not apply Misc Right knee    triamcinolone acetoni [employer] Activation Code: [id number]  Expires: 10/22/2017  4:03 PM    4. Enter your Zip Code and Date of Birth (mm/dd/yyyy) as indicated and click Next. You will be taken to the next sign-up page. 5. Create a [employer] Username.  This will be your MyChart lo

## (undated) NOTE — LETTER
[address]  Authorization for Surgical Operation and Procedure                                                                                           1. I hereby authorize                             , my physician and his/her assistants (if applicable), which may include medical students, residents, and/or fellows, to perform the following surgical operation/ procedure and administer such anesthesia as may be determined necessary by my physician: Operation/Procedure name (s)   Right breast two site wire localization on Tawny Benitez   2. I recognize that during the surgical operation/procedure, unforeseen conditions may necessitate additional or different procedures than those listed above. I, therefore, further authorize and request that the above-named surgeon, assistants, or designees perform such procedures as are, in their judgment, necessary and desirable. 3.   My surgeon/physician has discussed prior to my surgery the potential benefits, risks and side effects of this procedure; the likelihood of achieving goals; and potential problems that might occur during recuperation. They also discussed reasonable alternatives to the procedure, including risks, benefits, and side effects related to the alternatives and risks related to not receiving this procedure. I have had all my questions answered and I acknowledge that no guarantee has been made as to the result that may be obtained. 4.   Should the need arise during my operation/procedure, which includes change of level of care prior to discharge, I also consent to the administration of blood and/or blood products. Further, I understand that despite careful testing and screening of blood or blood products by collecting agencies, I may still be subject to ill effects as a result of receiving a blood transfusion and/or blood products.   The following are some, but not all, of the potential risks that can occur: fever and allergic reactions, hemolytic reactions, transmission of diseases such as Hepatitis, AIDS and Cytomegalovirus (CMV) and fluid overload. In the event that I wish to have an autologous transfusion of my own blood, or a directed donor transfusion, I will discuss this with my physician. Check only if Refusing Blood or Blood Products  I understand refusal of blood or blood products as deemed necessary by my physician may have serious consequences to my condition to include possible death. I hereby assume responsibility for my refusal and release the hospital, its personnel, and my physicians from any responsibility for the consequences of my refusal.    o  Refuse   5. I authorize the use of any specimen, organs, tissues, body parts or foreign objects that may be removed from my body during the operation/procedure for diagnosis, research or teaching purposes and their subsequent disposal by hospital authorities. I also authorize the release of specimen test results and/or written reports to my treating physician on the hospital medical staff or other referring or consulting physicians involved in my care, at the discretion of the Pathologist or my treating physician. 6.   I consent to the photographing or videotaping of the operations or procedures to be performed, including appropriate portions of my body for medical, scientific, or educational purposes, provided my identity is not revealed by the pictures or by descriptive texts accompanying them. If the procedure has been photographed/videotaped, the surgeon will obtain the original picture, image, videotape or CD. The hospital will not be responsible for storage, release or maintenance of the picture, image, tape or CD.    7.   I consent to the presence of a  or observers in the operating room as deemed necessary by my physician or their designees.     8.   I recognize that in the event my procedure results in extended X-Ray/fluoroscopy time, I may develop a skin reaction. 9. If I have a Do Not Attempt Resuscitation (DNAR) order in place, that status will be suspended while in the operating room, procedural suite, and during the recovery period unless otherwise explicitly stated by me (or a person authorized to consent on my behalf). The surgeon or my attending physician will determine when the applicable recovery period ends for purposes of reinstating the DNAR order. 10. Patients having a sterilization procedure: I understand that if the procedure is successful the results will be permanent and it will therefore be impossible for me to inseminate, conceive, or bear children. I also understand that the procedure is intended to result in sterility, although the result has not been guaranteed. 11. I acknowledge that my physician has explained sedation/analgesia administration to me including the risk and benefits I consent to the administration of sedation/analgesia as may be necessary or desirable in the judgment of my physician. I CERTIFY THAT I HAVE READ AND FULLY UNDERSTAND THE ABOVE CONSENT TO OPERATION and/or OTHER PROCEDURE.     _________________________________________ _________________________________     ___________________________________  Signature of Patient     Signature of Responsible Person                   Printed Name of Responsible Person                              _________________________________________ ______________________________        ___________________________________  Signature of Witness         Date  Time         Relationship to Patient    STATEMENT OF PHYSICIAN My signature below affirms that prior to the time of the procedure; I have explained to the patient and/or his/her legal representative, the risks and benefits involved in the proposed treatment and any reasonable alternative to the proposed treatment.  I have also explained the risks and benefits involved in refusal of the proposed treatment and alternatives to the proposed treatment and have answered the patient's questions.  If I have a significant financial interest in a co-management agreement or a significant financial interest in any product or implant, or other significant relationship used in this procedure/surgery, I have disclosed this and had a discussion with my patient.     _______________________________________________________________ _____________________________  Nnamdi Sommerck of Physician)                                                                                         (Date)                                   (Time)  Patient Name: Carmita Camargo    : 1960   Printed: 2023      Medical Record #: [id number]                                              Page 1 of 1

## (undated) NOTE — MR AVS SNAPSHOT
[facility]  [address], [address] [facility]  [place] [employer] [phone]               Thank you for choosing us for your health care visit with Greta Lee MD, MD.  We are glad to serve you and happy to provide you with this summary o TAKE 1 TABLET BY MOUTH BEFORE BREAKFAST.    Commonly known as:  SYNTHROID           loratadine 10 MG Tabs   1  Tab    oncle  Daily for 1-2  Weeks than prn   Commonly known as:  CLARITIN           Meloxicam 15 MG Tabs   Take 1 tablet (15 mg total) by mouth d Return in about 6 months (around 7/11/2017). MyChart     Visit [employer]  You can access your [employer] to more actively manage your health care and view more details from this visit by going to [email or web address].   If you've recently had a

## (undated) NOTE — LETTER
[address] [facility], [address] [place] [address], [address]  [address] [place] [address] [phone]        Dear Sawyer Morales MD,      I had the pleasure of seeing your patient, Luis F Vigil on 9/14/2017.      Below please find a 30-60 MIN BEFORE BREAKFAST (Patient taking differently: Take 40 mg by mouth as needed.  TAKE 1 CAPSULE BY MOUTH 30-60 MIN BEFORE BREAKFAST ) Disp: 90 capsule Rfl: 3   MetFORMIN HCl 500 MG Oral Tab Take 1 tablet (500 mg total) by mouth 2 (two) times daily wi • Diabetes Mother    • Heart Disorder Mother    • arthritis Moe Ronquillo Mother    • Diabetes Sister    • Diabetes Brother    • Heart Disorder Brother    • Glaucoma Neg    • Macular degeneration Neg       Social History:  Social History    Marital status: Marri Cranial Nerves: II-Visual acuity grossly normal, with full visual fields. Pupil react to light. Fundoscopic exam normal.  III,IV,VI- EOM full, with normal pursuit. V-Facial sensation intact, with symmetric corneal reflex. VII- face symmetric.  VIII- Audito

## (undated) NOTE — LETTER
01/15/19        [address]      Dear Gerardo Mena records indicate that you have outstanding lab work and or testing that was ordered for you and has not yet been completed:  Orders Placed This Encounter

## (undated) NOTE — MR AVS SNAPSHOT
[address]  [address]  [address] [facility]  [phone]               Thank you for choosing us for your health care visit with David Chopra MD.  We are glad to serve you and happy to provide you with this summary apply cream  On affected  Area  every  3  Hr  For 5-  Days           Calcium Carbonate-Vitamin D 600-400 MG-UNIT Tabs   TAKE ONE TABLET BY MOUTH EVERY DAY           fenofibrate micronized 134 MG Caps   Take 1 capsule (134 mg total) by mouth daily with miguelangel view more details from this visit by going to [email or web address]. If you've recently had a stay at the Hospital you can access your discharge instructions in [employer] by going to Visits < Admission Summaries.  If you've been to the Emergency Depar

## (undated) NOTE — ED AVS SNAPSHOT
Rosangela Dhillon   MRN: [id number]    Department:  [facility] Emergency Department   Date of Visit:  8/25/2017           Disclosure     Insurance plans vary and the physician(s) referred by the ER may not be covered by your plan.  Please contact CARE PHYSICIAN AT ONCE OR RETURN IMMEDIATELY TO THE EMERGENCY DEPARTMENT. If you have been prescribed any medication(s), please fill your prescription right away and begin taking the medication(s) as directed.   If you believe that any of the medications

## (undated) NOTE — LETTER
[address], [address], [address] [facility], [address] [place] [address], [address]  [address]  [phone]        Dear Katelin Sanon MD,      I had the pleasure of seeing your patient, Rosa Elena Pedro on 12/19/2017.      Vince Luna Hydroxychloroquine Sulfate 200 MG Oral Tab Take 1 tablet (200 mg total) by mouth 2 (two) times daily.  Disp: 180 tablet Rfl: 1   SUMAtriptan Succinate 100 MG Oral Tab TAKE 1 TABLET BY MOUTH PER  DAY   AS NEEDED  FOR  HEADACHE  MAXIMUM   2 PER  24 hours Disp Years of education:                 Number of children:               Social History Main Topics    Smoking status: Never Smoker                                                                Smokeless tobacco: Never Used                        Alcohol u VII- face symmetric. VIII- Auditory acuity symmetric. Motor: 5/5 strength in the upper and lower extremities. Tone normal. No pronator drift .   DTR: 2+ in the upper and lower extremities,  No Babinski, no hoffmans, no clonus  Coordination: Finger to

## (undated) NOTE — MR AVS SNAPSHOT
[facility]  [address], [address]  [phone]               Thank you for choosing us for your health care visit with Shubham Waller MD.  We are glad to serve you and happy to provide you with this summary Calcium Carbonate-Vitamin D 600-400 MG-UNIT Tabs   TAKE ONE TABLET BY MOUTH EVERY DAY           DM-Guaifenesin ER  MG Tb12   Take 1 tablet by mouth every 12 (twelve) hours.    Commonly known as:  MUCUS-DM           fenofibrate micronized 134 MG Caps TANVIR AT [address], [phone], [phone]  [place], [address] [facility] [address]     Phone:  [phone]    - topiramate 50 MG Tabs            MyChart     Visit MyChart  You can access your MyChart to more actively manage your health

## (undated) NOTE — LETTER
March 14, 2018    Nick Lobato MD  [address] [place] [address]     Patient: Jonel Mary   YOB: 1960   Date of Visit: 3/14/2018       Dear Dr. Martinez Ford MD:    Thank you for referring Enid Westfall to me for evaluation.  Juan complication, not stated as uncontrolled    • Visual impairment     glasses       Surgical History: Argelia Martinez has a past surgical history that includes upper gi endoscopy performed (09-); appendectomy (2005); cholecystectomy (2007); hysterecto hydrocortisone 2.5 % External Cream Apply thin layer on affected area twice per day  1-2 weeks Disp: 1 Tube Rfl: 0   Hydroxychloroquine Sulfate 200 MG Oral Tab Take 1 tablet (200 mg total) by mouth 2 (two) times daily.  Disp: 180 tablet Rfl: 1   SUMAtriptan Conjunctiva/Sclera Temp pinguecula Normal    Cornea Clear Clear    Anterior Chamber Deep and quiet Deep and quiet    Iris Normal Normal    Lens 2-3+ Nuclear sclerosis, Trace Cortical cataract 2+ Nuclear sclerosis, Trace Cortical cataract    Vitreous Vitre sugar control. Diagnosis and treatment discussed in detail with patient. Floaters   There is no evidence of retinal pathology. All signs and symptoms of retinal detachment/tears explained in detail.     Patient instructed to call the office if they e

## (undated) NOTE — LETTER
AUTHORIZATION FOR SURGICAL OPERATION OR OTHER PROCEDURE    1.  I hereby authorize Dr. Denise Gonzalez and the [facility] Office staff assigned to my case to perform the following operation and/or procedure at the [facility] Office:    __________________________________________ ____________________Bushra F Hameed_____________________  (please print)       Patient signature:  ___________________________________________________             Relationship to Patient:           []  Parent    Responsible person

## (undated) NOTE — LETTER
LUIS Notifier: [employer]   RONALDO. Patient Name: Madi Christopher. Identification Number: [id number]      Advance Beneficiary Notice of Noncoverage (ABN)  NOTE:  If Medicare doesn’t pay for D. Item/service(s) below, you may have to pay.   Medicare do want the D. Item/service(s) listed above, but do not bill Medicare. You may ask to be paid now as I am responsible for payment. I cannot appeal if Medicare is not billed. ? OPTION 3. I don’t want the D. Item/service(s) listed above.  I understand with this

## (undated) NOTE — LETTER
AUTHORIZATION FOR SURGICAL OPERATION OR OTHER PROCEDURE    1.  I hereby authorize Dr. Maryann Black and the [facility] Office staff assigned to my case to perform the following operation and/or procedure at the [facility] Office:    _____________________________Botox 200 uni Hameed___________________________________________________  (please print)       Patient signature:  ___________________________________________________             Relationship to Patient:           []  Parent    Responsible person

## (undated) NOTE — LETTER
AUTHORIZATION FOR SURGICAL OPERATION OR OTHER PROCEDURE    1. I hereby authorize Dr. Jose Molina and the [facility] Office staff assigned to my case to perform the following operation and/or procedure at the [facility] Office:    Botox Injections    2.   My physician has exp []  Parent    Responsible person                          []  Spouse  In case of minor or                    [] Other  _____________   Incompetent name:  __________________________________________________                               (please prin

## (undated) NOTE — ED AVS SNAPSHOT
[facility] in [address] [facility].  [address]    Phone:  [phone]    Fax:  Georges Jain   MRN: [id number]    Department:  [facility] in [address] [place] [address]   Date of Visit:  2 Discharge References/Attachments     PHARYNGITIS, VIRAL (ENGLISH)      Disclosure     Insurance plans vary and the physician(s) referred by the Immediate Care may not be covered by your plan.   It is possible that the physician may not participa IF THERE IS ANY CHANGE OR WORSENING OF YOUR CONDITION, CALL YOUR PRIMARY CARE PHYSICIAN AT ONCE OR GO TO THE EMERGENCY DEPARTMENT.     If you have been prescribed any medication(s), please fill your prescription right away and begin taking the medication(s) you to explore options for quitting.     - If you have concerns related to behavioral health issues or thoughts of harming yourself, contact [facility] at [phone].     - If you don’t have insurance, Filiberto Barraza

## (undated) NOTE — MR AVS SNAPSHOT
[facility]  [address], [address] [facility]  [address] [facility] [phone]               Thank you for choosing us for your health care visit with Gopal Kellogg MD, MD.  We are glad to serve you and happy to provide you with this summary o Hydroxychloroquine Sulfate 200 MG Tabs   Take 1 tablet (200 mg total) by mouth 2 (two) times daily. Commonly known as:  PLAQUENIL           ibuprofen 600 MG Tabs   as needed.    Commonly known as:  MOTRIN           Knee Brace Misc   Right knee If you've recently had a stay at the Hospital you can access your discharge instructions in [employer] by going to Visits < Admission Summaries.  If you've been to the Emergency Department or your doctor's office, you can view your past visit information in My

## (undated) NOTE — Clinical Note
March 7, 2017    Samantha Saldana MD  [address] [place] [address]     Patient: Fredy Arias   YOB: 1960   Date of Visit: 3/7/2017       Dear Dr. Zina Alarcon MD:    Thank you for referring Nakita Chavez to me for evaluation.  Here • arthritis[other] Pleas Client Mother    • Diabetes Sister    • Diabetes Brother    • Glaucoma Neg    • Macular degeneration Neg        Social History:   Smoking Status: Never Smoker                      Smokeless Status: Never Used                        Alco Blood Glucose Monitoring Suppl (TRUETRACK BLOOD GLUCOSE) W/DEVICE Does not apply Kit To check blood sugars twice a day. Disp: 1 kit Rfl: 0   Glucose Blood (TRUETEST TEST) In Vitro Strip Check blood sugars twice a day.  Disp: 100 each Rfl: 0   CALCIUM CARBON Fundus Exam    Right Left    Disc Good rim, Temporal crescent Good rim, Temporal crescent    C/D Ratio 0.5 0.5    Macula Normal- no BDR Normal- no BDR    Vessels Normal Normal    Periphery Normal Normal            Refraction     Wearing Rx      Sphere Cyli CC: Socorro Lobo MD    Document electronically generated by: Yodit Mackenzie

## (undated) NOTE — LETTER
7/19/2017              Tawny Benitez        [address]        [facility] [address]         Dear Huy Camargo,  ? Our records indicate that you had an appointment on 5/16/2017 with Meng Lawrence MD that you failed to keep.   Your healthcare is ve

## (undated) NOTE — LETTER
[address] [facility] MRI  [address]  [address] [address]  [phone]  Authorization for Imaging Procedure    1. I hereby authorize Dr. Jeff Santos, my physician and his/her assistants (if applicable), which may include medical students, residents, and/or fellows, to perform the following procedure and administer such anesthesia as may be determined necessary by my physician: José Miguel Troncoso on [facility]. 2.  I recognize that during the procedure, unforeseen conditions may necessitate additional or different procedures than those listed above. I, therefore, further authorize and request that the above-named physician, assistants, or designees perform such procedures as are, in their judgment, necessary and desirable. 3.  My physician has discussed prior to my procedure the potential benefits, risks and side effects of this procedure; the likelihood of achieving goals; and potential problems that might occur during recuperation. They also discussed reasonable alternatives to the procedure, including risks, benefits, and side effects related to the alternatives and risks related to not receiving this procedure. I have had all my questions answered and I acknowledge that no guarantee has been made as to the result that may be obtained. 4.  Should the need arise during my procedure, which includes change of level of care prior to discharge, I also consent to the administration of blood and/or blood products. Further, I understand that despite careful testing and screening of blood or blood products by collecting agencies, I may still be subject to ill effects as a result of receiving a blood transfusion and/or blood products.  The following are some, but not all, of the potential risks that can occur: fever and allergic reactions, hemolytic reactions, transmission of diseases such as Hepatitis, AIDS and Cytomegalovirus (CMV) and fluid overload. In the event that I wish to have an autologous transfusion of my own blood, or a directed donor transfusion, I will discuss this with my physician. Check only if Refusing Blood or Blood Products  I understand refusal of blood or blood products as deemed necessary by my physician may have serious consequences to my condition to include possible death. I hereby assume responsibility for my refusal and release the hospital, its personnel, and my physicians from any responsibility for the consequences of my refusal.   [  ] Patient Refuses Blood      5. I authorize the use of any specimen, organs, tissues, body parts or foreign objects that may be removed from my body during the procedure for diagnosis, research or teaching purposes and their subsequent disposal by hospital authorities. I also authorize the release of specimen test results and/or written reports to my treating physician on the hospital medical staff or other referring or consulting physicians involved in my care, at the discretion of the Pathologist or my treating physician. 6.  I consent to the photographing or videotaping of the procedures to be performed, including appropriate portions of my body for medical, scientific, or educational purposes, provided my identity is not revealed by the pictures or by descriptive texts accompanying them. If the procedure has been photographed/videotaped, the physician will obtain the original picture, image, videotape or CD. The hospital will not be responsible for storage, release or maintenance of the picture, image, tape or CD.   7.  I consent to the presence of a  or observers in the operating room as deemed necessary by my physician or their designees. 8.  I recognize that in the event my procedure results in extended X-Ray/fluoroscopy time, I may develop a skin reaction. 9.   If I have a Do Not Attempt Resuscitation (DNAR) order in place, that status will be suspended while in the operating room, procedural suite, and during the recovery period unless otherwise explicitly stated by me (or a person authorized to consent on my behalf). The performing physician or my attending physician will determine when the applicable recovery period ends for purposes of reinstating the DNAR order. 10.  I acknowledge that my physician has explained sedation/analgesia administration to me including the risk and benefits I consent to the administration of sedation/analgesia as may be necessary or desirable in the judgment of my physician. I CERTIFY THAT I HAVE READ AND FULLY UNDERSTAND THE ABOVE CONSENT FOR THE PROCEDURE.      Signature of Patient: _____________________________________________________________  Responsible person in case of minor, unconscious: ____________________________________  Relationship to patient:  __________________________________________________________    Signature of Witness: _______________________________Date: _________Time: __________    Signature of PROVIDER: _______________________________Date: _________Time: __________    Patient Name: Hardik Bowens : 1960  Printed: 2023   Medical Record #: [id number]

## (undated) NOTE — LETTER
AUTHORIZATION FOR SURGICAL OPERATION OR OTHER PROCEDURE    1.  I hereby authorize Dr. Agapito Moe and [facility] staff assigned to my case to perform the following operation and/or procedure at the [facility]:    Cortisone injection in Right Time:  ________ A. M.  P.M.        Patient Name:  ______________________________________________________  (please print)      Patient signature:  ___________________________________________________             Relationship to Patient:

## (undated) NOTE — LETTER
3/17/2021              Tawny Benitez        [address] [address]         To Whom It May Concern,    Monique Lucia is currently under my medical care. Tawny is eligible for the COVID 19 vaccine.   If you have any questions

## (undated) NOTE — MR AVS SNAPSHOT
[facility]  [address] [place] [address], [address]  Sae [place]               Thank you for choosing us for your health care visit with Wes Francois MD.  We are glad to serve you and happy to provide you with this summary Fax:  [phone]    Diagnoses:  SVT (supraventricular tachycardia) [facility])   Order:  [address] Internal    Zandra Liz MD   Doctor [address] [address]   Phone:  [phone]   Fax:  [phone]             Dulce Smart authorization, such as [place], please feel free to schedule your appointment immediately. However, if you are unsure about the requirements for authorization, please wait 5-7 days and then contact your physician's   office.  At that time, you ari Commonly known as:  SYNTHROID           loratadine 10 MG Tabs   1  Tab    oncle  Daily for 1-2  Weeks than prn   Commonly known as:  CLARITIN           Meloxicam 15 MG Tabs   Take 1 tablet (15 mg total) by mouth daily.  With  Food   What changed:  additiona Summaries. If you've been to the Emergency Department or your doctor's office, you can view your past visit information in [employer] by going to Visits < Visit Summaries. [facility] questions? Call [phone] for help.   [facility] is NOT to be used for urge

## (undated) NOTE — LETTER
03/15/21        [address]      Dear Gary Ortiz records indicate that you have outstanding lab work and or testing that was ordered for you and has not yet been completed:  Orders Placed This Encounter

## (undated) NOTE — MR AVS SNAPSHOT
[address] [address]  [phone]               Thank you for choosing us for your health care visit with Jeni Maynard MD.  We are glad to serve you and happy to provide you with this graham TAKE ONE TABLET BY MOUTH EVERY DAY           fenofibrate micronized 134 MG Caps   Take 1 capsule (134 mg total) by mouth daily with breakfast.   Commonly known as:  LOFIBRA           Glucose Blood Strp   Check blood sugars twice a day.    Commonly known as: TANVIR AT [address],[facility], [phone], [phone]  Tonya, [address] [facility] [address]     Phone:  [phone]    - Hydroxychloroquine Sulfate 200 MG Tabs            MyChart     Visit MyChart  You can access your MyChart to more actively m

## (undated) NOTE — ED AVS SNAPSHOT
[facility]mediate [facility] in [address] [address] [address]    Phone:  [phone]    Fax:  Brittney César   MRN: [id number]    Department:  [facility] Immediate Care in [address] [place] [address]   Date of Visit:  1 CONTINUE taking these medications     TRUETRACK BLOOD GLUCOSE w/Device Kit   Quantity:  1 kit   To check blood sugars twice a day.             Where to Get Your Medications      These medications were sent to [address][id number] - [address] [facility], [address] [place] [address] A you.  You are our top priority. You were examined and treated today on an urgent basis only. This was not a substitute for ongoing medical care. Often, one Immediate Care visit does not uncover every injury or illness.  If you have been referred to a pr pertaining to these instructions have been answered in a satisfactory manner. 24-Hour Pharmacies        Pharmacy Address Phone Number   Jai Terry [address] [facility] ([address]) [facility] Call [phone] for help. [facility] is NOT to be used for urgent needs. For medical emergencies, dial 911.

## (undated) NOTE — Clinical Note
[address] [facility], [address] [place] [address], [address]  Kristina Mcginnis [phone]        Dear Regine Araiza MD,      I had the pleasure of seeing your patient, Sunny Francois on 1/11/2017.      Below please find a Fluticasone Propionate 50 MCG/ACT Nasal Suspension 1-2 sprays by Nasal route daily.  Disp: 16 g Rfl: 0   OMEPRAZOLE 40 MG Oral Capsule Delayed Release TAKE 1 CAPSULE BY MOUTH 30-60 MIN BEFORE BREAKFAST Disp: 90 capsule Rfl: 0   Levothyroxine Sodium 50 MCG O Past Surgical History    CHOLECYSTECTOMY      HYSTERECTOMY      APPENDECTOMY      UPPER GI ENDOSCOPY PERFORMED  09-      Family History   Problem Relation Age of Onset   • Diabetes Father    • Diabetes Mother    • Heart Disorder Mother    • arthriti times three. Cranial Nerves: II-Visual acuity grossly normal, with full visual fields. Pupil react to light. Fundoscopic exam normal.  III,IV,VI- EOM full, with normal pursuit. V-Facial sensation intact, with symmetric corneal reflex.  VII- face symmet

## (undated) NOTE — ED AVS SNAPSHOT
Sonido April   MRN: [id number]    Department:  [facility] Emergency Department   Date of Visit:  12/11/2018           Disclosure     Insurance plans vary and the physician(s) referred by the ER may not be covered by your plan.  Please contact CARE PHYSICIAN AT ONCE OR RETURN IMMEDIATELY TO THE EMERGENCY DEPARTMENT. If you have been prescribed any medication(s), please fill your prescription right away and begin taking the medication(s) as directed.   If you believe that any of the medications

## (undated) NOTE — LETTER
05/31/18        [address]      Dear Julius Vyas records indicate that you have outstanding lab work and or testing that was ordered for you and has not yet been completed:          Lipid Panel [E]      Co

## (undated) NOTE — LETTER
[facility]  [address]  [address]  [facility] [address]  [phone]  Authorization for Imaging Procedure  Date of Procedure:     1. I hereby authorize Dr. Hortencia Rosa , my physician and his/her assistants (if applicable), which may include medical students, residents, and/or fellows, to perform the following procedure and administer such anesthesia as may be determined necessary by my physician: ULTRASOUND GUIDED RIGHT BREAST BIOPSY WITH CLIP LOANMENTSon Myron Eason. 2.  I recognize that during the procedure, unforeseen conditions may necessitate additional or different procedures than those listed above. I, therefore, further authorize and request that the above-named physician, assistants, or designees perform such procedures as are, in their judgment, necessary and desirable. 3.  My physician has discussed prior to my procedure the potential benefits, risks and side effects of this procedure; the likelihood of achieving goals; and potential problems that might occur during recuperation. They also discussed reasonable alternatives to the procedure, including risks, benefits, and side effects related to the alternatives and risks related to not receiving this procedure. I have had all my questions answered and I acknowledge that no guarantee has been made as to the result that may be obtained. 4.  Should the need arise during my procedure, which includes change of level of care prior to discharge, I also consent to the administration of blood and/or blood products. Further, I understand that despite careful testing and screening of blood or blood products by collecting agencies, I may still be subject to ill effects as a result of receiving a blood transfusion and/or blood products.  The following are some, but not all, of the potential risks that can occur: fever and allergic reactions, hemolytic reactions, transmission of diseases such as Hepatitis, AIDS and Cytomegalovirus (CMV) and fluid overload. In the event that I wish to have an autologous transfusion of my own blood, or a directed donor transfusion, I will discuss this with my physician. Check only if Refusing Blood or Blood Products  I understand refusal of blood or blood products as deemed necessary by my physician may have serious consequences to my condition to include possible death. I hereby assume responsibility for my refusal and release the hospital, its personnel, and my physicians from any responsibility for the consequences of my refusal.   [  ] Patient Refuses Blood      5. I authorize the use of any specimen, organs, tissues, body parts or foreign objects that may be removed from my body during the procedure for diagnosis, research or teaching purposes and their subsequent disposal by hospital authorities. I also authorize the release of specimen test results and/or written reports to my treating physician on the hospital medical staff or other referring or consulting physicians involved in my care, at the discretion of the Pathologist or my treating physician. 6.  I consent to the photographing or videotaping of the procedures to be performed, including appropriate portions of my body for medical, scientific, or educational purposes, provided my identity is not revealed by the pictures or by descriptive texts accompanying them. If the procedure has been photographed/videotaped, the physician will obtain the original picture, image, videotape or CD. The hospital will not be responsible for storage, release or maintenance of the picture, image, tape or CD.   7.  I consent to the presence of a  or observers in the operating room as deemed necessary by my physician or their designees. 8.  I recognize that in the event my procedure results in extended X-Ray/fluoroscopy time, I may develop a skin reaction. 9.   If I have a Do Not Attempt Resuscitation (DNAR) order in place, that status will be suspended while in the operating room, procedural suite, and during the recovery period unless otherwise explicitly stated by me (or a person authorized to consent on my behalf). The performing physician or my attending physician will determine when the applicable recovery period ends for purposes of reinstating the DNAR order. 10.  I acknowledge that my physician has explained sedation/analgesia administration to me including the risk and benefits I consent to the administration of sedation/analgesia as may be necessary or desirable in the judgment of my physician. I CERTIFY THAT I HAVE READ AND FULLY UNDERSTAND THE ABOVE CONSENT FOR THE PROCEDURE.    Signature of Patient: _____________________________________________________________  Responsible person in case of minor, unconscious: ____________________________________  Relationship to patient:  __________________________________________________________    Signature of Witness: _______________________________Date: _________Time: __________    Patient Name: Salena Moran : 1960  Printed: 2022   Medical Record #: [id number]

## (undated) NOTE — MR AVS SNAPSHOT
[facility]  [address] [place] [address], [address] [place] [address]               Thank you for choosing us for your health care visit with Dahiana German MD.  We are glad to serve you and happy to provide you with this summary Easton Rdz MD   [address]   Phone:  [phone]   Fax:  [phone]    Diagnoses:  Encounter for diabetic foot exam [facility])   Right foot pain   Order:  Podiatry - Internal    Agnieszka Marshall DPM   [address] Today's Vital Signs     BP Pulse Temp Height Weight BMI    100/67 mmHg 76 97.9 °F (36.6 °C) (Oral) 5' 4\" (1.626 m) 182 lb (82.555 kg) 31.22 kg/m2         Current Medications          This list is accurate as of: 4/4/17 11:59 PM.  Always use your SUMAtriptan Succinate 100 MG Tabs   TAKE 1 TABLET BY MOUTH PER  DAY   AS NEEDED  FOR  HEADACHE  MAXIMUM   2 PER  24 hours   Commonly known as:  IMITREX           topiramate 50 MG Tabs   Take 1.5 tablets (75 mg total) by mouth 2 (two) times daily.    Common

## (undated) NOTE — LETTER
[address] [facility], [address] [place] [address], [address]  [address] [place] [address] [phone]        Dear Eunice Henriquez MD,      I had the pleasure of seeing your patient, Gideon Doherty on 7/11/2017.      Below please find a daily. Disp: 30 capsule Rfl: 12   Omeprazole 40 MG Oral Capsule Delayed Release TAKE 1 CAPSULE BY MOUTH 30-60 MIN BEFORE BREAKFAST Disp: 90 capsule Rfl: 3   MetFORMIN HCl 500 MG Oral Tab Take 1 tablet (500 mg total) by mouth 2 (two) times daily with meals. • Acid reflux    • Age-related nuclear cataract of both eyes 4/9/2015   • Arrhythmia    • Arthritis    • Cataract    • Esophagitis 09-   • Floaters 4/9/2015   • Migraines    • Other and unspecified hyperlipidemia    • S/P ablation of accessory bypas MUSCULOSKELETAL: no joint complaints upper or lower extremities  PSYCHE:no depression or anxiety  NEURO: As in HPI    EXAM:     Vitals:  BP 92/60 (BP Location: Right arm, Patient Position: Sitting, Cuff Size: adult)   Pulse 72   Resp 16   Ht 64\"   Wt 175 decided to repeat the sed rate. Decision on temporal artery biopsy will be made once the results are available. She will call me later this week to review the blood test results. Thank you very much. Return in about 6 months (around 1/11/2018).     Sam Connelly

## (undated) NOTE — LETTER
2/13/2019              Tawny Benitez        [address] [facility] [address]         To Whom It May Concern,    Tawny Benitez has been a patient with Dr. Misbah Lares for the past 8 years.  If any questions please contact our office a

## (undated) NOTE — Clinical Note
[address] [facility], [address] [place] [address], [address]  [address] [place] [address] [phone]        Dear Cory Danielle MD,      I had the pleasure of seeing your patient, Argelia Martinez on 5/2/2017.      Below please find a s simvastatin 10 MG Oral Tab Take 1 tablet (10 mg total) by mouth nightly.  Disp: 90 tablet Rfl: 3   Omeprazole 40 MG Oral Capsule Delayed Release TAKE 1 CAPSULE BY MOUTH 30-60 MIN BEFORE BREAKFAST Disp: 90 capsule Rfl: 3   Elastic Bandages & Supports (KNEE B • Type II or unspecified type diabetes mellitus without mention of complication, not stated as uncontrolled    • Acid reflux    • Esophagitis 09-   • Arrhythmia           Past Surgical History    CHOLECYSTECTOMY      HYSTERECTOMY      APPENDECTOMY Higher Integrative Functions:  Alert and cooperative, with normal attention span and concentration. Speech and language normal.  Oriented times three. Cranial Nerves: II-Visual acuity grossly normal, with full visual fields. Pupil react to light.   Suha George

## (undated) NOTE — LETTER
[address], [address], [address] [facility], [address] [place] [address], [address]  [address]  [phone]        Dear Collins Jay MD,      I had the pleasure of seeing your patient, Karen Braswell on 2/28/2018.      Below Levothyroxine Sodium 50 MCG Oral Tab TAKE 1 TABLET BY MOUTH BEFORE BREAKFAST Disp: 90 tablet Rfl: 2   hydrocortisone 2.5 % External Cream Apply thin layer on affected area twice per day  1-2 weeks Disp: 1 Tube Rfl: 0   sulfaSALAzine 500 MG Oral Tab Take 1 09-: UPPER GI ENDOSCOPY PERFORMED   Family History   Problem Relation Age of Onset   • Diabetes Father    • Diabetes Mother    • Heart Disorder Mother    • arthritis Moe Ronquillo Mother    • Diabetes Sister    • Diabetes Brother    • Heart Disorder Broth attention span and concentration. Speech and language normal.  Oriented times three. Cranial Nerves: II-Visual acuity grossly normal, with full visual fields. Pupil react to light. Fundoscopic exam normal.  III,IV,VI- EOM full, with normal pursuit.  V-

## (undated) NOTE — LETTER
05/17/19        [address]      Dear Willie Session records indicate that you have outstanding lab work and or testing that was ordered for you and has not yet been completed:  Orders Placed This Encounter

## (undated) NOTE — LETTER
06/06/19        [address]      Dear Shy Yen records indicate that you have outstanding lab work and or testing that was ordered for you and has not yet been completed:  Orders Placed This Encounter

## (undated) NOTE — MR AVS SNAPSHOT
[address]  [phone]               Thank you for choosing us for your health care visit with Alona Cockayne, MD.  We are glad to serve you and happy to provide you with this graham PHYSICAL THERAPY - at [facility]    Complete by:  As directed    Assoc Dx:  DDD (degenerative disc disease), lumbar [M51.36], Primary osteoarthritis of right knee [M17.11]                 Follow-up Instructions     Return in about 4 weeks (around 5 [facility] Main (Blue Parking) (Yellow Parking)  [address]   [address] [facility],  [address], [address] [facility]  [address]  [address] [facility] responsibility. If you have questions, please call your plans customer service number located on your ID card. To schedule Physical Therapy at any of the [facility] facilities, please call [phone].     [facility] Take 1 capsule (134 mg total) by mouth daily with breakfast.   Commonly known as:  LOFIBRA           Glucose Blood Strp   Check blood sugars twice a day.    Commonly known as:  TRUETEST TEST           Hydroxychloroquine Sulfate 200 MG Tabs   Take 1 tablet ( If you've recently had a stay at the Hospital you can access your discharge instructions in [employer] by going to Visits < Admission Summaries.  If you've been to the Emergency Department or your doctor's office, you can view your past visit information in My

## (undated) NOTE — MR AVS SNAPSHOT
[facility]  [address] [place] [address], [address] [place] [address]  Yasir Nessa               Thank you for choosing us for your health care visit with Nick Lobato MD.  We are glad to serve you and happy to provide you with this summary TAKE 1 TABLET BY MOUTH BEFORE BREAKFAST.    Commonly known as:  SYNTHROID           loratadine 10 MG Tabs   1  Tab    oncle  Daily for 1-2  Weeks than prn   Commonly known as:  CLARITIN           Meloxicam 15 MG Tabs   Take 1 tablet (15 mg total) by mouth d Assoc Dx:  Type 2 diabetes mellitus with complication, without long-term current use of insulin (HCC) [E11.8]           Comp Metabolic Panel (14)    Complete by:  Jan 11, 2017 (Approximate)    Assoc Dx:  Type 2 diabetes mellitus with complication, without For medical emergencies, dial 911.            Visit [facility] online at  [email or web address]

## (undated) NOTE — MR AVS SNAPSHOT
[place]  [address]  [phone]               Thank you for choosing us for your health care visit with Shaye Bob MD.  We are glad to serve you and happy to provide you with this summa options such as surgery or change of glasses RX. Discussed surgical risks, benefits, alternatives and recovery.   Patient defers surgery and will try new glasses first.       As Dr. Steven Monge is no longer performing cataract surgery, a referral to Dr. Stewart Apo simvastatin 10 MG Tabs   Take 1 tablet (10 mg total) by mouth nightly.    Commonly known as:  ZOCOR           SUMAtriptan Succinate 100 MG Tabs   TAKE 1 TABLET BY MOUTH PER  DAY   AS NEEDED  FOR  HEADACHE  MAXIMUM   2 PER  24 hours   Commonly known as:  IM

## (undated) NOTE — MR AVS SNAPSHOT
[facility]  [address] [place] [address], [address] [place] [address]               Thank you for choosing us for your health care visit with Criss Mercado MD.  We are glad to serve you and happy to provide you with this summary Test Results     Medication Request           Medical Issues Discussed Today     Diabetes mellitus    Mixed hyperlipidemia    Skin disorder          Instructions and Information about Your Health     None      Allergies as of Jun 07, 2017     Codeine Naus Take 1 tablet (10 mg total) by mouth nightly.    Commonly known as:  ZOCOR           SUMAtriptan Succinate 100 MG Tabs   TAKE 1 TABLET BY MOUTH PER  DAY   AS NEEDED  FOR  HEADACHE  MAXIMUM   2 PER  24 hours   Commonly known as:  IMITREX           topiramate

## (undated) NOTE — LETTER
Medical Grade Compression Hose     Patient: Tawny Benitez     YOB: 1960         Diagnosis: Varicose Veins with Pain- Bilateral: I83.813  Compression: 20-30mmHg- Moderate  Style: Thigh-High        Amount: X 2  HCPS: - Thigh High          Physician Signature: _____________________  Date: 5/6/2025     LING Navarro